# Patient Record
Sex: FEMALE | Race: WHITE | NOT HISPANIC OR LATINO | Employment: FULL TIME | URBAN - METROPOLITAN AREA
[De-identification: names, ages, dates, MRNs, and addresses within clinical notes are randomized per-mention and may not be internally consistent; named-entity substitution may affect disease eponyms.]

---

## 2016-07-11 LAB — PAP-ABSTRACT: NORMAL

## 2017-02-07 ENCOUNTER — OFFICE VISIT (OUTPATIENT)
Dept: INTERNAL MEDICINE | Facility: CLINIC | Age: 47
End: 2017-02-07
Payer: COMMERCIAL

## 2017-02-07 VITALS
TEMPERATURE: 98.3 F | HEART RATE: 72 BPM | RESPIRATION RATE: 16 BRPM | OXYGEN SATURATION: 98 % | HEIGHT: 60 IN | WEIGHT: 136 LBS | BODY MASS INDEX: 26.7 KG/M2

## 2017-02-07 DIAGNOSIS — G43.109 MIGRAINE WITH AURA AND WITHOUT STATUS MIGRAINOSUS, NOT INTRACTABLE: ICD-10-CM

## 2017-02-07 DIAGNOSIS — F41.8 DEPRESSION WITH ANXIETY: Primary | ICD-10-CM

## 2017-02-07 PROCEDURE — 99212 OFFICE O/P EST SF 10 MIN: CPT | Performed by: INTERNAL MEDICINE

## 2017-02-07 RX ORDER — RIZATRIPTAN BENZOATE 10 MG/1
10 TABLET ORAL
Qty: 8 TABLET | Refills: 0 | Status: SHIPPED | OUTPATIENT
Start: 2017-02-07 | End: 2017-03-23

## 2017-02-07 RX ORDER — FLUOXETINE 40 MG/1
40 CAPSULE ORAL DAILY
Qty: 30 CAPSULE | Refills: 0 | Status: SHIPPED | OUTPATIENT
Start: 2017-02-07 | End: 2017-03-23

## 2017-02-07 NOTE — PROGRESS NOTES
SUBJECTIVE:                                                      HPI: Nikki Hardin is a pleasant 46 year old female who presents for medication refill only:    Needs refills of Prozac and Maxalt.    Has enough medication right now, but will run out several weeks before meeting with new PCP March 23rd.    No questions or concerns today.    The medication, allergy, and problem lists have been reviewed and updated as appropriate.     OBJECTIVE:                                                      Pulse 72  Temp(Src) 98.3  F (36.8  C) (Oral)  Resp 16  Ht 5' (1.524 m)  Wt 136 lb (61.689 kg)  BMI 26.56 kg/m2  SpO2 98%  Constitutional: well-appearing  Psych: normal judgment and insight; normal mood and affect; recent and remote memory intact    ASSESSMENT/PLAN:                                                      (F41.8) Depression with anxiety  (primary encounter diagnosis)  Plan: Prozac refill provided.     (G43.109) Migraine with aura and without status migrainosus, not intractable  Plan: Maxalt refill provided.     The instructions on the AVS were discussed and explained to the patient. Patient expressed understanding of instructions.    Ileana Strange MD   [place] [address] [address] [address]  T: [phone], F: [phone]

## 2017-02-07 NOTE — NURSING NOTE
Chief Complaint   Patient presents with     Medication Request     migraine and depression medications       Initial Pulse 72  Temp(Src) 98.3  F (36.8  C) (Oral)  Resp 16  Ht 5' (1.524 m)  Wt 136 lb (61.689 kg)  BMI 26.56 kg/m2  SpO2 98% Estimated body mass index is 26.56 kg/(m^2) as calculated from the following:    Height as of this encounter: 5' (1.524 m).    Weight as of this encounter: 136 lb (61.689 kg).  Medication Reconciliation: complete   Esther Mejia MA

## 2017-03-23 ENCOUNTER — OFFICE VISIT (OUTPATIENT)
Dept: INTERNAL MEDICINE | Facility: CLINIC | Age: 47
End: 2017-03-23
Payer: COMMERCIAL

## 2017-03-23 ENCOUNTER — TRANSFERRED RECORDS (OUTPATIENT)
Dept: HEALTH INFORMATION MANAGEMENT | Facility: CLINIC | Age: 47
End: 2017-03-23

## 2017-03-23 VITALS
SYSTOLIC BLOOD PRESSURE: 100 MMHG | HEART RATE: 64 BPM | OXYGEN SATURATION: 99 % | WEIGHT: 142 LBS | BODY MASS INDEX: 27.88 KG/M2 | TEMPERATURE: 97.8 F | RESPIRATION RATE: 16 BRPM | HEIGHT: 60 IN | DIASTOLIC BLOOD PRESSURE: 60 MMHG

## 2017-03-23 DIAGNOSIS — E55.9 VITAMIN D DEFICIENCY: ICD-10-CM

## 2017-03-23 DIAGNOSIS — M35.00: ICD-10-CM

## 2017-03-23 DIAGNOSIS — R53.83 FATIGUE, UNSPECIFIED TYPE: ICD-10-CM

## 2017-03-23 DIAGNOSIS — K52.839 MICROSCOPIC COLITIS, UNSPECIFIED: Primary | ICD-10-CM

## 2017-03-23 DIAGNOSIS — G43.109 MIGRAINE WITH AURA AND WITHOUT STATUS MIGRAINOSUS, NOT INTRACTABLE: ICD-10-CM

## 2017-03-23 DIAGNOSIS — F41.8 DEPRESSION WITH ANXIETY: ICD-10-CM

## 2017-03-23 LAB
ERYTHROCYTE [DISTWIDTH] IN BLOOD BY AUTOMATED COUNT: 13.5 % (ref 10–15)
ERYTHROCYTE [SEDIMENTATION RATE] IN BLOOD BY WESTERGREN METHOD: 21 MM/H (ref 0–20)
HCT VFR BLD AUTO: 34.3 % (ref 35–47)
HGB BLD-MCNC: 11.2 G/DL (ref 11.7–15.7)
MCH RBC QN AUTO: 29.4 PG (ref 26.5–33)
MCHC RBC AUTO-ENTMCNC: 32.7 G/DL (ref 31.5–36.5)
MCV RBC AUTO: 90 FL (ref 78–100)
PLATELET # BLD AUTO: 299 10E9/L (ref 150–450)
RBC # BLD AUTO: 3.81 10E12/L (ref 3.8–5.2)
WBC # BLD AUTO: 8.2 10E9/L (ref 4–11)

## 2017-03-23 PROCEDURE — 85027 COMPLETE CBC AUTOMATED: CPT | Performed by: INTERNAL MEDICINE

## 2017-03-23 PROCEDURE — 82180 ASSAY OF ASCORBIC ACID: CPT | Mod: 90 | Performed by: INTERNAL MEDICINE

## 2017-03-23 PROCEDURE — 84425 ASSAY OF VITAMIN B-1: CPT | Mod: 90 | Performed by: INTERNAL MEDICINE

## 2017-03-23 PROCEDURE — 84439 ASSAY OF FREE THYROXINE: CPT | Performed by: INTERNAL MEDICINE

## 2017-03-23 PROCEDURE — 99215 OFFICE O/P EST HI 40 MIN: CPT | Performed by: INTERNAL MEDICINE

## 2017-03-23 PROCEDURE — 80053 COMPREHEN METABOLIC PANEL: CPT | Performed by: INTERNAL MEDICINE

## 2017-03-23 PROCEDURE — 36415 COLL VENOUS BLD VENIPUNCTURE: CPT | Performed by: INTERNAL MEDICINE

## 2017-03-23 PROCEDURE — 83516 IMMUNOASSAY NONANTIBODY: CPT | Mod: 91 | Performed by: INTERNAL MEDICINE

## 2017-03-23 PROCEDURE — 86255 FLUORESCENT ANTIBODY SCREEN: CPT | Mod: 90 | Performed by: INTERNAL MEDICINE

## 2017-03-23 PROCEDURE — 82784 ASSAY IGA/IGD/IGG/IGM EACH: CPT | Performed by: INTERNAL MEDICINE

## 2017-03-23 PROCEDURE — 82728 ASSAY OF FERRITIN: CPT | Performed by: INTERNAL MEDICINE

## 2017-03-23 PROCEDURE — 82306 VITAMIN D 25 HYDROXY: CPT | Performed by: INTERNAL MEDICINE

## 2017-03-23 PROCEDURE — 83550 IRON BINDING TEST: CPT | Performed by: INTERNAL MEDICINE

## 2017-03-23 PROCEDURE — 84443 ASSAY THYROID STIM HORMONE: CPT | Performed by: INTERNAL MEDICINE

## 2017-03-23 PROCEDURE — 84207 ASSAY OF VITAMIN B-6: CPT | Mod: 90 | Performed by: INTERNAL MEDICINE

## 2017-03-23 PROCEDURE — 82746 ASSAY OF FOLIC ACID SERUM: CPT | Performed by: INTERNAL MEDICINE

## 2017-03-23 PROCEDURE — 99000 SPECIMEN HANDLING OFFICE-LAB: CPT | Performed by: INTERNAL MEDICINE

## 2017-03-23 PROCEDURE — 85652 RBC SED RATE AUTOMATED: CPT | Performed by: INTERNAL MEDICINE

## 2017-03-23 PROCEDURE — 82607 VITAMIN B-12: CPT | Performed by: INTERNAL MEDICINE

## 2017-03-23 PROCEDURE — 83540 ASSAY OF IRON: CPT | Performed by: INTERNAL MEDICINE

## 2017-03-23 PROCEDURE — 86235 NUCLEAR ANTIGEN ANTIBODY: CPT | Performed by: INTERNAL MEDICINE

## 2017-03-23 PROCEDURE — 83516 IMMUNOASSAY NONANTIBODY: CPT | Performed by: INTERNAL MEDICINE

## 2017-03-23 RX ORDER — NAPROXEN SODIUM 220 MG
TABLET ORAL PRN
Qty: 60 TABLET | COMMUNITY
Start: 2017-03-23 | End: 2019-07-11

## 2017-03-23 RX ORDER — RIZATRIPTAN BENZOATE 10 MG/1
10 TABLET ORAL
Qty: 8 TABLET | Refills: 3 | Status: SHIPPED | OUTPATIENT
Start: 2017-03-23 | End: 2017-04-13

## 2017-03-23 RX ORDER — FLUOXETINE 40 MG/1
40 CAPSULE ORAL DAILY
Qty: 90 CAPSULE | Refills: 0 | Status: SHIPPED | OUTPATIENT
Start: 2017-03-23 | End: 2017-07-04

## 2017-03-23 NOTE — MR AVS SNAPSHOT
After Visit Summary   3/23/2017    Nikki Hardin    MRN: [id number]           Patient Information     Date Of Birth          1970        Visit Information        Provider Department      3/23/2017 4:30 PM Vandana Castellanos MD [facility]        Today's Diagnoses     Microscopic colitis, unspecified    -  1    Vitamin D deficiency        Migraine with aura and without status migrainosus, not intractable        Sicca syndrome, Sjogren's (H)        Depression with anxiety        Fatigue, unspecified type          Care Instructions    ** FOLLOW UP PLAN**:    PLEASE SCHEDULE OFFICE VISIT SOONEST AVAILABILITY FROM TODAY TO FOLLOW UP ON  FATIGUE, SLEEP, AND TO REVIEW TEST RESULTS     YOU MAY CONTACT THE CLINIC IF ANY QUESTIONS OR CONCERNS -[phone] OR [facility]IA [facility]                   Follow-ups after your visit        Additional Services     GASTROENTEROLOGY ADULT REF CONSULT ONLY       Preferred Location: [facility] [phone]      Please be aware that coverage of these services is subject to the terms and limitations of your health insurance plan.  Call member services at your health plan with any benefit or coverage questions.  Any procedures must be performed at a [place] facility OR coordinated by your clinic's referral office.    Please bring the following with you to your appointment:    (1) Any X-Rays, CTs or MRIs which have been performed.  Contact the facility where they were done to arrange for  prior to your scheduled appointment.    (2) List of current medications   (3) This referral request   (4) Any documents/labs given to you for this referral                  Follow-up notes from your care team     See patient instructions section of the AVS      Who to contact     If you have questions or need follow up information about today's clinic visit or your schedule please contact [facility] directly at [phone].  Normal or  non-critical lab and imaging results will be communicated to you by Humounohart, letter or phone within 4 business days after the clinic has received the results. If you do not hear from us within 7 days, please contact the clinic through TerraPower or phone. If you have a critical or abnormal lab result, we will notify you by phone as soon as possible.  Submit refill requests through TerraPower or call your pharmacy and they will forward the refill request to us. Please allow 3 business days for your refill to be completed.          Additional Information About Your Visit        TerraPower Information     TerraPower gives you secure access to your electronic health record. If you see a primary care provider, you can also send messages to your care team and make appointments. If you have questions, please call your primary care clinic.  If you do not have a primary care provider, please call [phone] and they will assist you.        Care EveryWhere ID     This is your Care EveryWhere ID. This could be used by other organizations to access your [place] medical records  [id number]        Your Vitals Were     Pulse Temperature Respirations Height Pulse Oximetry BMI (Body Mass Index)    64 97.8  F (36.6  C) (Oral) 16 5' (1.524 m) 99% 27.73 kg/m2       Blood Pressure from Last 3 Encounters:   03/23/17 100/60   05/02/16 98/64   06/05/13 106/61    Weight from Last 3 Encounters:   03/23/17 142 lb (64.4 kg)   02/07/17 136 lb (61.7 kg)   05/02/16 137 lb 12.8 oz (62.5 kg)              We Performed the Following     CBC with platelets     Comprehensive metabolic panel     Deamidated Giladin Peptide Vivian IgA IgG [PTE5592]     Endomysial antibody IgA [OHG7646]     Erythrocyte sedimentation rate auto     Ferritin     Folate     GASTROENTEROLOGY ADULT REF CONSULT ONLY     IgA [LAB73]     Iron and iron binding capacity     SSA Ro DELMY Antibody IgG     SSB La DELMY Antibody IgG     T4 FREE     Tissue transglutaminase vivian IgA and IgG [USP4432]      TSH     Vitamin B1 whole blood     Vitamin B12     Vitamin B6     Vitamin C     Vitamin D Deficiency          Today's Medication Changes          These changes are accurate as of: 3/23/17  5:53 PM.  If you have any questions, ask your nurse or doctor.               Start taking these medicines.        Dose/Directions    naproxen sodium 220 MG tablet   Commonly known as:  ANAPROX   Replaces:  NAPROXEN PO   Started by:  Vandana Castellanos MD        Take by mouth 2 times daily (with meals)   Quantity:  60 tablet   Refills:  0         These medicines have changed or have updated prescriptions.        Dose/Directions    cholecalciferol 5000 UNITS Caps   This may have changed:    - medication strength  - how much to take  - additional instructions   Changed by:  Vandana Castellanos MD        Dose:  1 capsule   Take 1 capsule (5,000 Units) by mouth daily FOR VITAMIN D DEFICIENCY (LOW VITAMIN D)   Quantity:  100 capsule   Refills:  3       melatonin 5 MG Caps   This may have changed:  medication strength   Changed by:  Vandana Castellanos MD        Refills:  0         Stop taking these medicines if you haven't already. Please contact your care team if you have questions.     bismuth subsalicylate 262 MG/15ML suspension   Commonly known as:  PEPTO BISMOL   Stopped by:  Vandana Castellanos MD           IMODIUM ADVANCED 2-125 MG Chew   Generic drug:  Loperamide-Simethicone   Stopped by:  Vandana Castellanos MD           NAPROXEN PO   Replaced by:  naproxen sodium 220 MG tablet   Stopped by:  Vandana Castellanos MD                Where to get your medicines      These medications were sent to [facility] [address] [place] [address] & [address] [address]    Hours:  24-hours Phone:  [phone]     rizatriptan 10 MG tablet                Primary Care Provider Office Phone # Fax #    Vandana Castellanos [phone]       [place]  [facility] [address] [facility] [address]        Thank you!     Thank you for choosing [facility]  for your care. Our goal is always to provide you with excellent care. Hearing back from our patients is one way we can continue to improve our services. Please take a few minutes to complete the written survey that you may receive in the mail after your visit with us. Thank you!             Your Updated Medication List - Protect others around you: Learn how to safely use, store and throw away your medicines at www.disposemymeds.org.          This list is accurate as of: 3/23/17  5:53 PM.  Always use your most recent med list.                   Brand Name Dispense Instructions for use    acetaminophen 325 MG tablet    TYLENOL    100 tablet    Take 2 tablets by mouth every 4 hours as needed for pain.       calcium + D 600-200 MG-UNIT Tabs   Generic drug:  calcium carbonate-vitamin D     60 tablet    Take  by mouth daily.       CALCIUM-MAGNESIUM PO      Take 2 tablets by mouth daily       cholecalciferol 5000 UNITS Caps     100 capsule    Take 1 capsule (5,000 Units) by mouth daily FOR VITAMIN D DEFICIENCY (LOW VITAMIN D)       fish oil-omega-3 fatty acids 1000 MG capsule      Take 2 g by mouth daily 05/02/16: No longer takes this medication       FLUoxetine 40 MG capsule    PROzac    30 capsule    Take 1 capsule (40 mg) by mouth daily       melatonin 5 MG Caps          naproxen sodium 220 MG tablet    ANAPROX    60 tablet    Take by mouth 2 times daily (with meals)       PROBIOTIC PO      Take 2 tablets by mouth daily       rizatriptan 10 MG tablet    MAXALT    8 tablet    Take 1 tablet (10 mg) by mouth at onset of headache May repeat in 2 hours if needed: max 2/day;       vitamin B complex with vitamin C Tabs tablet      Take 2 tablets by mouth daily       Zinc Acetate (Oral) 50 MG Caps     90 capsule        ZINC PO      Take 1 tablet by mouth daily

## 2017-03-23 NOTE — PATIENT INSTRUCTIONS
** FOLLOW UP PLAN**:    PLEASE SCHEDULE OFFICE VISIT SOONEST AVAILABILITY FROM TODAY TO FOLLOW UP ON  FATIGUE, SLEEP, AND TO REVIEW TEST RESULTS     YOU MAY CONTACT THE CLINIC IF ANY QUESTIONS OR CONCERNS -[phone] OR VIA [employer]

## 2017-03-23 NOTE — NURSING NOTE
Chief Complaint   Patient presents with     Establish Care       Initial /60 (BP Location: Right arm, Patient Position: Chair, Cuff Size: Adult Regular)  Pulse 64  Temp 97.8  F (36.6  C) (Oral)  Resp 16  Ht 5' (1.524 m)  Wt 142 lb (64.4 kg)  SpO2 99%  BMI 27.73 kg/m2 Estimated body mass index is 27.73 kg/(m^2) as calculated from the following:    Height as of this encounter: 5' (1.524 m).    Weight as of this encounter: 142 lb (64.4 kg).  Medication Reconciliation: complete   Esther Mejia MA

## 2017-03-23 NOTE — Clinical Note
Please abstract the following data from this visit with this patient into the appropriate field in Epic:  Mammogram done on this date: 12/30/16 (approximately), by this group: [facility], results were WNL.  Pap smear done on this date: 07/11/16 (approximately), by this group: [facility], results were WNL.

## 2017-03-23 NOTE — PROGRESS NOTES
SUBJECTIVE:                                                    Nikki Hardin is a 46 year old female who presents to clinic today for the following health issues:    Establish care:  -referral for gastro consult  -night sweats, difficulty sleeping- causing migraines to worse and depression       Migraine Follow-Up    Headaches symptoms:  Stable     Frequency: 2-3 every 2 weeks or so     Duration of headaches: 2 hours - 2 days    Able to do normal daily activities/work with migraines: Yes    Rescue/Relief medication:ibuprofen (Advil, Motrin), naproxyn (Aleve) and Maxalt              Effectiveness: moderate relief    Preventative medication: None    Neurologic complications: No new stroke-like symptoms, loss of vision or speech, numbness or weakness    In the past 4 weeks, how often have you gone to Urgent Care or the emergency room because of your headaches?  0       Problem list and histories reviewed & adjusted, as indicated.  Additional history: as documented    Labs reviewed in [facility] Everywhere, labs sig for positive SSA was seen by Rheumatology, but report was equivocal.    Reviewed and updated as needed this visit by clinical staff  Tobacco       Reviewed and updated as needed this visit by Provider         ROS:  14 point ROS negative except as above      OBJECTIVE:                                                    /60 (BP Location: Right arm, Patient Position: Chair, Cuff Size: Adult Regular)  Pulse 64  Temp 97.8  F (36.6  C) (Oral)  Resp 16  Ht 5' (1.524 m)  Wt 142 lb (64.4 kg)  SpO2 99%  BMI 27.73 kg/m2  Body mass index is 27.73 kg/(m^2).  GENERAL: healthy, alert and no distress  NECK: no adenopathy, no asymmetry, masses, or scars and thyroid normal to palpation  RESP: lungs clear to auscultation - no rales, rhonchi or wheezes  CV: regular rate and rhythm, normal S1 S2, no S3 or S4, no murmur, click or rub, no peripheral edema and peripheral pulses strong  ABDOMEN: soft,  nontender, no hepatosplenomegaly, no masses and bowel sounds normal  MS: no gross musculoskeletal defects noted, no edema  NEURO: Normal strength and tone, mentation intact and speech normal  PSYCH: mentation appears normal, affect normal/bright    Diagnostic Test Results:  none      ASSESSMENT/PLAN:                                                      DIAGNOSIS/PLAN:   THE PROBLEMS THAT WERE ADDRESSED TODAY ARE AS FOLLOWS:    Encounter Diagnoses   Name Primary?     Microscopic colitis, unspecified Yes     Vitamin D deficiency      Migraine with aura and without status migrainosus, not intractable      Sicca syndrome, Sjogren's (H)      Depression with anxiety      Fatigue, unspecified type        SIGNIFICANT ISSUES  AS NOTED AND ADDRESSED ABOVE     MEDS AND AND LABS AS ORDERED TO ADDRESS PREVIOUS AND CURRENT ABNORMAL INDICES      SEE PATIENT INSTRUCTION SECTION FOR FOLLOW UP PLAN    Nikki IS TO CONTINUE OTHER TREATMENT REGIMEN/PLANS EXCEPT AS INDICATED    COMPLIANCE WITH MEDICATIONS DIET AND EXERCISE PLANS ENCOURAGED    DISCONTINUED MEDS:  Medications Discontinued During This Encounter   Medication Reason     bismuth subsalicylate (PEPTO BISMOL) 262 MG/15ML suspension      IMODIUM ADVANCED 2-125 MG OR CHEW      Cholecalciferol (VITAMIN D3 PO) Reorder     MELATONIN PO Reorder     NAPROXEN PO Reorder     rizatriptan (MAXALT) 10 MG tablet Reorder       CURRENT MED LIST WITH CHANGES AS NOTED BELOW:  Current Outpatient Prescriptions   Medication Sig Dispense Refill     cholecalciferol 5000 UNITS CAPS Take 1 capsule (5,000 Units) by mouth daily FOR VITAMIN D DEFICIENCY (LOW VITAMIN D) 100 capsule 3     Zinc Acetate, Oral, 50 MG CAPS  90 capsule      melatonin 5 MG CAPS        naproxen sodium (ANAPROX) 220 MG tablet Take by mouth 2 times daily (with meals) 60 tablet      rizatriptan (MAXALT) 10 MG tablet Take 1 tablet (10 mg) by mouth at onset of headache May repeat in 2 hours if needed: max 2/day; 8 tablet 3      FLUoxetine (PROZAC) 40 MG capsule Take 1 capsule (40 mg) by mouth daily 30 capsule 0     CALCIUM-MAGNESIUM PO Take 2 tablets by mouth daily       Multiple Vitamins-Minerals (ZINC PO) Take 1 tablet by mouth daily       Probiotic Product (PROBIOTIC PO) Take 2 tablets by mouth daily       vitamin  B complex with vitamin C (VITAMIN  B COMPLEX) TABS Take 2 tablets by mouth daily       calcium carbonate-vitamin D (CALCIUM + D) 600-200 MG-UNIT TABS Take  by mouth daily. 60 tablet      acetaminophen (TYLENOL) 325 MG tablet Take 2 tablets by mouth every 4 hours as needed for pain. 100 tablet 0     fish oil-omega-3 fatty acids (FISH OIL) 1000 MG capsule Take 2 g by mouth daily 05/02/16: No longer takes this medication           Office visit time > 40 mins, greater than 50% of which was spent obtaining history, reviewing medications, counseling re compliance with treatment plan, discussion of treatment, follow up plans, and coordination of care.       Patient Instructions   ** FOLLOW UP PLAN**:    PLEASE SCHEDULE OFFICE VISIT SOONEST AVAILABILITY FROM TODAY TO FOLLOW UP ON  FATIGUE, SLEEP, AND TO REVIEW TEST RESULTS     YOU MAY CONTACT THE CLINIC IF ANY QUESTIONS OR CONCERNS -[phone] OR VIA [employer]                 Vandana Castellanos MD  [facility]

## 2017-03-24 LAB
ALBUMIN SERPL-MCNC: 4.1 G/DL (ref 3.4–5)
ALP SERPL-CCNC: 64 U/L (ref 40–150)
ALT SERPL W P-5'-P-CCNC: 26 U/L (ref 0–50)
ANION GAP SERPL CALCULATED.3IONS-SCNC: 9 MMOL/L (ref 3–14)
AST SERPL W P-5'-P-CCNC: 17 U/L (ref 0–45)
BILIRUB SERPL-MCNC: 0.2 MG/DL (ref 0.2–1.3)
BUN SERPL-MCNC: 16 MG/DL (ref 7–30)
CALCIUM SERPL-MCNC: 9.3 MG/DL (ref 8.5–10.1)
CHLORIDE SERPL-SCNC: 104 MMOL/L (ref 94–109)
CO2 SERPL-SCNC: 26 MMOL/L (ref 20–32)
CREAT SERPL-MCNC: 0.81 MG/DL (ref 0.52–1.04)
DEPRECATED CALCIDIOL+CALCIFEROL SERPL-MC: 55 UG/L (ref 20–75)
FERRITIN SERPL-MCNC: 49 NG/ML (ref 8–252)
FOLATE SERPL-MCNC: 21.6 NG/ML
GFR SERPL CREATININE-BSD FRML MDRD: 75 ML/MIN/1.7M2
GLUCOSE SERPL-MCNC: 77 MG/DL (ref 70–99)
IRON SATN MFR SERPL: 15 % (ref 15–46)
IRON SERPL-MCNC: 45 UG/DL (ref 35–180)
POTASSIUM SERPL-SCNC: 4 MMOL/L (ref 3.4–5.3)
PROT SERPL-MCNC: 7.8 G/DL (ref 6.8–8.8)
SODIUM SERPL-SCNC: 139 MMOL/L (ref 133–144)
T4 FREE SERPL-MCNC: 0.99 NG/DL (ref 0.76–1.46)
TIBC SERPL-MCNC: 294 UG/DL (ref 240–430)
TSH SERPL DL<=0.05 MIU/L-ACNC: 2.88 MU/L (ref 0.4–4)
VIT B12 SERPL-MCNC: 1368 PG/ML (ref 193–986)

## 2017-03-26 LAB
VIT B1 BLD-MCNC: 160 UG/DL
VIT B6 SERPL-MCNC: 303.2 NG/ML
VIT C SERPL-MCNC: 44 MG/DL

## 2017-03-27 LAB
ENA SS-A IGG SER IA-ACNC: 1 AI (ref 0–0.9)
ENA SS-B IGG SER IA-ACNC: NORMAL AI (ref 0–0.9)
GLIADIN IGA SER-ACNC: 1 U/ML
GLIADIN IGG SER-ACNC: NORMAL U/ML
IGA SERPL-MCNC: 145 MG/DL (ref 70–380)
TTG IGA SER-ACNC: 1 U/ML
TTG IGG SER-ACNC: NORMAL U/ML

## 2017-03-28 LAB — ENDOMYSIUM AB SER QL: NORMAL

## 2017-04-13 ENCOUNTER — HOSPITAL ENCOUNTER (OUTPATIENT)
Facility: CLINIC | Age: 47
Setting detail: SPECIMEN
Discharge: HOME | End: 2017-04-13
Attending: INTERNAL MEDICINE | Admitting: INTERNAL MEDICINE
Payer: COMMERCIAL

## 2017-04-13 ENCOUNTER — OFFICE VISIT (OUTPATIENT)
Dept: INTERNAL MEDICINE | Facility: CLINIC | Age: 47
End: 2017-04-13
Payer: COMMERCIAL

## 2017-04-13 VITALS
DIASTOLIC BLOOD PRESSURE: 60 MMHG | HEIGHT: 60 IN | SYSTOLIC BLOOD PRESSURE: 110 MMHG | TEMPERATURE: 98 F | HEART RATE: 67 BPM | WEIGHT: 142 LBS | BODY MASS INDEX: 27.88 KG/M2 | OXYGEN SATURATION: 97 %

## 2017-04-13 DIAGNOSIS — R89.4 SEROLOGIC ABNORMALITY: ICD-10-CM

## 2017-04-13 DIAGNOSIS — R79.89 ELEVATED VITAMIN B12 LEVEL: ICD-10-CM

## 2017-04-13 DIAGNOSIS — G43.109 MIGRAINE WITH AURA AND WITHOUT STATUS MIGRAINOSUS, NOT INTRACTABLE: ICD-10-CM

## 2017-04-13 DIAGNOSIS — R53.83 FATIGUE, UNSPECIFIED TYPE: Primary | ICD-10-CM

## 2017-04-13 DIAGNOSIS — D64.9 ANEMIA, UNSPECIFIED TYPE: ICD-10-CM

## 2017-04-13 DIAGNOSIS — E67.2 HYPERVITAMINOSIS B6: ICD-10-CM

## 2017-04-13 DIAGNOSIS — R70.0 SEDIMENTATION RATE ELEVATION: ICD-10-CM

## 2017-04-13 LAB
ALBUMIN UR-MCNC: NEGATIVE MG/DL
APPEARANCE UR: CLEAR
BASOPHILS # BLD AUTO: 0 10E9/L (ref 0–0.2)
BASOPHILS NFR BLD AUTO: 0.5 %
BILIRUB UR QL STRIP: NEGATIVE
COLOR UR AUTO: YELLOW
DIFFERENTIAL METHOD BLD: ABNORMAL
EOSINOPHIL # BLD AUTO: 0.3 10E9/L (ref 0–0.7)
EOSINOPHIL NFR BLD AUTO: 3.2 %
ERYTHROCYTE [DISTWIDTH] IN BLOOD BY AUTOMATED COUNT: 13.7 % (ref 10–15)
ERYTHROCYTE [SEDIMENTATION RATE] IN BLOOD BY WESTERGREN METHOD: 26 MM/H (ref 0–20)
GLUCOSE UR STRIP-MCNC: NEGATIVE MG/DL
HCT VFR BLD AUTO: 33.8 % (ref 35–47)
HGB BLD-MCNC: 11.2 G/DL (ref 11.7–15.7)
HGB UR QL STRIP: NEGATIVE
KETONES UR STRIP-MCNC: NEGATIVE MG/DL
LEUKOCYTE ESTERASE UR QL STRIP: NEGATIVE
LYMPHOCYTES # BLD AUTO: 3.1 10E9/L (ref 0.8–5.3)
LYMPHOCYTES NFR BLD AUTO: 37.9 %
MCH RBC QN AUTO: 30.2 PG (ref 26.5–33)
MCHC RBC AUTO-ENTMCNC: 33.1 G/DL (ref 31.5–36.5)
MCV RBC AUTO: 91 FL (ref 78–100)
MONOCYTES # BLD AUTO: 0.7 10E9/L (ref 0–1.3)
MONOCYTES NFR BLD AUTO: 8 %
NEUTROPHILS # BLD AUTO: 4.2 10E9/L (ref 1.6–8.3)
NEUTROPHILS NFR BLD AUTO: 50.4 %
NITRATE UR QL: NEGATIVE
PH UR STRIP: 6 PH (ref 5–7)
PLATELET # BLD AUTO: 317 10E9/L (ref 150–450)
RBC # BLD AUTO: 3.71 10E12/L (ref 3.8–5.2)
RBC #/AREA URNS AUTO: NORMAL /HPF (ref 0–2)
SP GR UR STRIP: 1.01 (ref 1–1.03)
URN SPEC COLLECT METH UR: NORMAL
UROBILINOGEN UR STRIP-ACNC: 0.2 EU/DL (ref 0.2–1)
WBC # BLD AUTO: 8.3 10E9/L (ref 4–11)
WBC #/AREA URNS AUTO: NORMAL /HPF (ref 0–2)

## 2017-04-13 PROCEDURE — 86335 IMMUNFIX E-PHORSIS/URINE/CSF: CPT | Mod: 90 | Performed by: INTERNAL MEDICINE

## 2017-04-13 PROCEDURE — 81001 URINALYSIS AUTO W/SCOPE: CPT | Performed by: INTERNAL MEDICINE

## 2017-04-13 PROCEDURE — 82785 ASSAY OF IGE: CPT | Performed by: INTERNAL MEDICINE

## 2017-04-13 PROCEDURE — 84156 ASSAY OF PROTEIN URINE: CPT | Mod: 90 | Performed by: INTERNAL MEDICINE

## 2017-04-13 PROCEDURE — 82787 IGG 1 2 3 OR 4 EACH: CPT | Performed by: INTERNAL MEDICINE

## 2017-04-13 PROCEDURE — 83883 ASSAY NEPHELOMETRY NOT SPEC: CPT | Mod: 90 | Performed by: INTERNAL MEDICINE

## 2017-04-13 PROCEDURE — 85045 AUTOMATED RETICULOCYTE COUNT: CPT | Performed by: INTERNAL MEDICINE

## 2017-04-13 PROCEDURE — 00000402 ZZHCL STATISTIC TOTAL PROTEIN: Performed by: INTERNAL MEDICINE

## 2017-04-13 PROCEDURE — 86038 ANTINUCLEAR ANTIBODIES: CPT | Performed by: INTERNAL MEDICINE

## 2017-04-13 PROCEDURE — 84165 PROTEIN E-PHORESIS SERUM: CPT | Performed by: INTERNAL MEDICINE

## 2017-04-13 PROCEDURE — 36415 COLL VENOUS BLD VENIPUNCTURE: CPT | Performed by: INTERNAL MEDICINE

## 2017-04-13 PROCEDURE — 99215 OFFICE O/P EST HI 40 MIN: CPT | Performed by: INTERNAL MEDICINE

## 2017-04-13 PROCEDURE — 83516 IMMUNOASSAY NONANTIBODY: CPT | Mod: 90 | Performed by: INTERNAL MEDICINE

## 2017-04-13 PROCEDURE — 82784 ASSAY IGA/IGD/IGG/IGM EACH: CPT | Performed by: INTERNAL MEDICINE

## 2017-04-13 PROCEDURE — 84166 PROTEIN E-PHORESIS/URINE/CSF: CPT | Performed by: INTERNAL MEDICINE

## 2017-04-13 PROCEDURE — 99000 SPECIMEN HANDLING OFFICE-LAB: CPT | Performed by: INTERNAL MEDICINE

## 2017-04-13 PROCEDURE — 85060 BLOOD SMEAR INTERPRETATION: CPT | Performed by: INTERNAL MEDICINE

## 2017-04-13 PROCEDURE — 86431 RHEUMATOID FACTOR QUANT: CPT | Performed by: INTERNAL MEDICINE

## 2017-04-13 PROCEDURE — 85025 COMPLETE CBC W/AUTO DIFF WBC: CPT | Performed by: INTERNAL MEDICINE

## 2017-04-13 PROCEDURE — 85652 RBC SED RATE AUTOMATED: CPT | Performed by: INTERNAL MEDICINE

## 2017-04-13 PROCEDURE — 83021 HEMOGLOBIN CHROMOTOGRAPHY: CPT | Mod: 90 | Performed by: INTERNAL MEDICINE

## 2017-04-13 RX ORDER — RIZATRIPTAN BENZOATE 10 MG/1
10 TABLET ORAL
Qty: 12 TABLET | Refills: 3 | Status: SHIPPED | OUTPATIENT
Start: 2017-04-13 | End: 2017-07-31

## 2017-04-13 NOTE — MR AVS SNAPSHOT
After Visit Summary   4/13/2017    Nikki Freemann    MRN: [id number]           Patient Information     Date Of Birth          1970        Visit Information        Provider Department      4/13/2017 6:30 PM Vandana Castellanos MD [facility]        Today's Diagnoses     Fatigue, unspecified type    -  1    Serologic abnormality        Sedimentation rate elevation        Elevated vitamin B12 level        Anemia, unspecified type        Hypervitaminosis B6        Migraine with aura and without status migrainosus, not intractable          Care Instructions    ** FOLLOW UP PLAN**:    PLEASE SCHEDULE OFFICE VISIT SOONEST AVAILABILITY FROM TODAY TO FOLLOW UP ON   Fatigue, unspecified type    Sedimentation rate elevation  Elevated vitamin B12 level  Anemia, unspecified type    WE WILL DISCUSS TODAY'S RESULTS VIA MY CHART SO PLEASE SEND ME AN e VISIT REQUEST ABOUT THE MIDDLE OF NEXT WEEK       YOU MAY CONTACT THE CLINIC IF ANY QUESTIONS OR CONCERNS -[phone] OR VIA [employer]                   Follow-ups after your visit        Who to contact     If you have questions or need follow up information about today's clinic visit or your schedule please contact [facility] directly at [phone].  Normal or non-critical lab and imaging results will be communicated to you by MyChart, letter or phone within 4 business days after the clinic has received the results. If you do not hear from us within 7 days, please contact the clinic through Quadriservt or phone. If you have a critical or abnormal lab result, we will notify you by phone as soon as possible.  Submit refill requests through TheLocker or call your pharmacy and they will forward the refill request to us. Please allow 3 business days for your refill to be completed.          Additional Information About Your Visit        [employer] Information     TheLocker gives you secure access to your electronic health  record. If you see a primary care provider, you can also send messages to your care team and make appointments. If you have questions, please call your primary care clinic.  If you do not have a primary care provider, please call [phone] and they will assist you.        Care EveryWhere ID     This is your Care EveryWhere ID. This could be used by other organizations to access your [place] medical records  [id number]        Your Vitals Were     Pulse Temperature Height Pulse Oximetry BMI (Body Mass Index)       67 98  F (36.7  C) (Oral) 5' (1.524 m) 97% 27.73 kg/m2        Blood Pressure from Last 3 Encounters:   04/13/17 110/60   03/23/17 100/60   05/02/16 98/64    Weight from Last 3 Encounters:   04/13/17 142 lb (64.4 kg)   03/23/17 142 lb (64.4 kg)   02/07/17 136 lb (61.7 kg)              We Performed the Following     Anti-Centromere B Antibodies ([facility])     Antinuclear antibody screen by EIA     Blood Morphology Pathologist Review     CBC with platelets differential     CENTROMERE ANTIBODY, IgG: Laboratory Miscellaneous Order     Erythrocyte sedimentation rate auto     Free kappa and lambda light chains urine     HGB Eval Reflex to ELP or RBC Solubility     IgA     IgE     IgG subclasses     IgM     Protein electrophoresis random urine     Protein electrophoresis     Reticulocyte Count     Rheumatoid factor     UA with Microscopic reflex to Culture          Today's Medication Changes          These changes are accurate as of: 4/13/17  8:16 PM.  If you have any questions, ask your nurse or doctor.               Start taking these medicines.        Dose/Directions    amitriptyline 25 MG tablet   Commonly known as:  ELAVIL   Used for:  Migraine with aura and without status migrainosus, not intractable        Dose:  25 mg   Take 1 tablet (25 mg) by mouth At Bedtime INDICATION: MIGRAINE PROPHYLAXIS   Quantity:  90 tablet   Refills:  1         These medicines have changed or have updated prescriptions.         Dose/Directions    vitamin B complex with vitamin C Tabs tablet   This may have changed:  how much to take        Dose:  1 tablet   Take 1 tablet by mouth daily   Refills:  0         Stop taking these medicines if you haven't already. Please contact your care team if you have questions.     calcium + D 600-200 MG-UNIT Tabs   Generic drug:  calcium carbonate-vitamin D                Where to get your medicines      These medications were sent to [facility] [address] [place] [address] & [address] [address]    Hours:  24-hours Phone:  [phone]     amitriptyline 25 MG tablet    rizatriptan 10 MG tablet                Primary Care Provider Office Phone # Fax #    Vandana Castellanos [phone]       [facility] [address] [facility] [address]        Thank you!     Thank you for choosing [facility]  for your care. Our goal is always to provide you with excellent care. Hearing back from our patients is one way we can continue to improve our services. Please take a few minutes to complete the written survey that you may receive in the mail after your visit with us. Thank you!             Your Updated Medication List - Protect others around you: Learn how to safely use, store and throw away your medicines at www.disposemymeds.org.          This list is accurate as of: 4/13/17  8:16 PM.  Always use your most recent med list.                   Brand Name Dispense Instructions for use    acetaminophen 325 MG tablet    TYLENOL    100 tablet    Take 2 tablets by mouth every 4 hours as needed for pain.       amitriptyline 25 MG tablet    ELAVIL    90 tablet    Take 1 tablet (25 mg) by mouth At Bedtime INDICATION: MIGRAINE PROPHYLAXIS       CALCIUM-MAGNESIUM PO      Take 2 tablets by mouth daily WITH POTASSIUM       cholecalciferol 5000 UNITS Caps     100 capsule    Take 1 capsule (5,000  Units) by mouth daily FOR VITAMIN D DEFICIENCY (LOW VITAMIN D)       [facility] BISMUTH 262 MG Tabs   Generic drug:  bismuth subsalicylate      Take 3 tablets by mouth 3 times daily       fish oil-omega-3 fatty acids 1000 MG capsule      Take 2 g by mouth daily 05/02/16: No longer takes this medication       FLUoxetine 40 MG capsule    PROzac    90 capsule    Take 1 capsule (40 mg) by mouth daily       melatonin 5 MG Caps          naproxen sodium 220 MG tablet    ANAPROX    60 tablet    Take by mouth 2 times daily (with meals)       PROBIOTIC PO      Take 2 tablets by mouth daily       rizatriptan 10 MG tablet    MAXALT    12 tablet    Take 1 tablet (10 mg) by mouth at onset of headache May repeat in 2 hours if needed: max 2/day;       vitamin B complex with vitamin C Tabs tablet      Take 1 tablet by mouth daily       Zinc Acetate (Oral) 50 MG Caps     90 capsule        ZINC PO      Take 1 tablet by mouth daily Reported on 4/13/2017

## 2017-04-13 NOTE — NURSING NOTE
Chief Complaint   Patient presents with     Recheck Medication       Initial /60 (BP Location: Right arm, Patient Position: Chair, Cuff Size: Adult Small)  Pulse 67  Temp 98  F (36.7  C) (Oral)  Ht 5' (1.524 m)  Wt 142 lb (64.4 kg)  SpO2 97%  BMI 27.73 kg/m2 Estimated body mass index is 27.73 kg/(m^2) as calculated from the following:    Height as of this encounter: 5' (1.524 m).    Weight as of this encounter: 142 lb (64.4 kg).  Medication Reconciliation: complete

## 2017-04-14 ENCOUNTER — MYC MEDICAL ADVICE (OUTPATIENT)
Dept: INTERNAL MEDICINE | Facility: CLINIC | Age: 47
End: 2017-04-14

## 2017-04-14 LAB
RETICS # AUTO: 70.3 10E9/L (ref 25–95)
RETICS/RBC NFR AUTO: 1.8 % (ref 0.5–2)

## 2017-04-14 NOTE — PATIENT INSTRUCTIONS
** FOLLOW UP PLAN**:    PLEASE SCHEDULE OFFICE VISIT SOONEST AVAILABILITY FROM TODAY TO FOLLOW UP ON   Fatigue, unspecified type    Sedimentation rate elevation  Elevated vitamin B12 level  Anemia, unspecified type    WE WILL DISCUSS TODAY'S RESULTS VIA MY CHART SO PLEASE SEND ME AN e VISIT REQUEST ABOUT THE MIDDLE OF NEXT WEEK       YOU MAY CONTACT THE CLINIC IF ANY QUESTIONS OR CONCERNS [phone] OR VIA [employer]

## 2017-04-14 NOTE — PROGRESS NOTES
SUBJECTIVE:                                                    Nikki Hardin is a 46 year old female who presents to clinic today for the following health issues:  Nikki is primarily here to review test results from previous visit.  She continues to feel fatigued.    Nikki's most recent test results are as noted and addressed in the plan section of this note, and are significant for findings consistent with:   Serologic abnormality - Isolated SSA elevation  Sedimentation rate elevation  Elevated vitamin B12 level - without being on supplements  Anemia, unspecified type  Hypervitaminosis B6 - she takes 2 B complex pills daily    Some of these could certainly account for a lot of the symptoms that she has been experiencing.    Other significant issues as outlined and addressed in the plan section of this note.          Problem list and histories reviewed & adjusted, as indicated.  Additional history: as documented    Labs reviewed in EPIC    Reviewed and updated as needed this visit by clinical staff  Allergies       Reviewed and updated as needed this visit by Provider         ROS:  14 point ROS negative except as above      OBJECTIVE:                                                    /60 (BP Location: Right arm, Patient Position: Chair, Cuff Size: Adult Small)  Pulse 67  Temp 98  F (36.7  C) (Oral)  Ht 5' (1.524 m)  Wt 142 lb (64.4 kg)  SpO2 97%  BMI 27.73 kg/m2  Body mass index is 27.73 kg/(m^2).  GENERAL: healthy, alert and no distress  EYES: Eyes grossly normal to inspection, PERRL and conjunctivae and sclerae normal  NECK: no adenopathy, no asymmetry, masses, or scars and thyroid normal to palpation  RESP: lungs clear to auscultation - no rales, rhonchi or wheezes  CV: regular rate and rhythm, normal S1 S2, no S3 or S4, no murmur, click or rub, no peripheral edema and peripheral pulses strong  ABDOMEN: soft, nontender, no hepatosplenomegaly, no masses and bowel sounds normal  MS: no  gross musculoskeletal defects noted, no edema    Diagnostic Test Results:  Results for orders placed or performed in visit on 04/13/17   CBC with platelets differential   Result Value Ref Range    WBC 8.3 4.0 - 11.0 10e9/L    RBC Count 3.71 (L) 3.8 - 5.2 10e12/L    Hemoglobin 11.2 (L) 11.7 - 15.7 g/dL    Hematocrit 33.8 (L) 35.0 - 47.0 %    MCV 91 78 - 100 fl    MCH 30.2 26.5 - 33.0 pg    MCHC 33.1 31.5 - 36.5 g/dL    RDW 13.7 10.0 - 15.0 %    Platelet Count 317 150 - 450 10e9/L    Diff Method Automated Method     % Neutrophils 50.4 %    % Lymphocytes 37.9 %    % Monocytes 8.0 %    % Eosinophils 3.2 %    % Basophils 0.5 %    Absolute Neutrophil 4.2 1.6 - 8.3 10e9/L    Absolute Lymphocytes 3.1 0.8 - 5.3 10e9/L    Absolute Monocytes 0.7 0.0 - 1.3 10e9/L    Absolute Eosinophils 0.3 0.0 - 0.7 10e9/L    Absolute Basophils 0.0 0.0 - 0.2 10e9/L   Reticulocyte Count   Result Value Ref Range    % Retic 1.8 0.5 - 2.0 %    Absolute Retic 70.3 25 - 95 10e9/L   Blood Morphology Pathologist Review   Result Value Ref Range    Copath Report       Patient Name: ALYSSA HADDAD  MR#: [id number]  Specimen #: [id number]  Collected: 4/13/2017  Received: 4/17/2017  Reported: 4/17/2017 15:21  Ordering Phy(s): MARITZA WILLINGHAM    For improved result formatting, select 'View Enhanced Report Format'  under Linked Documents section.    TEST(S):  Peripheral Smear Morphology    FINAL DIAGNOSIS:  Peripheral blood  - Slight normocytic normochromic anemia with slightly increased rouleaux    COMMENT:  The etiology of this patient's anemia is not clear from this peripheral  blood morphology. There is no morphologic evidence of hemolysis.  Increased rouleaux can be seen with any elevation of plasma proteins.  Please correlate with pending laboratory studies.    Electronically signed out by:    Rebecca Swanson M.D.    CLINICAL HISTORY:  46-year-old woman with anemia.    PERIPHERAL BLOOD DATA:    PERIPHERAL BLOOD DATA  Patient Value (Reference  Range >18 year old female)  8.25 .......WBC   (4.0-11.0 x 10*9/L)  3.71 .......RBC   (3.8-5.2 x 10*12 /L)  11.2 .......HGB   (11.7-15.7 g/dL)  33.8 .......HCT   (35.0-47.0 %)  91.1 .......MCV   (78-100fL)  30.2 .......MCH   (26.5-33.0 pg)  33.1  .......MCHC   (31.5-36.5 g/dL)  13.7 .......RDW   (10.0-15.0 %)  317 .......PLT   (150-450 x 10*9/L)  1.84 .......Retic   (0.5-2.0%)    PERIPHERAL BLOOD DIFFERENTIAL  (Reference ranges >18 year old female)  Automated differential:    Percent  4.16....Neutrophils, segmented and bands (40 - 75)  3.13....Lymphocytes (20 - 48)  0.66....Monocytes (0 - 12)  0.26....Eosinophils (0 - 6)  0.04....Basophils (0 - 2)    Absolute  50.4....Neutrophils, segmented and bands  (1.6 - 8.3 x 10*9/L)  37.9....Lymphocytes  (0.8 - 5.3 x 10*9/L)  8.0....Monocytes  (0 -1.3 x 10*9/L)  3.2....Eosinophils  (0 - 0.7 x 10*9/L)  0.5....Basophils  (0 - 0.2 x 10*9/L)    PERIPHERAL MORPHOLOGY:    ERYTHROCYTES: The red blood cells appear normochromic. Poikilocytosis is  minimal. Polychromasia is not increased. Rouleaux formation is slightly  increased.    LEUKOCYTES: Plasmacytoi d lymphocytes are noted.    PLATELETS: The morphology of the platelets is normal.    CPT Codes:  A: 57947-BKUQ    TESTING LAB LOCATION:  [address] [place]  [address] [phone]    COLLECTION SITE:  Client:  [facility]  Location:  [facility] (S)     Protein electrophoresis   Result Value Ref Range    Albumin Fraction 4.5 3.7 - 5.1 g/dL    Alpha 1 Fraction 0.3 0.2 - 0.4 g/dL    Alpha 2 Fraction 0.7 0.5 - 0.9 g/dL    Beta Fraction 0.8 0.6 - 1.0 g/dL    Gamma Fraction 1.2 0.7 - 1.6 g/dL    Monoclonal Peak 0.0 0.0 g/dL    ELP Interpretation:       Essentially normal electrophoretic pattern.  No monoclonal protein seen.   Pathologic significance requires clinical correlation.  ABIODUN Kyle M.D.,   Ph.D., Pathologist ().     IgA   Result Value Ref Range     70  - 380 mg/dL   IgE   Result Value Ref Range    IGE 3 0 - 114 KIU/L   IgM   Result Value Ref Range     60 - 265 mg/dL   IgG subclasses   Result Value Ref Range    IGG 1160 695 - 1620 mg/dL    IgG1 Test sent to [facility].  See ARMISC. 300 - 856 mg/dL    IgG2 Test sent to [facility].  See ARMISC. 158 - 761 mg/dL    IgG3 Test sent to [facility].  See ARMISC. 24 - 192 mg/dL    IgG4 Test sent to [facility].  See ARMISC. 11 - 86 mg/dL   Rheumatoid factor   Result Value Ref Range    Rheumatoid Factor <20 <20 IU/mL   Antinuclear antibody screen by [facility]   Result Value Ref Range    SHILPA Screen by [facility] 9.6 (H) <1.0   UA with Microscopic reflex to Culture   Result Value Ref Range    Color Urine Yellow     Appearance Urine Clear     Glucose Urine Negative NEG mg/dL    Bilirubin Urine Negative NEG    Ketones Urine Negative NEG mg/dL    Specific Gravity Urine 1.015 1.003 - 1.035    pH Urine 6.0 5.0 - 7.0 pH    Protein Albumin Urine Negative NEG mg/dL    Urobilinogen Urine 0.2 0.2 - 1.0 EU/dL    Nitrite Urine Negative NEG    Blood Urine Negative NEG    Leukocyte Esterase Urine Negative NEG    Source Midstream Urine     WBC Urine O - 2 0 - 2 /HPF    RBC Urine O - 2 0 - 2 /HPF   Erythrocyte sedimentation rate auto   Result Value Ref Range    Sed Rate 26 (H) 0 - 20 mm/h   HGB Eval Reflex to ELP or RBC Solubility   Result Value Ref Range    Hemoglobin A1 96.7     Hemoglobin A2 2.9     Hemoglobin F 0.4     Hemoglobin S Eval 0.0     Hemoglobin C 0.0     Hemoglobin E 0.0     Hemoglobin Other 0.0     HGB Abn Evaluation       SEE NOTE  (Note)    Impression: Normal HPLC evaluation.    This normal HPLC result does not rule out the possibility  of alpha globin gene deletions associated with silent  carrier status or alpha thalassemia trait. Individuals who  carry a rare, Greek beta thalassemia variant often have a  normal Hb A2 and may not be identified by this assay.  Please correlate with clinical and laboratory findings.      Sickle Cell Solubility Confirm Not  Performed     Hemoglobin Capillary ELP       Not Performed  (Note)  Performed by [facility],  [address] [place] [address] [phone]  [email or web address], Robbin Nunez MD, Lab. Director     Protein electrophoresis random urine   Result Value Ref Range    ELP Interpretation Urine       Only trace albumin and trace globulins.  No obvious monoclonal protein seen. We   recommend a first morning voided urine to detect clinically significant   proteinuria.  A random specimen is not optimal for detecting all proteins.  The   specific gravity of this specimen was only 1.010. Pathological significance   requires clinical correlation.  ABIODUN Kyle M.D., Ph.D., Pathologist ([phone])     Free kappa and lambda light chains urine   Result Value Ref Range    Total Urine Volume Resistant     Hours Collected Resistant     Albumin Urine       Detected  Reference range: Detected  Unit: not reported      Alpha-1 Urine       Detected  Reference range: None Detected  Unit: not reported      Alpha-2 Urine       Detected  Reference range: None Detected  Unit: not reported      Beta Globulin Urine       Detected  Reference range: None Detected  Unit: not reported      Gamma Urine       Detected  Reference range: None Detected  Unit: not reported      Total Protein Urine       SEE NOTE  Reference range: 10 to 140  Unit: mg/d  (Note)  Total Protein = 1.63 mg/dL based on random urine.  INTERPRETIVE INFORMATION: Total Protein  Total urinary protein is determined nephelometrically by  adding the albumin and Kappa and/or Lambda light chains.  This value may not agree with the total protein as  determined by chemical methods, which characteristically  underestimate urinary light chains.      CARLOS Interpretation Urine       SEE NOTE  Unit: not reported  (Note)  Urine is negative for monoclonal Free Light Chains (Bence  Clayton Protein).      Free Kappa Light Chn Urine 1.59     Free Kappa Excr/Day Urine       SEE NOTE  Unit:  mg/d  (Note)  Unable to quantitate free light chain excretion per day  on a random urine sample.      Free Lambda Lght Chn Urine 0.04     Free Lambda Excr/Day Urine       SEE NOTE  Unit: mg/d  (Note)  Unable to quantitate free light chain excretion per day  on a random urine sample.      F Kappa/Lambda Ratio Urine 39.75 (H)    [facility] Miscellaneous Test   Result Value Ref Range    Result       SEE NOTE  (Note)  Test name                    Result Flag  Units  RefIntvl  ------------------------------------------------------------  Centromere Ab, IgG                0       AU/mL 0-40  INTERPRETIVE INFORMATION: Centromere Ab, IgG   29 AU/mL or Less ............. Negative   30 - 40 AU/mL ................ Equivocal   41 AU/mL or Greater .......... Positive  When detected by this multiplex bead assay, the presence of  centromere antibodies is mainly associated with CREST  syndrome, a variant of systemic sclerosis (SSc). These  antibodies target the centromere B, a dominant antigen of  the centromeric complex associated with the centromere  pattern observed in antinuclear antibody (SHILPA) testing by  [facility]. Centromere antibodies may also be seen in a varying  percentage of patients with other autoimmune diseases,  including diffuse cutaneous SSc, Raynaud syndrome,  interstitial pulmonary fibrosis, autoimmune liver disease,  systemic lupus erythematosus (SLE) and rheumatoid arthritis  ( RA).  A negative result indicates no detectable IgG antibodies to  centromere B. If the result is negative but clinical  suspicion for SSc is strong, consider testing for SHILPA by  [facility] along with other antibodies associated with SSc,  including Scl-70, U3-RNP, PM/Scl, or Th/To.  Performed by [facility],  [address] [place] [id number] [phone]  [email or web address], Robbin Nunez MD, Lab. Director      Test Name CENTROMERE ANTIBODY IgG     Send Outs Misc Test Code       Assayed at [facility] [address]    Send Outs [facility]  Test Specimen SERUM    [facility] Miscellaneous Test   Result Value Ref Range    Result       SEE NOTE  (Note)  Test name                    Result Flag  Units  RefIntvl  ------------------------------------------------------------  Immunoglobulin G Subclass 1                                 717       mg/dL 240-1118  REFERENCE INTERVAL: Immunoglobulin G Subclass 1  Access complete set of age- and/or gender-specific  reference intervals for this test in the [facility]  Test Directory ([email or web address]).  Immunoglobulin G Subclass 2                                 312       mg/dL 124-549  REFERENCE INTERVAL: Immunoglobulin G Subclass 2  Access complete set of age- and/or gender-specific  reference intervals for this test in the [employer]  Test Directory ([email or web address]).  Immunoglobulin G Subclass 3                                  28       mg/dL   REFERENCE INTERVAL: Immunoglobulin G Subclass 3  Access complete set of age- and/or gender-specific  reference intervals for this test in the [facility]  Test Directory ([email or web address]).  Immunoglobulin G Subclass 4                                   13       mg/dL 1-123  The total IgG (mg/dL) can be derived by the sum of the  subclasses IgG1, IgG2, IgG3 and IgG4 values. However, a  confirmatory and more precise total IgG is available by the  nephelometric method of total IgG (Test # [id number]).  REFERENCE INTERVAL: Immunoglobulin G Subclass 4  Access complete set of age- and/or gender-specific  reference intervals for this test in the [facility]  Test Directory ([email or web address]).  Performed by [facility],  [address] [place] [address] [phone]  [email or web address], Robbin Nunez MD, Lab. Director      Test Name IGG SUBCLASSES     Send Outs Misc Test Code 97816     Send Outs Misc Test Specimen Serum         ASSESSMENT/PLAN:                                                      DIAGNOSIS/PLAN:     ICD-10-CM    1. Fatigue, unspecified type R53.83 Protein  electrophoresis random urine     Free kappa and lambda light chains urine     [facility] Miscellaneous Test     [facility] Miscellaneous Test   2. Serologic abnormality R89.4 CBC with platelets differential     Reticulocyte Count     Blood Morphology Pathologist Review     Protein electrophoresis     IgA     IgE     IgM     IgG subclasses     Rheumatoid factor     Antinuclear antibody screen by [facility]     UA with Microscopic reflex to Culture     Erythrocyte sedimentation rate auto     Anti-Centromere B Antibodies ([facility])     Protein electrophoresis random urine     Free kappa and lambda light chains urine     CANCELED: IgG     CANCELED: CENTROMERE ANTIBODY, IgG: Laboratory Miscellaneous Order    LOW ABNORMAL SSA, NORMAL SSB   3. Sedimentation rate elevation R70.0 CBC with platelets differential     Reticulocyte Count     Blood Morphology Pathologist Review     Protein electrophoresis     IgA     IgE     IgM     IgG subclasses     Rheumatoid factor     Antinuclear antibody screen by [facility]     UA with Microscopic reflex to Culture     Erythrocyte sedimentation rate auto     Anti-Centromere B Antibodies ([facility])     Protein electrophoresis random urine     Free kappa and lambda light chains urine     CANCELED: IgG     CANCELED: CENTROMERE ANTIBODY, IgG: Laboratory Miscellaneous Order   4. Elevated vitamin B12 level R74.8 CBC with platelets differential     Reticulocyte Count     Blood Morphology Pathologist Review     Protein electrophoresis     IgA     IgE     IgM     IgG subclasses     Rheumatoid factor     Antinuclear antibody screen by [facility]     UA with Microscopic reflex to Culture     Erythrocyte sedimentation rate auto     Anti-Centromere B Antibodies ([facility])     HGB Eval Reflex to ELP or RBC Solubility     Protein electrophoresis random urine     Free kappa and lambda light chains urine     CANCELED: IgG     CANCELED: CENTROMERE ANTIBODY, IgG: Laboratory Miscellaneous Order   5. Anemia, unspecified type D64.9 CBC with  platelets differential     Reticulocyte Count     Blood Morphology Pathologist Review     Protein electrophoresis     IgA     IgE     IgM     IgG subclasses     Rheumatoid factor     Antinuclear antibody screen by [facility]     UA with Microscopic reflex to Culture     Erythrocyte sedimentation rate auto     Anti-Centromere B Antibodies ([facility])     HGB Eval Reflex to ELP or RBC Solubility     Protein electrophoresis random urine     Free kappa and lambda light chains urine     CANCELED: IgG     CANCELED: CENTROMERE ANTIBODY, IgG: Laboratory Miscellaneous Order   6. Hypervitaminosis B6 E67.2    7. Migraine with aura and without status migrainosus, not intractable G43.109 rizatriptan (MAXALT) 10 MG tablet     DISCONTINUED: amitriptyline (ELAVIL) 25 MG tablet       SIGNIFICANT ISSUES RE The primary encounter diagnosis was Fatigue, unspecified type. Diagnoses of Serologic abnormality, Sedimentation rate elevation, Elevated vitamin B12 level, Anemia, unspecified type, Hypervitaminosis B6, and Migraine with aura and without status migrainosus, not intractable were also pertinent to this visit. AS NOTED AND ADDRESSED ABOVE   MEDS AND AND LABS AS ORDERED TO ADDRESS PREVIOUS AND CURRENT ABNORMAL INDICES    SEE PATIENT INSTRUCTION SECTION FOR FOLLOW UP PLAN    Nikki IS TO CONTINUE OTHER TREATMENT REGIMEN/PLANS EXCEPT AS INDICATED    COMPLIANCE WITH MEDICATIONS DIET AND EXERCISE PLANS ENCOURAGED    DISCONTINUED MEDS:  Medications Discontinued During This Encounter   Medication Reason     vitamin  B complex with vitamin C (VITAMIN  B COMPLEX) TABS Reorder     calcium carbonate-vitamin D (CALCIUM + D) 600-200 MG-UNIT TABS      rizatriptan (MAXALT) 10 MG tablet Reorder       CURRENT MED LIST WITH CHANGES AS NOTED BELOW:  Current Outpatient Prescriptions   Medication Sig Dispense Refill     vitamin B complex with vitamin C (VITAMIN  B COMPLEX) TABS tablet Take 1 tablet by mouth daily       rizatriptan (MAXALT) 10 MG tablet Take 1  tablet (10 mg) by mouth at onset of headache May repeat in 2 hours if needed: max 2/day; 12 tablet 3     cholecalciferol 5000 UNITS CAPS Take 1 capsule (5,000 Units) by mouth daily FOR VITAMIN D DEFICIENCY (LOW VITAMIN D) 100 capsule 3     Zinc Acetate, Oral, 50 MG CAPS  90 capsule      naproxen sodium (ANAPROX) 220 MG tablet Take by mouth as needed Takes 2 tablets PRN 60 tablet      FLUoxetine (PROZAC) 40 MG capsule Take 1 capsule (40 mg) by mouth daily 90 capsule 0     CALCIUM-MAGNESIUM PO Take 2 tablets by mouth daily WITH POTASSIUM       Probiotic Product (PROBIOTIC PO) Take 2 tablets by mouth daily       acetaminophen (TYLENOL) 325 MG tablet Take 2 tablets by mouth every 4 hours as needed for pain. 100 tablet 0     bismuth subsalicylate ([facility] BISMUTH) 262 MG TABS Take 3 tablets by mouth 2 times daily Reported on 5/8/2017 80 tablet      loperamide (LOPERAMIDE A-D) 2 MG tablet Take 2 mg by mouth 4 times daily as needed for diarrhea       zolpidem (AMBIEN) 5 MG tablet Take tablet by mouth 15 minutes prior to sleep, for Sleep Study 1 tablet 0     Omega-3 Fatty Acids (FISH OIL CONCENTRATE PO) Take 850 mg by mouth daily       IBUPROFEN PO Take 200 mg by mouth Takes 3 tablets PRN       amitriptyline (ELAVIL) 25 MG tablet Take 0.5 tablets (12.5 mg) by mouth At Bedtime INDICATION: MIGRAINE PROPHYLAXIS 90 tablet 1     ferrous fumarate 65 mg, [place]. FE,-Vitamin C 125 mg (VITRON C)  MG TABS tablet Take 1 tablet by mouth daily INDICATION: IRON SUPPLEMENT 100 tablet prn         Office visit time > 40 mins, greater than 50% of which was spent obtaining history, reviewing medications, counseling re compliance with treatment plan, discussion of treatment, follow up plans, and coordination of care.       Patient Instructions   ** FOLLOW UP PLAN**:    PLEASE SCHEDULE OFFICE VISIT SOONEST AVAILABILITY FROM TODAY TO FOLLOW UP ON   Fatigue, unspecified type    Sedimentation rate elevation  Elevated vitamin B12 level  Anemia,  unspecified type    WE WILL DISCUSS TODAY'S RESULTS VIA MY CHART SO PLEASE SEND ME AN e VISIT REQUEST ABOUT THE MIDDLE OF NEXT WEEK       YOU MAY CONTACT THE CLINIC IF ANY QUESTIONS OR CONCERNS -[phone] OR VIA JUAN Castellanos MD  [facility]

## 2017-04-16 LAB
HGB A1 MFR BLD: 96.7 %
HGB A2 MFR BLD: 2.9 %
HGB C MFR BLD: 0 %
HGB E MFR BLD: 0 %
HGB F MFR BLD: 0.4 %
HGB FRACT BLD ELPH-IMP: NORMAL
HGB OTHER MFR BLD: 0 %
HGB S BLD QL SOLY: NORMAL
HGB S MFR BLD: 0 %
PATH INTERP BLD-IMP: NORMAL
RESULT: NORMAL
SEND OUTS MISC TEST CODE: NORMAL
SEND OUTS MISC TEST SPECIMEN: NORMAL
TEST NAME: NORMAL

## 2017-04-17 LAB
ALBUMIN SERPL ELPH-MCNC: 4.5 G/DL (ref 3.7–5.1)
ALPHA1 GLOB SERPL ELPH-MCNC: 0.3 G/DL (ref 0.2–0.4)
ALPHA2 GLOB SERPL ELPH-MCNC: 0.7 G/DL (ref 0.5–0.9)
ANA SER QL IA: 9.6
B-GLOBULIN SERPL ELPH-MCNC: 0.8 G/DL (ref 0.6–1)
COPATH REPORT: NORMAL
GAMMA GLOB SERPL ELPH-MCNC: 1.2 G/DL (ref 0.7–1.6)
IGA SERPL-MCNC: 152 MG/DL (ref 70–380)
IGE SERPL-ACNC: 3 KIU/L (ref 0–114)
IGM SERPL-MCNC: 133 MG/DL (ref 60–265)
M PROTEIN SERPL ELPH-MCNC: 0 G/DL
PROT PATTERN SERPL ELPH-IMP: NORMAL
PROT PATTERN UR ELPH-IMP: NORMAL

## 2017-04-18 LAB
IGG SERPL-MCNC: 1160 MG/DL (ref 695–1620)
IGG1 SER-MCNC: NORMAL MG/DL (ref 300–856)
IGG2 SER-MCNC: NORMAL MG/DL (ref 158–761)
IGG3 SER-MCNC: NORMAL MG/DL (ref 24–192)
IGG4 SER-MCNC: NORMAL MG/DL (ref 11–86)
RHEUMATOID FACT SER NEPH-ACNC: <20 IU/ML (ref 0–20)

## 2017-04-20 LAB
RESULT: NORMAL
SEND OUTS MISC TEST CODE: NORMAL
SEND OUTS MISC TEST SPECIMEN: NORMAL
TEST NAME: NORMAL

## 2017-04-21 LAB
ALBUMIN UR QL: ABNORMAL
ALPHA1 GLOB 24H UR QL ELPH: ABNORMAL
ALPHA2 GLOB UR QL ELPH: ABNORMAL
B-GLOBULIN UR QL ELPH: ABNORMAL
COLLECT DURATION TIME SPEC: ABNORMAL H
GAMMA GLOB UR QL ELPH: ABNORMAL
INTERPRETATION UR IFE-IMP: ABNORMAL
KAPPA LC FREE 24H UR-MRATE: ABNORMAL MG/(24.H)
KAPPA LC FREE UR-MCNC: 1.59 MG/DL
KAPPA LC FREE/LAMBDA FREE UR: 39.75
LAMBDA LC FREE 24H UR-MRATE: ABNORMAL MG/(24.H)
LAMBDA LC FREE UR-MCNC: 0.04 MG/DL
PROT 24H UR-MRATE: ABNORMAL G/(24.H)
SPECIMEN VOL ?TM UR: ABNORMAL ML

## 2017-04-26 ENCOUNTER — MYC MEDICAL ADVICE (OUTPATIENT)
Dept: INTERNAL MEDICINE | Facility: CLINIC | Age: 47
End: 2017-04-26

## 2017-04-28 ENCOUNTER — OFFICE VISIT (OUTPATIENT)
Dept: INTERNAL MEDICINE | Facility: CLINIC | Age: 47
End: 2017-04-28
Payer: COMMERCIAL

## 2017-04-28 VITALS
HEART RATE: 76 BPM | DIASTOLIC BLOOD PRESSURE: 62 MMHG | OXYGEN SATURATION: 98 % | BODY MASS INDEX: 27.93 KG/M2 | TEMPERATURE: 98.1 F | SYSTOLIC BLOOD PRESSURE: 106 MMHG | WEIGHT: 143 LBS

## 2017-04-28 DIAGNOSIS — E55.9 VITAMIN D DEFICIENCY: ICD-10-CM

## 2017-04-28 DIAGNOSIS — R53.83 FATIGUE, UNSPECIFIED TYPE: ICD-10-CM

## 2017-04-28 DIAGNOSIS — R82.90 ABNORMAL URINE FINDING: ICD-10-CM

## 2017-04-28 DIAGNOSIS — I73.00 RAYNAUD'S PHENOMENON WITHOUT GANGRENE: ICD-10-CM

## 2017-04-28 DIAGNOSIS — M35.00 SICCA (H): ICD-10-CM

## 2017-04-28 DIAGNOSIS — E67.2 HYPERVITAMINOSIS B6: ICD-10-CM

## 2017-04-28 DIAGNOSIS — R79.89 ELEVATED VITAMIN B12 LEVEL: ICD-10-CM

## 2017-04-28 DIAGNOSIS — D64.9 ANEMIA, UNSPECIFIED TYPE: ICD-10-CM

## 2017-04-28 DIAGNOSIS — R76.8 RHEUMATOID FACTOR POSITIVE: ICD-10-CM

## 2017-04-28 DIAGNOSIS — R40.0 DAYTIME SOMNOLENCE: ICD-10-CM

## 2017-04-28 DIAGNOSIS — R70.0 ELEVATED SEDIMENTATION RATE: ICD-10-CM

## 2017-04-28 DIAGNOSIS — G43.109 MIGRAINE WITH AURA AND WITHOUT STATUS MIGRAINOSUS, NOT INTRACTABLE: Primary | ICD-10-CM

## 2017-04-28 PROCEDURE — 99215 OFFICE O/P EST HI 40 MIN: CPT | Performed by: INTERNAL MEDICINE

## 2017-04-28 NOTE — NURSING NOTE
Chief Complaint   Patient presents with     Fatigue     Headache     Results     labs       Initial /62 (BP Location: Right arm, Patient Position: Chair, Cuff Size: Adult Regular)  Pulse 76  Temp 98.1  F (36.7  C) (Oral)  Wt 143 lb (64.9 kg)  SpO2 98%  BMI 27.93 kg/m2 Estimated body mass index is 27.93 kg/(m^2) as calculated from the following:    Height as of 4/13/17: 5' (1.524 m).    Weight as of this encounter: 143 lb (64.9 kg).  Medication Reconciliation: complete

## 2017-04-28 NOTE — MR AVS SNAPSHOT
After Visit Summary   4/28/2017    Nikki Freemann    MRN: [id number]           Patient Information     Date Of Birth          1970        Visit Information        Provider Department      4/28/2017 1:30 PM Vandana Castellanos MD [facility]        Today's Diagnoses     Migraine with aura and without status migrainosus, not intractable    -  1    Elevated vitamin B12 level        Abnormal urine finding        Fatigue, unspecified type        Daytime somnolence        Sicca (H)        Raynaud's phenomenon without gangrene        Anemia, unspecified type        Elevated sedimentation rate        Rheumatoid factor positive        Hypervitaminosis B6        Vitamin D deficiency          Care Instructions    ** FOLLOW UP PLAN**:    PLEASE SCHEDULE LABS WITH OFFICE VISIT SOONEST AVAILABILITY FROM TODAY TO FOLLOW UP ON  FATIGUE, ABNORMAL LAB VALUES, RESULTS OF RHEUMATOLOGY EVALUATION, AND TO REVIEW YOUR TEST RESULTS     BE SURE TO SCHEDULE YOUR LAB DRAW APPOINTMENT FOR AT LEAST 1 WEEK BEFORE YOUR NEXT VISIT      YOU HAVE BEEN REFERRED FOR RHEUMATOLOGY AND FOR SLEEP STUDY TO ADDRESS ISSUES RAISED TODAY RE ,  PLEASE  MAKE SURE TO SCHEDULE RHEUMATOLOGY APPOINTMENT(S) AT THE   OR BY CALLING THE NUMBER BELOW AND  SLEEP STUDY APPOINTMENT(S) BY TELEPHONE      YOU MAY CONTACT THE CLINIC IF ANY QUESTIONS OR CONCERNS -[phone] OR [facility]                   Follow-ups after your visit        Additional Services     RHEUMATOLOGY REFERRAL       Your provider has referred you to: [facility]:  [facility]  [phone] [email or web address]      Please be aware that coverage of these services is subject to the terms and limitations of your health insurance plan.  Call member services at your health plan with any benefit or coverage questions.      Please bring the following with you to your appointment:    (1) Any X-Rays, CTs or  MRIs which have been performed.  Contact the facility where they were done to arrange for  prior to your scheduled appointment.    (2) List of current medications   (3) This referral request   (4) Any documents/labs given to you for this referral            SLEEP EVALUATION & MANAGEMENT REFERRAL - ADULT       Please be aware that coverage of these services is subject to the terms and limitations of your health insurance plan.  Call member services at your health plan with any benefit or coverage questions.      Please bring the following to your appointment:    >>   List of current medications   >>   This referral request   >>   Any documents/labs given to you for this referral    [place] Elsi Edwards)   [phone] (Age 18 and up)                  Future tests that were ordered for you today     Open Future Orders        Priority Expected Expires Ordered    Vitamin B6 Routine 4/28/2017 10/26/2017 4/28/2017    Vitamin D Deficiency Routine 4/28/2017 10/26/2017 4/28/2017    Ferritin Routine 4/28/2017 10/26/2017 4/28/2017    Vitamin B12 Routine 4/28/2017 10/26/2017 4/28/2017    Erythrocyte sedimentation rate auto Routine 4/28/2017 4/28/2018 4/28/2017    Total Light Chains Ur Routine 4/28/2017 4/28/2018 4/28/2017    SLEEP EVALUATION & MANAGEMENT REFERRAL - ADULT Routine  4/28/2018 4/28/2017            Who to contact     If you have questions or need follow up information about today's clinic visit or your schedule please contact [facility] directly at [phone].  Normal or non-critical lab and imaging results will be communicated to you by MyChart, letter or phone within 4 business days after the clinic has received the results. If you do not hear from us within 7 days, please contact the clinic through MyChart or phone. If you have a critical or abnormal lab result, we will notify you by phone as soon as possible.  Submit refill requests through [employer] or call your pharmacy  and they will forward the refill request to us. Please allow 3 business days for your refill to be completed.          Additional Information About Your Visit        [employer] Information     [employer] gives you secure access to your electronic health record. If you see a primary care provider, you can also send messages to your care team and make appointments. If you have questions, please call your primary care clinic.  If you do not have a primary care provider, please call [phone] and they will assist you.        Care EveryWhere ID     This is your Care EveryWhere ID. This could be used by other organizations to access your [place] medical records  [id number]        Your Vitals Were     Pulse Temperature Pulse Oximetry BMI (Body Mass Index)          76 98.1  F (36.7  C) (Oral) 98% 27.93 kg/m2         Blood Pressure from Last 3 Encounters:   04/28/17 106/62   04/13/17 110/60   03/23/17 100/60    Weight from Last 3 Encounters:   04/28/17 143 lb (64.9 kg)   04/13/17 142 lb (64.4 kg)   03/23/17 142 lb (64.4 kg)              We Performed the Following     RHEUMATOLOGY REFERRAL          Today's Medication Changes          These changes are accurate as of: 4/28/17  2:31 PM.  If you have any questions, ask your nurse or doctor.               Start taking these medicines.        Dose/Directions    ferrous fumarate 65 mg ([place]. FE)-Vitamin C 125 mg  MG Tabs tablet   Commonly known as:  VITRON C   Used for:  Migraine with aura and without status migrainosus, not intractable   Started by:  Vandana Castellanos MD        Dose:  1 tablet   Take 1 tablet by mouth daily INDICATION: IRON SUPPLEMENT   Quantity:  100 tablet   Refills:  prn         These medicines have changed or have updated prescriptions.        Dose/Directions    amitriptyline 25 MG tablet   Commonly known as:  ELAVIL   This may have changed:  how much to take   Used for:  Migraine with aura and without status migrainosus, not intractable   Changed by:   Vandana Castellanos MD        Dose:  12.5 mg   Take 0.5 tablets (12.5 mg) by mouth At Bedtime INDICATION: MIGRAINE PROPHYLAXIS   Quantity:  90 tablet   Refills:  1            Where to get your medicines      Some of these will need a paper prescription and others can be bought over the counter.  Ask your nurse if you have questions.     Bring a paper prescription for each of these medications     ferrous fumarate 65 mg ([place]. FE)-Vitamin C 125 mg  MG Tabs tablet       You don't need a prescription for these medications     amitriptyline 25 MG tablet                Primary Care Provider Office Phone # Fax #    Vandana Castellanos [phone]       [facility] [address] [facility] [address]        Thank you!     Thank you for choosing [facility]  for your care. Our goal is always to provide you with excellent care. Hearing back from our patients is one way we can continue to improve our services. Please take a few minutes to complete the written survey that you may receive in the mail after your visit with us. Thank you!             Your Updated Medication List - Protect others around you: Learn how to safely use, store and throw away your medicines at www.disposemymeds.org.          This list is accurate as of: 4/28/17  2:31 PM.  Always use your most recent med list.                   Brand Name Dispense Instructions for use    acetaminophen 325 MG tablet    TYLENOL    100 tablet    Take 2 tablets by mouth every 4 hours as needed for pain.       amitriptyline 25 MG tablet    ELAVIL    90 tablet    Take 0.5 tablets (12.5 mg) by mouth At Bedtime INDICATION: MIGRAINE PROPHYLAXIS       CALCIUM-MAGNESIUM PO      Take 2 tablets by mouth daily WITH POTASSIUM       cholecalciferol 5000 UNITS Caps     100 capsule    Take 1 capsule (5,000 Units) by mouth daily FOR VITAMIN D DEFICIENCY (LOW VITAMIN D)       [facility] BISMUTH 262 MG Tabs   Generic drug:  bismuth  subsalicylate      Take 3 tablets by mouth 3 times daily       ferrous fumarate 65 mg ([place]. FE)-Vitamin C 125 mg  MG Tabs tablet    VITRON C    100 tablet    Take 1 tablet by mouth daily INDICATION: IRON SUPPLEMENT       FISH OIL CONCENTRATE PO      Take 850 mg by mouth daily       FLUoxetine 40 MG capsule    PROzac    90 capsule    Take 1 capsule (40 mg) by mouth daily       IBUPROFEN PO      Take 200 mg by mouth Takes 3 tablets PRN       melatonin 5 MG Caps          naproxen sodium 220 MG tablet    ANAPROX    60 tablet    Take by mouth as needed Takes 2 tablets PRN       PROBIOTIC PO      Take 2 tablets by mouth daily       rizatriptan 10 MG tablet    MAXALT    12 tablet    Take 1 tablet (10 mg) by mouth at onset of headache May repeat in 2 hours if needed: max 2/day;       vitamin B complex with vitamin C Tabs tablet      Take 1 tablet by mouth daily       Zinc Acetate (Oral) 50 MG Caps     90 capsule        ZINC PO      Take 1 tablet by mouth daily Reported on 4/28/2017

## 2017-04-28 NOTE — PROGRESS NOTES
SUBJECTIVE:                                                    Nikki Hardin is a 47 year old female who presents to clinic today for the following health issues:      Patient here to follow-up on fatigue.  Patient states that she is still feeling as she did prior to initial visit. Patient also presents today for follow-up on migraines. States that the Amitriptyline made the fatigue worse the next day after taking the medication. She also reports feeling as if her brain was foggy after taking this medication. Patient is also here to follow-up on labs taken at last visit.      She reports that she has felt a little better with regards to fatigue and muscle aches and pains since her last office visit.    She now gives me a history of snoring and excessive daytime sleepiness which coupled with the fatigue opens up the suggestion of possible sleep apnea. She says that she has had a sleep study in the past which was inconclusive.    Nikki's most recent test results are as noted and addressed in the plan section of this note, and are significant for findings consistent with:   Elevated vitamin B12 level ? Etiology  Abnormal urine finding - elevated light chain ratio  Anemia, unspecified type  Elevated sedimentation rate  Rheumatoid factor positive  Hypervitaminosis B6    Which could certainly account for some of the symptoms that she has been experiencing.    Problem list and histories reviewed & adjusted, as indicated.  Additional history: as documented    Labs reviewed in EPIC    Reviewed and updated as needed this visit by clinical staff       Reviewed and updated as needed this visit by Provider         ROS:  14 point ROS negative except as above      OBJECTIVE:                                                    /62 (BP Location: Right arm, Patient Position: Chair, Cuff Size: Adult Regular)  Pulse 76  Temp 98.1  F (36.7  C) (Oral)  Wt 143 lb (64.9 kg)  SpO2 98%  BMI 27.93 kg/m2  Body mass index  is 27.93 kg/(m^2).  GENERAL: healthy, alert and no distress  NECK: no adenopathy, no asymmetry, masses, or scars and thyroid normal to palpation  RESP: lungs clear to auscultation - no rales, rhonchi or wheezes  CV: regular rate and rhythm, normal S1 S2, no S3 or S4, no murmur, click or rub, no peripheral edema and peripheral pulses strong  MS: no gross musculoskeletal defects noted, no edema    Diagnostic Test Results:  Results for orders placed or performed in visit on 04/13/17   CBC with platelets differential   Result Value Ref Range    WBC 8.3 4.0 - 11.0 10e9/L    RBC Count 3.71 (L) 3.8 - 5.2 10e12/L    Hemoglobin 11.2 (L) 11.7 - 15.7 g/dL    Hematocrit 33.8 (L) 35.0 - 47.0 %    MCV 91 78 - 100 fl    MCH 30.2 26.5 - 33.0 pg    MCHC 33.1 31.5 - 36.5 g/dL    RDW 13.7 10.0 - 15.0 %    Platelet Count 317 150 - 450 10e9/L    Diff Method Automated Method     % Neutrophils 50.4 %    % Lymphocytes 37.9 %    % Monocytes 8.0 %    % Eosinophils 3.2 %    % Basophils 0.5 %    Absolute Neutrophil 4.2 1.6 - 8.3 10e9/L    Absolute Lymphocytes 3.1 0.8 - 5.3 10e9/L    Absolute Monocytes 0.7 0.0 - 1.3 10e9/L    Absolute Eosinophils 0.3 0.0 - 0.7 10e9/L    Absolute Basophils 0.0 0.0 - 0.2 10e9/L   Reticulocyte Count   Result Value Ref Range    % Retic 1.8 0.5 - 2.0 %    Absolute Retic 70.3 25 - 95 10e9/L   Blood Morphology Pathologist Review   Result Value Ref Range    Copath Report       Patient Name: ALYSSA HADDAD  MR#: [id number]  Specimen #: [id number]  Collected: 4/13/2017  Received: 4/17/2017  Reported: 4/17/2017 15:21  Ordering Phy(s): MARITZA WILLINGHAM    For improved result formatting, select 'View Enhanced Report Format'  under Linked Documents section.    TEST(S):  Peripheral Smear Morphology    FINAL DIAGNOSIS:  Peripheral blood  - Slight normocytic normochromic anemia with slightly increased rouleaux    COMMENT:  The etiology of this patient's anemia is not clear from this peripheral  blood morphology. There is  no morphologic evidence of hemolysis.  Increased rouleaux can be seen with any elevation of plasma proteins.  Please correlate with pending laboratory studies.    Electronically signed out by:    Rebecca Swanson M.D.    CLINICAL HISTORY:  46-year-old woman with anemia.    PERIPHERAL BLOOD DATA:    PERIPHERAL BLOOD DATA  Patient Value (Reference Range >18 year old female)  8.25 .......WBC   (4.0-11.0 x 10*9/L)  3.71 .......RBC   (3.8-5.2 x 10*12 /L)  11.2 .......HGB   (11.7-15.7 g/dL)  33.8 .......HCT   (35.0-47.0 %)  91.1 .......MCV   (78-100fL)  30.2 .......MCH   (26.5-33.0 pg)  33.1  .......MCHC   (31.5-36.5 g/dL)  13.7 .......RDW   (10.0-15.0 %)  317 .......PLT   (150-450 x 10*9/L)  1.84 .......Retic   (0.5-2.0%)    PERIPHERAL BLOOD DIFFERENTIAL  (Reference ranges >18 year old female)  Automated differential:    Percent  4.16....Neutrophils, segmented and bands (40 - 75)  3.13....Lymphocytes (20 - 48)  0.66....Monocytes (0 - 12)  0.26....Eosinophils (0 - 6)  0.04....Basophils (0 - 2)    Absolute  50.4....Neutrophils, segmented and bands  (1.6 - 8.3 x 10*9/L)  37.9....Lymphocytes  (0.8 - 5.3 x 10*9/L)  8.0....Monocytes  (0 -1.3 x 10*9/L)  3.2....Eosinophils  (0 - 0.7 x 10*9/L)  0.5....Basophils  (0 - 0.2 x 10*9/L)    PERIPHERAL MORPHOLOGY:    ERYTHROCYTES: The red blood cells appear normochromic. Poikilocytosis is  minimal. Polychromasia is not increased. Rouleaux formation is slightly  increased.    LEUKOCYTES: Plasmacytoi d lymphocytes are noted.    PLATELETS: The morphology of the platelets is normal.    CPT Codes:  A: 93135-MLQJ    TESTING LAB LOCATION:  [address]  [address]  [phone]    COLLECTION SITE:  Client:  [facility]  Location:  [facility] (S)     Protein electrophoresis   Result Value Ref Range    Albumin Fraction 4.5 3.7 - 5.1 g/dL    Alpha 1 Fraction 0.3 0.2 - 0.4 g/dL    Alpha 2 Fraction 0.7 0.5 - 0.9 g/dL    Beta Fraction 0.8  0.6 - 1.0 g/dL    Gamma Fraction 1.2 0.7 - 1.6 g/dL    Monoclonal Peak 0.0 0.0 g/dL    ELP Interpretation:       Essentially normal electrophoretic pattern.  No monoclonal protein seen.   Pathologic significance requires clinical correlation.  ABIODUN Kyle M.D.,   Ph.D., Pathologist ().     IgA   Result Value Ref Range     70 - 380 mg/dL   IgE   Result Value Ref Range    IGE 3 0 - 114 KIU/L   IgM   Result Value Ref Range     60 - 265 mg/dL   IgG subclasses   Result Value Ref Range    IGG 1160 695 - 1620 mg/dL    IgG1 Test sent to [facility].  See ARMISC. 300 - 856 mg/dL    IgG2 Test sent to [facility].  See ARMISC. 158 - 761 mg/dL    IgG3 Test sent to [facility].  See ARMISC. 24 - 192 mg/dL    IgG4 Test sent to [facility].  See ARMISC. 11 - 86 mg/dL   Rheumatoid factor   Result Value Ref Range    Rheumatoid Factor <20 <20 IU/mL   Antinuclear antibody screen by EIA   Result Value Ref Range    SHILPA Screen by [facility] 9.6 (H) <1.0   UA with Microscopic reflex to Culture   Result Value Ref Range    Color Urine Yellow     Appearance Urine Clear     Glucose Urine Negative NEG mg/dL    Bilirubin Urine Negative NEG    Ketones Urine Negative NEG mg/dL    Specific Gravity Urine 1.015 1.003 - 1.035    pH Urine 6.0 5.0 - 7.0 pH    Protein Albumin Urine Negative NEG mg/dL    Urobilinogen Urine 0.2 0.2 - 1.0 EU/dL    Nitrite Urine Negative NEG    Blood Urine Negative NEG    Leukocyte Esterase Urine Negative NEG    Source Midstream Urine     WBC Urine O - 2 0 - 2 /HPF    RBC Urine O - 2 0 - 2 /HPF   Erythrocyte sedimentation rate auto   Result Value Ref Range    Sed Rate 26 (H) 0 - 20 mm/h   HGB Eval Reflex to ELP or RBC Solubility   Result Value Ref Range    Hemoglobin A1 96.7     Hemoglobin A2 2.9     Hemoglobin F 0.4     Hemoglobin S Eval 0.0     Hemoglobin C 0.0     Hemoglobin E 0.0     Hemoglobin Other 0.0     HGB Abn Evaluation       SEE NOTE  (Note)    Impression: Normal HPLC evaluation.    This normal HPLC result does not  rule out the possibility  of alpha globin gene deletions associated with silent  carrier status or alpha thalassemia trait. Individuals who  carry a rare, Greek beta thalassemia variant often have a  normal Hb A2 and may not be identified by this assay.  Please correlate with clinical and laboratory findings.      Sickle Cell Solubility Confirm Not Performed     Hemoglobin Capillary ELP       Not Performed  (Note)  Performed by [facility],  [address] [place] [address] [phone]  [email or web address], Robbin Nunez MD, Lab. Director     Protein electrophoresis random urine   Result Value Ref Range    ELP Interpretation Urine       Only trace albumin and trace globulins.  No obvious monoclonal protein seen. We   recommend a first morning voided urine to detect clinically significant   proteinuria.  A random specimen is not optimal for detecting all proteins.  The   specific gravity of this specimen was only 1.010. Pathological significance   requires clinical correlation.  ABIODUN Kyle M.D., Ph.D., Pathologist ([phone])     Free kappa and lambda light chains urine   Result Value Ref Range    Total Urine Volume Resistant     Hours Collected Resistant     Albumin Urine       Detected  Reference range: Detected  Unit: not reported      Alpha-1 Urine       Detected  Reference range: None Detected  Unit: not reported      Alpha-2 Urine       Detected  Reference range: None Detected  Unit: not reported      Beta Globulin Urine       Detected  Reference range: None Detected  Unit: not reported      Gamma Urine       Detected  Reference range: None Detected  Unit: not reported      Total Protein Urine       SEE NOTE  Reference range: 10 to 140  Unit: mg/d  (Note)  Total Protein = 1.63 mg/dL based on random urine.  INTERPRETIVE INFORMATION: Total Protein  Total urinary protein is determined nephelometrically by  adding the albumin and Kappa and/or Lambda light chains.  This value may not agree with the total  protein as  determined by chemical methods, which characteristically  underestimate urinary light chains.      CARLOS Interpretation Urine       SEE NOTE  Unit: not reported  (Note)  Urine is negative for monoclonal Free Light Chains (Bence  Clayton Protein).      Free Kappa Light Chn Urine 1.59     Free Kappa Excr/Day Urine       SEE NOTE  Unit: mg/d  (Note)  Unable to quantitate free light chain excretion per day  on a random urine sample.      Free Lambda Lght Chn Urine 0.04     Free Lambda Excr/Day Urine       SEE NOTE  Unit: mg/d  (Note)  Unable to quantitate free light chain excretion per day  on a random urine sample.      F Kappa/Lambda Ratio Urine 39.75 (H)    [facility] Miscellaneous Test   Result Value Ref Range    Result       SEE NOTE  (Note)  Test name                    Result Flag  Units  RefIntvl  ------------------------------------------------------------  Centromere Ab, IgG                0       AU/mL 0-40  INTERPRETIVE INFORMATION: Centromere Ab, IgG   29 AU/mL or Less ............. Negative   30 - 40 AU/mL ................ Equivocal   41 AU/mL or Greater .......... Positive  When detected by this multiplex bead assay, the presence of  centromere antibodies is mainly associated with CREST  syndrome, a variant of systemic sclerosis (SSc). These  antibodies target the centromere B, a dominant antigen of  the centromeric complex associated with the centromere  pattern observed in antinuclear antibody (SHILPA) testing by  [facility]. Centromere antibodies may also be seen in a varying  percentage of patients with other autoimmune diseases,  including diffuse cutaneous SSc, Raynaud syndrome,  interstitial pulmonary fibrosis, autoimmune liver disease,  systemic lupus erythematosus (SLE) and rheumatoid arthritis  ( RA).  A negative result indicates no detectable IgG antibodies to  centromere B. If the result is negative but clinical  suspicion for SSc is strong, consider testing for SHILPA by  [facility] along with other antibodies  associated with SSc,  including Scl-70, U3-RNP, PM/Scl, or Th/To.  Performed by [facility] [facility]  [address] [place], [place] [id number] [phone]  [email or web address], Robbin Nunez MD, Lab. Director      Test Name CENTROMERE ANTIBODY IgG     Send Outs Misc Test Code       Assayed at [facility] [address]    Send Outs Misc Test Specimen SERUM    [facility] Miscellaneous Test   Result Value Ref Range    Result       SEE NOTE  (Note)  Test name                    Result Flag  Units  RefIntvl  ------------------------------------------------------------  Immunoglobulin G Subclass 1                                 717       mg/dL 240-1118  REFERENCE INTERVAL: Immunoglobulin G Subclass 1  Access complete set of age- and/or gender-specific  reference intervals for this test in the [facility]  Test Directory ([email or web address]).  Immunoglobulin G Subclass 2                                 312       mg/dL 124-549  REFERENCE INTERVAL: Immunoglobulin G Subclass 2  Access complete set of age- and/or gender-specific  reference intervals for this test in the [facility]  Test Directory ([email or web address]).  Immunoglobulin G Subclass 3                                  28       mg/dL   REFERENCE INTERVAL: Immunoglobulin G Subclass 3  Access complete set of age- and/or gender-specific  reference intervals for this test in the [facility]  Test Directory ([email or web address]).  Immunoglobulin G Subclass 4                                   13       mg/dL 1-123  The total IgG (mg/dL) can be derived by the sum of the  subclasses IgG1, IgG2, IgG3 and IgG4 values. However, a  confirmatory and more precise total IgG is available by the  nephelometric method of total IgG (Test # [id number]).  REFERENCE INTERVAL: Immunoglobulin G Subclass 4  Access complete set of age- and/or gender-specific  reference intervals for this test in the [facility]  Test Directory ([email or web address]).  Performed by [facility]  [address] [place] [address] [phone]  [email or web address], Robbin Nunez MD, Lab. Director      Test Name IGG SUBCLASSES     Send Outs Misc Test Code 90169     Send Outs Misc Test Specimen Serum         ASSESSMENT/PLAN:                                                      DIAGNOSIS/PLAN:     ICD-10-CM    1. Migraine with aura and without status migrainosus, not intractable G43.109 amitriptyline (ELAVIL) 25 MG tablet     ferrous fumarate 65 mg, [place]. FE,-Vitamin C 125 mg (VITRON C)  MG TABS tablet   2. Elevated vitamin B12 level R74.8    3. Abnormal urine finding R82.90 Total Light Chains Ur     RHEUMATOLOGY REFERRAL    ABNORMAL URINE LIGHT CHAIN RATIO   4. Fatigue, unspecified type R53.83 SLEEP EVALUATION & MANAGEMENT REFERRAL - ADULT     RHEUMATOLOGY REFERRAL    ASSOCIATED WITH SNORING AND EXCESSIVE DAYTIME FATIGUE   5. Daytime somnolence R40.0 SLEEP EVALUATION & MANAGEMENT REFERRAL - ADULT   6. Sicca (H) M35.00 RHEUMATOLOGY REFERRAL    WITH MILD ARTHRALGIA, POSITIVE SSA BUT NEGATIVE SSB   7. Raynaud's phenomenon without gangrene I73.00    8. Anemia, unspecified type D64.9 RHEUMATOLOGY REFERRAL     Ferritin     Vitamin B12    WITH BORDERLINE IRON STORES   9. Elevated sedimentation rate R70.0 RHEUMATOLOGY REFERRAL     Erythrocyte sedimentation rate auto   10. Rheumatoid factor positive R76.8 RHEUMATOLOGY REFERRAL   11. Hypervitaminosis B6 E67.2 Vitamin B6   12. Vitamin D deficiency E55.9 Vitamin D Deficiency       SIGNIFICANT ISSUES RE The primary encounter diagnosis was Migraine with aura and without status migrainosus, not intractable. Diagnoses of Elevated vitamin B12 level, Abnormal urine finding, Fatigue, unspecified type, Daytime somnolence, Sicca (H), Raynaud's phenomenon without gangrene, Anemia, unspecified type, Elevated sedimentation rate, Rheumatoid factor positive, Hypervitaminosis B6, and Vitamin D deficiency were also pertinent to this visit. AS NOTED AND ADDRESSED ABOVE   MEDS AND AND LABS AS ORDERED TO  ADDRESS PREVIOUS AND CURRENT ABNORMAL INDICES    GIVEN THE TEST RESULTS FROM MOST RECENT LAB DRAW, IT LOOKS LIKE ALYSSA MAY BE DEALING WITH AN AUTOIMMUNE DISORDER. mY RECOMMENDATION AT THIS TIME WOULD BE FOR RHEUMATOLOGY EVALUATION - REFERRAL TO DR. ZALDIVAR IS PLACED AT THIS TIME    ALSO GIVEN HISTORY OF SNORING AND  SHE HAS BEEN REFERRED FOR SLEEP  STUDY    SHE WAS UNABLE TO TOLERATE FULL DOSE OF AMITRIPTYLINE SO I WILL GIVE HER A TRIAL OF 12.5 MG TO SEE SHE CAN TOLERATE THIS FOR MIGRAINE PROPHYLAXIS    SEE PATIENT INSTRUCTION SECTION FOR FOLLOW UP PLAN    Alyssa IS TO CONTINUE OTHER TREATMENT REGIMEN/PLANS EXCEPT AS INDICATED    COMPLIANCE WITH MEDICATIONS DIET AND EXERCISE PLANS ENCOURAGED    DISCONTINUED MEDS:  Medications Discontinued During This Encounter   Medication Reason     fish oil-omega-3 fatty acids (FISH OIL) 1000 MG capsule Therapy completed     amitriptyline (ELAVIL) 25 MG tablet Reorder       CURRENT MED LIST WITH CHANGES AS NOTED BELOW:  Current Outpatient Prescriptions   Medication Sig Dispense Refill     Omega-3 Fatty Acids (FISH OIL CONCENTRATE PO) Take 850 mg by mouth daily       IBUPROFEN PO Take 200 mg by mouth Takes 3 tablets PRN       amitriptyline (ELAVIL) 25 MG tablet Take 0.5 tablets (12.5 mg) by mouth At Bedtime INDICATION: MIGRAINE PROPHYLAXIS 90 tablet 1     ferrous fumarate 65 mg, [place]. FE,-Vitamin C 125 mg (VITRON C)  MG TABS tablet Take 1 tablet by mouth daily INDICATION: IRON SUPPLEMENT 100 tablet prn     vitamin B complex with vitamin C (VITAMIN  B COMPLEX) TABS tablet Take 1 tablet by mouth daily       rizatriptan (MAXALT) 10 MG tablet Take 1 tablet (10 mg) by mouth at onset of headache May repeat in 2 hours if needed: max 2/day; 12 tablet 3     bismuth subsalicylate ([employer] BISMUTH) 262 MG TABS Take 3 tablets by mouth 3 times daily       cholecalciferol 5000 UNITS CAPS Take 1 capsule (5,000 Units) by mouth daily FOR VITAMIN D DEFICIENCY (LOW VITAMIN D) 100 capsule 3      Zinc Acetate, Oral, 50 MG CAPS  90 capsule      melatonin 5 MG CAPS        naproxen sodium (ANAPROX) 220 MG tablet Take by mouth as needed Takes 2 tablets PRN 60 tablet      FLUoxetine (PROZAC) 40 MG capsule Take 1 capsule (40 mg) by mouth daily 90 capsule 0     CALCIUM-MAGNESIUM PO Take 2 tablets by mouth daily WITH POTASSIUM       Probiotic Product (PROBIOTIC PO) Take 2 tablets by mouth daily       acetaminophen (TYLENOL) 325 MG tablet Take 2 tablets by mouth every 4 hours as needed for pain. 100 tablet 0     [DISCONTINUED] amitriptyline (ELAVIL) 25 MG tablet Take 1 tablet (25 mg) by mouth At Bedtime INDICATION: MIGRAINE PROPHYLAXIS 90 tablet 1     Multiple Vitamins-Minerals (ZINC PO) Take 1 tablet by mouth daily Reported on 4/28/2017           Office visit time > 40 mins, greater than 50% of which was spent obtaining history, reviewing medications, counseling re compliance with treatment plan, discussion of treatment, follow up plans, and coordination of care.     Patient Instructions   ** FOLLOW UP PLAN**:    PLEASE SCHEDULE LABS WITH OFFICE VISIT SOONEST AVAILABILITY FROM TODAY TO FOLLOW UP ON  FATIGUE, ABNORMAL LAB VALUES, RESULTS OF RHEUMATOLOGY EVALUATION, AND TO REVIEW YOUR TEST RESULTS     BE SURE TO SCHEDULE YOUR LAB DRAW APPOINTMENT FOR AT LEAST 1 WEEK BEFORE YOUR NEXT VISIT      YOU HAVE BEEN REFERRED FOR RHEUMATOLOGY AND FOR SLEEP STUDY TO ADDRESS ISSUES RAISED TODAY RE ,  PLEASE  MAKE SURE TO SCHEDULE RHEUMATOLOGY APPOINTMENT(S) AT THE   OR BY CALLING THE NUMBER BELOW AND  SLEEP STUDY APPOINTMENT(S) BY TELEPHONE      YOU MAY CONTACT THE CLINIC IF ANY QUESTIONS OR CONCERNS -[phone] OR VIA [facility] Gilbert[facility]USTEN Castellanos MD  [facility]

## 2017-04-28 NOTE — PATIENT INSTRUCTIONS
** FOLLOW UP PLAN**:    PLEASE SCHEDULE LABS WITH OFFICE VISIT SOONEST AVAILABILITY FROM TODAY TO FOLLOW UP ON  FATIGUE, ABNORMAL LAB VALUES, RESULTS OF RHEUMATOLOGY EVALUATION, AND TO REVIEW YOUR TEST RESULTS     BE SURE TO SCHEDULE YOUR LAB DRAW APPOINTMENT FOR AT LEAST 1 WEEK BEFORE YOUR NEXT VISIT      YOU HAVE BEEN REFERRED FOR RHEUMATOLOGY AND FOR SLEEP STUDY TO ADDRESS ISSUES RAISED TODAY RE ,  PLEASE  MAKE SURE TO SCHEDULE RHEUMATOLOGY APPOINTMENT(S) AT THE   OR BY CALLING THE NUMBER BELOW AND  SLEEP STUDY APPOINTMENT(S) BY TELEPHONE      YOU MAY CONTACT THE CLINIC IF ANY QUESTIONS OR CONCERNS -[phone] OR VIA tuul

## 2017-04-29 ASSESSMENT — PATIENT HEALTH QUESTIONNAIRE - PHQ9: SUM OF ALL RESPONSES TO PHQ QUESTIONS 1-9: 6

## 2017-05-07 ENCOUNTER — MYC MEDICAL ADVICE (OUTPATIENT)
Dept: INTERNAL MEDICINE | Facility: CLINIC | Age: 47
End: 2017-05-07

## 2017-05-08 ENCOUNTER — OFFICE VISIT (OUTPATIENT)
Dept: SLEEP MEDICINE | Facility: CLINIC | Age: 47
End: 2017-05-08
Attending: INTERNAL MEDICINE
Payer: COMMERCIAL

## 2017-05-08 VITALS
DIASTOLIC BLOOD PRESSURE: 70 MMHG | SYSTOLIC BLOOD PRESSURE: 109 MMHG | WEIGHT: 141.8 LBS | HEART RATE: 61 BPM | BODY MASS INDEX: 27.84 KG/M2 | HEIGHT: 60 IN | TEMPERATURE: 98.1 F

## 2017-05-08 DIAGNOSIS — R06.83 SNORING: Primary | ICD-10-CM

## 2017-05-08 DIAGNOSIS — R40.0 DAYTIME SOMNOLENCE: ICD-10-CM

## 2017-05-08 DIAGNOSIS — G47.00 INSOMNIA, UNSPECIFIED TYPE: ICD-10-CM

## 2017-05-08 DIAGNOSIS — G47.30 OBSERVED SLEEP APNEA: ICD-10-CM

## 2017-05-08 DIAGNOSIS — R53.83 FATIGUE, UNSPECIFIED TYPE: ICD-10-CM

## 2017-05-08 PROCEDURE — 99204 OFFICE O/P NEW MOD 45 MIN: CPT | Performed by: PHYSICIAN ASSISTANT

## 2017-05-08 RX ORDER — ZOLPIDEM TARTRATE 5 MG/1
TABLET ORAL
Qty: 1 TABLET | Refills: 0 | Status: SHIPPED | OUTPATIENT
Start: 2017-05-08 | End: 2017-06-26

## 2017-05-08 RX ORDER — LOPERAMIDE HYDROCHLORIDE 2 MG/1
2 TABLET ORAL DAILY PRN
COMMUNITY

## 2017-05-08 NOTE — MR AVS SNAPSHOT
"              After Visit Summary   5/8/2017    Nikki Pablo Hardin    MRN: [id number]           Patient Information     Date Of Birth          1970        Visit Information        Provider Department      5/8/2017 9:00 AM Goltz, Bennett Ezra, PA-C [facility]        Today's Diagnoses     Snoring    -  1    Fatigue, unspecified type        Daytime somnolence        Observed sleep apnea        Insomnia, unspecified type          Care Instructions    MY TREATMENT INFORMATION FOR SLEEP APNEA-  Nikki Hardin    DOCTOR : Bennett Ezra Goltz, PA-C  SLEEP CENTER : [facility]     MY CONTACT NUMBER: [phone]  Am I having a sleep study at a sleep center?  Make sure you have an appointment for the study before you leave!    Am I having a home sleep study?  Watch this video:  [email or web address]/[email or web address]?[email or web address]    Frequently asked questions:  1. What is Obstructive Sleep Apnea (JOSH)? JOSH is the most common type of sleep apnea. Apnea means, \"without breath.\"  Apnea is most often caused by narrowing or collapse of the upper airway as muscles relax during sleep.   Almost everyone has occasional apneas. Most people with sleep apnea have had brief interruptions at night frequently for many years.  The severity of sleep apnea is related to how frequent and severe the events are.   2. What are the consequences of JOSH? Symptoms include: feeling sleepy during the day, snoring loudly, gasping or stopping of breathing, trouble sleeping, and occasionally morning headaches or heartburn at night.  Sleepiness can be serious and even increase the risk of falling asleep while driving. Other health consequences may include development of high blood pressure and other cardiovascular disease in persons who are susceptible. Untreated JOSH  can contribute to heart disease, stroke and diabetes.   3. What are the treatment options? In most situations, sleep apnea is a " lifelong disease that must be managed with daily therapy. Medications are not effective for sleep apnea and surgery is generally not considered until other therapies have been tried. Your treatment is your choice . Continuous Positive Airway (CPAP) works right away and is the therapy that is effective in nearly everyone. An oral device to hold your jaw forward is usually the next most reliable option. Other options include postioning devices (to keep you off your back), weight loss, and surgery including a tongue pacing device. There is more detail about some of these options below.    Important tips for using CPAP and similar devices   Know your equipment:  CPAP is continuous positive airway pressure that prevents obstructive sleep apnea by keeping the throat from collapsing while you are sleeping. In most cases, the device is  smart  and can slowly self-adjusts if your throat collapses and keeps a record every day of how well you are treated-this information is available to you and your care team.  BPAP is bilevel positive airway pressure that keeps your throat open and also assists each breath with a pressure boost to maintain adequate breathing.  Special kinds of BPAP are used in patients who have inadequate breathing from lung or heart disease. In most cases, the device is  smart  and can slowly self-adjusts to assist breathing. Like CPAP, the device keeps a record of how well you are treated.  Your mask is your connection to the device. You get to choose what feels most comfortable and the staff will help to make sure if fits. Here: are some examples of the different masks that are available:       Key points to remember on your journey with sleep apnea:  1. Sleep study.  PAP devices often need to be adjusted during a sleep study to show that they are effective and adjusted right.  2. Good tips to remember: Try wearing just the mask during a quiet time during the day so your body adapts to wearing it. A  humidifier is recommended for comfort in most cases to prevent drying of your nose and throat. Allergy medication from your provider may help you if you are having nasal congestion.  3. Getting settled-in. It takes more than one night for most of us to get used to wearing a mask. Try wearing just the mask during a quiet time during the day so your body adapts to wearing it. A humidifier is recommended for comfort in most cases. Our team will work with you carefully on the first day and will be in contact within 4 days and again at 2 and 4 weeks for advice and remote device adjustments. Your therapy is evaluated by the device each day.   4. Use it every night. The more you are able to sleep naturally for 7-8 hours, the more likely you will have good sleep and to prevent health risks or symptoms from sleep apnea. Even if you use it 4 hours it helps. Occasionally all of us are unable to use a medical therapy, in sleep apnea, it is not dangerous to miss one night.   5. Communicate. Call our skilled team on the number provided on the first day if your visit for problems that make it difficult to wear the device. Over 2 out of 3 patients can learn to wear the device long-term with help from our team. Remember to call our team or your sleep providers if you are unable to wear the device as we may have other solutions for those who cannot adapt to mask CPAP therapy. It is recommended that you sleep your sleep provider within the first 3 months and yearly after that if you are not having problems.   Take care of your equipment. Make sure you clean your mask and tubing using directions every day and that your filter and mask are replaced as recommended or if they are not working.     BESIDES CPAP, WHAT OTHER THERAPIES ARE THERE?    Positioning Device  Positioning devices are generally used when sleep apnea is mild and only occurs on your back.This example shows a pillow that straps around the waist. It may be appropriate for  those whose sleep study shows milder sleep apnea that occurs primarily when lying flat on one's back. Preliminary studies have shown benefit but effectiveness at home may need to be verified by a home sleep test. These devices are generally not covered by medical insurance.    Oral Appliance  What is oral appliance therapy?  An oral appliance is a small acrylic device that fits over the upper and lower teeth  (similar to a retainer or a mouth guard). This device slightly moves jaw forward, which moves the base of the tongue forward, opens the airway, improves breathing for effective treat snoring and obstructive sleep apnea in perhaps 7 out of 10 people .  The best working devices are custom-made by a dental device  after a mold is made of the teeth 1, 2, 3.  FOR MORE DETAILED INFORMATION SEE BELOW:  When is an oral appliance indicated?  Oral appliance therapy is recommended as a first-line treatment for patients with primary snoring, mild sleep apnea, and for patients with moderate sleep apnea who prefer appliance therapy to use of CPAP4, 5. Severity of sleep apnea is determined by sleep testing and is based on the number of respiratory events per hour of sleep.   How successful is oral appliance therapy?  The success rate of oral appliance therapy in patients with mild sleep apnea is 75-80% while in patients with moderate sleep apnea it is 50-70%. The chance of success in patients with severe sleep apnea is 40-50%. The research also shows that oral appliances have a beneficial effect on the cardiovascular health of JOSH patients at the same magnitude as CPAP therapy7.  Oral appliances should be a second-line treatment in cases of severe sleep apnea, but if not completely successful then a combination therapy utilizing CPAP plus oral appliance therapy may be effective. Oral appliances tend to be effective in a broad range of patients although studies show that the patients who have the highest success  are females, younger patients, those with milder disease, and less severe obesity. 3, 6.   The chances of success are lower in patients who have more severe JOSH, are older, and those who are morbidly obese.    Finding a dentist that practices dental sleep medicine  Specific training is available through the American Academy of Dental Sleep Medicine for dentists interested in working in the field of sleep. To find a dentist who is educated in the field of sleep and the use of oral appliances, near you, visit the Web site of the American Academy of Dental Sleep Medicine; also see   http://www.accpstorage.org/newOrganization/patients/oralAppliances.pdf  To search for a dentist certified in these practices:  Http://aadsm.org/FindADentist.aspx?1  1. Divine et al. Objectively measured vs self-reported compliance during oral appliance therapy for sleep-disordered breathing. Chest 2013; 144(5): 1704-0546.  2. Cassidy et al. Objective measurement of compliance during oral appliance therapy for sleep-disordered breathing. Thorax 2013; 68(1): 91-96.  3. Jean et al. Mandibular advancement devices in 620 men and women with JOSH and snoring: tolerability and predictors of treatment success. Chest 2004; 125: 5413-5666.  4. Juan José, et al. Oral appliances for snoring and JOSH: a review. Sleep 2006; 29: 244-262.  5. Enmanuel et al. Oral appliance treatment for JOSH: an update. J Clin Sleep Med 2014; 10(2): 215-227.  6. Su et al. Predictors of OSAH treatment outcome. J Dent Res 2007; 86: 5755-7653.  Weight Loss:    Weight management is a personal decision.  If you are interested in exploring weight loss strategies, the following discussion covers the impact on weight loss on sleep apnea and the approaches that may be successful.    Weight loss decreases severity of sleep apnea in most people with obesity. For those with mild obesity who have developed snoring with weight gain, even 15-30 pound weight loss can  improve and occasionally eliminate sleep apnea.  Structured and life-long dietary and health habits are necessary to lose weight and keep healthier weight levels.     Though there may be significant health benefits from weight loss, long-term weight loss is very difficult to achieve- studies show success with dietary management in less than 10% of people. In addition, substantial weight loss may require years of dietary control and may be difficult if patients have severe obesity. In these cases, surgical management may be considered.  Finally, older individuals who have tolerated obesity without health complications may be less likely to benefit from weight loss strategies.    Your BMI is Body mass index is 27.69 kg/(m^2).  Body mass index (BMI) is one way to tell whether you are at a healthy weight, overweight, or obese. It measures your weight in relation to your height.  A BMI of 18.5 to 24.9 is in the healthy range. A person with a BMI of 25 to 29.9 is considered overweight, and someone with a BMI of 30 or greater is considered obese. More than two-thirds of American adults are considered overweight or obese.  Being overweight or obese increases the risk for further weight gain. Excess weight may lead to heart disease and diabetes.  Creating and following plans for healthy eating and physical activity may help you improve your health.  Weight control is part of healthy lifestyle and includes exercise, emotional health, and healthy eating habits. Careful eating habits lifelong are the mainstay of weight control. Though there are significant health benefits from weight loss, long-term weight loss with diet alone may be very difficult to achieve- studies show long-term success with dietary management in less than 10% of people. Attaining a healthy weight may be especially difficult to achieve in those with severe obesity. In some cases, medications, devices and surgical management might be considered.  What can  you do?  If you are overweight or obese and are interested in methods for weight loss, you should discuss this with your provider.     Consider reducing daily calorie intake by 500 calories.     Keep a food journal.     Avoiding skipping meals, consider cutting portions instead.    Diet combined with exercise helps maintain muscle while optimizing fat loss. Strength training is particularly important for building and maintaining muscle mass. Exercise helps reduce stress, increase energy, and improves fitness. Increasing exercise without diet control, however, may not burn enough calories to loose weight.       Start walking three days a week 10-20 minutes at a time    Work towards walking thirty minutes five days a week     Eventually, increase the speed of your walking for 1-2 minutes at time    In addition, we recommend that you review healthy lifestyles and methods for weight loss available through the National Institutes of Health patient information sites:  http://win.niddk.nih.gov/publications/index.htm    And look into health and wellness programs that may be available through your health insurance provider, employer, local community center, or cole club.    Weight management plan: Patient was referred to their PCP to discuss a diet and exercise plan.  Surgery:  Upper Airway Surgery for JOSH  Surgery for JOSH is a second-line treatment option in the management of sleep apnea.  Surgery should be considered for patients who are having a difficult time tolerating CPAP.    Surgery for JOSH is directed at areas that are responsible for narrowing or complete obstruction of the airway during sleep.  There are a wide range of procedures available to enlarge and/or stabilize the airway to prevent blockage of breathing in the three major areas where it can occur: the palate, tongue, and nasal regions.  Successful surgical treatment depends on the accurate identification of the factors responsible for obstructive sleep  apnea in each person.  A personalized approach is required because there is no single treatment that works well for everyone.  Because of anatomic variation, consultation with an examination by a sleep surgeon is a critical first step in determining what surgical options are best for each patient.  In some cases, examination during sedation may be recommended in order to guide the selection of procedures.  Patients will be counseled about risks and benefits as well as the typical recovery course after surgery. Surgery is typically not a cure for a person s JOSH.  However, surgery will often significantly improve one s JOSH severity (termed  success rate ).  Even in the absence of a cure, surgery will decrease the cardiovascular risk associated with OSA7; improve overall quality of life8 (sleepiness, functionality, sleep quality, etc).  Palate Procedures:  Patients with JOSH often have narrowing of their airway in the region of their tonsils and uvula.  The goals of palate procedures are to widen the airway in this region as well as to help the tissues resist collapse.  Modern palate procedure techniques focus on tissue conservation and soft tissue rearrangement, rather than tissue removal.  Often the uvula is preserved in this procedure. Residual sleep apnea is common in patient after pharyngoplasty with an average reduction in sleep apnea events of 33%2.    Tongue Procedures:  ExamWhile patients are awake, the muscles that surround the throat are active and keep this region open for breathing. These muscles relax during sleep, allowing the tongue and other structures to collapse and block breathing.  There are several different tongue procedures available.  Selection of a tongue base procedure depends on characteristics seen on physical exam.  Generally, procedures are aimed at removing bulky tissues in this area or preventing the back of the tongue from falling back during sleep.  Success rates for tongue surgery  range from 50-62%3.  Hypoglossal Nerve Stimulation:  Hypoglossal nerve stimulation has recently received approval from the [place] Food and Drug Administration for the treatment of obstructive sleep apnea.  This is based on research showing that the system was safe and effective in treating sleep apnea6.  Results showed that the median AHI score decreased 68%, from 29.3 to 9.0. This therapy uses an implant system that senses breathing patterns and delivers mild stimulation to airway muscles, which keeps the airway open during sleep.  The system consists of three fully implanted components: a small generator (similar in size to a pacemaker), a breathing sensor, and a stimulation lead.  Using a small handheld remote, a patient turns the therapy on before bed and off upon awakening.    Candidates for this device must be greater than 22 years of age, have moderate to severe JOSH (AHI between 20-65), BMI less than 32, have tried CPAP/oral appliance without success, and have appropriate upper airway anatomy (determined by a sleep endoscopy performed by Dr. Samano).  Hypoglossal Nerve Stimulation Pathway:    The sleep surgeon s office will work with the patient through the insurance prior-authorization process (including communications and appeals).    Nasal Procedures:  Nasal obstruction can interfere with nasal breathing during the day and night.  Studies have shown that relief of nasal obstruction can improve the ability of some patients to tolerate positive airway pressure therapy for obstructive sleep apnea1.  Treatment options include medications such as nasal saline, topical corticosteroid and antihistamine sprays, and oral medications such as antihistamines or decongestants. Non-surgical treatments can include external nasal dilators for selected patients. If these are not successful by themselves, surgery can improve the nasal airway either alone or in combination with these other options.  Combination  Procedures:  Combination of surgical procedures and other treatments may be recommended, particularly if patients have more than one area of narrowing or persistent positional disease.  The success rate of combination surgery ranges from 66-80%2,3.    1. Mee LYON. The Role of the Nose in Snoring and Obstructive Sleep Apnoea: An Update.  Eur Arch Otorhinolaryngol. 2011; 268: 1365-73.  2.  Yeyo SM; Anastasia JA; Valerio JR; Pallanch JF; Lorne MB; Yeny SG; Ravi STAFFORD. Surgical modifications of the upper airway for obstructive sleep apnea in adults: a systematic review and meta-analysis. SLEEP 2010;33(10):7751-8104. Roverto BRYAN. Hypopharyngeal surgery in obstructive sleep apnea: an evidence-based medicine review.  Arch Otolaryngol Head Neck Surg. 2006 Feb;132(2):206-13.  3. Favian YH1, Ashley Y, Saqib LEONARDO. The efficacy of anatomically based multilevel surgery for obstructive sleep apnea. Otolaryngol Head Neck Surg. 2003 Oct;129(4):327-35.  4. Roverto BRYAN, Goldberg A. Hypopharyngeal Surgery in Obstructive Sleep Apnea: An Evidence-Based Medicine Review. Arch Otolaryngol Head Neck Surg. 2006 Feb;132(2):206-13.  5. Sekou AMATO et al. Upper-Airway Stimulation for Obstructive Sleep Apnea.  N Engl J Med. 2014 Jan 9;370(2):139-49.  6. Conchita Y et al. Increased Incidence of Cardiovascular Disease in Middle-aged Men with Obstructive Sleep Apnea. Am J Respir Crit Care Med; 2002 166: 159-165  7. Anneliese EM et al. Studying Life Effects and Effectiveness of Palatopharyngoplasty (SLEEP) study: Subjective Outcomes of Isolated Uvulopalatopharyngoplasty. Otolaryngol Head Neck Surg. 2011; 144: 623-631.    General recommendations for sleep problems (Insomnia)  Allow 2-4 weeks to see results     Establish a regular sleep schedule    Most people only need 7-8 hours of sleep.  Don't be in bed longer than you need     to sleep or you will end up spending more time awake in bed. This trains your    brain to think of the bed as a place to not  sleep.  Go to bed at same time each night   Get up at same time each day - Set an alarm everyday (even weekends). This is one of    the most important tips. It prevents you from relying on your insomnia to get you    up on time for your day. That actually reinforces insomnia. It also will help your    body get into a pattern where you start feeling tired at a consistent time each    night.  The body functions best when you keep a consistent routine.  Avoid sleeping-in and napping. Anytime you sleep during the day, you will be less tired at    night. You may be tired enough to fall asleep, but you will wake more in the    middle of the night because you will have met your sleep need before the night is    done.   Cut down time in bed (if not asleep, get up)- Use your bed only for sleep and sex    Anytime you spend time in bed doing activities other than sleep (reading,    watching TV, working, playing on the computer or phone, or even just laying in    bed trying to sleep), you are training  your brain to think of the bed as a place to    do activities other than sleep. If you are not falling asleep within 20-30 minutes,    get out of bed. While out of bed, avoid bright lights. Avoid work or chores. Being    productive in the middle of the night reinforces waking up at night. Find relaxing,    not particularly entertaining activities like reading, listening to music, or relaxation    exercises. Go back to bed if you start feeling groggy, or after about 30 minutes,    even if not feeling very tired. Sometimes, just getting out of bed stops the pattern    of getting frustrated about laying in bed not sleeping, and that can help you fall    asleep.   Avoid trying to force yourself to sleep- sleep is not like everything else. The harder you    work at most things, the more you can accomplish. The harder you work at    sleep, the less you will sleep.     Make the bedroom comfortable - quiet, dark and cool are better.  Consider ear plugs    (silicon). Use dark blinds or wear an eye mask if needed     Make a relaxing routine prior to bedtime  Relaxation exercises:   Progressive muscle relaxation: Relax each muscle group individually    Begin with your feet, flex, then relax. Try to imagine your feet feeling heavy and sinking into the bed. Move to your calves, do the same thing. Work through each muscle group toward your head.    Relaxing Mental Imagery: Try to imagine a trip that you took and found relaxing, or imagine a day at the beach. Try to walk yourself through the day in your mind as if you were dreaming it. Try to imagine sensing the different experiences, such as feeling sand between your toes, the heat of the sun on your skin, seeing the waves crashing the shore, the smell of the salt water, etc.     Deal with your worries before bedtime    Set aside a worry time around dinner time for 10-15 minutes. Write down the    things that are on your mind. Plan time in the coming days to address those    issues. Brainstorm ideas on how you will deal with them. Try to identify issues    that are out of your control, and try to let those issues go.  Listen to relaxation tapes   Classical Music or Nature sounds   Back Massage   Get regular exercise each day (at least 1-2 hours before bedtime)   Take medications only as directed   Eat a light bedtime snack or warm drink   Warm milk   Warm herbal tea (non-caffeinated)       Things to avoid   No overstimulating activities just before bed   No competitive games before bedtime   No exciting television programs before bedtime   Avoid caffeine after lunchtime   Avoid chocolate   Do not use alcohol to induce sleep (worsens Insomnia)   Do not take someone else's sleeping pills   Do not look at the clock when awakening   Do not turn on light when getting up to use bathroom, use a nightlight     Online Programs     www.SHUTi.me (pronounced shut eye). There is a fee for this program. Enter the  code  [place]  if you decide to enroll in this program.      www.sleepIO.com (pronounced sleep ee oh). There is a fee for this program. Enter the code  [place]  if you decide to enroll in this program.     Suggested Resources  Insomnia Treatment Books     Overcoming Insomnia by Yan Domínguez and Candi Nava (2008)    No More Sleepless Nights by Luis Fernando Cortes and Aruna Lynch (1996)    Say Micha to Insomnia by David Aquino (2009)    The Insomnia Workbook by Goldie Kay and Danny Black (2009)    The Insomnia Answer by Dick Fernandez and Aaron Ramires (2006)      Stress Management and Relaxation Books    The Relaxation and Stress Reduction Workbook by Ashleigh Fields, Nori Morales and Luis Bowman (2008)    Stress Management Workbook: Techniques and Self-Assessment Procedures by Anastasia Roper and Jeff Marin (1997)    A Mindfulness-Based Stress Reduction Workbook by Cecil Fournier and Camilla Yousif (2010)    The Complete Stress Management Workbook by Robert Boss, Johnnie Suero and William Smith (1996)    Assert Yourself by Sujey Fonseca and Mike Fonseca (1977)    Relaxation Resources for Computer Download   These websites offer resources to help you relax. This list is for information only. [facility] is not responsible for the quality of services or the actions of any person or organization.  Progressive Muscle Relaxation (PMR):     http://www.[employer].Yodio/progressive-muscle-relaxation-exercise.html     http://studentsupport.[facility]/counseling/resources/self-help/relaxation-and-stress-management/   Deep Breathing Exercises:    http://www.[employer]/breathing-awareness.html     Meditation:     www.playnik    www.theChronicle SolutionsguidedChronicle Solutionsmeditation-site.com You may have to pay for some of these resources.    Guided Imagery:    http://www.[employer]/guided-imagery-scripts.html      http://[employer]/library/teclkezmdn-vutjhd-gkcdlki/     Counseling / Behavioral Health  [facility]  Visit [email or web address] or call [phone] to find a clinic close to you.  Or call [phone] for [facility].                      Follow-ups after your visit        Follow-up notes from your care team     Return in about 2 weeks (around 5/22/2017) for Sleep Study Review.      Your next 10 appointments already scheduled     May 17, 2017  4:00 PM CDT   [facility] with Balta [facility]legas MD   [facility]    [address] [address] [phone]           Bring a current list of meds and any records pertaining to this visit.  For Physicals, please bring immunization records and any forms needing to be filled out.  Please arrive 10 minutes early to complete paperwork.            Jun 12, 2017  8:30 PM CDT   PSG Split with BED 4 SH SLEEP   [facility] ([facility])    [address] [place] [id number]   [phone]            Jun 26, 2017 10:30 AM CDT   Return Sleep Patient with Bennett Ezra Goltz, PA-C   [facility] ([facility])    [address] [place] [id number]   [phone]            Jun 29, 2017  1:30 PM CDT   Office Visit with Vandana Castellanos MD   [facility]    [address] [id number] [phone]           Bring a current list of meds and any records pertaining to this visit.  For Physicals, please bring immunization records and any forms needing to be filled out.  Please arrive 10 minutes early to complete paperwork.              Future tests that were ordered for you today     Open Future Orders        Priority Expected Expires Ordered     Comprehensive Sleep Study Routine  11/4/2017 5/8/2017            Who to contact     If you have questions or need follow up information about today's clinic visit or your schedule please contact [facility] directly at [phone].  Normal or non-critical lab and imaging results will be communicated to you by Yupi Studioshart, letter or phone within 4 business days after the clinic has received the results. If you do not hear from us within 7 days, please contact the clinic through [employer]i [employer] or phone. If you have a critical or abnormal lab result, we will notify you by phone as soon as possible.  Submit refill requests through TapFwd or call your pharmacy and they will forward the refill request to us. Please allow 3 business days for your refill to be completed.          Additional Information About Your Visit        [employer] Influence Information     TapFwd gives you secure access to your electronic health record. If you see a primary care provider, you can also send messages to your care team and make appointments. If you have questions, please call your primary care clinic.  If you do not have a primary care provider, please call [phone] and they will assist you.        Care EveryWhere ID     This is your Care EveryWhere ID. This could be used by other organizations to access your [facility] medical records  [id number]        Your Vitals Were     Pulse Temperature Height BMI (Body Mass Index)          61 98.1  F (36.7  C) (Oral) 1.524 m (5') 27.69 kg/m2         Blood Pressure from Last 3 Encounters:   05/08/17 109/70   04/28/17 106/62   04/13/17 110/60    Weight from Last 3 Encounters:   05/08/17 64.3 kg (141 lb 12.8 oz)   04/28/17 64.9 kg (143 lb)   04/13/17 64.4 kg (142 lb)              We Performed the Following     SLEEP EVALUATION & MANAGEMENT REFERRAL - ADULT          Today's Medication Changes          These changes are accurate as of: 5/8/17 10:19 AM.  If you have any questions, ask your nurse or  doctor.               Start taking these medicines.        Dose/Directions    zolpidem 5 MG tablet   Commonly known as:  AMBIEN   Started by:  Goltz, Bennett Ezra, PA-C        Take tablet by mouth 15 minutes prior to sleep, for Sleep Study   Quantity:  1 tablet   Refills:  0         Stop taking these medicines if you haven't already. Please contact your care team if you have questions.     ZINC PO   Stopped by:  Goltz, Bennett Ezra, PA-C                Where to get your medicines      Some of these will need a paper prescription and others can be bought over the counter.  Ask your nurse if you have questions.     Bring a paper prescription for each of these medications     zolpidem 5 MG tablet                Primary Care Provider Office Phone # Fax #    Vandana Castellanos [phone]       [facility] [address] [facility] [address]        Thank you!     Thank you for choosing [facility]  for your care. Our goal is always to provide you with excellent care. Hearing back from our patients is one way we can continue to improve our services. Please take a few minutes to complete the written survey that you may receive in the mail after your visit with us. Thank you!             Your Updated Medication List - Protect others around you: Learn how to safely use, store and throw away your medicines at www.disposemymeds.org.          This list is accurate as of: 5/8/17 10:19 AM.  Always use your most recent med list.                   Brand Name Dispense Instructions for use    acetaminophen 325 MG tablet    TYLENOL    100 tablet    Take 2 tablets by mouth every 4 hours as needed for pain.       amitriptyline 25 MG tablet    ELAVIL    90 tablet    Take 0.5 tablets (12.5 mg) by mouth At Bedtime INDICATION: MIGRAINE PROPHYLAXIS       CALCIUM-MAGNESIUM PO      Take 2 tablets by mouth daily WITH POTASSIUM       cholecalciferol 5000 UNITS Caps     100 capsule    Take 1 capsule  (5,000 Units) by mouth daily FOR VITAMIN D DEFICIENCY (LOW VITAMIN D)       [facility] BISMUTH 262 MG Tabs   Generic drug:  bismuth subsalicylate      Take 3 tablets by mouth 3 times daily Reported on 5/8/2017       ferrous fumarate 65 mg ([place]. FE)-Vitamin C 125 mg  MG Tabs tablet    VITRON C    100 tablet    Take 1 tablet by mouth daily INDICATION: IRON SUPPLEMENT       FISH OIL CONCENTRATE PO      Take 850 mg by mouth daily       FLUoxetine 40 MG capsule    PROzac    90 capsule    Take 1 capsule (40 mg) by mouth daily       IBUPROFEN PO      Take 200 mg by mouth Takes 3 tablets PRN       LOPERAMIDE A-D 2 MG tablet   Generic drug:  loperamide      Take 2 mg by mouth 4 times daily as needed for diarrhea       melatonin 5 MG Caps      Reported on 5/8/2017       naproxen sodium 220 MG tablet    ANAPROX    60 tablet    Take by mouth as needed Takes 2 tablets PRN       PROBIOTIC PO      Take 2 tablets by mouth daily       rizatriptan 10 MG tablet    MAXALT    12 tablet    Take 1 tablet (10 mg) by mouth at onset of headache May repeat in 2 hours if needed: max 2/day;       vitamin B complex with vitamin C Tabs tablet      Take 1 tablet by mouth daily       Zinc Acetate (Oral) 50 MG Caps     90 capsule        zolpidem 5 MG tablet    AMBIEN    1 tablet    Take tablet by mouth 15 minutes prior to sleep, for Sleep Study

## 2017-05-08 NOTE — PROGRESS NOTES
Sleep Consultation:    Date on this visit: 5/8/2017    Nikki Hardin is sent by Vandana Castellanos for a sleep consultation regarding fatigue/daytime somnolence.    Primary Physician: Vandana Castellanosdavid Hardin reports waking frequently in the night and rarely feeling rested, constant fatigue for a few years, worse in the last year. Her medical history is significant for depression with anxiety, IBS, migraines, anemia and elevated sed rate.     Nikki goes to bed at 10:00 PM during the week. She often reads until 10:30-11 PM. She wakes up at 7:00 AM with an alarm. She hits the snooze starting at 6:15 AM. She falls asleep in 5 minutes.  Nikki denies difficulty falling asleep but has some trouble staying asleep. She tried melatonin 5 mg but that made her very groggy. She also tried 25 mg amitriptyline for headaches, but that also made her very groggy the next morning. She thinks she may have still woken up in the night with those medications. She wakes up 3-5 times a night for 5-15 minutes before falling back to sleep.  Occasionally, she will have an awakening lasting 45-60 minutes. Nikki wakes up to go to the bathroom, external stimuli and hearing her own snoring. In the last year, she has noticed waking with night sweats too. On weekends, Nikki goes to sleep at 11:00 PM.  She wakes up at 8:00 AM with an alarm to take her son somewhere. She guesses she would get up around 8:30-9 AM without an alarm. She falls asleep in 5 minutes.  Patient gets an average of 6-7 hours of sleep per night.     Patient does use electronics in bed, watch TV in bed, sometimes worries in bed about work and family, read in bed, eats in bed and works (sometimes) in bed.     Nikki does not do shift work.  She works day shifts.  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 She lives with her , son, daughter and sometimes her other daughter when she is home from college.    Nikki does snore  frequently and snoring is loud. That has been most noticeable in the last year. Patient does have a regular bed partner. There is report of snorting.  She does have witnessed apneas (observed a handful of times). They sometimes sleep separately because her  falls asleep downstairs.  Patient sleeps on her side (mostly) and stomach. She may sometimes wake on her back. She wakes with a headache about once per week. She has occasional snort arousals and morning dry mouth, denies morning confusion and restless legs. Nikki has occasional bruxism( has used a bite guard for years) and denies any sleep walking, sleep talking, dream enactment, sleep paralysis, cataplexy and hypnogogic/hypnopompic hallucinations.    Nikki has difficulty breathing through her nose, claustrophobia, depression and anxiety, denies reflux at night and heartburn.      Nikki's weight fluctuates within 5-10 pounds. She recently started Weight Watchers.  Patient describes themself as neither a morning or night person. She used to be a night owl. She would prefer to go to sleep at 11:00-11:30 PM and wake up at 7:00-8:00 AM.  Patient's L[place]erty Sleepiness score 9/24 inconsistent with daytime sleepiness.      Nikki naps 2 times per week (on weekends) for 30-60 minutes, sometimes feels refreshed after naps. She usually does fall asleep quickly. She takes some inadvertant naps if it is slow at work or if reading.  She admits fighting sleep while driving, infrequently. The most recent episode was 3 week(s) ago.  Patient was counseled on the importance of driving while alert, to pull over if drowsy, or nap before getting into the vehicle if sleepy.  She uses 2-3 cups/day of coffee or soda. Last caffeine intake is usually before 6 PM.    Allergies:    Allergies   Allergen Reactions     No Known Allergies        Medications:    Current Outpatient Prescriptions   Medication Sig Dispense Refill     loperamide (LOPERAMIDE A-D) 2 MG tablet Take 2 mg  by mouth 4 times daily as needed for diarrhea       Omega-3 Fatty Acids (FISH OIL CONCENTRATE PO) Take 850 mg by mouth daily       IBUPROFEN PO Take 200 mg by mouth Takes 3 tablets PRN       vitamin B complex with vitamin C (VITAMIN  B COMPLEX) TABS tablet Take 1 tablet by mouth daily       rizatriptan (MAXALT) 10 MG tablet Take 1 tablet (10 mg) by mouth at onset of headache May repeat in 2 hours if needed: max 2/day; 12 tablet 3     cholecalciferol 5000 UNITS CAPS Take 1 capsule (5,000 Units) by mouth daily FOR VITAMIN D DEFICIENCY (LOW VITAMIN D) 100 capsule 3     Zinc Acetate, Oral, 50 MG CAPS  90 capsule      naproxen sodium (ANAPROX) 220 MG tablet Take by mouth as needed Takes 2 tablets PRN 60 tablet      FLUoxetine (PROZAC) 40 MG capsule Take 1 capsule (40 mg) by mouth daily 90 capsule 0     CALCIUM-MAGNESIUM PO Take 2 tablets by mouth daily WITH POTASSIUM       Probiotic Product (PROBIOTIC PO) Take 2 tablets by mouth daily       acetaminophen (TYLENOL) 325 MG tablet Take 2 tablets by mouth every 4 hours as needed for pain. 100 tablet 0     amitriptyline (ELAVIL) 25 MG tablet Take 0.5 tablets (12.5 mg) by mouth At Bedtime INDICATION: MIGRAINE PROPHYLAXIS (Patient not taking: Reported on 5/8/2017) 90 tablet 1     ferrous fumarate 65 mg, [place]. FE,-Vitamin C 125 mg (VITRON C)  MG TABS tablet Take 1 tablet by mouth daily INDICATION: IRON SUPPLEMENT (Patient not taking: Reported on 5/8/2017) 100 tablet prn     bismuth subsalicylate ([facility] BISMUTH) 262 MG TABS Take 3 tablets by mouth 3 times daily Reported on 5/8/2017       melatonin 5 MG CAPS Reported on 5/8/2017         Problem List:  Patient Active Problem List    Diagnosis Date Noted     Serologic abnormality 04/13/2017     Priority: Medium     LOW ABNORMAL SSA, NORMAL SSB       Sedimentation rate elevation 04/13/2017     Priority: Medium     Elevated vitamin B12 level 04/13/2017     Priority: Medium     NOT ON B-12 SUPPLEMENTS       Anemia, unspecified type  2017     Priority: Medium     Fatigue, unspecified type 2017     Priority: Medium     Hypervitaminosis B6 2017     Priority: Medium     Migraine with aura and without status migrainosus, not intractable 2017     Priority: Medium     Depression with anxiety 2017     Priority: Medium     Other acne 2006     Priority: Medium     Irritable bowel syndrome 2005     Priority: Medium     diarrhea          Past Medical/Surgical History:  Past Medical History:   Diagnosis Date     Anxiety      Colitis     lymphocytic colitis ?     Depressive disorder, not elsewhere classified     sees psych- Dr Blue     Migraine, unspecified, without mention of intractable migraine without mention of status migrainosus age 14 yrs     Other acne      PAIN IN THORACIC SPINE [724.1] 2006    chiropractic care     Raynaud disease     ? connective tissue dz.  ? sogren's     Past Surgical History:   Procedure Laterality Date     C IUD,MIRENA  2005     C NONSPECIFIC PROCEDURE       X 3     ESOPHAGOSCOPY, GASTROSCOPY, DUODENOSCOPY (EGD), COMBINED  10/10     HC ABLATION, ENDOMETRIAL, THERMAL, W/O HYSTEROSCOPIC GUIDANCE  2010     HC COLONOSCOPY THRU STOMA, DIAGNOSTIC  2004     HC COLONOSCOPY THRU STOMA, DIAGNOSTIC  5/10     HYSTEROSCOPY      D&C       Social History:  Social History     Social History     Marital status:      Spouse name: N/A     Number of children: N/A     Years of education: N/A     Occupational History     Not on file.     Social History Main Topics     Smoking status: Never Smoker     Smokeless tobacco: Never Used     Alcohol use Yes      Comment: occasional- once monthly     Drug use: No     Sexual activity: Yes     Partners: Male     Birth control/ protection: None      Comment:  vasectomy     Other Topics Concern     Not on file     Social History Narrative    Caffeine intake/servings daily - 1    Calcium intake/servings daily - 2    Exercise 1 times  weekly - describe walk    Sunscreen used - Yes    Seatbelts used - Yes    Guns stored in the home - No    Self Breast Exam - No    Pap test up to date -  No    Eye exam up to date -  Yes    Dental exam up to date -  Yes    DEXA scan up to date -  No    Flex Sig/Colonoscopy up to date -  Yes    Mammography up to date -  Yes    Immunizations reviewed and up to date - Yes    Abuse: Current or Past (Physical, Sexual or Emotional) - No    Do you feel safe in your environment - Yes    Do you cope well with stress - No    Do you suffer from insomnia - Yes    Last updated by: Issac George  1/30/2012                   Family History:  Family History   Problem Relation Age of Onset     Neurologic Disorder Mother      MIGRIANES     C.A.D. Father      in his 60's     Depression Father      HEART DISEASE Father      Lipids Father      Hypertension Father      Arthritis Father      Sleep Disorder Father      Cancer - colorectal Paternal Grandmother      CANCER Maternal Grandfather      stomach     HEART DISEASE Maternal Grandfather      Depression Brother      Depression Brother      Asthma Daughter      HEART DISEASE Paternal Grandfather      CEREBROVASCULAR DISEASE Maternal Aunt      DIABETES No family hx of        Review of Systems:  A complete review of systems reviewed by me is negative with the exeption of what has been mentioned in the history of present illness.  CONSTITUTIONAL: NEGATIVE for weight gain/loss, fever, chills, sweats or night sweats, drug allergies.  EYES: NEGATIVE for changes in vision, blind spots, double vision.  ENT: NEGATIVE for ear pain, sore throat, sinus pain, post-nasal drip, runny nose, bloody nose  CARDIAC: NEGATIVE for fast heartbeats or fluttering in chest, chest pain or pressure, breathlessness when lying flat, swollen legs or swollen feet.  NEUROLOGIC: NEGATIVE weakness or numbness in the arms or legs.  NEUROLOGIC:  POSITIVE for  headaches  DERMATOLOGIC: NEGATIVE for rashes, new moles or  change in mole(s)  PULMONARY: NEGATIVE SOB at rest, dry cough, productive cough, coughing up blood, wheezing or whistling when breathing.    PULMONARY:  POSITIVE for  SOB with activity  GASTROINTESTINAL: NEGATIVE for nausea or vomitting, fat or grease in stools, constipation, abdominal pain, bowel movements black in color or blood noted.  GASTROINTESTINAL:  POSITIVE for  loose or watery stools  GENITOURINARY: NEGATIVE for pain during urination, blood in urine, urinating more frequently than usual, irregular menstrual periods.  MUSCULOSKELETAL: NEGATIVE for muscle pain, bone or joint pain, swollen joints.  ENDOCRINE: NEGATIVE for increased thirst or urination, diabetes.  LYMPHATIC: NEGATIVE for swollen lymph nodes, lumps or bumps in the breasts or nipple discharge.  MENTAL HEALTH: POSITIVE for depression and anxiety    Physical Examination:  Vitals: /70  Pulse 61  Temp 98.1  F (36.7  C) (Oral)  Ht 1.524 m (5')  Wt 64.3 kg (141 lb 12.8 oz)  BMI 27.69 kg/m2  Neck Circumference: 33 cm.      [place] Total Score 2/8/2012   Total score - [place] 9       GENERAL APPEARANCE: healthy, alert, no distress and cooperative  EYES: Eyes grossly normal to inspection, PERRL, conjunctivae and sclerae normal and lids and lashes normal  HENT: nose and mouth without ulcers or lesions, oral mucous membranes moist and oropharynx clear  NECK: no adenopathy, no asymmetry, masses, or scars, thyroid normal to palpation and trachea midline and normal to palpation  RESP: lungs clear to auscultation - no rales, rhonchi or wheezes  CV: regular rates and rhythm, normal S1 S2, no S3 or S4, no murmur, click or rub and no irregular beats  LYMPHATICS: normal ant/post cervical and supraclavicular nodes  MS: extremities normal- no gross deformities noted  NEURO: Normal strength and tone, mentation intact, speech normal and cranial nerves 2-12 intact  Mallampati Class: I.  Tonsillar Stage: 2  visible at pillars.    Impression/Plan:    (R06.83)  Snoring  (primary encounter diagnosis), (R40.0) Daytime somnolence, (G47.30) Observed sleep apnea, (R53.83) Fatigue, unspecified type  Comment: In the last year, Nikki has started to snore noticeably. She has also been told on a handful of occasions that she seems to have stopped breathing. She has also become more sleepy. Her ESS is 9/24, which is normal. However, she can nap on weekends and sometimes will doze briefly when it is slow at work. She is perimenopausal. She has not had any significant weight changes in the last 1-2 years. Other risk factors are negative. No HTN, BMI 27, age <50 (47), neck 33 cm, female gender. Given low risk and history of insomnia, I recommend in lab rather than home study.  Plan: Comprehensive Sleep Study        Split night PSG with CPAP/BiPAP titration if indicated. She was prescribed 5 mg zolpidem for the study.    (G47.00) Insomnia, unspecified type  Comment: She denies difficulty falling asleep. She wakes up 3-5 times a night for 5-15 minutes. She has tried amitriptyline and melatonin and both made her groggy the next day. She spends about 9 hours in bed, but reads, watches TV or electronics for an hour before trying to fall asleep. She naps 2 times per week for 30-60 minutes  Plan: Only be in bed for 8 hours. Avoid naps or sleeping in. Avoid electronics within 30-60 minutes of bedtime.      Literature provided regarding sleep apnea and insomnia.      She will follow up with me in approximately two weeks after her sleep study has been competed to review the results and discuss plan of care.       Polysomnography reviewed.  Obstructive sleep apnea reviewed.  Complications of untreated sleep apnea were reviewed.  45 minutes was spent during this visit, over 50% in counseling and coordination of care.    Bennett Goltz, PA-C    CC: Vandana Castellanos

## 2017-05-08 NOTE — PATIENT INSTRUCTIONS
"MY TREATMENT INFORMATION FOR SLEEP APNEA-  Nikki Shah Wilfred    DOCTOR : Bennett Ezra Goltz, PA-C  SLEEP CENTER : [place]     MY CONTACT NUMBER: [phone]  Am I having a sleep study at a sleep center?  Make sure you have an appointment for the study before you leave!    Am I having a home sleep study?  Watch this video:  [email or web address].com/watch?v=CteI_[email or web address]    Frequently asked questions:  1. What is Obstructive Sleep Apnea (JOSH)? JOSH is the most common type of sleep apnea. Apnea means, \"without breath.\"  Apnea is most often caused by narrowing or collapse of the upper airway as muscles relax during sleep.   Almost everyone has occasional apneas. Most people with sleep apnea have had brief interruptions at night frequently for many years.  The severity of sleep apnea is related to how frequent and severe the events are.   2. What are the consequences of JOSH? Symptoms include: feeling sleepy during the day, snoring loudly, gasping or stopping of breathing, trouble sleeping, and occasionally morning headaches or heartburn at night.  Sleepiness can be serious and even increase the risk of falling asleep while driving. Other health consequences may include development of high blood pressure and other cardiovascular disease in persons who are susceptible. Untreated JOSH  can contribute to heart disease, stroke and diabetes.   3. What are the treatment options? In most situations, sleep apnea is a lifelong disease that must be managed with daily therapy. Medications are not effective for sleep apnea and surgery is generally not considered until other therapies have been tried. Your treatment is your choice . Continuous Positive Airway (CPAP) works right away and is the therapy that is effective in nearly everyone. An oral device to hold your jaw forward is usually the next most reliable option. Other options include postioning devices (to keep you off your back), weight " loss, and surgery including a tongue pacing device. There is more detail about some of these options below.    Important tips for using CPAP and similar devices   Know your equipment:  CPAP is continuous positive airway pressure that prevents obstructive sleep apnea by keeping the throat from collapsing while you are sleeping. In most cases, the device is  smart  and can slowly self-adjusts if your throat collapses and keeps a record every day of how well you are treated-this information is available to you and your care team.  BPAP is bilevel positive airway pressure that keeps your throat open and also assists each breath with a pressure boost to maintain adequate breathing.  Special kinds of BPAP are used in patients who have inadequate breathing from lung or heart disease. In most cases, the device is  smart  and can slowly self-adjusts to assist breathing. Like CPAP, the device keeps a record of how well you are treated.  Your mask is your connection to the device. You get to choose what feels most comfortable and the staff will help to make sure if fits. Here: are some examples of the different masks that are available:       Key points to remember on your journey with sleep apnea:  1. Sleep study.  PAP devices often need to be adjusted during a sleep study to show that they are effective and adjusted right.  2. Good tips to remember: Try wearing just the mask during a quiet time during the day so your body adapts to wearing it. A humidifier is recommended for comfort in most cases to prevent drying of your nose and throat. Allergy medication from your provider may help you if you are having nasal congestion.  3. Getting settled-in. It takes more than one night for most of us to get used to wearing a mask. Try wearing just the mask during a quiet time during the day so your body adapts to wearing it. A humidifier is recommended for comfort in most cases. Our team will work with you carefully on the first day  and will be in contact within 4 days and again at 2 and 4 weeks for advice and remote device adjustments. Your therapy is evaluated by the device each day.   4. Use it every night. The more you are able to sleep naturally for 7-8 hours, the more likely you will have good sleep and to prevent health risks or symptoms from sleep apnea. Even if you use it 4 hours it helps. Occasionally all of us are unable to use a medical therapy, in sleep apnea, it is not dangerous to miss one night.   5. Communicate. Call our skilled team on the number provided on the first day if your visit for problems that make it difficult to wear the device. Over 2 out of 3 patients can learn to wear the device long-term with help from our team. Remember to call our team or your sleep providers if you are unable to wear the device as we may have other solutions for those who cannot adapt to mask CPAP therapy. It is recommended that you sleep your sleep provider within the first 3 months and yearly after that if you are not having problems.   Take care of your equipment. Make sure you clean your mask and tubing using directions every day and that your filter and mask are replaced as recommended or if they are not working.     BESIDES CPAP, WHAT OTHER THERAPIES ARE THERE?    Positioning Device  Positioning devices are generally used when sleep apnea is mild and only occurs on your back.This example shows a pillow that straps around the waist. It may be appropriate for those whose sleep study shows milder sleep apnea that occurs primarily when lying flat on one's back. Preliminary studies have shown benefit but effectiveness at home may need to be verified by a home sleep test. These devices are generally not covered by medical insurance.    Oral Appliance  What is oral appliance therapy?  An oral appliance is a small acrylic device that fits over the upper and lower teeth  (similar to a retainer or a mouth guard). This device slightly moves jaw  forward, which moves the base of the tongue forward, opens the airway, improves breathing for effective treat snoring and obstructive sleep apnea in perhaps 7 out of 10 people .  The best working devices are custom-made by a dental device  after a mold is made of the teeth 1, 2, 3.  FOR MORE DETAILED INFORMATION SEE BELOW:  When is an oral appliance indicated?  Oral appliance therapy is recommended as a first-line treatment for patients with primary snoring, mild sleep apnea, and for patients with moderate sleep apnea who prefer appliance therapy to use of CPAP4, 5. Severity of sleep apnea is determined by sleep testing and is based on the number of respiratory events per hour of sleep.   How successful is oral appliance therapy?  The success rate of oral appliance therapy in patients with mild sleep apnea is 75-80% while in patients with moderate sleep apnea it is 50-70%. The chance of success in patients with severe sleep apnea is 40-50%. The research also shows that oral appliances have a beneficial effect on the cardiovascular health of JOSH patients at the same magnitude as CPAP therapy7.  Oral appliances should be a second-line treatment in cases of severe sleep apnea, but if not completely successful then a combination therapy utilizing CPAP plus oral appliance therapy may be effective. Oral appliances tend to be effective in a broad range of patients although studies show that the patients who have the highest success are females, younger patients, those with milder disease, and less severe obesity. 3, 6.   The chances of success are lower in patients who have more severe JOSH, are older, and those who are morbidly obese.    Finding a dentist that practices dental sleep medicine  Specific training is available through the American Academy of Dental Sleep Medicine for dentists interested in working in the field of sleep. To find a dentist who is educated in the field of sleep and the use of oral  appliances, near you, visit the Web site of the American Academy of Dental Sleep Medicine; also see   http://www.accpstorage.org/newOrganization/patients/oralAppliances.pdf  To search for a dentist certified in these practices:  Http://aadsm.org/FindADentist.aspx?1  1. Divine et al. Objectively measured vs self-reported compliance during oral appliance therapy for sleep-disordered breathing. Chest 2013; 144(5): 6356-1427.  2. Cassidy et al. Objective measurement of compliance during oral appliance therapy for sleep-disordered breathing. Thorax 2013; 68(1): 91-96.  3. Jean et al. Mandibular advancement devices in 620 men and women with JOSH and snoring: tolerability and predictors of treatment success. Chest 2004; 125: 3626-7893.  4. Juan José et al. Oral appliances for snoring and JOSH: a review. Sleep 2006; 29: 244-262.  5. Enmanuel et al. Oral appliance treatment for JOSH: an update. J Clin Sleep Med 2014; 10(2): 215-227.  6. Su et al. Predictors of OSAH treatment outcome. J Dent Res 2007; 86: 4314-8762.  Weight Loss:    Weight management is a personal decision.  If you are interested in exploring weight loss strategies, the following discussion covers the impact on weight loss on sleep apnea and the approaches that may be successful.    Weight loss decreases severity of sleep apnea in most people with obesity. For those with mild obesity who have developed snoring with weight gain, even 15-30 pound weight loss can improve and occasionally eliminate sleep apnea.  Structured and life-long dietary and health habits are necessary to lose weight and keep healthier weight levels.     Though there may be significant health benefits from weight loss, long-term weight loss is very difficult to achieve- studies show success with dietary management in less than 10% of people. In addition, substantial weight loss may require years of dietary control and may be difficult if patients have severe obesity.  In these cases, surgical management may be considered.  Finally, older individuals who have tolerated obesity without health complications may be less likely to benefit from weight loss strategies.    Your BMI is Body mass index is 27.69 kg/(m^2).  Body mass index (BMI) is one way to tell whether you are at a healthy weight, overweight, or obese. It measures your weight in relation to your height.  A BMI of 18.5 to 24.9 is in the healthy range. A person with a BMI of 25 to 29.9 is considered overweight, and someone with a BMI of 30 or greater is considered obese. More than two-thirds of American adults are considered overweight or obese.  Being overweight or obese increases the risk for further weight gain. Excess weight may lead to heart disease and diabetes.  Creating and following plans for healthy eating and physical activity may help you improve your health.  Weight control is part of healthy lifestyle and includes exercise, emotional health, and healthy eating habits. Careful eating habits lifelong are the mainstay of weight control. Though there are significant health benefits from weight loss, long-term weight loss with diet alone may be very difficult to achieve- studies show long-term success with dietary management in less than 10% of people. Attaining a healthy weight may be especially difficult to achieve in those with severe obesity. In some cases, medications, devices and surgical management might be considered.  What can you do?  If you are overweight or obese and are interested in methods for weight loss, you should discuss this with your provider.     Consider reducing daily calorie intake by 500 calories.     Keep a food journal.     Avoiding skipping meals, consider cutting portions instead.    Diet combined with exercise helps maintain muscle while optimizing fat loss. Strength training is particularly important for building and maintaining muscle mass. Exercise helps reduce stress, increase  energy, and improves fitness. Increasing exercise without diet control, however, may not burn enough calories to loose weight.       Start walking three days a week 10-20 minutes at a time    Work towards walking thirty minutes five days a week     Eventually, increase the speed of your walking for 1-2 minutes at time    In addition, we recommend that you review healthy lifestyles and methods for weight loss available through the National Institutes of Health patient information sites:  http://win.niddk.nih.gov/publications/index.htm    And look into health and wellness programs that may be available through your health insurance provider, employer, local community center, or cole club.    Weight management plan: Patient was referred to their PCP to discuss a diet and exercise plan.  Surgery:  Upper Airway Surgery for JOSH  Surgery for JOSH is a second-line treatment option in the management of sleep apnea.  Surgery should be considered for patients who are having a difficult time tolerating CPAP.    Surgery for JOSH is directed at areas that are responsible for narrowing or complete obstruction of the airway during sleep.  There are a wide range of procedures available to enlarge and/or stabilize the airway to prevent blockage of breathing in the three major areas where it can occur: the palate, tongue, and nasal regions.  Successful surgical treatment depends on the accurate identification of the factors responsible for obstructive sleep apnea in each person.  A personalized approach is required because there is no single treatment that works well for everyone.  Because of anatomic variation, consultation with an examination by a sleep surgeon is a critical first step in determining what surgical options are best for each patient.  In some cases, examination during sedation may be recommended in order to guide the selection of procedures.  Patients will be counseled about risks and benefits as well as the typical  recovery course after surgery. Surgery is typically not a cure for a person s JOSH.  However, surgery will often significantly improve one s JOSH severity (termed  success rate ).  Even in the absence of a cure, surgery will decrease the cardiovascular risk associated with OSA7; improve overall quality of life8 (sleepiness, functionality, sleep quality, etc).  Palate Procedures:  Patients with JOSH often have narrowing of their airway in the region of their tonsils and uvula.  The goals of palate procedures are to widen the airway in this region as well as to help the tissues resist collapse.  Modern palate procedure techniques focus on tissue conservation and soft tissue rearrangement, rather than tissue removal.  Often the uvula is preserved in this procedure. Residual sleep apnea is common in patient after pharyngoplasty with an average reduction in sleep apnea events of 33%2.    Tongue Procedures:  ExamWhile patients are awake, the muscles that surround the throat are active and keep this region open for breathing. These muscles relax during sleep, allowing the tongue and other structures to collapse and block breathing.  There are several different tongue procedures available.  Selection of a tongue base procedure depends on characteristics seen on physical exam.  Generally, procedures are aimed at removing bulky tissues in this area or preventing the back of the tongue from falling back during sleep.  Success rates for tongue surgery range from 50-62%3.  Hypoglossal Nerve Stimulation:  Hypoglossal nerve stimulation has recently received approval from the [place] Food and Drug Administration for the treatment of obstructive sleep apnea.  This is based on research showing that the system was safe and effective in treating sleep apnea6.  Results showed that the median AHI score decreased 68%, from 29.3 to 9.0. This therapy uses an implant system that senses breathing patterns and delivers mild stimulation to  airway muscles, which keeps the airway open during sleep.  The system consists of three fully implanted components: a small generator (similar in size to a pacemaker), a breathing sensor, and a stimulation lead.  Using a small handheld remote, a patient turns the therapy on before bed and off upon awakening.    Candidates for this device must be greater than 22 years of age, have moderate to severe JOSH (AHI between 20-65), BMI less than 32, have tried CPAP/oral appliance without success, and have appropriate upper airway anatomy (determined by a sleep endoscopy performed by Dr. Samano).  Hypoglossal Nerve Stimulation Pathway:    The sleep surgeon s office will work with the patient through the insurance prior-authorization process (including communications and appeals).    Nasal Procedures:  Nasal obstruction can interfere with nasal breathing during the day and night.  Studies have shown that relief of nasal obstruction can improve the ability of some patients to tolerate positive airway pressure therapy for obstructive sleep apnea1.  Treatment options include medications such as nasal saline, topical corticosteroid and antihistamine sprays, and oral medications such as antihistamines or decongestants. Non-surgical treatments can include external nasal dilators for selected patients. If these are not successful by themselves, surgery can improve the nasal airway either alone or in combination with these other options.  Combination Procedures:  Combination of surgical procedures and other treatments may be recommended, particularly if patients have more than one area of narrowing or persistent positional disease.  The success rate of combination surgery ranges from 66-80%2,3.    1. Mee LYON. The Role of the Nose in Snoring and Obstructive Sleep Apnoea: An Update.  Eur Arch Otorhinolaryngol. 2011; 268: 1365-73.  2.  Yeyo SM; Anastasia JA; Valerio KEMP; Pallanch JF; Lorne ALLISON; Yeny MOLINA; Ravi STAFFORD. Surgical  modifications of the upper airway for obstructive sleep apnea in adults: a systematic review and meta-analysis. SLEEP 2010;33(10):1029-0242. Roverto BRYAN. Hypopharyngeal surgery in obstructive sleep apnea: an evidence-based medicine review.  Arch Otolaryngol Head Neck Surg. 2006 Feb;132(2):206-13.  3. Favian YH1, Ashley Y, Saqib LEONARDO. The efficacy of anatomically based multilevel surgery for obstructive sleep apnea. Otolaryngol Head Neck Surg. 2003 Oct;129(4):327-35.  4. Kezirian E, Goldberg A. Hypopharyngeal Surgery in Obstructive Sleep Apnea: An Evidence-Based Medicine Review. Arch Otolaryngol Head Neck Surg. 2006 Feb;132(2):206-13.  5. Sekou AMATO et al. Upper-Airway Stimulation for Obstructive Sleep Apnea.  N Engl J Med. 2014 Jan 9;370(2):139-49.  6. Conchita Y et al. Increased Incidence of Cardiovascular Disease in Middle-aged Men with Obstructive Sleep Apnea. Am J Respir Crit Care Med; 2002 166: 159-165  7. Coy EM et al. Studying Life Effects and Effectiveness of Palatopharyngoplasty (SLEEP) study: Subjective Outcomes of Isolated Uvulopalatopharyngoplasty. Otolaryngol Head Neck Surg. 2011; 144: 623-631.    General recommendations for sleep problems (Insomnia)  Allow 2-4 weeks to see results     Establish a regular sleep schedule    Most people only need 7-8 hours of sleep.  Don't be in bed longer than you need     to sleep or you will end up spending more time awake in bed. This trains your    brain to think of the bed as a place to not sleep.  Go to bed at same time each night   Get up at same time each day - Set an alarm everyday (even weekends). This is one of    the most important tips. It prevents you from relying on your insomnia to get you    up on time for your day. That actually reinforces insomnia. It also will help your    body get into a pattern where you start feeling tired at a consistent time each    night.  The body functions best when you keep a consistent routine.  Avoid sleeping-in and napping.  Anytime you sleep during the day, you will be less tired at    night. You may be tired enough to fall asleep, but you will wake more in the    middle of the night because you will have met your sleep need before the night is    done.   Cut down time in bed (if not asleep, get up)- Use your bed only for sleep and sex    Anytime you spend time in bed doing activities other than sleep (reading,    watching TV, working, playing on the computer or phone, or even just laying in    bed trying to sleep), you are training  your brain to think of the bed as a place to    do activities other than sleep. If you are not falling asleep within 20-30 minutes,    get out of bed. While out of bed, avoid bright lights. Avoid work or chores. Being    productive in the middle of the night reinforces waking up at night. Find relaxing,    not particularly entertaining activities like reading, listening to music, or relaxation    exercises. Go back to bed if you start feeling groggy, or after about 30 minutes,    even if not feeling very tired. Sometimes, just getting out of bed stops the pattern    of getting frustrated about laying in bed not sleeping, and that can help you fall    asleep.   Avoid trying to force yourself to sleep- sleep is not like everything else. The harder you    work at most things, the more you can accomplish. The harder you work at    sleep, the less you will sleep.     Make the bedroom comfortable - quiet, dark and cool are better. Consider ear plugs    (silicon). Use dark blinds or wear an eye mask if needed     Make a relaxing routine prior to bedtime  Relaxation exercises:   Progressive muscle relaxation: Relax each muscle group individually    Begin with your feet, flex, then relax. Try to imagine your feet feeling heavy and sinking into the bed. Move to your calves, do the same thing. Work through each muscle group toward your head.    Relaxing Mental Imagery: Try to imagine a trip that you took and found  relaxing, or imagine a day at the beach. Try to walk yourself through the day in your mind as if you were dreaming it. Try to imagine sensing the different experiences, such as feeling sand between your toes, the heat of the sun on your skin, seeing the waves crashing the shore, the smell of the salt water, etc.     Deal with your worries before bedtime    Set aside a worry time around dinner time for 10-15 minutes. Write down the    things that are on your mind. Plan time in the coming days to address those    issues. Brainstorm ideas on how you will deal with them. Try to identify issues    that are out of your control, and try to let those issues go.  Listen to relaxation tapes   Classical Music or Nature sounds   Back Massage   Get regular exercise each day (at least 1-2 hours before bedtime)   Take medications only as directed   Eat a light bedtime snack or warm drink   Warm milk   Warm herbal tea (non-caffeinated)       Things to avoid   No overstimulating activities just before bed   No competitive games before bedtime   No exciting television programs before bedtime   Avoid caffeine after lunchtime   Avoid chocolate   Do not use alcohol to induce sleep (worsens Insomnia)   Do not take someone else's sleeping pills   Do not look at the clock when awakening   Do not turn on light when getting up to use bathroom, use a nightlight     Online Programs     www.SHUTi.me (pronounced shut eye). There is a fee for this program. Enter the code  [facility]  if you decide to enroll in this program.      www.sleepIO.com (pronounced sleep ee oh). There is a fee for this program. Enter the code  [facility]  if you decide to enroll in this program.     Suggested Resources  Insomnia Treatment Books     Overcoming Insomnia by Yan Domínguez and Candi Nava (2008)    No More Sleepless Nights by Luis Fernando Cortes and Aruna Lynch (1996)    Say Micha to Insomnia by David Aquino (2009)    The Insomnia Workbook by Goldie  Eliza and Danny Black (2009)    The Insomnia Answer by Dick Fernandez and Aaron Ramires (2006)      Stress Management and Relaxation Books    The Relaxation and Stress Reduction Workbook by Ashleigh Fields, Nori Morales and Luis Bowman (2008)    Stress Management Workbook: Techniques and Self-Assessment Procedures by Anastasia Roper and Jeff Marin (1997)    A Mindfulness-Based Stress Reduction Workbook by Cecil Fournier and Camilla Yousif (2010)    The Complete Stress Management Workbook by Robert Boss, Johnnie Suero and William Smith (1996)    Assert Yourself by Sujey Fonseca and Mike [employer] (1977)    Relaxation Resources for Computer Download   These websites offer resources to help you relax. This list is for information only. [place] is not responsible for the quality of services or the actions of any person or organization.  Progressive Muscle Relaxation (PMR):     http://www.[employer]/progressive-muscle-relaxation-exercise.html     http://studentsupport.[facility]/counseling/resources/self-help/relaxation-and-stress-management/   Deep Breathing Exercises:    http://www.[employer]/breathing-awareness.html     Meditation:     wwwSpot Coffee    www.[employer] Holdingsmeditation-site.Resolver You may have to pay for some of these resources.    Guided Imagery:    http://www.[employer]/guided-imagery-scripts.html     http://[employer]/library/kzliwzqjwt-lsklix-dvaxkip/     Counseling / Behavioral Health  [facility]  Visit [email or web address] or call [phone] to find a clinic close to you.  Or call [phone] for [facility].

## 2017-05-08 NOTE — NURSING NOTE
Chief Complaint   Patient presents with     Sleep Problem     consult       Initial /70  Pulse 61  Temp 98.1  F (36.7  C) (Oral)  Ht 1.524 m (5')  Wt 64.3 kg (141 lb 12.8 oz)  BMI 27.69 kg/m2 Estimated body mass index is 27.69 kg/(m^2) as calculated from the following:    Height as of this encounter: 1.524 m (5').    Weight as of this encounter: 64.3 kg (141 lb 12.8 oz).  Medication Reconciliation: complete     Neck circumference:33 CM  ESS:6  Cristina Hough MA  [facility] [place]

## 2017-05-16 NOTE — PROGRESS NOTES
[place] - Rheumatology Clinic Visit     Nikki Hardin MRN# [id number]   YOB: 1970    Primary care provider: Vandana Castellanos  May 17, 2017          Assessment and Plan:   # Sicca symptoms, chronic fatigue   # Positive SHILPA >9, SSA borderline elevated; sed rate > 25; past h/o malar rash and photosensitivity  # Raynaud phenomenon since about 2014  # Microscopic lymphocytic colitis (diagnosed in 20s)  # Father with h/o ?ulcerative colitis    There is suspicion for a brewing autoimmune connective tissue disease. However it is unclear whether this is Sjogren's or systemic lupus or something else. We will get the following work up and meet again to discuss further.   Chronic fatigue is her most bothering symptom. Vitamin D, TSH within normal limits. Sleep study also coming up soon.     The labs, imaging from patient records are reviewed.     I will be back in touch with the patient through mychart/letter when results are available.     Return in about 2 weeks (around 5/31/2017).    Orders Placed This Encounter   Procedures     CRP inflammation     Cyclic Citrullinated Peptide Antibody IgG     Complement C3     Complement C4     DNA double stranded antibodies     Centromere Antibody IgG     DELMY antibody panel       Medications Discontinued During This Encounter   Medication Reason     bismuth subsalicylate ([facility] BISMUTH) 262 MG TABS Reorder     melatonin 5 MG CAPS      Current Outpatient Prescriptions   Medication Sig Dispense Refill     bismuth subsalicylate ([facility] BISMUTH) 262 MG TABS Take 3 tablets by mouth 2 times daily Reported on 5/8/2017 80 tablet      loperamide (LOPERAMIDE A-D) 2 MG tablet Take 2 mg by mouth 4 times daily as needed for diarrhea       Omega-3 Fatty Acids (FISH OIL CONCENTRATE PO) Take 850 mg by mouth daily       IBUPROFEN PO Take 200 mg by mouth Takes 3 tablets PRN       vitamin B complex with vitamin C (VITAMIN  B COMPLEX) TABS tablet Take 1 tablet by mouth daily        rizatriptan (MAXALT) 10 MG tablet Take 1 tablet (10 mg) by mouth at onset of headache May repeat in 2 hours if needed: max 2/day; 12 tablet 3     cholecalciferol 5000 UNITS CAPS Take 1 capsule (5,000 Units) by mouth daily FOR VITAMIN D DEFICIENCY (LOW VITAMIN D) 100 capsule 3     Zinc Acetate, Oral, 50 MG CAPS  90 capsule      naproxen sodium (ANAPROX) 220 MG tablet Take by mouth as needed Takes 2 tablets PRN 60 tablet      FLUoxetine (PROZAC) 40 MG capsule Take 1 capsule (40 mg) by mouth daily 90 capsule 0     CALCIUM-MAGNESIUM PO Take 2 tablets by mouth daily WITH POTASSIUM       Probiotic Product (PROBIOTIC PO) Take 2 tablets by mouth daily       acetaminophen (TYLENOL) 325 MG tablet Take 2 tablets by mouth every 4 hours as needed for pain. 100 tablet 0     zolpidem (AMBIEN) 5 MG tablet Take tablet by mouth 15 minutes prior to sleep, for Sleep Study 1 tablet 0     amitriptyline (ELAVIL) 25 MG tablet Take 0.5 tablets (12.5 mg) by mouth At Bedtime INDICATION: MIGRAINE PROPHYLAXIS (Patient not taking: Reported on 5/8/2017) 90 tablet 1     ferrous fumarate 65 mg, [place]. FE,-Vitamin C 125 mg (VITRON C)  MG TABS tablet Take 1 tablet by mouth daily INDICATION: IRON SUPPLEMENT (Patient not taking: Reported on 5/8/2017) 100 tablet prn       Balta Villegas MD  [facility]          Active Problem List:     Patient Active Problem List    Diagnosis Date Noted     Serologic abnormality 04/13/2017     Priority: Medium     LOW ABNORMAL SSA, NORMAL SSB       Sedimentation rate elevation 04/13/2017     Priority: Medium     Elevated vitamin B12 level 04/13/2017     Priority: Medium     NOT ON B-12 SUPPLEMENTS       Anemia, unspecified type 04/13/2017     Priority: Medium     Fatigue, unspecified type 04/13/2017     Priority: Medium     Hypervitaminosis B6 04/13/2017     Priority: Medium     Migraine with aura and without status migrainosus, not intractable 02/07/2017     Priority: Medium     Depression  with anxiety 02/07/2017     Priority: Medium     Other acne 02/28/2006     Priority: Medium     Irritable bowel syndrome 03/23/2005     Priority: Medium     diarrhea              History of Present Illness:     Chief Complaint   Patient presents with     Establish Care     May 17, 2017   Ms. Hardin is here for positive SHILPA. She was also seen in Feb 2016 and Mar 2016 [facility] (reports not available today).     This was tested to investigate fatigue.     Fatigue - 7/10    Joint pain history  Onset: sx started in the last year  Involved joints: hands/ has a lot of fatigue  Pain scale: 1/10   Wakes the patient from sleep : No  Morning stiffness:Yes for 30 minutes somedays  Meds used:nothing     Interim history  Since last visit:  1. Infections - No  2. New symptoms/medical problem - Yes/ fatigue the last year dry mouth and dry eyes, very sensitive to cold  3. Any side effects from Rheum medications -NA  3. ER visits/Hospitalizations/surgeries - No  4. Last PCP visit: 4/28/17    SHILPA > 9; SSA 1.0  Dry eyes and dry mouth X since about 2014. Uses systane eye drops (generic). Dry mouth is not severe.     Raynaud's phenomenon:  Onset: 2014  Digits: 2nd digits.   Digital ulcers: no   Color changes: white, bluish purple  Duration of an attack:  30 mins  Pain during attack:  Not pain  Frequency of attacks: winter once a week  H/o nailfold capillary abnormalities:   Family history of Raynauds:     GERD: occassional    Has past h/o malar rash and photosensitivity    Diarrhea with colitis flare ups.     No h/o unintentional weight loss, loss of appetite, fevers, rash, swollen glands  No family or personal history of psoriasis, chron's disease.   Patient denies any persistent malar rash, photosensitivity, recurrent mouth ulcers, recurrent sinusitis/rhinitis  No h/o recurrent miscarriages or arterial/venous thrombosis in the past  No h/o persistent shortness of breath, cough, chest pain  No h/o  persistent nausea, vomiting, constipation, abdominal pain  No h/o hematochezia, hematuria, hemoptysis, hematemesis  No h/o seizures   No h/o cancer    Creat within normal limits  AST, ALT within normal limits  RF neg.   UA no blood or protein    Anemia 11.2  Sed rate > 25  Vitamin d and TSH within normal limits    BP Readings from Last 3 Encounters:   17 98/68   17 109/70   17 106/62          Review of Systems:   Complete ROS negative except for symptoms mentioned in the HPI          Past Medical History:     Past Medical History:   Diagnosis Date     Anxiety      Colitis     lymphocytic colitis ?     Depressive disorder, not elsewhere classified     sees psych- Dr Blue     Migraine, unspecified, without mention of intractable migraine without mention of status migrainosus age 14 yrs     Other acne      PAIN IN THORACIC SPINE [724.1] 2006    chiropractic care     Raynaud disease     ? connective tissue dz.  ? sogren's     Past Surgical History:   Procedure Laterality Date     C IUD,MIRENA  2005     C NONSPECIFIC PROCEDURE       X 3     ESOPHAGOSCOPY, GASTROSCOPY, DUODENOSCOPY (EGD), COMBINED  10/10     HC ABLATION, ENDOMETRIAL, THERMAL, W/O HYSTEROSCOPIC GUIDANCE  2010     HC COLONOSCOPY THRU STOMA, DIAGNOSTIC  2004     HC COLONOSCOPY THRU STOMA, DIAGNOSTIC  5/10     HYSTEROSCOPY      D&C            Social History:     Social History     Occupational History     Not on file.     Social History Main Topics     Smoking status: Never Smoker     Smokeless tobacco: Never Used     Alcohol use Yes      Comment: occasional- once monthly     Drug use: No     Sexual activity: Yes     Partners: Male     Birth control/ protection: None      Comment:  vasectomy            Family History:     Family History   Problem Relation Age of Onset     Neurologic Disorder Mother      MIGRIANES     C.A.D. Father      in his 60's     Depression Father      HEART DISEASE Father      Lipids Father       Hypertension Father      Arthritis Father      Sleep Disorder Father      Cancer - colorectal Paternal Grandmother      CANCER Maternal Grandfather      stomach     HEART DISEASE Maternal Grandfather      Depression Brother      Depression Brother      Asthma Daughter      HEART DISEASE Paternal Grandfather      CEREBROVASCULAR DISEASE Maternal Aunt      DIABETES No family hx of             Allergies:     Allergies   Allergen Reactions     No Known Allergies             Medications:     Current Outpatient Prescriptions   Medication Sig Dispense Refill     bismuth subsalicylate ([facility] BISMUTH) 262 MG TABS Take 3 tablets by mouth 2 times daily Reported on 5/8/2017 80 tablet      loperamide (LOPERAMIDE A-D) 2 MG tablet Take 2 mg by mouth 4 times daily as needed for diarrhea       Omega-3 Fatty Acids (FISH OIL CONCENTRATE PO) Take 850 mg by mouth daily       IBUPROFEN PO Take 200 mg by mouth Takes 3 tablets PRN       vitamin B complex with vitamin C (VITAMIN  B COMPLEX) TABS tablet Take 1 tablet by mouth daily       rizatriptan (MAXALT) 10 MG tablet Take 1 tablet (10 mg) by mouth at onset of headache May repeat in 2 hours if needed: max 2/day; 12 tablet 3     cholecalciferol 5000 UNITS CAPS Take 1 capsule (5,000 Units) by mouth daily FOR VITAMIN D DEFICIENCY (LOW VITAMIN D) 100 capsule 3     Zinc Acetate, Oral, 50 MG CAPS  90 capsule      naproxen sodium (ANAPROX) 220 MG tablet Take by mouth as needed Takes 2 tablets PRN 60 tablet      FLUoxetine (PROZAC) 40 MG capsule Take 1 capsule (40 mg) by mouth daily 90 capsule 0     CALCIUM-MAGNESIUM PO Take 2 tablets by mouth daily WITH POTASSIUM       Probiotic Product (PROBIOTIC PO) Take 2 tablets by mouth daily       acetaminophen (TYLENOL) 325 MG tablet Take 2 tablets by mouth every 4 hours as needed for pain. 100 tablet 0     zolpidem (AMBIEN) 5 MG tablet Take tablet by mouth 15 minutes prior to sleep, for Sleep Study 1 tablet 0     amitriptyline (ELAVIL) 25 MG tablet  Take 0.5 tablets (12.5 mg) by mouth At Bedtime INDICATION: MIGRAINE PROPHYLAXIS (Patient not taking: Reported on 5/8/2017) 90 tablet 1     ferrous fumarate 65 mg, [place]. FE,-Vitamin C 125 mg (VITRON C)  MG TABS tablet Take 1 tablet by mouth daily INDICATION: IRON SUPPLEMENT (Patient not taking: Reported on 5/8/2017) 100 tablet prn            Physical Exam:   Blood pressure 98/68, pulse 64, temperature 98.8  F (37.1  C), temperature source Oral, height 5' (1.524 m), weight 141 lb 11.2 oz (64.3 kg), SpO2 99 %, not currently breastfeeding.  Wt Readings from Last 4 Encounters:   05/17/17 141 lb 11.2 oz (64.3 kg)   05/08/17 141 lb 12.8 oz (64.3 kg)   04/28/17 143 lb (64.9 kg)   04/13/17 142 lb (64.4 kg)       Constitutional: well-developed, appearing stated age; cooperative  Eyes: PERRLA, normal conjunctiva, sclera  ENT: nl external ears, nose, lips.No mucous membrane lesions, normal saliva pool  Neck: no cervical lymphadenopathy  Resp: lungs clear to auscultation,   CV: RRR, no added sounds, no edema  GI: Abdomen soft and no tenderness  : not tested  Lymph: no cervical, supraclavicular or epitrochlear nodes  MS: All shoulder, elbow, wrist, MCP/PIP/DIP, hip, knee, ankle, and foot MTP/IP joints were examined and  found normal. No active synovitis or deformity. Full ROM.  Fist 100%.  No dactylitis,  tenosynovitis, enthesopathy.  Skin: no nail pitting; no rash in exposed areas  Psych: nl judgement, orientation, memory, affect.         Data:     Results for orders placed or performed in visit on 04/13/17   CBC with platelets differential   Result Value Ref Range    WBC 8.3 4.0 - 11.0 10e9/L    RBC Count 3.71 (L) 3.8 - 5.2 10e12/L    Hemoglobin 11.2 (L) 11.7 - 15.7 g/dL    Hematocrit 33.8 (L) 35.0 - 47.0 %    MCV 91 78 - 100 fl    MCH 30.2 26.5 - 33.0 pg    MCHC 33.1 31.5 - 36.5 g/dL    RDW 13.7 10.0 - 15.0 %    Platelet Count 317 150 - 450 10e9/L    Diff Method Automated Method     % Neutrophils 50.4 %    %  Lymphocytes 37.9 %    % Monocytes 8.0 %    % Eosinophils 3.2 %    % Basophils 0.5 %    Absolute Neutrophil 4.2 1.6 - 8.3 10e9/L    Absolute Lymphocytes 3.1 0.8 - 5.3 10e9/L    Absolute Monocytes 0.7 0.0 - 1.3 10e9/L    Absolute Eosinophils 0.3 0.0 - 0.7 10e9/L    Absolute Basophils 0.0 0.0 - 0.2 10e9/L   Reticulocyte Count   Result Value Ref Range    % Retic 1.8 0.5 - 2.0 %    Absolute Retic 70.3 25 - 95 10e9/L   Blood Morphology Pathologist Review   Result Value Ref Range    Copath Report       Patient Name: ALYSSA HADDAD  MR#: [id number]  Specimen #: [id number]  Collected: 4/13/2017  Received: 4/17/2017  Reported: 4/17/2017 15:21  Ordering Phy(s): MARITZA WILLINGHAM    For improved result formatting, select 'View Enhanced Report Format'  under Linked Documents section.    TEST(S):  Peripheral Smear Morphology    FINAL DIAGNOSIS:  Peripheral blood  - Slight normocytic normochromic anemia with slightly increased rouleaux    COMMENT:  The etiology of this patient's anemia is not clear from this peripheral  blood morphology. There is no morphologic evidence of hemolysis.  Increased rouleaux can be seen with any elevation of plasma proteins.  Please correlate with pending laboratory studies.    Electronically signed out by:    Rebecca Swanson M.D.    CLINICAL HISTORY:  46-year-old woman with anemia.    PERIPHERAL BLOOD DATA:    PERIPHERAL BLOOD DATA  Patient Value (Reference Range >18 year old female)  8.25 .......WBC   (4.0-11.0 x 10*9/L)  3.71 .......RBC   (3.8-5.2 x 10*12 /L)  11.2 .......HGB   (11.7-15.7 g/dL)  33.8 .......HCT   (35.0-47.0 %)  91.1 .......MCV   (78-100fL)  30.2 .......MCH   (26.5-33.0 pg)  33.1  .......MCHC   (31.5-36.5 g/dL)  13.7 .......RDW   (10.0-15.0 %)  317 .......PLT   (150-450 x 10*9/L)  1.84 .......Retic   (0.5-2.0%)    PERIPHERAL BLOOD DIFFERENTIAL  (Reference ranges >18 year old female)  Automated differential:    Percent  4.16....Neutrophils, segmented and bands (40 -  75)  3.13....Lymphocytes (20 - 48)  0.66....Monocytes (0 - 12)  0.26....Eosinophils (0 - 6)  0.04....Basophils (0 - 2)    Absolute  50.4....Neutrophils, segmented and bands  (1.6 - 8.3 x 10*9/L)  37.9....Lymphocytes  (0.8 - 5.3 x 10*9/L)  8.0....Monocytes  (0 -1.3 x 10*9/L)  3.2....Eosinophils  (0 - 0.7 x 10*9/L)  0.5....Basophils  (0 - 0.2 x 10*9/L)    PERIPHERAL MORPHOLOGY:    ERYTHROCYTES: The red blood cells appear normochromic. Poikilocytosis is  minimal. Polychromasia is not increased. Rouleaux formation is slightly  increased.    LEUKOCYTES: Plasmacytoi d lymphocytes are noted.    PLATELETS: The morphology of the platelets is normal.    CPT Codes:  A: 45666-WPSL    TESTING LAB LOCATION:  [address]  [address] [phone]    COLLECTION SITE:  Client:  [facility]  Location:  [facility] (S)     Protein electrophoresis   Result Value Ref Range    Albumin Fraction 4.5 3.7 - 5.1 g/dL    Alpha 1 Fraction 0.3 0.2 - 0.4 g/dL    Alpha 2 Fraction 0.7 0.5 - 0.9 g/dL    Beta Fraction 0.8 0.6 - 1.0 g/dL    Gamma Fraction 1.2 0.7 - 1.6 g/dL    Monoclonal Peak 0.0 0.0 g/dL    ELP Interpretation:       Essentially normal electrophoretic pattern.  No monoclonal protein seen.   Pathologic significance requires clinical correlation.  ABIODUN Kyle M.D.,   Ph.D., Pathologist ().     IgA   Result Value Ref Range     70 - 380 mg/dL   IgE   Result Value Ref Range    IGE 3 0 - 114 KIU/L   IgM   Result Value Ref Range     60 - 265 mg/dL   IgG subclasses   Result Value Ref Range    IGG 1160 695 - 1620 mg/dL    IgG1 Test sent to [facility].  See ARMISC. 300 - 856 mg/dL    IgG2 Test sent to [facility].  See ARMISC. 158 - 761 mg/dL    IgG3 Test sent to [facility].  See ARMISC. 24 - 192 mg/dL    IgG4 Test sent to [facility].  See ARMISC. 11 - 86 mg/dL   Rheumatoid factor   Result Value Ref Range    Rheumatoid Factor <20 <20 IU/mL   Antinuclear antibody screen by EIA    Result Value Ref Range    SHILPA Screen by EIA 9.6 (H) <1.0   UA with Microscopic reflex to Culture   Result Value Ref Range    Color Urine Yellow     Appearance Urine Clear     Glucose Urine Negative NEG mg/dL    Bilirubin Urine Negative NEG    Ketones Urine Negative NEG mg/dL    Specific Gravity Urine 1.015 1.003 - 1.035    pH Urine 6.0 5.0 - 7.0 pH    Protein Albumin Urine Negative NEG mg/dL    Urobilinogen Urine 0.2 0.2 - 1.0 EU/dL    Nitrite Urine Negative NEG    Blood Urine Negative NEG    Leukocyte Esterase Urine Negative NEG    Source Midstream Urine     WBC Urine O - 2 0 - 2 /HPF    RBC Urine O - 2 0 - 2 /HPF   Erythrocyte sedimentation rate auto   Result Value Ref Range    Sed Rate 26 (H) 0 - 20 mm/h   HGB Eval Reflex to ELP or RBC Solubility   Result Value Ref Range    Hemoglobin A1 96.7     Hemoglobin A2 2.9     Hemoglobin F 0.4     Hemoglobin S Eval 0.0     Hemoglobin C 0.0     Hemoglobin E 0.0     Hemoglobin Other 0.0     HGB Abn Evaluation       SEE NOTE  (Note)    Impression: Normal HPLC evaluation.    This normal HPLC result does not rule out the possibility  of alpha globin gene deletions associated with silent  carrier status or alpha thalassemia trait. Individuals who  carry a rare, Greek beta thalassemia variant often have a  normal Hb A2 and may not be identified by this assay.  Please correlate with clinical and laboratory findings.      Sickle Cell Solubility Confirm Not Performed     Hemoglobin Capillary ELP       Not Performed  (Note)  Performed by [facility],  [address] [place] [id number] [phone]  [email or web address], Robbin Nunez MD, Lab. Director     Protein electrophoresis random urine   Result Value Ref Range    ELP Interpretation Urine       Only trace albumin and trace globulins.  No obvious monoclonal protein seen. We   recommend a first morning voided urine to detect clinically significant   proteinuria.  A random specimen is not optimal for detecting all proteins.  The    specific gravity of this specimen was only 1.010. Pathological significance   requires clinical correlation.  ABIODUN Kyle M.D., Ph.D., Pathologist ([phone])     Free kappa and lambda light chains urine   Result Value Ref Range    Total Urine Volume Resistant     Hours Collected Resistant     Albumin Urine       Detected  Reference range: Detected  Unit: not reported      Alpha-1 Urine       Detected  Reference range: None Detected  Unit: not reported      Alpha-2 Urine       Detected  Reference range: None Detected  Unit: not reported      Beta Globulin Urine       Detected  Reference range: None Detected  Unit: not reported      Gamma Urine       Detected  Reference range: None Detected  Unit: not reported      Total Protein Urine       SEE NOTE  Reference range: 10 to 140  Unit: mg/d  (Note)  Total Protein = 1.63 mg/dL based on random urine.  INTERPRETIVE INFORMATION: Total Protein  Total urinary protein is determined nephelometrically by  adding the albumin and Kappa and/or Lambda light chains.  This value may not agree with the total protein as  determined by chemical methods, which characteristically  underestimate urinary light chains.      CARLOS Interpretation Urine       SEE NOTE  Unit: not reported  (Note)  Urine is negative for monoclonal Free Light Chains (Bence  Clayton Protein).      Free Kappa Light Chn Urine 1.59     Free Kappa Excr/Day Urine       SEE NOTE  Unit: mg/d  (Note)  Unable to quantitate free light chain excretion per day  on a random urine sample.      Free Lambda Lght Chn Urine 0.04     Free Lambda Excr/Day Urine       SEE NOTE  Unit: mg/d  (Note)  Unable to quantitate free light chain excretion per day  on a random urine sample.      F Kappa/Lambda Ratio Urine 39.75 (H)    [facility] Miscellaneous Test   Result Value Ref Range    Result       SEE NOTE  (Note)  Test name                    Result Flag  Units   RefIntvl  ------------------------------------------------------------  Centromere Ab, IgG                0       AU/mL 0-40  INTERPRETIVE INFORMATION: Centromere Ab, IgG   29 AU/mL or Less ............. Negative   30 - 40 AU/mL ................ Equivocal   41 AU/mL or Greater .......... Positive  When detected by this multiplex bead assay, the presence of  centromere antibodies is mainly associated with CREST  syndrome, a variant of systemic sclerosis (SSc). These  antibodies target the centromere B, a dominant antigen of  the centromeric complex associated with the centromere  pattern observed in antinuclear antibody (SHILPA) testing by  [facility]. Centromere antibodies may also be seen in a varying  percentage of patients with other autoimmune diseases,  including diffuse cutaneous SSc, Raynaud syndrome,  interstitial pulmonary fibrosis, autoimmune liver disease,  systemic lupus erythematosus (SLE) and rheumatoid arthritis  ( RA).  A negative result indicates no detectable IgG antibodies to  centromere B. If the result is negative but clinical  suspicion for SSc is strong, consider testing for SHILPA by  [facility] along with other antibodies associated with SSc,  including Scl-70, U3-RNP, PM/Scl, or Th/To.  Performed by [facility],  [address] [place] [address] [id number] [phone]  [email or web address], Robbin Nunez MD, Lab. Director      Test Name CENTROMERE ANTIBODY IgG     Send Outs Misc Test Code       [place] [address]    Send Outs Misc Test Specimen SERUM    [facility] Miscellaneous Test   Result Value Ref Range    Result       SEE NOTE  (Note)  Test name                    Result Flag  Units  RefIntvl  ------------------------------------------------------------  Immunoglobulin G Subclass 1                                 717       mg/dL 240-1118  REFERENCE INTERVAL: Immunoglobulin G Subclass 1  Access complete set of age- and/or gender-specific  reference intervals for this test in the [facility]  Laboratory  Test Directory ([email or web address]).  Immunoglobulin G Subclass 2                                 312       mg/dL 124-549  REFERENCE INTERVAL: Immunoglobulin G Subclass 2  Access complete set of age- and/or gender-specific  reference intervals for this test in the [facility]  Test Directory ([email or web address]).  Immunoglobulin G Subclass 3                                  28       mg/dL   REFERENCE INTERVAL: Immunoglobulin G Subclass 3  Access complete set of age- and/or gender-specific  reference intervals for this test in the [facility]  Test Directory ([email or web address]).  Immunoglobulin G Subclass 4                                   13       mg/dL 1-123  The total IgG (mg/dL) can be derived by the sum of the  subclasses IgG1, IgG2, IgG3 and IgG4 values. However, a  confirmatory and more precise total IgG is available by the  nephelometric method of total IgG (Test # [id number]).  REFERENCE INTERVAL: Immunoglobulin G Subclass 4  Access complete set of age- and/or gender-specific  reference intervals for this test in the [facility]  Test Directory ([email or web address]).  Performed by [facility],  [address] [place] [id number] [phone]  [email or web address], Robbin Nunez MD, Lab. Director      Test Name IGG SUBCLASSES     Send Outs Misc Test Code 30804     Send Outs Misc Test Specimen Serum        Balta Villegas MD    [facility]

## 2017-05-17 ENCOUNTER — OFFICE VISIT (OUTPATIENT)
Dept: INTERNAL MEDICINE | Facility: CLINIC | Age: 47
End: 2017-05-17
Payer: COMMERCIAL

## 2017-05-17 VITALS
SYSTOLIC BLOOD PRESSURE: 98 MMHG | HEIGHT: 60 IN | OXYGEN SATURATION: 99 % | HEART RATE: 64 BPM | TEMPERATURE: 98.8 F | WEIGHT: 141.7 LBS | DIASTOLIC BLOOD PRESSURE: 68 MMHG | BODY MASS INDEX: 27.82 KG/M2

## 2017-05-17 DIAGNOSIS — R53.82 CHRONIC FATIGUE: ICD-10-CM

## 2017-05-17 DIAGNOSIS — M35.00 SICCA SYNDROME (H): Primary | ICD-10-CM

## 2017-05-17 DIAGNOSIS — R76.0 ABNORMAL ANTIBODY TITER: ICD-10-CM

## 2017-05-17 PROCEDURE — 86140 C-REACTIVE PROTEIN: CPT | Performed by: INTERNAL MEDICINE

## 2017-05-17 PROCEDURE — 86235 NUCLEAR ANTIGEN ANTIBODY: CPT | Performed by: INTERNAL MEDICINE

## 2017-05-17 PROCEDURE — 86160 COMPLEMENT ANTIGEN: CPT | Performed by: INTERNAL MEDICINE

## 2017-05-17 PROCEDURE — 99204 OFFICE O/P NEW MOD 45 MIN: CPT | Performed by: INTERNAL MEDICINE

## 2017-05-17 PROCEDURE — 36415 COLL VENOUS BLD VENIPUNCTURE: CPT | Performed by: INTERNAL MEDICINE

## 2017-05-17 PROCEDURE — 86225 DNA ANTIBODY NATIVE: CPT | Performed by: INTERNAL MEDICINE

## 2017-05-17 PROCEDURE — 86200 CCP ANTIBODY: CPT | Performed by: INTERNAL MEDICINE

## 2017-05-17 ASSESSMENT — ROUTINE ASSESSMENT OF PATIENT INDEX DATA (RAPID3)
TOTAL RAPID3 SCORE: 6.3
RAPID3 INTERPRETATION: MODERATE 6.1-12.0

## 2017-05-17 NOTE — NURSING NOTE
Chief Complaint   Patient presents with     \[facility] [place] [facility]\"" Care       Initial BP 98/68 (BP Location: Left arm, Patient Position: Chair, Cuff Size: Adult Regular)  Pulse 64  Temp 98.8  F (37.1  C) (Oral)  Ht 1.524 m (5')  Wt 64.3 kg (141 lb 11.2 oz)  SpO2 99%  BMI 27.67 kg/m2 Estimated body mass index is 27.67 kg/(m^2) as calculated from the following:    Height as of this encounter: 1.524 m (5').    Weight as of this encounter: 64.3 kg (141 lb 11.2 oz).  Medication Reconciliation: complete    Joint pain history  Onset: sx started in the last year  Involved joints: hands/ has a lot of fatigue  Pain scale:  1/10     Wakes the patient from sleep : No  Morning stiffness:Yes for 30 minutes  Meds used:nothing    Interim history  Since last visit:  1. Infections - No  2. New symptoms/medical problem - Yes/ fatigue the last year dry mouth and dry eyes, very sensitive to cold  3. Any side effects from Rheum medications -NA  3. ER visits/Hospitalizations/surgeries - No  4. Last PCP visit: 4/28/17  Wt Readings from Last 4 Encounters:   05/17/17 64.3 kg (141 lb 11.2 oz)   05/08/17 64.3 kg (141 lb 12.8 oz)   04/28/17 64.9 kg (143 lb)   04/13/17 64.4 kg (142 lb)     BP Readings from Last 3 Encounters:   05/17/17 98/68   05/08/17 109/70   04/28/17 106/62

## 2017-05-17 NOTE — MR AVS SNAPSHOT
After Visit Summary   5/17/2017    Nikki Shah Wilfred    MRN: [id number]           Patient Information     Date Of Birth          1970        Visit Information        Provider Department      5/17/2017 4:00 PM Balta Villegas MD [facility]        Today's Diagnoses     Sicca syndrome (H)    -  1    Abnormal antibody titer        Chronic fatigue           Follow-ups after your visit        Follow-up notes from your care team     Return in about 2 weeks (around 5/31/2017).      Your next 10 appointments already scheduled     Jun 12, 2017  8:30 PM CDT   PSG Split with BED 4 SH SLEEP   [facility] [place] ([facility])    [address]  [place] [facility] [place] [address]   [phone]            Jun 26, 2017 10:30 AM CDT   Return Sleep Patient with Bennett Ezra Goltz, PA-C   [facility] ([facility])    [address]  [place] [facility] [place] [address]   [phone]            Jun 29, 2017  1:30 PM CDT   [facility]anos [facility]   [facility]    [address] [id number] [phone]           Bring a current list of meds and any records pertaining to this visit.  For Physicals, please bring immunization records and any forms needing to be filled out.  Please arrive 10 minutes early to complete paperwork.              Future tests that were ordered for you today     Open Future Orders        Priority Expected Expires Ordered    CRP inflammation Routine  6/16/2017 5/17/2017    Cyclic Citrullinated Peptide Antibody IgG Routine  6/16/2017 5/17/2017    Complement C3 Routine  6/16/2017 5/17/2017    Complement C4 Routine  6/16/2017 5/17/2017    DNA double stranded antibodies Routine  6/16/2017 5/17/2017    Centromere Antibody IgG Routine  6/16/2017 5/17/2017    DELMY antibody panel Routine   6/16/2017 5/17/2017            Who to contact     If you have questions or need follow up information about today's clinic visit or your schedule please contact [facility] directly at [phone].  Normal or non-critical lab and imaging results will be communicated to you by [employer], letter or phone within 4 business days after the clinic has received the results. If you do not hear from us within 7 days, please contact the clinic through [employer] or phone. If you have a critical or abnormal lab result, we will notify you by phone as soon as possible.  Submit refill requests through [employer] or call your pharmacy and they will forward the refill request to us. Please allow 3 business days for your refill to be completed.          Additional Information About Your Visit        [employer] Information     [employer] gives you secure access to your electronic health record. If you see a primary care provider, you can also send messages to your care team and make appointments. If you have questions, please call your primary care clinic.  If you do not have a primary care provider, please call [phone] and they will assist you.        Care EveryWhere ID     This is your Care EveryWhere ID. This could be used by other organizations to access your [place] medical records  [id number]        Your Vitals Were     Pulse Temperature Height Pulse Oximetry BMI (Body Mass Index)       64 98.8  F (37.1  C) (Oral) 5' (1.524 m) 99% 27.67 kg/m2        Blood Pressure from Last 3 Encounters:   05/17/17 98/68   05/08/17 109/70   04/28/17 106/62    Weight from Last 3 Encounters:   05/17/17 141 lb 11.2 oz (64.3 kg)   05/08/17 141 lb 12.8 oz (64.3 kg)   04/28/17 143 lb (64.9 kg)                 Today's Medication Changes          These changes are accurate as of: 5/17/17  4:43 PM.  If you have any questions, ask your nurse or doctor.               These medicines have changed or have updated prescriptions.         Dose/Directions    [facility] BISMUTH 262 MG Tabs   This may have changed:  when to take this   Generic drug:  bismuth subsalicylate   Changed by:  aBlta Villegas MD        Dose:  3 tablet   Take 3 tablets by mouth 2 times daily Reported on 5/8/2017   Quantity:  80 tablet   Refills:  0         Stop taking these medicines if you haven't already. Please contact your care team if you have questions.     melatonin 5 MG Caps   Stopped by:  Balta Villegas MD                    Primary Care Provider Office Phone # Fax #    Vandana LEXIS Castellanos [phone]       [facility] [address] [facility] [address]        Thank you!     Thank you for choosing [facility]  for your care. Our goal is always to provide you with excellent care. Hearing back from our patients is one way we can continue to improve our services. Please take a few minutes to complete the written survey that you may receive in the mail after your visit with us. Thank you!             Your Updated Medication List - Protect others around you: Learn how to safely use, store and throw away your medicines at www.disposemymeds.org.          This list is accurate as of: 5/17/17  4:43 PM.  Always use your most recent med list.                   Brand Name Dispense Instructions for use    acetaminophen 325 MG tablet    TYLENOL    100 tablet    Take 2 tablets by mouth every 4 hours as needed for pain.       amitriptyline 25 MG tablet    ELAVIL    90 tablet    Take 0.5 tablets (12.5 mg) by mouth At Bedtime INDICATION: MIGRAINE PROPHYLAXIS       CALCIUM-MAGNESIUM PO      Take 2 tablets by mouth daily WITH POTASSIUM       cholecalciferol 5000 UNITS Caps     100 capsule    Take 1 capsule (5,000 Units) by mouth daily FOR VITAMIN D DEFICIENCY (LOW VITAMIN D)       CVS BISMUTH 262 MG Tabs   Generic drug:  bismuth subsalicylate     80 tablet    Take 3 tablets by mouth 2 times daily Reported on 5/8/2017        ferrous fumarate 65 mg ([place]. FE)-Vitamin C 125 mg  MG Tabs tablet    VITRON C    100 tablet    Take 1 tablet by mouth daily INDICATION: IRON SUPPLEMENT       FISH OIL CONCENTRATE PO      Take 850 mg by mouth daily       FLUoxetine 40 MG capsule    PROzac    90 capsule    Take 1 capsule (40 mg) by mouth daily       IBUPROFEN PO      Take 200 mg by mouth Takes 3 tablets PRN       LOPERAMIDE A-D 2 MG tablet   Generic drug:  loperamide      Take 2 mg by mouth 4 times daily as needed for diarrhea       naproxen sodium 220 MG tablet    ANAPROX    60 tablet    Take by mouth as needed Takes 2 tablets PRN       PROBIOTIC PO      Take 2 tablets by mouth daily       rizatriptan 10 MG tablet    MAXALT    12 tablet    Take 1 tablet (10 mg) by mouth at onset of headache May repeat in 2 hours if needed: max 2/day;       vitamin B complex with vitamin C Tabs tablet      Take 1 tablet by mouth daily       Zinc Acetate (Oral) 50 MG Caps     90 capsule        zolpidem 5 MG tablet    AMBIEN    1 tablet    Take tablet by mouth 15 minutes prior to sleep, for Sleep Study

## 2017-05-18 LAB — CRP SERPL-MCNC: <2.9 MG/L (ref 0–8)

## 2017-05-19 LAB
C3 SERPL-MCNC: 82 MG/DL (ref 76–169)
C4 SERPL-MCNC: 19 MG/DL (ref 15–50)
CCP AB SER IA-ACNC: 1 U/ML
CENTROMERE IGG SER-ACNC: NORMAL AI (ref 0–0.9)
DSDNA AB SER-ACNC: 1 IU/ML
ENA RNP IGG SER IA-ACNC: NORMAL AI (ref 0–0.9)
ENA SCL70 IGG SER IA-ACNC: NORMAL AI (ref 0–0.9)
ENA SM IGG SER-ACNC: NORMAL AI (ref 0–0.9)
ENA SS-A IGG SER IA-ACNC: 0.8 AI (ref 0–0.9)
ENA SS-B IGG SER IA-ACNC: NORMAL AI (ref 0–0.9)

## 2017-05-19 NOTE — PROGRESS NOTES
Results released to [facility]:  Specific antibodies for systemic lupus, sjogren syndrome, mixed connective tissue disease, scleroderma were negative. CCP rheumatoid antibody and complement levels were also normal. We will discuss these when we meet.     Sincerely    Balta Villegas MD  [facility]

## 2017-05-30 NOTE — PROGRESS NOTES
[facility] - Rheumatology Clinic Visit     Nikki Hardin MRN# [id number]   YOB: 1970    Primary care provider: Vandana Castellanos  May 31, 2017          Assessment and Plan:   # Sicca symptoms, chronic fatigue   # Positive SHILPA >9, SSA borderline elevated (repeat negative); sed rate > 25; past h/o malar rash and photosensitivity  # Raynaud phenomenon since about 2014  # Microscopic lymphocytic colitis (diagnosed in 20s)  # Father with h/o ?ulcerative colitis    There is suspicion for a brewing autoimmune connective tissue disease. Specific antibodies for systemic lupus, sjogren syndrome, mixed connective tissue disease, scleroderma were negative. CCP rheumatoid antibody and complement levels were also normal.   Chronic fatigue is her most bothering symptom. Vitamin D, TSH within normal limits. Sleep study also coming up soon. If sleep study is normal, then a trial of hydroxychloroquine may be tried for 6 months. This was also recommended by previous rheumatologist that she has seen.   Discussed with the patient regarding benefits versus risks of plaquenil therapy.  Discussed that plaquenil can cause in some people skin hyperpigmentation, muscle weakness, eye toxicity. Needs baseline eye exam by ophthalmologist. Recommend periodic eye exams. Discussed that it would take about 3-6 months to get maximum efficacy of plaquenil.  Patient would get back to us by the end of June after sleep study, if interested in trying hydroxychloroquine.     The labs, imaging from patient records are reviewed.     I will be back in touch with the patient through mychart/letter when results are available.     Data Unavailable    No orders of the defined types were placed in this encounter.      There are no discontinued medications.  Current Outpatient Prescriptions   Medication Sig Dispense Refill     bismuth subsalicylate ([facility] BISMUTH) 262 MG TABS Take 3 tablets by mouth 2 times daily Reported on 5/8/2017 80 tablet       loperamide (LOPERAMIDE A-D) 2 MG tablet Take 2 mg by mouth 4 times daily as needed for diarrhea       zolpidem (AMBIEN) 5 MG tablet Take tablet by mouth 15 minutes prior to sleep, for Sleep Study 1 tablet 0     Omega-3 Fatty Acids (FISH OIL CONCENTRATE PO) Take 850 mg by mouth daily       IBUPROFEN PO Take 200 mg by mouth Takes 3 tablets PRN       ferrous fumarate 65 mg, [place]. FE,-Vitamin C 125 mg (VITRON C)  MG TABS tablet Take 1 tablet by mouth daily INDICATION: IRON SUPPLEMENT 100 tablet prn     vitamin B complex with vitamin C (VITAMIN  B COMPLEX) TABS tablet Take 1 tablet by mouth daily       rizatriptan (MAXALT) 10 MG tablet Take 1 tablet (10 mg) by mouth at onset of headache May repeat in 2 hours if needed: max 2/day; 12 tablet 3     cholecalciferol 5000 UNITS CAPS Take 1 capsule (5,000 Units) by mouth daily FOR VITAMIN D DEFICIENCY (LOW VITAMIN D) 100 capsule 3     Zinc Acetate, Oral, 50 MG CAPS  90 capsule      naproxen sodium (ANAPROX) 220 MG tablet Take by mouth as needed Takes 2 tablets PRN 60 tablet      FLUoxetine (PROZAC) 40 MG capsule Take 1 capsule (40 mg) by mouth daily 90 capsule 0     CALCIUM-MAGNESIUM PO Take 2 tablets by mouth daily WITH POTASSIUM       Probiotic Product (PROBIOTIC PO) Take 2 tablets by mouth daily       acetaminophen (TYLENOL) 325 MG tablet Take 2 tablets by mouth every 4 hours as needed for pain. 100 tablet 0     amitriptyline (ELAVIL) 25 MG tablet Take 0.5 tablets (12.5 mg) by mouth At Bedtime INDICATION: MIGRAINE PROPHYLAXIS 90 tablet 1       Balta Villegas MD  [facility]          Active Problem List:     Patient Active Problem List    Diagnosis Date Noted     Serologic abnormality 04/13/2017     Priority: Medium     LOW ABNORMAL SSA, NORMAL SSB       Sedimentation rate elevation 04/13/2017     Priority: Medium     Elevated vitamin B12 level 04/13/2017     Priority: Medium     NOT ON B-12 SUPPLEMENTS       Anemia, unspecified type 04/13/2017      Priority: Medium     Fatigue, unspecified type 04/13/2017     Priority: Medium     Hypervitaminosis B6 04/13/2017     Priority: Medium     Migraine with aura and without status migrainosus, not intractable 02/07/2017     Priority: Medium     Depression with anxiety 02/07/2017     Priority: Medium     Other acne 02/28/2006     Priority: Medium     Irritable bowel syndrome 03/23/2005     Priority: Medium     diarrhea              History of Present Illness:     Chief Complaint   Patient presents with     RECHECK     May 17, 2017   Ms. Hardin is here for positive SHILPA. She was also seen in Feb 2016 and Mar 2016 by [facility] (reports not available today).     This was tested to investigate fatigue.     Fatigue - 7/10; she pushes her through to get the daily activities. Feels tired while driving. Avoided optional social activities.     Joint pain history  Onset: sx started in the last year  Involved joints: hands/ has a lot of fatigue  Pain scale: 1/10   Wakes the patient from sleep : No  Morning stiffness:Yes for 30 minutes somedays  Meds used:nothing     Interim history  Since last visit:  1. Infections - No  2. New symptoms/medical problem - Yes/ fatigue the last year dry mouth and dry eyes, very sensitive to cold  3. Any side effects from Rheum medications -NA  3. ER visits/Hospitalizations/surgeries - No  4. Last PCP visit: 4/28/17    SHILPA > 9; SSA 1.0  Dry eyes and dry mouth X since about 2014. Uses systane eye drops (generic). Dry mouth is not severe.     Raynaud's phenomenon:  Onset: 2014  Digits: 2nd digits.   Digital ulcers: no   Color changes: white, bluish purple  Duration of an attack:  30 mins  Pain during attack:  Not pain  Frequency of attacks: winter once a week  H/o nailfold capillary abnormalities:   Family history of Raynauds:     GERD: occassional    Has past h/o malar rash and photosensitivity    Diarrhea with colitis flare ups.     No h/o unintentional weight loss, loss  of appetite, fevers, rash, swollen glands  No family or personal history of psoriasis, chron's disease.   Patient denies any persistent malar rash, photosensitivity, recurrent mouth ulcers, recurrent sinusitis/rhinitis  No h/o recurrent miscarriages or arterial/venous thrombosis in the past  No h/o persistent shortness of breath, cough, chest pain  No h/o persistent nausea, vomiting, constipation, abdominal pain  No h/o hematochezia, hematuria, hemoptysis, hematemesis  No h/o seizures   No h/o cancer    Creat within normal limits  AST, ALT within normal limits  RF neg.   UA no blood or protein    Anemia 11.2  Sed rate > 25  Vitamin d and TSH within normal limits    May 31, 2017   Here to discuss test results:    Specific antibodies for systemic lupus, sjogren syndrome, mixed connective tissue disease, scleroderma were negative. CCP rheumatoid antibody and complement levels were also normal.     Joint pain history  Onset: none/ has extreme fatigue  Involved joints: none  Pain scale:  0/10     Wakes the patient from sleep : No  Morning stiffness:Yes for 30 minutes  Meds used:none     Interim history  Since last visit:  1. Infections - No  2. New symptoms/medical problem - No  3. Any side effects from Rheum medications -NA  3. ER visits/Hospitalizations/surgeries - No  4. Last PCP visit: 1 month ago      BP Readings from Last 3 Encounters:   05/31/17 92/60   05/17/17 98/68   05/08/17 109/70          Review of Systems:   Complete ROS negative except for symptoms mentioned in the HPI          Past Medical History:     Past Medical History:   Diagnosis Date     Anxiety      Colitis     lymphocytic colitis ?     Depressive disorder, not elsewhere classified     sees psych- Dr Blue     Migraine, unspecified, without mention of intractable migraine without mention of status migrainosus age 14 yrs     Other acne      PAIN IN THORACIC SPINE [724.1] 4/13/2006    chiropractic care     Raynaud disease     ? connective tissue  sandra.  ? rasheeda's     Past Surgical History:   Procedure Laterality Date     C IUD,MIRENA  2005     C NONSPECIFIC PROCEDURE       X 3     ESOPHAGOSCOPY, GASTROSCOPY, DUODENOSCOPY (EGD), COMBINED  10/10     HC ABLATION, ENDOMETRIAL, THERMAL, W/O HYSTEROSCOPIC GUIDANCE  2010     HC COLONOSCOPY THRU STOMA, DIAGNOSTIC  2004     HC COLONOSCOPY THRU STOMA, DIAGNOSTIC  5/10     HYSTEROSCOPY      D&C            Social History:     Social History     Occupational History     Not on file.     Social History Main Topics     Smoking status: Never Smoker     Smokeless tobacco: Never Used     Alcohol use Yes      Comment: occasional- once monthly     Drug use: No     Sexual activity: Yes     Partners: Male     Birth control/ protection: None      Comment:  vasectomy            Family History:     Family History   Problem Relation Age of Onset     Neurologic Disorder Mother      MIGRIANES     C.A.D. Father      in his 60's     Depression Father      HEART DISEASE Father      Lipids Father      Hypertension Father      Arthritis Father      Sleep Disorder Father      Cancer - colorectal Paternal Grandmother      CANCER Maternal Grandfather      stomach     HEART DISEASE Maternal Grandfather      Depression Brother      Depression Brother      Asthma Daughter      HEART DISEASE Paternal Grandfather      CEREBROVASCULAR DISEASE Maternal Aunt      DIABETES No family hx of             Allergies:     Allergies   Allergen Reactions     No Known Allergies             Medications:     Current Outpatient Prescriptions   Medication Sig Dispense Refill     bismuth subsalicylate ([facility] BISMUTH) 262 MG TABS Take 3 tablets by mouth 2 times daily Reported on 2017 80 tablet      loperamide (LOPERAMIDE A-D) 2 MG tablet Take 2 mg by mouth 4 times daily as needed for diarrhea       zolpidem (AMBIEN) 5 MG tablet Take tablet by mouth 15 minutes prior to sleep, for Sleep Study 1 tablet 0     Omega-3 Fatty Acids (FISH OIL CONCENTRATE  PO) Take 850 mg by mouth daily       IBUPROFEN PO Take 200 mg by mouth Takes 3 tablets PRN       ferrous fumarate 65 mg, [place]. FE,-Vitamin C 125 mg (VITRON C)  MG TABS tablet Take 1 tablet by mouth daily INDICATION: IRON SUPPLEMENT 100 tablet prn     vitamin B complex with vitamin C (VITAMIN  B COMPLEX) TABS tablet Take 1 tablet by mouth daily       rizatriptan (MAXALT) 10 MG tablet Take 1 tablet (10 mg) by mouth at onset of headache May repeat in 2 hours if needed: max 2/day; 12 tablet 3     cholecalciferol 5000 UNITS CAPS Take 1 capsule (5,000 Units) by mouth daily FOR VITAMIN D DEFICIENCY (LOW VITAMIN D) 100 capsule 3     Zinc Acetate, Oral, 50 MG CAPS  90 capsule      naproxen sodium (ANAPROX) 220 MG tablet Take by mouth as needed Takes 2 tablets PRN 60 tablet      FLUoxetine (PROZAC) 40 MG capsule Take 1 capsule (40 mg) by mouth daily 90 capsule 0     CALCIUM-MAGNESIUM PO Take 2 tablets by mouth daily WITH POTASSIUM       Probiotic Product (PROBIOTIC PO) Take 2 tablets by mouth daily       acetaminophen (TYLENOL) 325 MG tablet Take 2 tablets by mouth every 4 hours as needed for pain. 100 tablet 0     amitriptyline (ELAVIL) 25 MG tablet Take 0.5 tablets (12.5 mg) by mouth At Bedtime INDICATION: MIGRAINE PROPHYLAXIS 90 tablet 1            Physical Exam:   Blood pressure 92/60, pulse 64, temperature 98.7  F (37.1  C), temperature source Oral, height 5' (1.524 m), weight 139 lb 11.2 oz (63.4 kg), SpO2 97 %, not currently breastfeeding.  Wt Readings from Last 4 Encounters:   05/31/17 139 lb 11.2 oz (63.4 kg)   05/17/17 141 lb 11.2 oz (64.3 kg)   05/08/17 141 lb 12.8 oz (64.3 kg)   04/28/17 143 lb (64.9 kg)       Constitutional: well-developed, appearing stated age; cooperative  Psych: nl judgement, orientation, memory, affect.         Data:     Results for orders placed or performed in visit on 05/17/17   CRP inflammation   Result Value Ref Range    CRP Inflammation <2.9 0.0 - 8.0 mg/L   Cyclic Citrullinated  Peptide Antibody IgG   Result Value Ref Range    Cyclic Citrullinated Peptide Antibody, IgG 1 <7 U/mL   Complement C3   Result Value Ref Range    Complement C3 82 76 - 169 mg/dL   Complement C4   Result Value Ref Range    Complement C4 19 15 - 50 mg/dL   DNA double stranded antibodies   Result Value Ref Range    DNA-ds 1 <10 IU/mL   Centromere Antibody IgG   Result Value Ref Range    Centromere Antibody IgG  0.0 - 0.9 AI     <0.2  Negative   Antibody index (AI) values reflect qualitative changes in antibody   concentration that cannot be directly associated with clinical condition or   disease state.     DELMY antibody panel   Result Value Ref Range    RNP Antibody IgG  0.0 - 0.9 AI     <0.2  Negative   Antibody index (AI) values reflect qualitative changes in antibody   concentration that cannot be directly associated with clinical condition or   disease state.      Smith DELMY Antibody IgG  0.0 - 0.9 AI     <0.2  Negative   Antibody index (AI) values reflect qualitative changes in antibody   concentration that cannot be directly associated with clinical condition or   disease state.      SSA (Ro) (DELMY) Antibody, IgG 0.8 0.0 - 0.9 AI    SSB (La) (DELMY) Antibody, IgG  0.0 - 0.9 AI     <0.2  Negative   Antibody index (AI) values reflect qualitative changes in antibody   concentration that cannot be directly associated with clinical condition or   disease state.      Scleroderma Antibody Scl-70 DELMY IgG  0.0 - 0.9 AI     <0.2  Negative   Antibody index (AI) values reflect qualitative changes in antibody   concentration that cannot be directly associated with clinical condition or   disease state.         Balta Villegas MD    [facility]

## 2017-05-31 ENCOUNTER — OFFICE VISIT (OUTPATIENT)
Dept: INTERNAL MEDICINE | Facility: CLINIC | Age: 47
End: 2017-05-31
Payer: COMMERCIAL

## 2017-05-31 VITALS
WEIGHT: 139.7 LBS | OXYGEN SATURATION: 97 % | HEIGHT: 60 IN | SYSTOLIC BLOOD PRESSURE: 92 MMHG | BODY MASS INDEX: 27.43 KG/M2 | DIASTOLIC BLOOD PRESSURE: 60 MMHG | TEMPERATURE: 98.7 F | HEART RATE: 64 BPM

## 2017-05-31 DIAGNOSIS — R53.82 CHRONIC FATIGUE: Primary | ICD-10-CM

## 2017-05-31 PROCEDURE — 99213 OFFICE O/P EST LOW 20 MIN: CPT | Performed by: INTERNAL MEDICINE

## 2017-05-31 ASSESSMENT — ROUTINE ASSESSMENT OF PATIENT INDEX DATA (RAPID3)
RAPID3 INTERPRETATION: LOW 3.1-6
TOTAL RAPID3 SCORE: 4.5

## 2017-05-31 NOTE — PATIENT INSTRUCTIONS
Hydroxychloroquine Sulfate Oral tablet  What is this medicine?  HYDROXYCHLOROQUINE (lima drox ee KLOR oh kwin) is used to treat rheumatoid arthritis and systemic lupus erythematosus. It is also used to treat malaria.  This medicine may be used for other purposes; ask your health care provider or pharmacist if you have questions.  What should I tell my health care provider before I take this medicine?  They need to know if you have any of these conditions:    alcoholism    anemia or other blood disorder    eye disease    glucose 6-phosphate dehydrogenase (G6PD) deficiency    liver disease    porphyria    psoriasis    an unusual or allergic reaction to chloroquine, hydroxychloroquine, other medicines, foods, dyes, or preservatives    pregnant or trying to get pregnant    breast-feeding  How should I use this medicine?  Take this medicine by mouth with a glass of water. Follow the directions on the prescription label. If this medicine upsets your stomach take it with food or milk. Take your doses at regular intervals. Do not take your medicine more often than directed.  Talk to your pediatrician regarding the use of this medicine in children. Special care may be needed.  Overdosage: If you think you have taken too much of this medicine contact a poison control center or emergency room at once.  NOTE: This medicine is only for you. Do not share this medicine with others.  What if I miss a dose?  If you miss a dose, take it as soon as you can. If it is almost time for your next dose, take only that dose. Do not take double or extra doses.  What may interact with this medicine?    antacids    botulinum toxins    digoxin    kaolin    penicillamine  This list may not describe all possible interactions. Give your health care provider a list of all the medicines, herbs, non-prescription drugs, or dietary supplements you use. Also tell them if you smoke, drink alcohol, or use illegal drugs. Some items may interact with your  medicine.  What should I watch for while using this medicine?  Visit your doctor or health care professional for regular check ups. Tell your doctor if your symptoms do not improve. Arthritis symptoms may take several weeks to improve. If you are taking this medicine for a long time, you will need important blood work done. You will also need to have your eyes checked as directed.  This medicine can make you more sensitive to the sun. Keep out of the sun. If you cannot avoid being in the sun, wear protective clothing and use sunscreen. Do not use sun lamps or tanning beds/booths.  Avoid antacids and kaolin containing products for 2 hours before and after taking a dose of this medicine.  What side effects may I notice from receiving this medicine?  Side effects that you should report to your doctor or health care professional as soon as possible:    allergic reactions like skin rash, itching or hives, swelling of the face, lips, or tongue    change in vision    fever, infection    hearing loss or ringing    muscle weakness, tremor, or numbness    redness, blistering, peeling or loosening of the skin, including inside the mouth    seizures    unusual bleeding or bruising    unusually weak or tired  Side effects that usually do not require medical attention (report to your doctor or health care professional if they continue or are bothersome):    change in coloration of the mouth or skin    dizziness    hair loss, lightening    headache    irritability, nervousness, nightmares    loss of appetite    stomach upset, diarrhea  This list may not describe all possible side effects. Call your doctor for medical advice about side effects. You may report side effects to FDA at 3-178-FDA-1072.  Where should I keep my medicine?  Keep out of the reach of children. In children, this medicine can cause overdose with small doses.  Store at room temperature between 15 and 30 degrees C (59 and 86 degrees F). Protect from moisture and  light. Throw away any unused medicine after the expiration date.  NOTE:This sheet is a summary. It may not cover all possible information. If you have questions about this medicine, talk to your doctor, pharmacist, or health care provider. Copyright  2016 Gold Standard

## 2017-05-31 NOTE — MR AVS SNAPSHOT
After Visit Summary   5/31/2017    Nikki Hardin    MRN: [id number]           Patient Information     Date Of Birth          1970        Visit Information        Provider Department      5/31/2017 9:15 AM Balta Villegas MD [facility]        Care Instructions      Hydroxychloroquine Sulfate Oral tablet  What is this medicine?  HYDROXYCHLOROQUINE (lima drox ee KLOR oh kwin) is used to treat rheumatoid arthritis and systemic lupus erythematosus. It is also used to treat malaria.  This medicine may be used for other purposes; ask your health care provider or pharmacist if you have questions.  What should I tell my health care provider before I take this medicine?  They need to know if you have any of these conditions:    alcoholism    anemia or other blood disorder    eye disease    glucose 6-phosphate dehydrogenase (G6PD) deficiency    liver disease    porphyria    psoriasis    an unusual or allergic reaction to chloroquine, hydroxychloroquine, other medicines, foods, dyes, or preservatives    pregnant or trying to get pregnant    breast-feeding  How should I use this medicine?  Take this medicine by mouth with a glass of water. Follow the directions on the prescription label. If this medicine upsets your stomach take it with food or milk. Take your doses at regular intervals. Do not take your medicine more often than directed.  Talk to your pediatrician regarding the use of this medicine in children. Special care may be needed.  Overdosage: If you think you have taken too much of this medicine contact a poison control center or emergency room at once.  NOTE: This medicine is only for you. Do not share this medicine with others.  What if I miss a dose?  If you miss a dose, take it as soon as you can. If it is almost time for your next dose, take only that dose. Do not take double or extra doses.  What may interact with this medicine?    antacids    botulinum  toxins    digoxin    kaolin    penicillamine  This list may not describe all possible interactions. Give your health care provider a list of all the medicines, herbs, non-prescription drugs, or dietary supplements you use. Also tell them if you smoke, drink alcohol, or use illegal drugs. Some items may interact with your medicine.  What should I watch for while using this medicine?  Visit your doctor or health care professional for regular check ups. Tell your doctor if your symptoms do not improve. Arthritis symptoms may take several weeks to improve. If you are taking this medicine for a long time, you will need important blood work done. You will also need to have your eyes checked as directed.  This medicine can make you more sensitive to the sun. Keep out of the sun. If you cannot avoid being in the sun, wear protective clothing and use sunscreen. Do not use sun lamps or tanning beds/booths.  Avoid antacids and kaolin containing products for 2 hours before and after taking a dose of this medicine.  What side effects may I notice from receiving this medicine?  Side effects that you should report to your doctor or health care professional as soon as possible:    allergic reactions like skin rash, itching or hives, swelling of the face, lips, or tongue    change in vision    fever, infection    hearing loss or ringing    muscle weakness, tremor, or numbness    redness, blistering, peeling or loosening of the skin, including inside the mouth    seizures    unusual bleeding or bruising    unusually weak or tired  Side effects that usually do not require medical attention (report to your doctor or health care professional if they continue or are bothersome):    change in coloration of the mouth or skin    dizziness    hair loss, lightening    headache    irritability, nervousness, nightmares    loss of appetite    stomach upset, diarrhea  This list may not describe all possible side effects. Call your doctor for  medical advice about side effects. You may report side effects to FDA at 0-018-JPB-1181.  Where should I keep my medicine?  Keep out of the reach of children. In children, this medicine can cause overdose with small doses.  Store at room temperature between 15 and 30 degrees C (59 and 86 degrees F). Protect from moisture and light. Throw away any unused medicine after the expiration date.  NOTE:This sheet is a summary. It may not cover all possible information. If you have questions about this medicine, talk to your doctor, pharmacist, or health care provider. Copyright  2016 Gold Standard                Follow-ups after your visit        Your next 10 appointments already scheduled     Jun 12, 2017  8:30 PM CDT   PSG Split with BED 4 SH SLEEP   [facility] [place] ([facility])    [address] [address]   [phone]            Jun 26, 2017 10:30 AM CDT   Return Sleep Patient with Bennett Ezra Goltz, PA-C   [facility] ([facility])    [address] [address]   [phone]            Jun 29, 2017  1:30 PM CDT   Office Visit with Vandana Castellanos MD   [facility] ([facility]    [address] [address] [phone]           Bring a current list of meds and any records pertaining to this visit.  For Physicals, please bring immunization records and any forms needing to be filled out.  Please arrive 10 minutes early to complete paperwork.              Who to contact     If you have questions or need follow up information about today's clinic visit or your schedule please contact [facility] directly at [phone].  Normal or non-critical lab and imaging results will be communicated to you by MyChart, letter or phone within 4 business days after the clinic has received the results. If you  do not hear from us within 7 days, please contact the clinic through [employer] or phone. If you have a critical or abnormal lab result, we will notify you by phone as soon as possible.  Submit refill requests through [employer] or call your pharmacy and they will forward the refill request to us. Please allow 3 business days for your refill to be completed.          Additional Information About Your Visit        [employer] Information     [employer] gives you secure access to your electronic health record. If you see a primary care provider, you can also send messages to your care team and make appointments. If you have questions, please call your primary care clinic.  If you do not have a primary care provider, please call [phone] and they will assist you.        Care EveryWhere ID     This is your Care EveryWhere ID. This could be used by other organizations to access your [place] medical records  [id number]        Your Vitals Were     Pulse Temperature Height Pulse Oximetry BMI (Body Mass Index)       64 98.7  F (37.1  C) (Oral) 5' (1.524 m) 97% 27.28 kg/m2        Blood Pressure from Last 3 Encounters:   05/31/17 92/60   05/17/17 98/68   05/08/17 109/70    Weight from Last 3 Encounters:   05/31/17 139 lb 11.2 oz (63.4 kg)   05/17/17 141 lb 11.2 oz (64.3 kg)   05/08/17 141 lb 12.8 oz (64.3 kg)              Today, you had the following     No orders found for display       Primary Care Provider Office Phone # Fax #    Vandana Castellanos [phone]       [facility] [address] [facility] [place] [address]        Thank you!     Thank you for choosing [facility]  for your care. Our goal is always to provide you with excellent care. Hearing back from our patients is one way we can continue to improve our services. Please take a few minutes to complete the written survey that you may receive in the mail after your visit with us. Thank you!             Your Updated  Medication List - Protect others around you: Learn how to safely use, store and throw away your medicines at www.disposemymeds.org.          This list is accurate as of: 5/31/17  9:51 AM.  Always use your most recent med list.                   Brand Name Dispense Instructions for use    acetaminophen 325 MG tablet    TYLENOL    100 tablet    Take 2 tablets by mouth every 4 hours as needed for pain.       amitriptyline 25 MG tablet    ELAVIL    90 tablet    Take 0.5 tablets (12.5 mg) by mouth At Bedtime INDICATION: MIGRAINE PROPHYLAXIS       CALCIUM-MAGNESIUM PO      Take 2 tablets by mouth daily WITH POTASSIUM       cholecalciferol 5000 UNITS Caps     100 capsule    Take 1 capsule (5,000 Units) by mouth daily FOR VITAMIN D DEFICIENCY (LOW VITAMIN D)       [employer] BISMUTH 262 MG Tabs   Generic drug:  bismuth subsalicylate     80 tablet    Take 3 tablets by mouth 2 times daily Reported on 5/8/2017       ferrous fumarate 65 mg ([place]. FE)-Vitamin C 125 mg  MG Tabs tablet    VITRON C    100 tablet    Take 1 tablet by mouth daily INDICATION: IRON SUPPLEMENT       FISH OIL CONCENTRATE PO      Take 850 mg by mouth daily       FLUoxetine 40 MG capsule    PROzac    90 capsule    Take 1 capsule (40 mg) by mouth daily       IBUPROFEN PO      Take 200 mg by mouth Takes 3 tablets PRN       LOPERAMIDE A-D 2 MG tablet   Generic drug:  loperamide      Take 2 mg by mouth 4 times daily as needed for diarrhea       naproxen sodium 220 MG tablet    ANAPROX    60 tablet    Take by mouth as needed Takes 2 tablets PRN       PROBIOTIC PO      Take 2 tablets by mouth daily       rizatriptan 10 MG tablet    MAXALT    12 tablet    Take 1 tablet (10 mg) by mouth at onset of headache May repeat in 2 hours if needed: max 2/day;       vitamin B complex with vitamin C Tabs tablet      Take 1 tablet by mouth daily       Zinc Acetate (Oral) 50 MG Caps     90 capsule        zolpidem 5 MG tablet    AMBIEN    1 tablet    Take tablet by mouth 15  minutes prior to sleep, for Sleep Study

## 2017-05-31 NOTE — NURSING NOTE
Chief Complaint   Patient presents with     RECHECK       Initial BP 92/60 (BP Location: Left arm, Patient Position: Chair, Cuff Size: Adult Regular)  Pulse 64  Temp 98.7  F (37.1  C) (Oral)  Ht 1.524 m (5')  Wt 63.4 kg (139 lb 11.2 oz)  SpO2 97%  BMI 27.28 kg/m2 Estimated body mass index is 27.28 kg/(m^2) as calculated from the following:    Height as of this encounter: 1.524 m (5').    Weight as of this encounter: 63.4 kg (139 lb 11.2 oz).  Medication Reconciliation: complete    Joint pain history  Onset: none/ has extreme fatigue  Involved joints: none  Pain scale:  0/10     Wakes the patient from sleep : No  Morning stiffness:Yes for 30 minutes  Meds used:none    Interim history  Since last visit:  1. Infections - No  2. New symptoms/medical problem - No  3. Any side effects from Rheum medications -NA  3. ER visits/Hospitalizations/surgeries - No  4. Last PCP visit: 1 month ago  Wt Readings from Last 4 Encounters:   05/31/17 63.4 kg (139 lb 11.2 oz)   05/17/17 64.3 kg (141 lb 11.2 oz)   05/08/17 64.3 kg (141 lb 12.8 oz)   04/28/17 64.9 kg (143 lb)     BP Readings from Last 3 Encounters:   05/31/17 92/60   05/17/17 98/68   05/08/17 109/70

## 2017-06-08 ENCOUNTER — TRANSFERRED RECORDS (OUTPATIENT)
Dept: HEALTH INFORMATION MANAGEMENT | Facility: CLINIC | Age: 47
End: 2017-06-08

## 2017-06-12 ENCOUNTER — THERAPY VISIT (OUTPATIENT)
Dept: SLEEP MEDICINE | Facility: CLINIC | Age: 47
End: 2017-06-12
Payer: COMMERCIAL

## 2017-06-12 DIAGNOSIS — R53.83 FATIGUE, UNSPECIFIED TYPE: ICD-10-CM

## 2017-06-12 DIAGNOSIS — R06.83 SNORING: ICD-10-CM

## 2017-06-12 DIAGNOSIS — G47.30 OBSERVED SLEEP APNEA: ICD-10-CM

## 2017-06-12 DIAGNOSIS — R40.0 DAYTIME SOMNOLENCE: ICD-10-CM

## 2017-06-12 PROCEDURE — 95810 POLYSOM 6/> YRS 4/> PARAM: CPT | Performed by: PSYCHIATRY & NEUROLOGY

## 2017-06-12 NOTE — MR AVS SNAPSHOT
After Visit Summary   6/12/2017    Nikki Shah Wilfred    MRN: [id number]           Patient Information     Date Of Birth          1970        Visit Information        Provider Department      6/12/2017 8:30 PM BED 4 SH SLEEP [facility]        Today's Diagnoses     Fatigue, unspecified type        Daytime somnolence        Snoring        Observed sleep apnea           Follow-ups after your visit        Your next 10 appointments already scheduled     Jun 26, 2017 10:30 AM CDT   Return Sleep Patient with Bennett Ezra Goltz, PA-C   [facility] ([facility])    [address] [place] [address] [phone]            Jun 29, 2017  1:30 PM CDT   Office Visit with Vandana Castellanos MD   [facility] ([facility])    [address] [address] [phone]           Bring a current list of meds and any records pertaining to this visit.  For Physicals, please bring immunization records and any forms needing to be filled out.  Please arrive 10 minutes early to complete paperwork.              Who to contact     If you have questions or need follow up information about today's clinic visit or your schedule please contact [facility] directly at [phone].  Normal or non-critical lab and imaging results will be communicated to you by MyChart, letter or phone within 4 business days after the clinic has received the results. If you do not hear from us within 7 days, please contact the clinic through MyChart or phone. If you have a critical or abnormal lab result, we will notify you by phone as soon as possible.  Submit refill requests through [email or web address] or call your pharmacy and they will forward the refill request to us. Please allow 3 business days for your refill to be completed.          Additional Information About Your Visit        [facility]  Information     Physician Referral Network (PRN) gives you secure access to your electronic health record. If you see a primary care provider, you can also send messages to your care team and make appointments. If you have questions, please call your primary care clinic.  If you do not have a primary care provider, please call [phone] and they will assist you.        Care EveryWhere ID     This is your Care EveryWhere ID. This could be used by other organizations to access your [place] medical records  [id number]         Blood Pressure from Last 3 Encounters:   05/31/17 92/60   05/17/17 98/68   05/08/17 109/70    Weight from Last 3 Encounters:   05/31/17 63.4 kg (139 lb 11.2 oz)   05/17/17 64.3 kg (141 lb 11.2 oz)   05/08/17 64.3 kg (141 lb 12.8 oz)              We Performed the Following     Comprehensive Sleep Study        Primary Care Provider Office Phone # Fax #    Vandana Castellanos [phone]       [facility] [address] [place] [address]        Thank you!     Thank you for choosing [place] [facility]  for your care. Our goal is always to provide you with excellent care. Hearing back from our patients is one way we can continue to improve our services. Please take a few minutes to complete the written survey that you may receive in the mail after your visit with us. Thank you!             Your Updated Medication List - Protect others around you: Learn how to safely use, store and throw away your medicines at www.disposemymeds.org.          This list is accurate as of: 6/12/17 11:59 PM.  Always use your most recent med list.                   Brand Name Dispense Instructions for use    acetaminophen 325 MG tablet    TYLENOL    100 tablet    Take 2 tablets by mouth every 4 hours as needed for pain.       amitriptyline 25 MG tablet    ELAVIL    90 tablet    Take 0.5 tablets (12.5 mg) by mouth At Bedtime INDICATION: MIGRAINE PROPHYLAXIS       CALCIUM-MAGNESIUM PO      Take 2 tablets  by mouth daily WITH POTASSIUM       cholecalciferol 5000 UNITS Caps     100 capsule    Take 1 capsule (5,000 Units) by mouth daily FOR VITAMIN D DEFICIENCY (LOW VITAMIN D)       [employer]S BISMUTH 262 MG Tabs   Generic drug:  bismuth subsalicylate     80 tablet    Take 3 tablets by mouth 2 times daily Reported on 5/8/2017       ferrous fumarate 65 mg ([place]. FE)-Vitamin C 125 mg  MG Tabs tablet    VITRON C    100 tablet    Take 1 tablet by mouth daily INDICATION: IRON SUPPLEMENT       FISH OIL CONCENTRATE PO      Take 850 mg by mouth daily       FLUoxetine 40 MG capsule    PROzac    90 capsule    Take 1 capsule (40 mg) by mouth daily       IBUPROFEN PO      Take 200 mg by mouth Takes 3 tablets PRN       LOPERAMIDE A-D 2 MG tablet   Generic drug:  loperamide      Take 2 mg by mouth 4 times daily as needed for diarrhea       naproxen sodium 220 MG tablet    ANAPROX    60 tablet    Take by mouth as needed Takes 2 tablets PRN       PROBIOTIC PO      Take 2 tablets by mouth daily       rizatriptan 10 MG tablet    MAXALT    12 tablet    Take 1 tablet (10 mg) by mouth at onset of headache May repeat in 2 hours if needed: max 2/day;       vitamin B complex with vitamin C Tabs tablet      Take 1 tablet by mouth daily       Zinc Acetate (Oral) 50 MG Caps     90 capsule        zolpidem 5 MG tablet    AMBIEN    1 tablet    Take tablet by mouth 15 minutes prior to sleep, for Sleep Study

## 2017-06-14 NOTE — PROCEDURES
SLEEP STUDY INTERPRETATION  POLYSOMNOGRAPHY REPORT      Patient: Nikki Hardin  YOB: 1970  Study Date: 6/12/2017  MRN: [id number]  Referring Provider: MD Castellanos Adepero  Ordering Provider: Goltz, PA-C, Bennett    Indications for Polysomnography: The patient is a 47 y old Female who is 5' and weighs 141.0 lbs.  Her BMI is 27.7, [place] sleepiness scale 9.0 and neck size is 33.0.  Relevant medical history includes snoring, witnessed apneas. A diagnostic polysomnogram was performed to evaluate for sleep apnea.    Polysomnogram Data:  A full night polysomnogram recorded the standard physiologic parameters including EEG, EOG, EMG, ECG, nasal and oral airflow.  Respiratory parameters of chest and abdominal movements were recorded with respiratory inductance plethysmography.  Oxygen saturation was recorded by pulse oximetry.      Sleep Architecture: Sleep fragmentation  The total recording time of the polysomnogram was 462.1 minutes.  The total sleep time was 416.0 minutes.  Sleep latency was 7.5 minutes.  REM latency was 128.0 minutes.  Arousal index was 15.0 arousals per hour.  Sleep efficiency was 90.0%.  Wake after sleep onset was 38.5 minutes.  The patient spent 3.7% of total sleep time in Stage N1, 61.4% in Stage N2, 17.2% in Stages N3, and 17.7% in REM.  Time in REM supine was 28.0 minutes.    Respiration: Mild JOSH predominantly while supine.      Events - The polysomnogram revealed a presence of 14 obstructive, - central, and - mixed apneas resulting in an apnea index of 2.0 events per hour.  There were 24 hypopneas resulting in a hypopnea index of 3.5 events per hour.  The combined apnea/hypopnea index was 5.5 events per hour.  The REM AHI was 15.5 events per hour.  The supine AHI was 29.8 events per hour.  The RERA index was 2.0 events per hour.   The RDI was 7.5 events per hour.    Snoring - was reported as mild.    Respiratory rate and pattern - was notable for normal respiratory rate and  pattern.    Sustained Sleep Associated Hypoventilation - Transcutaneous carbon dioxide monitoring was not used.    Sleep Associated Hypoxemia - (Greater than 5 minutes O2 sat below 89%) was not present.  Baseline oxygen saturation was 97.6%. Lowest oxygen saturation was 89.0%.  Time spent less than or equal to 88% was 0 minutes.  Time spent less than or equal to 89% was 0 minutes.  7.5 2.0 5.5     Movement Activity:    Periodic Limb Activity - There were 15 PLMs during the entire study. The PLM index was 2.2 movements per hour.  The PLM Arousal Index was 0 per hour.    REM EMG Activity - Excessive muscle activity was not present.    Nocturnal Behavior - Abnormal sleep related behaviors were not noted.     Bruxism - None apparent.    Cardiac Summary: Sinus, intermittent bradycardia  The average pulse rate was 59.8 bpm.  The minimum pulse rate was 28.0 bpm while the maximum pulse rate was 91.0 bpm.     Assessment:     Mild JOSH predominantly while supine.      Recommendations:    If deemed clinically relevant Mild JOSH can be treated with the following options:  o Dental Appliance  o Auto CPAP  o Upper Airway Surgery  o Position restriction device (such as a ZZOMA) to prevent the patient from sleeping supine    Advise regarding the risks of drowsy driving.    Suggest optimizing sleep schedule and avoiding sleep deprivation.    Weight management     Diagnostic Code(s): G47.33      Jackson Ivan MD 6-14-17

## 2017-06-26 ENCOUNTER — OFFICE VISIT (OUTPATIENT)
Dept: SLEEP MEDICINE | Facility: CLINIC | Age: 47
End: 2017-06-26
Payer: COMMERCIAL

## 2017-06-26 VITALS
DIASTOLIC BLOOD PRESSURE: 63 MMHG | SYSTOLIC BLOOD PRESSURE: 98 MMHG | TEMPERATURE: 97.5 F | HEART RATE: 66 BPM | OXYGEN SATURATION: 97 % | WEIGHT: 137.8 LBS | RESPIRATION RATE: 16 BRPM | HEIGHT: 60 IN | BODY MASS INDEX: 27.05 KG/M2

## 2017-06-26 DIAGNOSIS — G47.33 OSA (OBSTRUCTIVE SLEEP APNEA): Primary | ICD-10-CM

## 2017-06-26 DIAGNOSIS — G47.00 INSOMNIA, UNSPECIFIED TYPE: ICD-10-CM

## 2017-06-26 PROCEDURE — 99214 OFFICE O/P EST MOD 30 MIN: CPT | Performed by: PHYSICIAN ASSISTANT

## 2017-06-26 NOTE — PATIENT INSTRUCTIONS
Sleep restriction: Bedtime should be set based on wake up time and number of hours you feel you sleep per night on average.  Keep the same wake up time daily. Set alarm every day at the same time.  Do not rely on your insomnia to wake you up for the day.  Adjust bedtime by 15 minutes each week depending on percentage of night you are able to sleep (out of time you are allowed in bed). If you sleep >90% of the night, you may go to bed 15 minutes earlier. If you sleep less than 85% of your time in bed, go to bed 15 minutes later.  If between 85 and 90%, keep the same bed time.   Stimulus control: Only go to bed when sleepy. No TV in bed.  Leave room if cannot fall asleep within 20 minutes. While out of bed in the middle of the night, avoid bright lights, physical activity, work activities and chores. Do relaxing activities like reading or listening to relaxing music. Return to bed if starting to feel drowsy or after 30 minutes.        Exercise during the day, no strenuous exercise 4 hours before bed        Literature provided for Sleep Hygiene and Insomnia (American Academy of Sleep Medicine)    Instructions for treating Delayed Sleep Phase Syndrome:    Delayed Sleep Phase Syndrome (DSPS) means that your body's internal timing is set late compared to the 24 hour day. Therefore, it is often difficult to get up on time for work in the morning and sometimes difficult to fall asleep on time, in order to get enough sleep. People with DSPS often tend to like to stay up late on weekends and sleep in until between 10 AM and noon, sometimes even later.This is actually a bad habit that will perpetuate the problem. It reinforces your body's tendency to be on that later schedule.    You should go to bed when you are sleepy and ready to sleep. During this entire process, you should not engage in activities that may make it worse, such as watching TV in bed, leaving the TV on all night, drinking any caffeine 6 hours before bed or  exercising 1-2 hours before bed.     Start taking Melatonin, 0.5-1 mg tablet 3-5 hours before the time that you fall asleep on average (not your desired bedtime or time that you get in bed, but the time you normally fall asleep on your own).     Upon awakening, get exposure to sun-light for about 30-45 minutes. You do not need to look at the sun, in fact, this is dangerous. Reading the paper with the sun shining on you is adequate.  Alternatively, you may use a Seasonal Affective Disorder Lamp (intensity 10,000 Lux) instead of the sun. The lamp should be positioned 1-2 arms lengths away from you. They lamps are sold at Home [employer] Companies such as [employer], [employer] or [employer]. A prescription can be written to get insurance coverage in some cases. They are also sold on [employer].    Using the light and melatonin should help march your internal clock (known as your circadian rhythms) gradually earlier. As your bedtime advances, remember to take your melatonin earlier, keeping it 5 hours before your fall asleep time.    Avoid naps and sleeping in because sleeping during the day will delay your body's clock and you will have to start from scratch.     More information about light therapy:  If the cost of any light box is too much, you can also purchase a compact fluorescent all spectrum light bulb at a local hardware store for about $8.  The most commonly available bulb is 1400 lumen.  You would need two of these positioned within 1 meter of yourself to be equivalent to 2,500 lux.  The bulbs can be placed in a standard light fixture.  Additionally, they can be placed in a mountable fixture that is used in greenhouses.  Mountable fixtures are also available at hardware stores for about $9.  Do not look directly at the light.  If you have any concerns regarding the safety of bright light therapy for you, it is recommended that you consult an ophthalmologist before using a light box.  If you have a condition  that makes your eyes very sensitive to light, macular degeneration, a family history of such problems, or diabetic changes to your eyes, consult an ophthalmologist before using a light box. If you have anxiety disorder and have an increase in anxiety discontinue use.   Your BMI is Body mass index is 26.91 kg/(m^2).  Weight management is a personal decision.  If you are interested in exploring weight loss strategies, the following discussion covers the approaches that may be successful. Body mass index (BMI) is one way to tell whether you are at a healthy weight, overweight, or obese. It measures your weight in relation to your height.  A BMI of 18.5 to 24.9 is in the healthy range. A person with a BMI of 25 to 29.9 is considered overweight, and someone with a BMI of 30 or greater is considered obese. More than two-thirds of American adults are considered overweight or obese.  Being overweight or obese increases the risk for further weight gain. Excess weight may lead to heart disease and diabetes.  Creating and following plans for healthy eating and physical activity may help you improve your health.  Weight control is part of healthy lifestyle and includes exercise, emotional health, and healthy eating habits. Careful eating habits lifelong are the mainstay of weight control. Though there are significant health benefits from weight loss, long-term weight loss with diet alone may be very difficult to achieve- studies show long-term success with dietary management in less than 10% of people. Attaining a healthy weight may be especially difficult to achieve in those with severe obesity. In some cases, medications, devices and surgical management might be considered.  What can you do?  If you are overweight or obese and are interested in methods for weight loss, you should discuss this with your provider.     Consider reducing daily calorie intake by 500 calories.     Keep a food journal.     Avoiding skipping meals,  consider cutting portions instead.    Diet combined with exercise helps maintain muscle while optimizing fat loss. Strength training is particularly important for building and maintaining muscle mass. Exercise helps reduce stress, increase energy, and improves fitness. Increasing exercise without diet control, however, may not burn enough calories to loose weight.       Start walking three days a week 10-20 minutes at a time    Work towards walking thirty minutes five days a week     Eventually, increase the speed of your walking for 1-2 minutes at time    In addition, we recommend that you review healthy lifestyles and methods for weight loss available through the National Institutes of Health patient information sites:  http://win.niddk.nih.gov/publications/index.htm    And look into health and wellness programs that may be available through your health insurance provider, employer, local community center, or cole club.    Weight management plan: Patient was referred to their PCP to discuss a diet and exercise plan.

## 2017-06-26 NOTE — NURSING NOTE
Chief Complaint   Patient presents with     Study Results     PSG 06/12/2017 follow up       Initial BP 98/63  Pulse 66  Temp 97.5  F (36.4  C) (Oral)  Resp 16  Ht 1.524 m (5')  Wt 62.5 kg (137 lb 12.8 oz)  SpO2 97%  BMI 26.91 kg/m2 Estimated body mass index is 26.91 kg/(m^2) as calculated from the following:    Height as of this encounter: 1.524 m (5').    Weight as of this encounter: 62.5 kg (137 lb 12.8 oz).  Medication Reconciliation: complete     NATTY Peralta  Clinic Coordinator   Registered Medical Assistant   [facility]

## 2017-06-26 NOTE — PROGRESS NOTES
Sleep Study Follow-Up Visit:    Date on this visit: 6/26/2017    Nikki Hardin comes in today for follow-up of her sleep study done on 6/12/2017 at the [facility] for  waking frequently in the night and rarely feeling rested, constant fatigue for a few years, worse in the last year. Her medical history is significant for depression with anxiety, IBS, migraines, anemia and elevated sed rate.    Sleep latency 7.5 minutes with Ambien.  REM achieved.   REM latency 128 minutes.  Sleep efficiency 90%. Total sleep time 416 minutes.    Sleep architecture:  Stage 1, 3.7% (5%), stage 2, 61.4% (45-55%), stage 3, 17.2% (15-20%), stage REM, 17.7% (20-25%).  AHI was 5.5/hr, with desaturations down to 89%. She spent 0 minutes below 90% SpO2. RDI 7.5/hr.  REM RDI 17.1/hr, consistent with moderate REM JOHS.  Supine RDI 40.8/hr, consistent with severe SUPINE JOSH (76.5 minutes supine).  Her non-supine RDI was 0. Periodic Limb Movement Index 2.2/hour, none with arousals.       CPAP titration:  CPAP was not initiated. These findings were reviewed with the patient.  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 She had to be at work by 7:30 AM. That was making things worse.    Past medical/surgical history, family history, social history, medications and allergies were reviewed.      Problem List:  Patient Active Problem List    Diagnosis Date Noted     Serologic abnormality 04/13/2017     Priority: Medium     LOW ABNORMAL SSA, NORMAL SSB       Sedimentation rate elevation 04/13/2017     Priority: Medium     Elevated vitamin B12 level 04/13/2017     Priority: Medium     NOT ON B-12 SUPPLEMENTS       Anemia, unspecified type 04/13/2017     Priority: Medium     Fatigue, unspecified type 04/13/2017     Priority: Medium     Hypervitaminosis B6 04/13/2017     Priority: Medium     Migraine with aura and without status migrainosus, not intractable 02/07/2017     Priority: Medium     Depression with anxiety 02/07/2017      Priority: Medium     Other acne 02/28/2006     Priority: Medium     Irritable bowel syndrome 03/23/2005     Priority: Medium     diarrhea          Impression/Plan:    (G47.33) JOSH (obstructive sleep apnea)  (primary encounter diagnosis)  Comment: Nikki had mild JOSH overall, AHI 5.5/hr, however it was entirely related to sleeping supine. Her supine RDI was 41/hr and non-supine RDI was 0. She did not think she slept supine at home.  Plan: Sleep Positioning Device  ()        Slumberbump to help her avoid supine position    (G47.00) Insomnia, unspecified type  Comment: She wakes 3-5 times per night for 10-15 minutes. Sometimes she may wake for up to an hour. She has some delayed sleep tendency, sleeping in an extra 1-2 hours on weekends. She was also spending up to 9 hours in bed.  Plan: We discussed getting 30 minutes of bright light exposure in the morning, either with the sun or a SAD Lamp of 10,000 lux intensity. Avoid bright light, including electronics, in the hour before bedtime. Take 1 mg melatonin 3-5 hours prior to natural sleep onset. Keep a consistent sleep schedule, avoiding naps and sleeping in. She was also advised to try sleep restriction, adjusting her bedtime by 15 minutes each week, to help consolidate her sleep.      She will follow up with me in about 2 month(s). She will call to schedule a follow up if needed.    Twenty-five minutes spent with patient, all of which were spent face-to-face counseling, consulting, coordinating plan of care.      Bennett Goltz, PA-C    CC: No ref. provider found

## 2017-07-04 DIAGNOSIS — F41.8 DEPRESSION WITH ANXIETY: ICD-10-CM

## 2017-07-05 NOTE — TELEPHONE ENCOUNTER
FLUoxetine     Last Written Prescription Date: 03/23/17  Last Fill Quantity: 90, # refills: 0  Last Office Visit with [facility] primary care provider:  05/31/17        Last PHQ-9 score on record=   PHQ-9 SCORE 4/28/2017   Total Score -   Total Score 6

## 2017-07-06 ENCOUNTER — TRANSFERRED RECORDS (OUTPATIENT)
Dept: HEALTH INFORMATION MANAGEMENT | Facility: CLINIC | Age: 47
End: 2017-07-06

## 2017-07-06 RX ORDER — FLUOXETINE 40 MG/1
CAPSULE ORAL
Qty: 90 CAPSULE | Refills: 0 | Status: SHIPPED | OUTPATIENT
Start: 2017-07-06 | End: 2017-10-13

## 2017-07-31 DIAGNOSIS — G43.109 MIGRAINE WITH AURA AND WITHOUT STATUS MIGRAINOSUS, NOT INTRACTABLE: ICD-10-CM

## 2017-08-02 RX ORDER — RIZATRIPTAN BENZOATE 10 MG/1
TABLET ORAL
Qty: 12 TABLET | Refills: 8 | Status: SHIPPED | OUTPATIENT
Start: 2017-08-02 | End: 2018-07-06

## 2017-08-02 NOTE — TELEPHONE ENCOUNTER
rizatriptan (MAXALT) 10 MG tablet      Last Written Prescription Date: 4/13/2017  Last Fill Quantity: 12, # refills: 3  Last Office Visit with [facility], [facility] or [facility] prescribing provider: 5/31/2017       BP Readings from Last 3 Encounters:   06/26/17 98/63   05/31/17 92/60   05/17/17 98/68

## 2017-09-12 ENCOUNTER — TRANSFERRED RECORDS (OUTPATIENT)
Dept: HEALTH INFORMATION MANAGEMENT | Facility: CLINIC | Age: 47
End: 2017-09-12

## 2017-09-15 ENCOUNTER — TRANSFERRED RECORDS (OUTPATIENT)
Dept: HEALTH INFORMATION MANAGEMENT | Facility: CLINIC | Age: 47
End: 2017-09-15

## 2017-10-09 ENCOUNTER — TRANSFERRED RECORDS (OUTPATIENT)
Dept: HEALTH INFORMATION MANAGEMENT | Facility: CLINIC | Age: 47
End: 2017-10-09

## 2017-10-13 DIAGNOSIS — F41.8 DEPRESSION WITH ANXIETY: ICD-10-CM

## 2017-10-13 NOTE — TELEPHONE ENCOUNTER
fluoxetine     Last Written Prescription Date: 07/06/17  Last Fill Quantity: 90, # refills: 0  Last Office Visit with [facility] [place] care provider:  04/28/17        Last PHQ-9 score on record=   PHQ-9 SCORE 4/28/2017   Total Score -   Total Score 6

## 2017-10-16 ENCOUNTER — MYC MEDICAL ADVICE (OUTPATIENT)
Dept: ANTICOAGULATION | Facility: CLINIC | Age: 47
End: 2017-10-16

## 2017-10-16 ASSESSMENT — PATIENT HEALTH QUESTIONNAIRE - PHQ9
10. IF YOU CHECKED OFF ANY PROBLEMS, HOW DIFFICULT HAVE THESE PROBLEMS MADE IT FOR YOU TO DO YOUR WORK, TAKE CARE OF THINGS AT HOME, OR GET ALONG WITH OTHER PEOPLE: SOMEWHAT DIFFICULT
SUM OF ALL RESPONSES TO PHQ QUESTIONS 1-9: 9
SUM OF ALL RESPONSES TO PHQ QUESTIONS 1-9: 9

## 2017-10-17 RX ORDER — FLUOXETINE 40 MG/1
CAPSULE ORAL
Qty: 30 CAPSULE | Refills: 0 | Status: SHIPPED | OUTPATIENT
Start: 2017-10-17 | End: 2017-11-15

## 2017-10-17 ASSESSMENT — PATIENT HEALTH QUESTIONNAIRE - PHQ9: SUM OF ALL RESPONSES TO PHQ QUESTIONS 1-9: 9

## 2017-11-15 DIAGNOSIS — F41.8 DEPRESSION WITH ANXIETY: ICD-10-CM

## 2017-11-15 RX ORDER — FLUOXETINE 40 MG/1
CAPSULE ORAL
Qty: 30 CAPSULE | Refills: 0 | Status: SHIPPED | OUTPATIENT
Start: 2017-11-15 | End: 2017-12-21

## 2017-11-15 NOTE — LETTER
[facility]  [address] [address]  Tel. [phone]  Fax [phone]    11/15/2017      Nikki Shah Wilfred                                                                                                              [address] [facility] [address]    Dear Nikki,    We recently received a request from your pharmacy to refill medications you are taking.  As these medications were prescribed by a physician who has left the [facility] Internal Medicine department, we cannot continue to honor these prescription requests long-term.      We will fill the prescriptions for another 30 days.  Within that 30 days, please establish care with one of our other Internal Medicine providers, or further refill requests will be denied.    If you have any questions or want more information about providers at our clinic or other [facility], please visit [email or web address] or call [phone] (toll-free).     Thank you for choosing [facility] for your medical care.     Sincerely,    [facility]

## 2017-12-21 ENCOUNTER — TRANSFERRED RECORDS (OUTPATIENT)
Dept: HEALTH INFORMATION MANAGEMENT | Facility: CLINIC | Age: 47
End: 2017-12-21

## 2017-12-21 ENCOUNTER — OFFICE VISIT (OUTPATIENT)
Dept: FAMILY MEDICINE | Facility: CLINIC | Age: 47
End: 2017-12-21
Payer: COMMERCIAL

## 2017-12-21 VITALS
BODY MASS INDEX: 25.39 KG/M2 | WEIGHT: 130 LBS | SYSTOLIC BLOOD PRESSURE: 107 MMHG | HEART RATE: 67 BPM | OXYGEN SATURATION: 97 % | RESPIRATION RATE: 18 BRPM | TEMPERATURE: 98.3 F | DIASTOLIC BLOOD PRESSURE: 65 MMHG

## 2017-12-21 DIAGNOSIS — F41.8 DEPRESSION WITH ANXIETY: Primary | ICD-10-CM

## 2017-12-21 DIAGNOSIS — K52.832 LYMPHOCYTIC COLITIS: ICD-10-CM

## 2017-12-21 DIAGNOSIS — H91.90 HEARING PROBLEM, UNSPECIFIED LATERALITY: ICD-10-CM

## 2017-12-21 DIAGNOSIS — D64.9 ANEMIA, UNSPECIFIED TYPE: ICD-10-CM

## 2017-12-21 LAB
ERYTHROCYTE [DISTWIDTH] IN BLOOD BY AUTOMATED COUNT: 13 % (ref 10–15)
FERRITIN SERPL-MCNC: 31 NG/ML (ref 8–252)
HCT VFR BLD AUTO: 36 % (ref 35–47)
HGB BLD-MCNC: 11.7 G/DL (ref 11.7–15.7)
IRON SATN MFR SERPL: 18 % (ref 15–46)
IRON SERPL-MCNC: 48 UG/DL (ref 35–180)
MCH RBC QN AUTO: 29.5 PG (ref 26.5–33)
MCHC RBC AUTO-ENTMCNC: 32.5 G/DL (ref 31.5–36.5)
MCV RBC AUTO: 91 FL (ref 78–100)
PLATELET # BLD AUTO: 295 10E9/L (ref 150–450)
RBC # BLD AUTO: 3.97 10E12/L (ref 3.8–5.2)
TIBC SERPL-MCNC: 269 UG/DL (ref 240–430)
WBC # BLD AUTO: 6.7 10E9/L (ref 4–11)

## 2017-12-21 PROCEDURE — 82728 ASSAY OF FERRITIN: CPT | Performed by: FAMILY MEDICINE

## 2017-12-21 PROCEDURE — 99213 OFFICE O/P EST LOW 20 MIN: CPT | Performed by: FAMILY MEDICINE

## 2017-12-21 PROCEDURE — 85027 COMPLETE CBC AUTOMATED: CPT | Performed by: FAMILY MEDICINE

## 2017-12-21 PROCEDURE — 36415 COLL VENOUS BLD VENIPUNCTURE: CPT | Performed by: FAMILY MEDICINE

## 2017-12-21 PROCEDURE — 83550 IRON BINDING TEST: CPT | Performed by: FAMILY MEDICINE

## 2017-12-21 PROCEDURE — 83540 ASSAY OF IRON: CPT | Performed by: FAMILY MEDICINE

## 2017-12-21 RX ORDER — FLUOXETINE 40 MG/1
40 CAPSULE ORAL DAILY
Qty: 90 CAPSULE | Refills: 3 | Status: SHIPPED | OUTPATIENT
Start: 2017-12-21 | End: 2019-01-17

## 2017-12-21 ASSESSMENT — ANXIETY QUESTIONNAIRES
GAD7 TOTAL SCORE: 6
7. FEELING AFRAID AS IF SOMETHING AWFUL MIGHT HAPPEN: NOT AT ALL
6. BECOMING EASILY ANNOYED OR IRRITABLE: MORE THAN HALF THE DAYS
3. WORRYING TOO MUCH ABOUT DIFFERENT THINGS: SEVERAL DAYS
5. BEING SO RESTLESS THAT IT IS HARD TO SIT STILL: NOT AT ALL
1. FEELING NERVOUS, ANXIOUS, OR ON EDGE: MORE THAN HALF THE DAYS
2. NOT BEING ABLE TO STOP OR CONTROL WORRYING: SEVERAL DAYS

## 2017-12-21 ASSESSMENT — PATIENT HEALTH QUESTIONNAIRE - PHQ9
SUM OF ALL RESPONSES TO PHQ QUESTIONS 1-9: 6
5. POOR APPETITE OR OVEREATING: NOT AT ALL

## 2017-12-21 NOTE — LETTER
My Depression Action Plan  Name: Nikki Hardin   Date of Birth 1970  Date: 12/21/2017    My doctor: Vandana Castellanos   My clinic: [address] [address]  [phone]          GREEN    ZONE   Good Control    What it looks like:     Things are going generally well. You have normal up s and down s. You may even feel depressed from time to time, but bad moods usually last less than a day.   What you need to do:  1. Continue to care for yourself (see self care plan)  2. Check your depression survival kit and update it as needed  3. Follow your physician s recommendations including any medication.  4. Do not stop taking medication unless you consult with your physician first.           YELLOW         ZONE Getting Worse    What it looks like:     Depression is starting to interfere with your life.     It may be hard to get out of bed; you may be starting to isolate yourself from others.    Symptoms of depression are starting to last most all day and this has happened for several days.     You may have suicidal thoughts but they are not constant.   What you need to do:     1. Call your care team, your response to treatment will improve if you keep your care team informed of your progress. Yellow periods are signs an adjustment may need to be made.     2. Continue your self-care, even if you have to fake it!    3. Talk to someone in your support network    4. Open up your depression survival kit           RED    ZONE Medical Alert - Get Help    What it looks like:     Depression is seriously interfering with your life.     You may experience these or other symptoms: You can t get out of bed most days, can t work or engage in other necessary activities, you have trouble taking care of basic hygiene, or basic responsibilities, thoughts of suicide or death that will not go away, self-injurious behavior.     What you need to do:  1. Call your care  team and request a same-day appointment. If they are not available (weekends or after hours) call your local crisis line, emergency room or 911.      Electronically signed by: Trell Moss, December 21, 2017    Depression Self Care Plan / Survival Kit    Self-Care for Depression  Here s the deal. Your body and mind are really not as separate as most people think.  What you do and think affects how you feel and how you feel influences what you do and think. This means if you do things that people who feel good do, it will help you feel better.  Sometimes this is all it takes.  There is also a place for medication and therapy depending on how severe your depression is, so be sure to consult with your medical provider and/ or Behavioral Health Consultant if your symptoms are worsening or not improving.     In order to better manage my stress, I will:    Exercise  Get some form of exercise, every day. This will help reduce pain and release endorphins, the  feel good  chemicals in your brain. This is almost as good as taking antidepressants!  This is not the same as joining a gym and then never going! (they count on that by the way ) It can be as simple as just going for a walk or doing some gardening, anything that will get you moving.      Hygiene   Maintain good hygiene (Get out of bed in the morning, Make your bed, Brush your teeth, Take a shower, and Get dressed like you were going to work, even if you are unemployed).  If your clothes don't fit try to get ones that do.    Diet  I will strive to eat foods that are good for me, drink plenty of water, and avoid excessive sugar, caffeine, alcohol, and other mood-altering substances.  Some foods that are helpful in depression are: complex carbohydrates, B vitamins, flaxseed, fish or fish oil, fresh fruits and vegetables.    Psychotherapy  I agree to participate in Individual Therapy (if recommended).    Medication  If prescribed medications, I agree to take them.  Missing  doses can result in serious side effects.  I understand that drinking alcohol, or other illicit drug use, may cause potential side effects.  I will not stop my medication abruptly without first discussing it with my provider.    Staying Connected With Others  I will stay in touch with my friends, family members, and my primary care provider/team.    Use your imagination  Be creative.  We all have a creative side; it doesn t matter if it s oil painting, sand castles, or mud pies! This will also kick up the endorphins.    Witness Beauty  (AKA stop and smell the roses) Take a look outside, even in mid-winter. Notice colors, textures. Watch the squirrels and birds.     Service to others  Be of service to others.  There is always someone else in need.  By helping others we can  get out of ourselves  and remember the really important things.  This also provides opportunities for practicing all the other parts of the program.    Humor  Laugh and be silly!  Adjust your TV habits for less news and crime-drama and more comedy.    Control your stress  Try breathing deep, massage therapy, biofeedback, and meditation. Find time to relax each day.     My support system    Clinic Contact:  Phone number:    Contact 1:  Phone number:    Contact 2:  Phone number:    Synagogue/:  Phone number:    Therapist:  Phone number:    Local crisis center:    Phone number:    Other community support:  Phone number:

## 2017-12-21 NOTE — PROGRESS NOTES
"HPI      ROS      Physical Exam      SUBJECTIVE:   Nikki Hardin is a 47 year old female who presents to clinic today for the following health issues:    Depression and Anxiety Follow-Up    Status since last visit: Improved since the last refill     Other associated symptoms:None    Complicating factors:     Significant life event: No     Current substance abuse: None    PHQ-9 Score and MyChart F/U Questions 4/28/2017 10/16/2017   Total Score 6 9   Q9: Suicide Ideation Not at all Not at all     BYRON-7 SCORE 10/31/2012   Total Score 7       PHQ-9  English  PHQ-9   Any Language  GAD7  Suicide Assessment Five-step Evaluation and Treatment (SAFE-T)      Amount of exercise or physical activity: 2 days/week for an average of greater than 60 minutes    Problems taking medications regularly: No    Medication side effects: none    Diet: regular (no restrictions)    Additional: establish care      Nikki reports that she has been on prozac for a number of years and states it does very well for her depression and anxiety.  She has rarely ever had panic attacks.  She denies any SI/HI.  She denies ever having had any manic symptoms.  One daughter has recently gone through a manic episode.  There is otherwise a strong family history of depression.       She has also tried the following:  celexa  wellbutrin-tremors in hands  zoloft    She recalls that wellbutrin was stopped after many years due to tremors in her hands.  She cannot recall why the others were stopped, though she did stop due to pregnancy and due to migraines.      Lymphocytic colitis: she is followed by [place] [facility], now with Dr. Corrales.  She would like to talk with him about whether re-starting wellbutrin might help with her chronic diarrhea.  She had a colonoscopy 1-2 years ago.      Frequent headaches: she has frequent headaches and \"bad migraines\" occur about once every two months.  She takes maxalt and ibuprofen as needed.      Anemia: she " reports a h/o anemia, but is not sure what the cause is (states it is not due to her colitis).  She had been on a daily iron supplement but hasn't really been taking this lately.  No symptoms such as dizziness or extreme fatigue.  She has not had menses for the past three years, since her endometrial ablation.      Hearing difficulty: L>R, others are starting to comment that she should have her hearing tested.  She mostly has trouble only if there is a lot of background noise.  No h/o noise exposure.      She has two daughters (Kate is also seen here) and a son.            Problem list and histories reviewed & adjusted, as indicated.  Additional history: as documented    Patient Active Problem List   Diagnosis     Irritable bowel syndrome     Other acne     Migraine with aura and without status migrainosus, not intractable     Depression with anxiety     Serologic abnormality     Sedimentation rate elevation     Elevated vitamin B12 level     Anemia, unspecified type     Fatigue, unspecified type     Hypervitaminosis B6     Lymphocytic colitis     Past Surgical History:   Procedure Laterality Date     C IUD,MIRENA  2005     C NONSPECIFIC PROCEDURE       X 3     ESOPHAGOSCOPY, GASTROSCOPY, DUODENOSCOPY (EGD), COMBINED  10/10     HC ABLATION, ENDOMETRIAL, THERMAL, W/O HYSTEROSCOPIC GUIDANCE  2010     HC COLONOSCOPY THRU STOMA, DIAGNOSTIC  2004     HC COLONOSCOPY THRU STOMA, DIAGNOSTIC  5/10     HYSTEROSCOPY      D&C       Social History   Substance Use Topics     Smoking status: Never Smoker     Smokeless tobacco: Never Used     Alcohol use Yes      Comment: occasional- once monthly     Family History   Problem Relation Age of Onset     Neurologic Disorder Mother      MIGRIANES     C.A.D. Father      in his 60's     Depression Father      HEART DISEASE Father      Lipids Father      Hypertension Father      Arthritis Father      Sleep Disorder Father      CEREBROVASCULAR DISEASE Father 78     Cancer  - colorectal Paternal Grandmother      CANCER Maternal Grandfather      stomach     HEART DISEASE Maternal Grandfather      Depression Brother      Depression Brother      Asthma Daughter      HEART DISEASE Paternal Grandfather      Breast Cancer Maternal Aunt      DIABETES No family hx of          Current Outpatient Prescriptions   Medication Sig Dispense Refill     FLUoxetine (PROZAC) 40 MG capsule Take 1 capsule (40 mg) by mouth daily 90 capsule 3     [DISCONTINUED] FLUoxetine (PROZAC) 40 MG capsule TAKE ONE CAPSULE BY MOUTH EVERY DAY 30 capsule 0     rizatriptan (MAXALT) 10 MG tablet TAKE 1 TABLET(10 MG) BY MOUTH AT ONSET OF HEADACHE. MAY REPEAT IN 2 HOURS AS NEEDED:. MAX 2 PER DAY 12 tablet 8     loperamide (LOPERAMIDE A-D) 2 MG tablet Take 2 mg by mouth 4 times daily as needed for diarrhea       Omega-3 Fatty Acids (FISH OIL CONCENTRATE PO) Take 850 mg by mouth daily       IBUPROFEN PO Take 200 mg by mouth Takes 3 tablets PRN       ferrous fumarate 65 mg, [place]. FE,-Vitamin C 125 mg (VITRON C)  MG TABS tablet Take 1 tablet by mouth daily INDICATION: IRON SUPPLEMENT 100 tablet prn     vitamin B complex with vitamin C (VITAMIN  B COMPLEX) TABS tablet Take 1 tablet by mouth daily       cholecalciferol 5000 UNITS CAPS Take 1 capsule (5,000 Units) by mouth daily FOR VITAMIN D DEFICIENCY (LOW VITAMIN D) 100 capsule 3     Zinc Acetate, Oral, 50 MG CAPS  90 capsule      naproxen sodium (ANAPROX) 220 MG tablet Take by mouth as needed Takes 2 tablets PRN 60 tablet      CALCIUM-MAGNESIUM PO Take 2 tablets by mouth daily WITH POTASSIUM       acetaminophen (TYLENOL) 325 MG tablet Take 2 tablets by mouth every 4 hours as needed for pain. 100 tablet 0     Allergies   Allergen Reactions     No Known Allergies          Reviewed and updated as needed this visit by clinical staffTobacco  Allergies  Med Hx  Surg Hx  Fam Hx  Soc Hx      Reviewed and updated as needed this visit by Provider         ROS:  Constitutional,  HEENT, cardiovascular, pulmonary, GI, , musculoskeletal, neuro, skin, endocrine and psych systems are negative, except as otherwise noted.      OBJECTIVE:   /65 (BP Location: Right arm, Patient Position: Sitting, Cuff Size: Adult Regular)  Pulse 67  Temp 98.3  F (36.8  C) (Oral)  Resp 18  Wt 130 lb (59 kg)  SpO2 97%  BMI 25.39 kg/m2  Body mass index is 25.39 kg/(m^2).  GENERAL: healthy, alert and no distress  EYES: Eyes grossly normal to inspection, PERRL and conjunctivae and sclerae normal  HENT: ear canals and TM's normal, nose and mouth without ulcers or lesions  NECK: no adenopathy, no asymmetry, masses, or scars and thyroid normal to palpation  RESP: lungs clear to auscultation - no rales, rhonchi or wheezes  CV: regular rate and rhythm, normal S1 S2, no S3 or S4, no murmur, click or rub, no peripheral edema and peripheral pulses strong  ABDOMEN: soft, nontender, no hepatosplenomegaly, no masses and bowel sounds normal  MS: no gross musculoskeletal defects noted, no edema  SKIN: no suspicious lesions or rashes  NEURO: Normal strength and tone, mentation intact and speech normal  PSYCH: mentation appears normal, affect normal/bright    Diagnostic Test Results:  none     ASSESSMENT/PLAN:     1. Depression with anxiety  Stable, continue prozac, rx refilled.  - DEPRESSION ACTION PLAN (DAP)  - FLUoxetine (PROZAC) 40 MG capsule; Take 1 capsule (40 mg) by mouth daily  Dispense: 90 capsule; Refill: 3    2. Anemia, unspecified type    - CBC with platelets  - Iron and iron binding capacity  - Ferritin    3. Hearing problem, unspecified laterality    - AUDIOLOGY ADULT REFERRAL    4. Lymphocytic colitis  I'm not certain about wellbutrin as a treatment option, but a TCA might be helpful with diarrhea, as well as mood and frequent headache/migraine.  She will discuss with her GI.           Nohelia Rodrigez MD  [facility]

## 2017-12-21 NOTE — MR AVS SNAPSHOT
After Visit Summary   12/21/2017    Nikki Hardin    MRN: [id number]           Patient Information     Date Of Birth          1970        Visit Information        Provider Department      12/21/2017 11:20 AM Nohelia Rodrigez MD [facility]        Today's Diagnoses     Anemia, unspecified type    -  1    Depression with anxiety        Hearing problem, unspecified laterality           Follow-ups after your visit        Additional Services     AUDIOLOGY ADULT REFERRAL       Your provider has referred you to: [facility]: Audiology and Aural Rehab Services [facility] [phone]   [email or web address]    Specialty Testing:  Audiogram w/Tymps and Reflexes (Comprehensive Audiology Evaluation)                  Who to contact     If you have questions or need follow up information about today's clinic visit or your schedule please contact [facility] directly at [phone].  Normal or non-critical lab and imaging results will be communicated to you by Qwbcghart, letter or phone within 4 business days after the clinic has received the results. If you do not hear from us within 7 days, please contact the clinic through Totsyt or phone. If you have a critical or abnormal lab result, we will notify you by phone as soon as possible.  Submit refill requests through Everyclick or call your pharmacy and they will forward the refill request to us. Please allow 3 business days for your refill to be completed.          Additional Information About Your Visit        MyChart Information     Everyclick gives you secure access to your electronic health record. If you see a primary care provider, you can also send messages to your care team and make appointments. If you have questions, please call your primary care clinic.  If you do not have a primary care provider, please call [phone] and they will assist  you.        Care EveryWhere ID     This is your Care EveryWhere ID. This could be used by other organizations to access your [place] medical records  [id number]        Your Vitals Were     Pulse Temperature Respirations Pulse Oximetry BMI (Body Mass Index)       67 98.3  F (36.8  C) (Oral) 18 97% 25.39 kg/m2        Blood Pressure from Last 3 Encounters:   12/21/17 107/65   06/26/17 98/63   05/31/17 92/60    Weight from Last 3 Encounters:   12/21/17 130 lb (59 kg)   06/26/17 137 lb 12.8 oz (62.5 kg)   05/31/17 139 lb 11.2 oz (63.4 kg)              We Performed the Following     AUDIOLOGY ADULT REFERRAL     CBC with platelets     DEPRESSION ACTION PLAN (DAP)     Ferritin     Iron and iron binding capacity          Today's Medication Changes          These changes are accurate as of: 12/21/17 12:20 PM.  If you have any questions, ask your nurse or doctor.               These medicines have changed or have updated prescriptions.        Dose/Directions    FLUoxetine 40 MG capsule   Commonly known as:  PROzac   This may have changed:  See the new instructions.   Used for:  Depression with anxiety   Changed by:  Nohelia Rodrigez MD        Dose:  40 mg   Take 1 capsule (40 mg) by mouth daily   Quantity:  90 capsule   Refills:  3            Where to get your medicines      These medications were sent to [facility] [address] [place] [address] & [address] [address]    Hours:  24-hours Phone:  [phone]     FLUoxetine 40 MG capsule                Primary Care Provider Office Phone # Fax #    Vandana Castellanos [phone]       XXX RESIGNED XXX  [facility] [id number]        Equal Access to Services     NATANAEL FARRAR AH: Berlin Pollack, wabrittda luqnewton, qaybta kaalmada zahraa, samanta bender. So [facility] [phone].    ATENCIÓN: Si habla español, tiene a taveras disposición servicios  celina de asistencia lingüística. Slime sánchez [phone].    We comply with applicable federal civil rights laws and [place] laws. We do not discriminate on the basis of race, color, national origin, age, disability, sex, sexual orientation, or gender identity.            Thank you!     Thank you for choosing [facility]  for your care. Our goal is always to provide you with excellent care. Hearing back from our patients is one way we can continue to improve our services. Please take a few minutes to complete the written survey that you may receive in the mail after your visit with us. Thank you!             Your Updated Medication List - Protect others around you: Learn how to safely use, store and throw away your medicines at www.disposemymeds.org.          This list is accurate as of: 12/21/17 12:20 PM.  Always use your most recent med list.                   Brand Name Dispense Instructions for use Diagnosis    acetaminophen 325 MG tablet    TYLENOL    100 tablet    Take 2 tablets by mouth every 4 hours as needed for pain.        CALCIUM-MAGNESIUM PO      Take 2 tablets by mouth daily WITH POTASSIUM        cholecalciferol 5000 UNITS Caps     100 capsule    Take 1 capsule (5,000 Units) by mouth daily FOR VITAMIN D DEFICIENCY (LOW VITAMIN D)        ferrous fumarate 65 mg ([place]. FE)-Vitamin C 125 mg  MG Tabs tablet    VITRON C    100 tablet    Take 1 tablet by mouth daily INDICATION: IRON SUPPLEMENT    Migraine with aura and without status migrainosus, not intractable       FISH OIL CONCENTRATE PO      Take 850 mg by mouth daily        FLUoxetine 40 MG capsule    PROzac    90 capsule    Take 1 capsule (40 mg) by mouth daily    Depression with anxiety       IBUPROFEN PO      Take 200 mg by mouth Takes 3 tablets PRN        LOPERAMIDE A-D 2 MG tablet   Generic drug:  loperamide      Take 2 mg by mouth 4 times daily as needed for diarrhea        naproxen sodium 220 MG tablet    ANAPROX     60 tablet    Take by mouth as needed Takes 2 tablets PRN        rizatriptan 10 MG tablet    MAXALT    12 tablet    TAKE 1 TABLET(10 MG) BY MOUTH AT ONSET OF HEADACHE. MAY REPEAT IN 2 HOURS AS NEEDED:. MAX 2 PER DAY    Migraine with aura and without status migrainosus, not intractable       vitamin B complex with vitamin C Tabs tablet      Take 1 tablet by mouth daily        Zinc Acetate (Oral) 50 MG Caps     90 capsule

## 2017-12-22 ASSESSMENT — ANXIETY QUESTIONNAIRES: GAD7 TOTAL SCORE: 6

## 2018-01-27 ENCOUNTER — OFFICE VISIT (OUTPATIENT)
Dept: AUDIOLOGY | Facility: CLINIC | Age: 48
End: 2018-01-27
Payer: COMMERCIAL

## 2018-01-27 DIAGNOSIS — H90.3 SENSORINEURAL HEARING LOSS (SNHL) OF BOTH EARS: Primary | ICD-10-CM

## 2018-01-27 NOTE — MR AVS SNAPSHOT
After Visit Summary   1/27/2018    Nikki Freemann    MRN: [id number]           Patient Information     Date Of Birth          1970        Visit Information        Provider Department      1/27/2018 8:00 AM Julieta Anderson AuD M [facility] Audiology        Today's Diagnoses     Sensorineural hearing loss (SNHL) of both ears    -  1       Follow-ups after your visit        Who to contact     Please call your clinic at [phone] to:    Ask questions about your health    Make or cancel appointments    Discuss your medicines    Learn about your test results    Speak to your doctor   If you have compliments or concerns about an experience at your clinic, or if you wish to file a complaint, please contact [facility] Patient Relations at [phone] or email us at [email or web address]         Additional Information About Your Visit        MyChart Information     Lookbackt gives you secure access to your electronic health record. If you see a primary care provider, you can also send messages to your care team and make appointments. If you have questions, please call your primary care clinic.  If you do not have a primary care provider, please call [phone] and they will assist you.      Diet4Life is an electronic gateway that provides easy, online access to your medical records. With Diet4Life, you can request a clinic appointment, read your test results, renew a prescription or communicate with your care team.     To access your existing account, please contact your [facility] Clinic or call [phone] for assistance.        Care EveryWhere ID     This is your Care EveryWhere ID. This could be used by other organizations to access your [place] medical records  [id number]         Blood Pressure from Last 3 Encounters:   12/21/17 107/65   06/26/17 98/63   05/31/17 92/60    Weight from Last 3 Encounters:   12/21/17 59 kg (130 lb)    06/26/17 62.5 kg (137 lb 12.8 oz)   05/31/17 63.4 kg (139 lb 11.2 oz)              We Performed the Following     AUDIOGRAM/TYMPANOGRAM - INTERFACE     [facility] Audiometry Thrshld Eval & Speech Recog ([id number])     Tymps / Reflex   ([id number])        Primary Care Provider Office Phone # Fax #    Vandana Castellanos [phone]       XXX RESIGNED XXX  [facility] [address]        Equal Access to Services     NATANAEL [facility] HospitalNATHANAEL : Hadii aad ku hadasho Soomaali, waaxda luqadaha, qaybta kaalmada adeegyada, waxay idiin hayaan aderylan navarro . So [facility] [phone].    ATENCIÓN: Si habla español, tiene a taveras disposición servicios gratuitos de asistencia lingüística. [facility] [phone].    We comply with applicable federal civil rights laws and [place] laws. We do not discriminate on the basis of race, color, national origin, age, disability, sex, sexual orientation, or gender identity.            Thank you!     Thank you for choosing [facility] AUDIOLOGY  for your care. Our goal is always to provide you with excellent care. Hearing back from our patients is one way we can continue to improve our services. Please take a few minutes to complete the written survey that you may receive in the mail after your visit with us. Thank you!             Your Updated Medication List - Protect others around you: Learn how to safely use, store and throw away your medicines at www.disposemymeds.org.          This list is accurate as of 1/27/18 11:59 PM.  Always use your most recent med list.                   Brand Name Dispense Instructions for use Diagnosis    acetaminophen 325 MG tablet    TYLENOL    100 tablet    Take 2 tablets by mouth every 4 hours as needed for pain.        CALCIUM-MAGNESIUM PO      Take 2 tablets by mouth daily WITH POTASSIUM        cholecalciferol 5000 UNITS Caps     100 capsule    Take 1 capsule (5,000 Units) by mouth daily FOR VITAMIN D DEFICIENCY (LOW VITAMIN D)        ferrous fumarate 65 mg ([place] IRA.  FE)-Vitamin C 125 mg  MG Tabs tablet    VITRON C    100 tablet    Take 1 tablet by mouth daily INDICATION: IRON SUPPLEMENT    Migraine with aura and without status migrainosus, not intractable       FISH OIL CONCENTRATE PO      Take 850 mg by mouth daily        FLUoxetine 40 MG capsule    PROzac    90 capsule    Take 1 capsule (40 mg) by mouth daily    Depression with anxiety       IBUPROFEN PO      Take 200 mg by mouth Takes 3 tablets PRN        LOPERAMIDE A-D 2 MG tablet   Generic drug:  loperamide      Take 2 mg by mouth 4 times daily as needed for diarrhea        naproxen sodium 220 MG tablet    ANAPROX    60 tablet    Take by mouth as needed Takes 2 tablets PRN        rizatriptan 10 MG tablet    MAXALT    12 tablet    TAKE 1 TABLET(10 MG) BY MOUTH AT ONSET OF HEADACHE. MAY REPEAT IN 2 HOURS AS NEEDED:. MAX 2 PER DAY    Migraine with aura and without status migrainosus, not intractable       vitamin B complex with vitamin C Tabs tablet      Take 1 tablet by mouth daily        Zinc Acetate (Oral) 50 MG Caps     90 capsule

## 2018-01-29 NOTE — PROGRESS NOTES
AUDIOLOGY REPORT    SUBJECTIVE:  Nikki Hardin is a 47 year old female who was seen in Audiology at the [facility] [place]   for audiologic evaluation, referred by Nohelia Rodrigez MD.  The patient reports bilateral decreased hearing over several months. Her family has been complaining of her hearing as of recently. Nikki denies any otalgia, aural fullness, tinnitus, and dizziness. She denies any significant history of ear infections, ear-related surgeries, or noise exposure.     OBJECTIVE:  Otoscopic exam indicates ears are clear of cerumen bilaterally     Pure Tone Thresholds assessed using conventional audiometry with good  reliability from 250-8000 Hz bilaterally using circumaural headphones     RIGHT:  normal hearing, with a mild sensorineural notch at 6000 Hz.     LEFT:    normal hearing, with a mild sensorineural notch at 6000 Hz.    Tympanogram:    RIGHT: normal eardrum mobility    LEFT:   normal eardrum mobility    Reflexes (reported by stimulus ear):  RIGHT: Ipsilateral is present at normal levels  RIGHT: Contralateral is present at normal levels  LEFT:   Ipsilateral is present at normal levels  LEFT:   Contralateral is present at normal levels      Speech Reception Threshold:    RIGHT: 5 dB HL    LEFT:   5 dB HL  Word Recognition Score:     RIGHT: 100% at 50 dB HL using NU-6 recorded word list.    LEFT:   100% at 50 dB HL using NU-6 recorded word list.      ASSESSMENT: Today's results largely normal hearing in both ears, with the exception of a mild sensorineural hearing loss at 6000 Hz.  Today s results were discussed with the patient in detail.     PLAN:  Patient was counseled regarding hearing loss and impact on communication. We discussed the importance of using hearing protection in noisy environments. The patient is not a good candidate for amplification at this time. It is recommended that she continue to monitor her hearing on an annual basis, or  sooner if concerns are noted. Please call this clinic with questions regarding these results or recommendations.      Oj Ross.  Licensed Audiologist  [place] #[id number]

## 2018-02-06 ENCOUNTER — TELEPHONE (OUTPATIENT)
Dept: FAMILY MEDICINE | Facility: CLINIC | Age: 48
End: 2018-02-06

## 2018-02-06 NOTE — TELEPHONE ENCOUNTER
Talked to pt.  Sx started on 2/4/18. Sudden onset of fever=101.8, chills, cough.    Rec home measures. Rest, fluids, med fever.  Pt is low risk.  Pt was asking about Tamiflu. Offered appt or evisit.  Offered afternoon appt. Pt declined.    If sx worsen or any sob or worse resp sx- pt should be seen.  Pt's  felt pt should go to the ER. I reassured that ER was not needed at this time.  Reviewed home measures and stay home from work.  YOLANDE Garcia

## 2018-02-08 ENCOUNTER — TELEPHONE (OUTPATIENT)
Dept: FAMILY MEDICINE | Facility: CLINIC | Age: 48
End: 2018-02-08

## 2018-02-08 NOTE — TELEPHONE ENCOUNTER
Triaged call. Had onset of fever and RANDALL sx on Monday into Tuesday. Fever 101.8 then 99.7 then back to 102.1 (chills)now 99.2.    Drinking fluids and having cough , runny nose . fatigue.  Had ST but that is better today. No weakness or dizziness.   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    Plan:  Continue to monitor temp and sx. If fever spikes again she should call to discuss plan or come for eval.  Rest, fluids.   Only go to work if no fever for 24 hours and feeling better.  Understands plan.   Pippa Sun RN

## 2018-02-13 ENCOUNTER — OFFICE VISIT (OUTPATIENT)
Dept: URGENT CARE | Facility: URGENT CARE | Age: 48
End: 2018-02-13
Payer: COMMERCIAL

## 2018-02-13 VITALS
HEIGHT: 60 IN | DIASTOLIC BLOOD PRESSURE: 80 MMHG | BODY MASS INDEX: 25.17 KG/M2 | OXYGEN SATURATION: 99 % | HEART RATE: 62 BPM | WEIGHT: 128.2 LBS | RESPIRATION RATE: 16 BRPM | SYSTOLIC BLOOD PRESSURE: 120 MMHG | TEMPERATURE: 98.1 F

## 2018-02-13 DIAGNOSIS — R06.2 WHEEZING: ICD-10-CM

## 2018-02-13 DIAGNOSIS — R05.8 PRODUCTIVE COUGH: Primary | ICD-10-CM

## 2018-02-13 PROCEDURE — 99214 OFFICE O/P EST MOD 30 MIN: CPT | Performed by: PHYSICIAN ASSISTANT

## 2018-02-13 RX ORDER — CODEINE PHOSPHATE AND GUAIFENESIN 10; 100 MG/5ML; MG/5ML
1 SOLUTION ORAL EVERY 4 HOURS PRN
Qty: 120 ML | Refills: 0 | Status: SHIPPED | OUTPATIENT
Start: 2018-02-13 | End: 2018-05-25

## 2018-02-13 RX ORDER — AZITHROMYCIN 250 MG/1
TABLET, FILM COATED ORAL
Qty: 6 TABLET | Refills: 0 | Status: SHIPPED | OUTPATIENT
Start: 2018-02-13 | End: 2018-05-25

## 2018-02-13 RX ORDER — CHOLESTYRAMINE 4 G/9G
2 POWDER, FOR SUSPENSION ORAL DAILY
COMMUNITY
End: 2020-12-22

## 2018-02-13 RX ORDER — ALBUTEROL SULFATE 90 UG/1
2 AEROSOL, METERED RESPIRATORY (INHALATION) EVERY 6 HOURS PRN
Qty: 1 INHALER | Refills: 0 | Status: SHIPPED | OUTPATIENT
Start: 2018-02-13 | End: 2018-05-25

## 2018-02-13 NOTE — MR AVS SNAPSHOT
After Visit Summary   2/13/2018    Nikki Hardin    MRN: [id number]           Patient Information     Date Of Birth          1970        Visit Information        Provider Department      2/13/2018 7:10 PM Alessia Mojica PA-C [facility]        Today's Diagnoses     Productive cough    -  1    Wheezing          Care Instructions    (R05) Productive cough  (primary encounter diagnosis)  Comment:   Plan: guaiFENesin-codeine (ROBITUSSIN AC) 100-10         MG/5ML SOLN solution, azithromycin (ZITHROMAX)         250 MG tablet            (R06.2) Wheezing  Comment:   Plan: albuterol (PROAIR HFA/PROVENTIL HFA/VENTOLIN         HFA) 108 (90 BASE) MCG/ACT Inhaler            Follow up with primary clinic should symptoms persist or worsen.                  Follow-ups after your visit        Who to contact     If you have questions or need follow up information about today's clinic visit or your schedule please contact [facility] directly at [phone].  Normal or non-critical lab and imaging results will be communicated to you by [employer], letter or phone within 4 business days after the clinic has received the results. If you do not hear from us within 7 days, please contact the clinic through [employer] or phone. If you have a critical or abnormal lab result, we will notify you by phone as soon as possible.  Submit refill requests through [employer] or call your pharmacy and they will forward the refill request to us. Please allow 3 business days for your refill to be completed.          Additional Information About Your Visit        [employer] Information     [employer] gives you secure access to your electronic health record. If you see a primary care provider, you can also send messages to your care team and make appointments. If you have questions, please call your primary care clinic.  If you do not have a primary care provider, please call  [phone] and they will assist you.        Care EveryWhere ID     This is your Care EveryWhere ID. This could be used by other organizations to access your [place] medical records  [id number]        Your Vitals Were     Pulse Temperature Respirations Height Pulse Oximetry BMI (Body Mass Index)    62 98.1  F (36.7  C) (Oral) 16 5' (1.524 m) 99% 25.04 kg/m2       Blood Pressure from Last 3 Encounters:   02/13/18 120/80   12/21/17 107/65   06/26/17 98/63    Weight from Last 3 Encounters:   02/13/18 128 lb 3.2 oz (58.2 kg)   12/21/17 130 lb (59 kg)   06/26/17 137 lb 12.8 oz (62.5 kg)              Today, you had the following     No orders found for display         Today's Medication Changes          These changes are accurate as of 2/13/18  8:46 PM.  If you have any questions, ask your nurse or doctor.               Start taking these medicines.        Dose/Directions    albuterol 108 (90 BASE) MCG/ACT Inhaler   Commonly known as:  PROAIR HFA/PROVENTIL HFA/VENTOLIN HFA   Used for:  Wheezing   Started by:  Alessia Mojica PA-C        Dose:  2 puff   Inhale 2 puffs into the lungs every 6 hours as needed for shortness of breath / dyspnea or wheezing   Quantity:  1 Inhaler   Refills:  0       azithromycin 250 MG tablet   Commonly known as:  ZITHROMAX   Used for:  Productive cough   Started by:  Alessia Mojica PA-C        Two tablets first day, then one tablet daily for four days.   Quantity:  6 tablet   Refills:  0       guaiFENesin-codeine 100-10 MG/5ML Soln solution   Commonly known as:  ROBITUSSIN AC   Used for:  Productive cough   Started by:  Alessia Mojica PA-C        Dose:  1 tsp.   Take 5 mLs by mouth every 4 hours as needed for cough   Quantity:  120 mL   Refills:  0            Where to get your medicines      These medications were sent to [facility] [address] [place] [address] & [address]  [address]    Hours:  24-hours Phone:  [phone]     albuterol 108 (90 BASE) MCG/ACT Inhaler    azithromycin 250 MG tablet         Some of these will need a paper prescription and others can be bought over the counter.  Ask your nurse if you have questions.     Bring a paper prescription for each of these medications     guaiFENesin-codeine 100-10 MG/5ML Soln solution                Primary Care Provider Office Phone # Fax #    Nohelia Rodrigez [phone] [address] [place] [address]        Equal Access to Services     [facility]: Hadii aad ku hadasho Soomaali, waaxda luqadaha, qaybta kaalmada aderylanyada, samanta navarro . So [facility] [phone].    ATENCIÓN: Si habla español, tiene a taveras disposición servicios gratuitos de asistencia lingüística. [facility] [phone].    We comply with applicable federal civil rights laws and [place] laws. We do not discriminate on the basis of race, color, national origin, age, disability, sex, sexual orientation, or gender identity.            Thank you!     Thank you for choosing [facility]  for your care. Our goal is always to provide you with excellent care. Hearing back from our patients is one way we can continue to improve our services. Please take a few minutes to complete the written survey that you may receive in the mail after your visit with us. Thank you!             Your Updated Medication List - Protect others around you: Learn how to safely use, store and throw away your medicines at www.disposemymeds.org.          This list is accurate as of 2/13/18  8:46 PM.  Always use your most recent med list.                   Brand Name Dispense Instructions for use Diagnosis    acetaminophen 325 MG tablet    TYLENOL    100 tablet    Take 2 tablets by mouth every 4 hours as needed for pain.        albuterol 108 (90 BASE) MCG/ACT Inhaler    PROAIR HFA/PROVENTIL HFA/VENTOLIN HFA     1 Inhaler    Inhale 2 puffs into the lungs every 6 hours as needed for shortness of breath / dyspnea or wheezing    Wheezing       azithromycin 250 MG tablet    ZITHROMAX    6 tablet    Two tablets first day, then one tablet daily for four days.    Productive cough       CALCIUM-MAGNESIUM PO      Take 2 tablets by mouth daily WITH POTASSIUM        cholecalciferol 5000 UNITS Caps     100 capsule    Take 1 capsule (5,000 Units) by mouth daily FOR VITAMIN D DEFICIENCY (LOW VITAMIN D)        cholestyramine 4 G Packet    QUESTRAN     Take 1 packet by mouth 3 times daily (with meals)        ferrous fumarate 65 mg ([place]. FE)-Vitamin C 125 mg  MG Tabs tablet    VITRON C    100 tablet    Take 1 tablet by mouth daily INDICATION: IRON SUPPLEMENT    Migraine with aura and without status migrainosus, not intractable       FISH OIL CONCENTRATE PO      Take 850 mg by mouth daily        FLUoxetine 40 MG capsule    PROzac    90 capsule    Take 1 capsule (40 mg) by mouth daily    Depression with anxiety       guaiFENesin-codeine 100-10 MG/5ML Soln solution    ROBITUSSIN AC    120 mL    Take 5 mLs by mouth every 4 hours as needed for cough    Productive cough       IBUPROFEN PO      Take 200 mg by mouth Takes 3 tablets PRN        LOPERAMIDE A-D 2 MG tablet   Generic drug:  loperamide      Take 2 mg by mouth 4 times daily as needed for diarrhea        naproxen sodium 220 MG tablet    ANAPROX    60 tablet    Take by mouth as needed Takes 2 tablets PRN        rizatriptan 10 MG tablet    MAXALT    12 tablet    TAKE 1 TABLET(10 MG) BY MOUTH AT ONSET OF HEADACHE. MAY REPEAT IN 2 HOURS AS NEEDED:. MAX 2 PER DAY    Migraine with aura and without status migrainosus, not intractable       vitamin B complex with vitamin C Tabs tablet      Take 1 tablet by mouth daily        Zinc Acetate (Oral) 50 MG Caps     90 capsule

## 2018-02-14 NOTE — PROGRESS NOTES
"  SUBJECTIVE:   Nikki Hardin is a 47 year old female who presents to clinic today for the following health issues:    ED/UC Followup:    Facility:  [facility]  Date of visit: 2/13/18  Reason for visit: Productive cough, wheezing  Current Status: feeling better, less pressure in chest, a little more energy, no new sx     UC on 2/13/18:   \"SUBJECTIVE:   Nikki Hardin is a 47 year old female presenting with a chief complaint of   1) productive cough for the past week, worsening.  Had fevers initially, and was diagnosed with Influenza over the phone by her primary clinic.    2) some wheezing.    3) runny nose and congestion  Fevers stopped on or about 2/9/18.  Body aches initially  Onset of symptoms was as above.  Course of illness is worsening.    Severity moderate  Current and Associated symptoms: as above  Treatment measures tried include otc meds.  Predisposing factors include none.  DID have a flu vaccination this season.  SH: travelling to [place] for weekend to visit her daughter in college there.    (R05) Productive cough  (primary encounter diagnosis)  Comment:   Plan: guaiFENesin-codeine (ROBITUSSIN AC) 100-10         MG/5ML SOLN solution, azithromycin (ZITHROMAX)         250 MG tablet  (R06.2) Wheezing  Comment:   Plan: albuterol (PROAIR HFA/PROVENTIL HFA/VENTOLIN         HFA) 108 (90 BASE) MCG/ACT Inhaler\"      Problem list and histories reviewed & adjusted, as indicated.  Additional history: as documented  Patient Active Problem List   Diagnosis     Irritable bowel syndrome     Other acne     Migraine with aura and without status migrainosus, not intractable     Depression with anxiety     Serologic abnormality     Sedimentation rate elevation     Elevated vitamin B12 level     Anemia, unspecified type     Fatigue, unspecified type     Hypervitaminosis B6     Lymphocytic colitis     Past Surgical History:   Procedure Laterality Date     C IUD,MIRENA  5/2005     C NONSPECIFIC PROCEDURE   "     X 3     ESOPHAGOSCOPY, GASTROSCOPY, DUODENOSCOPY (EGD), COMBINED  10/10     HC ABLATION, ENDOMETRIAL, THERMAL, W/O HYSTEROSCOPIC GUIDANCE  2010     HC COLONOSCOPY THRU STOMA, DIAGNOSTIC  2004     HC COLONOSCOPY THRU STOMA, DIAGNOSTIC  5/10     HYSTEROSCOPY      D&C       Social History   Substance Use Topics     Smoking status: Never Smoker     Smokeless tobacco: Never Used     Alcohol use Yes      Comment: occasional- once monthly     Family History   Problem Relation Age of Onset     Neurologic Disorder Mother      MIGRIANES     C.A.D. Father      in his 60's     Depression Father      HEART DISEASE Father      Lipids Father      Hypertension Father      Arthritis Father      Sleep Disorder Father      CEREBROVASCULAR DISEASE Father 78     Cancer - colorectal Paternal Grandmother      CANCER Maternal Grandfather      stomach     HEART DISEASE Maternal Grandfather      Depression Brother      Depression Brother      Asthma Daughter      HEART DISEASE Paternal Grandfather      Breast Cancer Maternal Aunt      DIABETES No family hx of          Current Outpatient Prescriptions   Medication Sig Dispense Refill     cholestyramine (QUESTRAN) 4 G Packet Take 1 packet by mouth 3 times daily (with meals)       albuterol (PROAIR HFA/PROVENTIL HFA/VENTOLIN HFA) 108 (90 BASE) MCG/ACT Inhaler Inhale 2 puffs into the lungs every 6 hours as needed for shortness of breath / dyspnea or wheezing 1 Inhaler 0     guaiFENesin-codeine (ROBITUSSIN AC) 100-10 MG/5ML SOLN solution Take 5 mLs by mouth every 4 hours as needed for cough 120 mL 0     azithromycin (ZITHROMAX) 250 MG tablet Two tablets first day, then one tablet daily for four days. 6 tablet 0     FLUoxetine (PROZAC) 40 MG capsule Take 1 capsule (40 mg) by mouth daily 90 capsule 3     rizatriptan (MAXALT) 10 MG tablet TAKE 1 TABLET(10 MG) BY MOUTH AT ONSET OF HEADACHE. MAY REPEAT IN 2 HOURS AS NEEDED:. MAX 2 PER DAY 12 tablet 8     loperamide (LOPERAMIDE A-D) 2  MG tablet Take 2 mg by mouth 4 times daily as needed for diarrhea       IBUPROFEN PO Take 200 mg by mouth Takes 3 tablets PRN       vitamin B complex with vitamin C (VITAMIN  B COMPLEX) TABS tablet Take 1 tablet by mouth daily       cholecalciferol 5000 UNITS CAPS Take 1 capsule (5,000 Units) by mouth daily FOR VITAMIN D DEFICIENCY (LOW VITAMIN D) 100 capsule 3     naproxen sodium (ANAPROX) 220 MG tablet Take by mouth as needed Takes 2 tablets PRN 60 tablet      acetaminophen (TYLENOL) 325 MG tablet Take 2 tablets by mouth every 4 hours as needed for pain. 100 tablet 0     Allergies   Allergen Reactions     No Known Allergies      Recent Labs   Lab Test  03/23/17   1806  06/25/13   1107  12/26/11   0959  11/24/10   0828   LDL   --   109  136*   --    HDL   --   70  78   --    TRIG   --   56  62   --    ALT  26   --   10  15   CR  0.81   --   0.86  0.86   GFRESTIMATED  75   --   72  72   GFRESTBLACK  >90   GFR Calc     --   87  88   POTASSIUM  4.0   --   4.2  4.1   TSH  2.88  3.32  1.00  1.21      BP Readings from Last 3 Encounters:   02/15/18 100/67   02/13/18 120/80   12/21/17 107/65    Wt Readings from Last 3 Encounters:   02/15/18 58.1 kg (128 lb)   02/13/18 58.2 kg (128 lb 3.2 oz)   12/21/17 59 kg (130 lb)        Reviewed and updated as needed this visit by clinical staff  Tobacco  Allergies  Meds  Med Hx  Surg Hx  Fam Hx  Soc Hx      Reviewed and updated as needed this visit by Provider         ROS:  No complaint of chest pain or SOB.    This document serves as a record of the services and decisions personally performed and made by Nohelia Amezcua MD. It was created on his/her behalf by Angelita Escalante, trained medical scribe. The creation of this document is based the provider's statements to the medical scribes.    Beti Escalante, February 15, 2018  OBJECTIVE:     /67  Pulse 59  Temp 98.1  F (36.7  C) (Oral)  Resp 18  Ht 1.524 m (5')  Wt 58.1 kg (128 lb)  SpO2 98%   BMI 25 kg/m2  Body mass index is 25 kg/(m^2).  Exam:  GENERAL APPEARANCE:  alert and no acute distress  EYES:anicteric, no conjunctival redness  HENT: ear canals and TM's normal and nose and mouth without ulcers or lesions; OP mildly erythematous without swelling or exudates  RESP: lungs with slight wheezing right lower lung, otherwise clear to auscultation without crackles or rhonchi  CV: regular rates and rhythm, normal S1 S2, no S3 or S4 and no murmur, click or rub -   Diagnostic Test Results:  none     ASSESSMENT/PLAN:   1. Acute bronchitis with symptoms > 10 days  She was seen in  on 2/13/18 and treated with Azithromycin. She is on day three of the antibiotic and has seen much improvement in her symptoms. She will finish Azithromycin course and continue to use Robitussin AC and her albuterol inhaler as needed and let us know if symptoms begin to worsen or do not resolve completely.      Patient Instructions   Information from Your Family Doctor  Treating the Common Cold in Adults  [facility] Phys[facility]. 2012 Jul 15;86(2):online.related article on treatment of the common cold in children and adults.  What do I do if I have a cold?  Most colds don't cause serious illness and will get better over time. Cold symptoms in adults can be treated with some over-the-counter medicines. Talk to your doctor about what is best for you.  What over-the-counter treatments are helpful in adults?  ? Choosing an over-the-counter medicine that contains an antihistamine and a decongestant may help you cough less and breath better through your nose. Cough medicines such as dextromethorphan (one brand: Robitussin) and guaifenesin (one brand: Mucinex) may help some people.  ? If you have a headache or body aches, pain medicines such as ibuprofen (one brand: Advil) can help. The pain medicine naproxen (one brand: Aleve) also may be used for cough.  ? Herbal products, such as Echinacea purpurea, Pelargonium sidoides (geranium) extract (one  brand: Umcka Coldcare), and Andrographis paniculata (one brand: [place]old), may reduce cold symptoms.  ? Zinc taken in the first 24 hours of cold symptoms may reduce how many days you have a cold, and you may also get fewer symptoms. You can take one lozenge every two hours while awake for as long as you have cold symptoms. But, they may give you a bad taste in your mouth or upset your stomach. Zinc nose sprays should not be used.  What treatments are not helpful in adults?  ? Antibiotics  ? Antihistamines without decongestants  ? Codeine  ? Echinacea angustifolia  ? Saline nasal spray  ? Vitamin C  This handout is provided to you by your family doctor and the American Academy of Family Physicians. Other health-related information is available from the AAFP online at http://familydoctor.org. ??This information provides a general overview and may not apply to everyone. Talk to your family doctor to find out if this information applies to you and to get more information on this subject.  2012 by the American Academy of Family Physicians.?This content is owned by the AAFP. A person viewing it online may make one printout of the material and may use that printout only for his or her personal, non-commercial reference. This material may not otherwise be downloaded, copied, printed, stored, transmitted or reproduced in any medium, whether now known or later invented, except as authorized in writing by the AAFP. Contact afpserv@aafp.org for copyright questions and/or permission requests.        The information in this document, created by the medical scribe for me, accurately reflects the services I personally performed and the decisions made by me. I have reviewed and approved this document for accuracy. 02/15/18    Nohelia Amezcua MD  [facility]

## 2018-02-14 NOTE — PROGRESS NOTES
SUBJECTIVE:   Nikki Hardin is a 47 year old female presenting with a chief complaint of   1) productive cough for the past week, worsening.  Had fevers initially, and was diagnosed with Influenza over the phone by her primary clinic.    2) some wheezing.    3) runny nose and congestion  Fevers stopped on or about 2/9/18.  Body aches initially  Onset of symptoms was as above.  Course of illness is worsening.    Severity moderate  Current and Associated symptoms: as above  Treatment measures tried include otc meds.  Predisposing factors include none.  DID have a flu vaccination this season.    SH: travelling to [place] for weekend to visit her daughter in college there.      Past Medical History:   Diagnosis Date     Anxiety      Colitis     lymphocytic colitis ?     Depressive disorder, not elsewhere classified     sees psych- Dr Blue     Migraine, unspecified, without mention of intractable migraine without mention of status migrainosus age 14 yrs     Other acne      PAIN IN THORACIC SPINE [724.1] 4/13/2006    chiropractic care     Raynaud disease     ? connective tissue dz.  ? sogren's     Patient Active Problem List   Diagnosis     Irritable bowel syndrome     Other acne     Migraine with aura and without status migrainosus, not intractable     Depression with anxiety     Serologic abnormality     Sedimentation rate elevation     Elevated vitamin B12 level     Anemia, unspecified type     Fatigue, unspecified type     Hypervitaminosis B6     Lymphocytic colitis     Social History   Substance Use Topics     Smoking status: Never Smoker     Smokeless tobacco: Never Used     Alcohol use Yes      Comment: occasional- once monthly       ROS:  CONSTITUTIONAL:as per HPI  INTEGUMENTARY/SKIN: NEGATIVE for worrisome rashes, moles or lesions  EYES: NEGATIVE for vision changes or irritation  ENT/MOUTH: as per HPI  RESP:as per HPI  CV: NEGATIVE for chest pain, palpitations or peripheral edema  GI: NEGATIVE for nausea,  abdominal pain, heartburn, or change in bowel habits  MUSCULOSKELETAL: as per HPI    OBJECTIVE  :/80  Pulse 62  Temp 98.1  F (36.7  C) (Oral)  Resp 16  Ht 5' (1.524 m)  Wt 128 lb 3.2 oz (58.2 kg)  SpO2 99%  BMI 25.04 kg/m2  GENERAL APPEARANCE: healthy, alert and no distress  EYES: EOMI,  PERRL, conjunctiva clear  HENT: ear canals and TM's normal.  Nose and mouth without ulcers, erythema or lesions  NECK: supple, nontender, no lymphadenopathy  RESP: rhonchi in right lower lobe.  Wheezing in lower lung fields bilaterally  CV: regular rates and rhythm, normal S1 S2, no murmur noted  ABDOMEN:  soft, nontender, no HSM or masses and bowel sounds normal  NEURO: Normal strength and tone, sensory exam grossly normal,  normal speech and mentation  SKIN: no suspicious lesions or rashes    (R05) Productive cough  (primary encounter diagnosis)  Comment:   Plan: guaiFENesin-codeine (ROBITUSSIN AC) 100-10         MG/5ML SOLN solution, azithromycin (ZITHROMAX)         250 MG tablet            (R06.2) Wheezing  Comment:   Plan: albuterol (PROAIR HFA/PROVENTIL HFA/VENTOLIN         HFA) 108 (90 BASE) MCG/ACT Inhaler            Follow up with primary clinic should symptoms persist or worsen.    Patient expresses understanding and agreement with the assessment and plan as above.

## 2018-02-14 NOTE — PATIENT INSTRUCTIONS
(R05) Productive cough  (primary encounter diagnosis)  Comment:   Plan: guaiFENesin-codeine (ROBITUSSIN AC) 100-10         MG/5ML SOLN solution, azithromycin (ZITHROMAX)         250 MG tablet            (R06.2) Wheezing  Comment:   Plan: albuterol (PROAIR HFA/PROVENTIL HFA/VENTOLIN         HFA) 108 (90 BASE) MCG/ACT Inhaler            Follow up with primary clinic should symptoms persist or worsen.

## 2018-02-15 ENCOUNTER — OFFICE VISIT (OUTPATIENT)
Dept: FAMILY MEDICINE | Facility: CLINIC | Age: 48
End: 2018-02-15
Payer: COMMERCIAL

## 2018-02-15 VITALS
DIASTOLIC BLOOD PRESSURE: 67 MMHG | WEIGHT: 128 LBS | RESPIRATION RATE: 18 BRPM | SYSTOLIC BLOOD PRESSURE: 100 MMHG | HEART RATE: 59 BPM | BODY MASS INDEX: 25.13 KG/M2 | OXYGEN SATURATION: 98 % | HEIGHT: 60 IN | TEMPERATURE: 98.1 F

## 2018-02-15 DIAGNOSIS — J20.9 ACUTE BRONCHITIS WITH SYMPTOMS > 10 DAYS: Primary | ICD-10-CM

## 2018-02-15 PROCEDURE — 99213 OFFICE O/P EST LOW 20 MIN: CPT | Performed by: FAMILY MEDICINE

## 2018-02-15 NOTE — MR AVS SNAPSHOT
After Visit Summary   2/15/2018    Nikki Hardin    MRN: [id number]           Patient Information     Date Of Birth          1970        Visit Information        Provider Department      2/15/2018 7:40 AM Nohelia Amezcua MD [facility]        Today's Diagnoses     Acute bronchitis with symptoms > 10 days    -  1      Care Instructions    Information from Your Family Doctor  Treating the Common Cold in Adults  Am [facility] Physician. 2012 Jul 15;86(2):online.related article on treatment of the common cold in children and adults.  What do I do if I have a cold?  Most colds don't cause serious illness and will get better over time. Cold symptoms in adults can be treated with some over-the-counter medicines. Talk to your doctor about what is best for you.  What over-the-counter treatments are helpful in adults?  ? Choosing an over-the-counter medicine that contains an antihistamine and a decongestant may help you cough less and breath better through your nose. Cough medicines such as dextromethorphan (one brand: Robitussin) and guaifenesin (one brand: Mucinex) may help some people.  ? If you have a headache or body aches, pain medicines such as ibuprofen (one brand: Advil) can help. The pain medicine naproxen (one brand: Aleve) also may be used for cough.  ? Herbal products, such as Echinacea purpurea, Pelargonium sidoides (geranium) extract (one brand: Umcka Coldcare), and Andrographis paniculata (one brand: Kalmcold), may reduce cold symptoms.  ? Zinc taken in the first 24 hours of cold symptoms may reduce how many days you have a cold, and you may also get fewer symptoms. You can take one lozenge every two hours while awake for as long as you have cold symptoms. But, they may give you a bad taste in your mouth or upset your stomach. Zinc nose sprays should not be used.  What treatments are not helpful in adults?  ? Antibiotics  ? Antihistamines without  decongestants  ? Codeine  ? Echinacea angustifolia  ? Saline nasal spray  ? Vitamin C  This handout is provided to you by your family doctor and the American Academy of Family Physicians. Other health-related information is available from the AAFP online at http://familydoctor.org. ??This information provides a general overview and may not apply to everyone. Talk to your family doctor to find out if this information applies to you and to get more information on this subject.  2012 by the American Academy of Family Physicians.?This content is owned by the AAFP. A person viewing it online may make one printout of the material and may use that printout only for his or her personal, non-commercial reference. This material may not otherwise be downloaded, copied, printed, stored, transmitted or reproduced in any medium, whether now known or later invented, except as authorized in writing by the AAFP. Contact [email or web address] for copyright questions and/or permission requests.            Follow-ups after your visit        Who to contact     If you have questions or need follow up information about today's clinic visit or your schedule please contact [facility] directly at [phone].  Normal or non-critical lab and imaging results will be communicated to you by MyChart, letter or phone within 4 business days after the clinic has received the results. If you do not hear from us within 7 days, please contact the clinic through [employer] or phone. If you have a critical or abnormal lab result, we will notify you by phone as soon as possible.  Submit refill requests through [employer] or call your pharmacy and they will forward the refill request to us. Please allow 3 business days for your refill to be completed.          Additional Information About Your Visit        Char[employer] Information     [employer] gives you secure access to your electronic health record. If you see a primary care provider, you can also send  messages to your care team and make appointments. If you have questions, please call your primary care clinic.  If you do not have a primary care provider, please call [phone] and they will assist you.        Care EveryWhere ID     This is your Care EveryWhere ID. This could be used by other organizations to access your [facility] medical records  [id number]        Your Vitals Were     Pulse Temperature Respirations Height Pulse Oximetry BMI (Body Mass Index)    59 98.1  F (36.7  C) (Oral) 18 1.524 m (5') 98% 25 kg/m2       Blood Pressure from Last 3 Encounters:   02/15/18 100/67   02/13/18 120/80   12/21/17 107/65    Weight from Last 3 Encounters:   02/15/18 58.1 kg (128 lb)   02/13/18 58.2 kg (128 lb 3.2 oz)   12/21/17 59 kg (130 lb)              Today, you had the following     No orders found for display       Primary Care Provider Office Phone # Fax #    Nohelia Rodrigez [phone] [address] [place] [address]        Equal Access to Services     [facility]: Hadii aad ku hadasho Sorichardali, waaxda luqadaha, qaybta kaalmada adeegyada, samanta navarro . So [facility] [phone].    ATENCIÓN: Si habla español, tiene a taveras disposición servicios gratuitos de asistencia lingüística. Llame al [phone].    We comply with applicable federal civil rights laws and [place] laws. We do not discriminate on the basis of race, color, national origin, age, disability, sex, sexual orientation, or gender identity.            Thank you!     Thank you for choosing [facility]  for your care. Our goal is always to provide you with excellent care. Hearing back from our patients is one way we can continue to improve our services. Please take a few minutes to complete the written survey that you may receive in the mail after your visit with us. Thank you!             Your Updated Medication List - Protect others around you: Learn how to safely use,  store and throw away your medicines at www.disposemymeds.org.          This list is accurate as of 2/15/18  8:03 AM.  Always use your most recent med list.                   Brand Name Dispense Instructions for use Diagnosis    acetaminophen 325 MG tablet    TYLENOL    100 tablet    Take 2 tablets by mouth every 4 hours as needed for pain.        albuterol 108 (90 BASE) MCG/ACT Inhaler    PROAIR HFA/PROVENTIL HFA/VENTOLIN HFA    1 Inhaler    Inhale 2 puffs into the lungs every 6 hours as needed for shortness of breath / dyspnea or wheezing    Wheezing       azithromycin 250 MG tablet    ZITHROMAX    6 tablet    Two tablets first day, then one tablet daily for four days.    Productive cough       cholecalciferol 5000 UNITS Caps     100 capsule    Take 1 capsule (5,000 Units) by mouth daily FOR VITAMIN D DEFICIENCY (LOW VITAMIN D)        cholestyramine 4 G Packet    QUESTRAN     Take 1 packet by mouth 3 times daily (with meals)        FLUoxetine 40 MG capsule    PROzac    90 capsule    Take 1 capsule (40 mg) by mouth daily    Depression with anxiety       guaiFENesin-codeine 100-10 MG/5ML Soln solution    ROBITUSSIN AC    120 mL    Take 5 mLs by mouth every 4 hours as needed for cough    Productive cough       IBUPROFEN PO      Take 200 mg by mouth Takes 3 tablets PRN        LOPERAMIDE A-D 2 MG tablet   Generic drug:  loperamide      Take 2 mg by mouth 4 times daily as needed for diarrhea        naproxen sodium 220 MG tablet    ANAPROX    60 tablet    Take by mouth as needed Takes 2 tablets PRN        rizatriptan 10 MG tablet    MAXALT    12 tablet    TAKE 1 TABLET(10 MG) BY MOUTH AT ONSET OF HEADACHE. MAY REPEAT IN 2 HOURS AS NEEDED:. MAX 2 PER DAY    Migraine with aura and without status migrainosus, not intractable       vitamin B complex with vitamin C Tabs tablet      Take 1 tablet by mouth daily

## 2018-02-15 NOTE — PATIENT INSTRUCTIONS
Information from Your Family Doctor  Treating the Common Cold in Adults  Am [facility] Physician. 2012 Jul 15;86(2):online.related article on treatment of the common cold in children and adults.  What do I do if I have a cold?  Most colds don't cause serious illness and will get better over time. Cold symptoms in adults can be treated with some over-the-counter medicines. Talk to your doctor about what is best for you.  What over-the-counter treatments are helpful in adults?  ? Choosing an over-the-counter medicine that contains an antihistamine and a decongestant may help you cough less and breath better through your nose. Cough medicines such as dextromethorphan (one brand: Robitussin) and guaifenesin (one brand: Mucinex) may help some people.  ? If you have a headache or body aches, pain medicines such as ibuprofen (one brand: Advil) can help. The pain medicine naproxen (one brand: Aleve) also may be used for cough.  ? Herbal products, such as Echinacea purpurea, Pelargonium sidoides (geranium) extract (one brand: Umcka Coldcare), and Andrographis paniculata (one brand: Kalmcold), may reduce cold symptoms.  ? Zinc taken in the first 24 hours of cold symptoms may reduce how many days you have a cold, and you may also get fewer symptoms. You can take one lozenge every two hours while awake for as long as you have cold symptoms. But, they may give you a bad taste in your mouth or upset your stomach. Zinc nose sprays should not be used.  What treatments are not helpful in adults?  ? Antibiotics  ? Antihistamines without decongestants  ? Codeine  ? Echinacea angustifolia  ? Saline nasal spray  ? Vitamin C  This handout is provided to you by your family doctor and the American Academy of Family Physicians. Other health-related information is available from the AAFP online at http://familydoctor.org. ??This information provides a general overview and may not apply to everyone. Talk to your family doctor to find out if this  information applies to you and to get more information on this subject.  2012 by the American Academy of Family Physicians.?This content is owned by the AA. A person viewing it online may make one printout of the material and may use that printout only for his or her personal, non-commercial reference. This material may not otherwise be downloaded, copied, printed, stored, transmitted or reproduced in any medium, whether now known or later invented, except as authorized in writing by the AAFP. Contact [email or web address] for copyright questions and/or permission requests.

## 2018-03-03 ENCOUNTER — HEALTH MAINTENANCE LETTER (OUTPATIENT)
Age: 48
End: 2018-03-03

## 2018-03-07 ENCOUNTER — RADIANT APPOINTMENT (OUTPATIENT)
Dept: MAMMOGRAPHY | Facility: CLINIC | Age: 48
End: 2018-03-07
Attending: FAMILY MEDICINE
Payer: COMMERCIAL

## 2018-03-07 DIAGNOSIS — Z12.39 BREAST CANCER SCREENING: ICD-10-CM

## 2018-03-07 PROCEDURE — 77063 BREAST TOMOSYNTHESIS BI: CPT | Mod: TC

## 2018-03-07 PROCEDURE — 77067 SCR MAMMO BI INCL CAD: CPT | Mod: TC

## 2018-03-21 ENCOUNTER — TRANSFERRED RECORDS (OUTPATIENT)
Dept: HEALTH INFORMATION MANAGEMENT | Facility: CLINIC | Age: 48
End: 2018-03-21

## 2018-05-23 ENCOUNTER — MYC MEDICAL ADVICE (OUTPATIENT)
Dept: FAMILY MEDICINE | Facility: CLINIC | Age: 48
End: 2018-05-23

## 2018-05-25 ENCOUNTER — OFFICE VISIT (OUTPATIENT)
Dept: FAMILY MEDICINE | Facility: CLINIC | Age: 48
End: 2018-05-25
Payer: COMMERCIAL

## 2018-05-25 VITALS
DIASTOLIC BLOOD PRESSURE: 72 MMHG | WEIGHT: 128 LBS | TEMPERATURE: 98.2 F | OXYGEN SATURATION: 97 % | SYSTOLIC BLOOD PRESSURE: 108 MMHG | RESPIRATION RATE: 18 BRPM | BODY MASS INDEX: 25 KG/M2 | HEART RATE: 70 BPM

## 2018-05-25 DIAGNOSIS — B07.8 OTHER VIRAL WARTS: Primary | ICD-10-CM

## 2018-05-25 PROCEDURE — 17110 DESTRUCTION B9 LES UP TO 14: CPT | Performed by: FAMILY MEDICINE

## 2018-05-25 ASSESSMENT — ANXIETY QUESTIONNAIRES
3. WORRYING TOO MUCH ABOUT DIFFERENT THINGS: SEVERAL DAYS
2. NOT BEING ABLE TO STOP OR CONTROL WORRYING: NOT AT ALL
5. BEING SO RESTLESS THAT IT IS HARD TO SIT STILL: NOT AT ALL
1. FEELING NERVOUS, ANXIOUS, OR ON EDGE: SEVERAL DAYS
7. FEELING AFRAID AS IF SOMETHING AWFUL MIGHT HAPPEN: NOT AT ALL
6. BECOMING EASILY ANNOYED OR IRRITABLE: NOT AT ALL
GAD7 TOTAL SCORE: 3

## 2018-05-25 ASSESSMENT — PATIENT HEALTH QUESTIONNAIRE - PHQ9: 5. POOR APPETITE OR OVEREATING: SEVERAL DAYS

## 2018-05-25 NOTE — MR AVS SNAPSHOT
After Visit Summary   5/25/2018    Nikki Freemann    MRN: [id number]           Patient Information     Date Of Birth          1970        Visit Information        Provider Department      5/25/2018 11:20 AM Nohelia Rodrigez MD [facility]        Today's Diagnoses     Other viral warts    -  1       Follow-ups after your visit        Who to contact     If you have questions or need follow up information about today's clinic visit or your schedule please contact [facility] directly at [phone].  Normal or non-critical lab and imaging results will be communicated to you by Recurioushart, letter or phone within 4 business days after the clinic has received the results. If you do not hear from us within 7 days, please contact the clinic through [facility] or phone. If you have a critical or abnormal lab result, we will notify you by phone as soon as possible.  Submit refill requests through [employer] or call your pharmacy and they will forward the refill request to us. Please allow 3 business days for your refill to be completed.          Additional Information About Your Visit        MyChart Information     [employer] gives you secure access to your electronic health record. If you see a primary care provider, you can also send messages to your care team and make appointments. If you have questions, please call your primary care clinic.  If you do not have a primary care provider, please call [phone] and they will assist you.        Care EveryWhere ID     This is your Care EveryWhere ID. This could be used by other organizations to access your [facility] medical records  [id number]        Your Vitals Were     Pulse Temperature Respirations Pulse Oximetry BMI (Body Mass Index)       70 98.2  F (36.8  C) (Oral) 18 97% 25 kg/m2        Blood Pressure from Last 3 Encounters:   05/25/18 108/72   02/15/18 100/67   02/13/18 120/80    Weight from Last 3 Encounters:    05/25/18 128 lb (58.1 kg)   02/15/18 128 lb (58.1 kg)   02/13/18 128 lb 3.2 oz (58.2 kg)              We Performed the Following     DESTRUCT BENIGN LESION, UP TO 14          Today's Medication Changes          These changes are accurate as of 5/25/18 12:11 PM.  If you have any questions, ask your nurse or doctor.               Stop taking these medicines if you haven't already. Please contact your care team if you have questions.     albuterol 108 (90 Base) MCG/ACT Inhaler   Commonly known as:  PROAIR HFA/PROVENTIL HFA/VENTOLIN HFA   Stopped by:  Nohelia Rodrigez MD           azithromycin 250 MG tablet   Commonly known as:  ZITHROMAX   Stopped by:  Nohelia Rodrigez MD           guaiFENesin-codeine 100-10 MG/5ML Soln solution   Commonly known as:  ROBITUSSIN AC   Stopped by:  Nohelia Rodrigez MD                    Primary Care Provider Office Phone # Fax #    Nohelia Rodrigez [phone] [address] [place] [address]        Equal Access to Services     [facility] AH: Hadii aad ku hadasho Soomaali, waaxda luqadaha, qaybta kaalmada adeegyada, samanta kaur hayvalentinn irma navarro . So [facility] [phone].    ATENCIÓN: Si habla español, tiene a taveras disposición servicios gratuitos de asistencia lingüística. [facility] [phone].    We comply with applicable federal civil rights laws and [place] laws. We do not discriminate on the basis of race, color, national origin, age, disability, sex, sexual orientation, or gender identity.            Thank you!     Thank you for choosing [facility]  for your care. Our goal is always to provide you with excellent care. Hearing back from our patients is one way we can continue to improve our services. Please take a few minutes to complete the written survey that you may receive in the mail after your visit with us. Thank you!             Your Updated Medication List - Protect others around you: Learn how to  safely use, store and throw away your medicines at www.disposemymeds.org.          This list is accurate as of 5/25/18 12:11 PM.  Always use your most recent med list.                   Brand Name Dispense Instructions for use Diagnosis    acetaminophen 325 MG tablet    TYLENOL    100 tablet    Take 2 tablets by mouth every 4 hours as needed for pain.        cholecalciferol 5000 units Caps     100 capsule    Take 1 capsule (5,000 Units) by mouth daily FOR VITAMIN D DEFICIENCY (LOW VITAMIN D)        cholestyramine 4 g Packet    QUESTRAN     Take 1 packet by mouth 3 times daily (with meals)        FLUoxetine 40 MG capsule    PROzac    90 capsule    Take 1 capsule (40 mg) by mouth daily    Depression with anxiety       IBUPROFEN PO      Take 200 mg by mouth Takes 3 tablets PRN        LOPERAMIDE A-D 2 MG tablet   Generic drug:  loperamide      Take 2 mg by mouth 4 times daily as needed for diarrhea        naproxen sodium 220 MG tablet    ANAPROX    60 tablet    Take by mouth as needed Takes 2 tablets PRN        rizatriptan 10 MG tablet    MAXALT    12 tablet    TAKE 1 TABLET(10 MG) BY MOUTH AT ONSET OF HEADACHE. MAY REPEAT IN 2 HOURS AS NEEDED:. MAX 2 PER DAY    Migraine with aura and without status migrainosus, not intractable       vitamin B complex with vitamin C Tabs tablet      Take 1 tablet by mouth daily

## 2018-05-25 NOTE — PROGRESS NOTES
HPI      ROS      Physical Exam      SUBJECTIVE:   Nikki Hardin is a 48 year old female who presents to clinic today for the following health issues:    WART(S)      Onset: ongoing     Description (location/number): both hands. 1 on right and 3 on left     Accompanying signs and symptoms: Painful: no     History: prior warts: YES- plantar warts     Therapies tried and outcome: OTC compound W outcome not effective           Problem list and histories reviewed & adjusted, as indicated.  Additional history:     Patient Active Problem List   Diagnosis     Irritable bowel syndrome     Other acne     Migraine with aura and without status migrainosus, not intractable     Depression with anxiety     Serologic abnormality     Sedimentation rate elevation     Elevated vitamin B12 level     Anemia, unspecified type     Fatigue, unspecified type     Hypervitaminosis B6     Lymphocytic colitis     Past Surgical History:   Procedure Laterality Date     C IUD,MIRENA  2005     C NONSPECIFIC PROCEDURE       X 3     ESOPHAGOSCOPY, GASTROSCOPY, DUODENOSCOPY (EGD), COMBINED  10/10     HC ABLATION, ENDOMETRIAL, THERMAL, W/O HYSTEROSCOPIC GUIDANCE  2010     HC COLONOSCOPY THRU STOMA, DIAGNOSTIC  2004     HC COLONOSCOPY THRU STOMA, DIAGNOSTIC  5/10     HYSTEROSCOPY      D&C       Social History   Substance Use Topics     Smoking status: Never Smoker     Smokeless tobacco: Never Used     Alcohol use Yes      Comment: occasional- once monthly     Family History   Problem Relation Age of Onset     Neurologic Disorder Mother      MIGRIANES     C.A.D. Father      in his 60's     Depression Father      HEART DISEASE Father      Lipids Father      Hypertension Father      Arthritis Father      Sleep Disorder Father      CEREBROVASCULAR DISEASE Father 78     Cancer - colorectal Paternal Grandmother      CANCER Maternal Grandfather      stomach     HEART DISEASE Maternal Grandfather      Depression Brother      Depression  Brother      Asthma Daughter      HEART DISEASE Paternal Grandfather      Breast Cancer Maternal Aunt      DIABETES No family hx of          Current Outpatient Prescriptions   Medication Sig Dispense Refill     acetaminophen (TYLENOL) 325 MG tablet Take 2 tablets by mouth every 4 hours as needed for pain. 100 tablet 0     cholecalciferol 5000 UNITS CAPS Take 1 capsule (5,000 Units) by mouth daily FOR VITAMIN D DEFICIENCY (LOW VITAMIN D) 100 capsule 3     cholestyramine (QUESTRAN) 4 G Packet Take 1 packet by mouth 3 times daily (with meals)       FLUoxetine (PROZAC) 40 MG capsule Take 1 capsule (40 mg) by mouth daily 90 capsule 3     IBUPROFEN PO Take 200 mg by mouth Takes 3 tablets PRN       loperamide (LOPERAMIDE A-D) 2 MG tablet Take 2 mg by mouth 4 times daily as needed for diarrhea       naproxen sodium (ANAPROX) 220 MG tablet Take by mouth as needed Takes 2 tablets PRN 60 tablet      rizatriptan (MAXALT) 10 MG tablet TAKE 1 TABLET(10 MG) BY MOUTH AT ONSET OF HEADACHE. MAY REPEAT IN 2 HOURS AS NEEDED:. MAX 2 PER DAY 12 tablet 8     vitamin B complex with vitamin C (VITAMIN  B COMPLEX) TABS tablet Take 1 tablet by mouth daily       Allergies   Allergen Reactions     No Known Allergies        Reviewed and updated as needed this visit by clinical staff  Tobacco  Allergies  Meds  Med Hx  Surg Hx  Fam Hx  Soc Hx      Reviewed and updated as needed this visit by Provider         ROS:  Constitutional, HEENT, cardiovascular, pulmonary, gi and gu systems are negative, except as otherwise noted.    OBJECTIVE:     /72 (BP Location: Left arm, Patient Position: Sitting, Cuff Size: Adult Regular)  Pulse 70  Temp 98.2  F (36.8  C) (Oral)  Resp 18  Wt 128 lb (58.1 kg)  SpO2 97%  BMI 25 kg/m2  Body mass index is 25 kg/(m^2).  GENERAL APPEARANCE: healthy, alert and no distress  SKIN: Right hand: 3mm wart to intertriginous area between 4th and 5th fingers.  Left hand: two 4-5mm wart to 3rd and 4th MCPs.  2mm wart  to first mcp.  1mm wart to ring finger and 1mm wart to lateral 5th finger.          ASSESSMENT/PLAN:     47 yo F with warts to bilateral hands.  6 warts are treated with cryotherapy x3.  Tolerated well, no adverse effects.  Advised to allow to heal, then treat with over the counter salicylic acid if needed.  F/u in 1 month if repeat treatment is needed.      Nohelia Rodrigez MD  [facility]

## 2018-05-26 ASSESSMENT — ANXIETY QUESTIONNAIRES: GAD7 TOTAL SCORE: 3

## 2018-05-26 ASSESSMENT — PATIENT HEALTH QUESTIONNAIRE - PHQ9: SUM OF ALL RESPONSES TO PHQ QUESTIONS 1-9: 3

## 2018-06-29 ENCOUNTER — OFFICE VISIT (OUTPATIENT)
Dept: FAMILY MEDICINE | Facility: CLINIC | Age: 48
End: 2018-06-29
Payer: COMMERCIAL

## 2018-06-29 VITALS
SYSTOLIC BLOOD PRESSURE: 103 MMHG | WEIGHT: 127 LBS | HEART RATE: 60 BPM | DIASTOLIC BLOOD PRESSURE: 68 MMHG | BODY MASS INDEX: 24.8 KG/M2 | RESPIRATION RATE: 18 BRPM | OXYGEN SATURATION: 95 % | TEMPERATURE: 97.8 F

## 2018-06-29 DIAGNOSIS — B07.8 COMMON WART: Primary | ICD-10-CM

## 2018-06-29 PROCEDURE — 17110 DESTRUCTION B9 LES UP TO 14: CPT | Performed by: FAMILY MEDICINE

## 2018-06-29 NOTE — MR AVS SNAPSHOT
After Visit Summary   6/29/2018    Nikki Freemann    MRN: [id number]           Patient Information     Date Of Birth          1970        Visit Information        Provider Department      6/29/2018 8:00 AM Nohelia Rodrigez MD [facility]        Today's Diagnoses     Common wart    -  1       Follow-ups after your visit        Who to contact     If you have questions or need follow up information about today's clinic visit or your schedule please contact [facility] directly at [phone].  Normal or non-critical lab and imaging results will be communicated to you by [employer], letter or phone within 4 business days after the clinic has received the results. If you do not hear from us within 7 days, please contact the clinic through [employer] or phone. If you have a critical or abnormal lab result, we will notify you by phone as soon as possible.  Submit refill requests through [employer] or call your pharmacy and they will forward the refill request to us. Please allow 3 business days for your refill to be completed.          Additional Information About Your Visit        MyChart Information     [employer] gives you secure access to your electronic health record. If you see a primary care provider, you can also send messages to your care team and make appointments. If you have questions, please call your primary care clinic.  If you do not have a primary care provider, please call [phone] and they will assist you.        Care EveryWhere ID     This is your Care EveryWhere ID. This could be used by other organizations to access your [place] medical records  [id number]        Your Vitals Were     Pulse Temperature Respirations Pulse Oximetry BMI (Body Mass Index)       60 97.8  F (36.6  C) (Oral) 18 95% 24.8 kg/m2        Blood Pressure from Last 3 Encounters:   06/29/18 103/68   05/25/18 108/72   02/15/18 100/67    Weight from Last 3 Encounters:    06/29/18 127 lb (57.6 kg)   05/25/18 128 lb (58.1 kg)   02/15/18 128 lb (58.1 kg)              We Performed the Following     DESTRUCT BENIGN LESION, UP TO 14        Primary Care Provider Office Phone # Fax #    Nohelia Rodrigez [phone]       [address]  [place] [address]        Equal Access to Services     SIMBA FARRAR : Hadii aad ku hadasho Soomaali, waaxda luqadaha, qaybta kaalmada adeegyada, waxay idiin hayaan adeeg khyamilkash lalindsey . So [facility] [phone].    ATENCIÓN: Si oswaldo tripp, tiene a taveras disposición servicios gratuitos de asistencia lingüística. Llame al [phone].    We comply with applicable federal civil rights laws and [place] laws. We do not discriminate on the basis of race, color, national origin, age, disability, sex, sexual orientation, or gender identity.            Thank you!     Thank you for choosing [facility]  for your care. Our goal is always to provide you with excellent care. Hearing back from our patients is one way we can continue to improve our services. Please take a few minutes to complete the written survey that you may receive in the mail after your visit with us. Thank you!             Your Updated Medication List - Protect others around you: Learn how to safely use, store and throw away your medicines at www.disposemymeds.org.          This list is accurate as of 6/29/18  8:55 AM.  Always use your most recent med list.                   Brand Name Dispense Instructions for use Diagnosis    acetaminophen 325 MG tablet    TYLENOL    100 tablet    Take 2 tablets by mouth every 4 hours as needed for pain.        cholecalciferol 5000 units Caps     100 capsule    Take 1 capsule (5,000 Units) by mouth daily FOR VITAMIN D DEFICIENCY (LOW VITAMIN D)        cholestyramine 4 g Packet    QUESTRAN     Take 1 packet by mouth 3 times daily (with meals)        FLUoxetine 40 MG capsule    PROzac    90 capsule    Take 1 capsule (40  mg) by mouth daily    Depression with anxiety       IBUPROFEN PO      Take 200 mg by mouth Takes 3 tablets PRN        LOPERAMIDE A-D 2 MG tablet   Generic drug:  loperamide      Take 2 mg by mouth 4 times daily as needed for diarrhea        naproxen sodium 220 MG tablet    ANAPROX    60 tablet    Take by mouth as needed Takes 2 tablets PRN        rizatriptan 10 MG tablet    MAXALT    12 tablet    TAKE 1 TABLET(10 MG) BY MOUTH AT ONSET OF HEADACHE. MAY REPEAT IN 2 HOURS AS NEEDED:. MAX 2 PER DAY    Migraine with aura and without status migrainosus, not intractable       vitamin B complex with vitamin C Tabs tablet      Take 1 tablet by mouth daily

## 2018-06-29 NOTE — PROGRESS NOTES
SUBJECTIVE:   Nikki Hardin is a 48 year old female who presents to clinic today for the following health issues:      WART(S)    Onset: ongoing     Description (location/number): both hands. 1 on right and 3 on left     Accompanying signs and symptoms: Painful: no     History: prior warts: YES- plantar warts     Therapies tried and outcome: OTC compound W outcome not effective    We treated these with cryotherapy last month.  The wart on the side of her left hand has resolved.  There are still three larger warts, one on the right hand in the 4th-5th interdigital web, one on her left first knuckle and one on her left fourth knuckle . She thinks she sees a small wart starting on her left ring finger.  She has been applying compound W.         Problem list and histories reviewed & adjusted, as indicated.  Additional history: as documented    Patient Active Problem List   Diagnosis     Irritable bowel syndrome     Other acne     Migraine with aura and without status migrainosus, not intractable     Depression with anxiety     Serologic abnormality     Sedimentation rate elevation     Elevated vitamin B12 level     Anemia, unspecified type     Fatigue, unspecified type     Hypervitaminosis B6     Lymphocytic colitis     Past Surgical History:   Procedure Laterality Date     C IUD,MIRENA  2005     C NONSPECIFIC PROCEDURE       X 3     ESOPHAGOSCOPY, GASTROSCOPY, DUODENOSCOPY (EGD), COMBINED  10/10     HC ABLATION, ENDOMETRIAL, THERMAL, W/O HYSTEROSCOPIC GUIDANCE  2010     HC COLONOSCOPY THRU STOMA, DIAGNOSTIC  2004     HC COLONOSCOPY THRU STOMA, DIAGNOSTIC  5/10     HYSTEROSCOPY      D&C       Social History   Substance Use Topics     Smoking status: Never Smoker     Smokeless tobacco: Never Used     Alcohol use Yes      Comment: occasional- once monthly     Family History   Problem Relation Age of Onset     Neurologic Disorder Mother      MIGRIANES     C.A.D. Father      in his 60's     Depression  Father      HEART DISEASE Father      Lipids Father      Hypertension Father      Arthritis Father      Sleep Disorder Father      Cerebrovascular Disease Father 78     Cancer - colorectal Paternal Grandmother      Cancer Maternal Grandfather      stomach     HEART DISEASE Maternal Grandfather      Depression Brother      Depression Brother      Asthma Daughter      HEART DISEASE Paternal Grandfather      Breast Cancer Maternal Aunt      Diabetes No family hx of          Current Outpatient Prescriptions   Medication Sig Dispense Refill     acetaminophen (TYLENOL) 325 MG tablet Take 2 tablets by mouth every 4 hours as needed for pain. 100 tablet 0     cholecalciferol 5000 UNITS CAPS Take 1 capsule (5,000 Units) by mouth daily FOR VITAMIN D DEFICIENCY (LOW VITAMIN D) 100 capsule 3     cholestyramine (QUESTRAN) 4 G Packet Take 1 packet by mouth 3 times daily (with meals)       FLUoxetine (PROZAC) 40 MG capsule Take 1 capsule (40 mg) by mouth daily 90 capsule 3     IBUPROFEN PO Take 200 mg by mouth Takes 3 tablets PRN       loperamide (LOPERAMIDE A-D) 2 MG tablet Take 2 mg by mouth 4 times daily as needed for diarrhea       naproxen sodium (ANAPROX) 220 MG tablet Take by mouth as needed Takes 2 tablets PRN 60 tablet      rizatriptan (MAXALT) 10 MG tablet TAKE 1 TABLET(10 MG) BY MOUTH AT ONSET OF HEADACHE. MAY REPEAT IN 2 HOURS AS NEEDED:. MAX 2 PER DAY 12 tablet 8     vitamin B complex with vitamin C (VITAMIN  B COMPLEX) TABS tablet Take 1 tablet by mouth daily       Allergies   Allergen Reactions     No Known Allergies        Reviewed and updated as needed this visit by clinical staff  Tobacco  Allergies  Meds  Med Hx  Surg Hx  Fam Hx  Soc Hx      Reviewed and updated as needed this visit by Provider         ROS:  Constitutional, HEENT, cardiovascular, pulmonary, gi and gu systems are negative, except as otherwise noted.    OBJECTIVE:     /68 (BP Location: Right arm, Patient Position: Sitting, Cuff Size:  Adult Regular)  Pulse 60  Temp 97.8  F (36.6  C) (Oral)  Resp 18  Wt 127 lb (57.6 kg)  SpO2 95%  BMI 24.8 kg/m2  Body mass index is 24.8 kg/(m^2).  GENERAL APPEARANCE: healthy, alert and no distress  SKIN: Right hand: 3mm wart to intertriginous area between 4th and 5th fingers.  Left hand: two 4-5mm wart to First and Fourth MCPs. Tiny possible wart 1mm to left ring finger above her ring.         ASSESSMENT/PLAN:             1. Common wart  The three larger warts and the newer smaller wart were treated with cryotherapy x3.  The patient tolerated the procedure well.  No adverse effects.  F/u 1 month if needed.    - DESTRUCT BENIGN LESION, UP TO 14        Nohelia Rodrigez MD  [facility]

## 2018-07-05 ENCOUNTER — MYC MEDICAL ADVICE (OUTPATIENT)
Dept: FAMILY MEDICINE | Facility: CLINIC | Age: 48
End: 2018-07-05

## 2018-07-05 DIAGNOSIS — G43.109 MIGRAINE WITH AURA AND WITHOUT STATUS MIGRAINOSUS, NOT INTRACTABLE: ICD-10-CM

## 2018-07-06 RX ORDER — RIZATRIPTAN BENZOATE 10 MG/1
TABLET ORAL
Qty: 12 TABLET | Refills: 0 | Status: SHIPPED | OUTPATIENT
Start: 2018-07-06 | End: 2018-08-01

## 2018-07-06 NOTE — TELEPHONE ENCOUNTER
Medication is being filled for 1 time refill only due to:  Patient needs to be seen because it will be a year since last refill in august..  Pippa Sun RN

## 2018-07-19 ENCOUNTER — TRANSFERRED RECORDS (OUTPATIENT)
Dept: HEALTH INFORMATION MANAGEMENT | Facility: CLINIC | Age: 48
End: 2018-07-19

## 2018-07-23 ENCOUNTER — TRANSFERRED RECORDS (OUTPATIENT)
Dept: HEALTH INFORMATION MANAGEMENT | Facility: CLINIC | Age: 48
End: 2018-07-23

## 2018-07-26 ENCOUNTER — TRANSFERRED RECORDS (OUTPATIENT)
Dept: HEALTH INFORMATION MANAGEMENT | Facility: CLINIC | Age: 48
End: 2018-07-26

## 2018-08-01 ENCOUNTER — OFFICE VISIT (OUTPATIENT)
Dept: FAMILY MEDICINE | Facility: CLINIC | Age: 48
End: 2018-08-01
Payer: COMMERCIAL

## 2018-08-01 VITALS
RESPIRATION RATE: 12 BRPM | WEIGHT: 127.4 LBS | TEMPERATURE: 98.6 F | BODY MASS INDEX: 24.88 KG/M2 | HEART RATE: 75 BPM | DIASTOLIC BLOOD PRESSURE: 66 MMHG | OXYGEN SATURATION: 99 % | SYSTOLIC BLOOD PRESSURE: 104 MMHG

## 2018-08-01 DIAGNOSIS — R87.610 PAPANICOLAOU SMEAR OF CERVIX WITH ATYPICAL SQUAMOUS CELLS OF UNDETERMINED SIGNIFICANCE (ASC-US): ICD-10-CM

## 2018-08-01 DIAGNOSIS — G43.109 MIGRAINE WITH AURA AND WITHOUT STATUS MIGRAINOSUS, NOT INTRACTABLE: ICD-10-CM

## 2018-08-01 DIAGNOSIS — Z23 NEED FOR SHINGLES VACCINE: ICD-10-CM

## 2018-08-01 DIAGNOSIS — B07.8 COMMON WART: ICD-10-CM

## 2018-08-01 DIAGNOSIS — F41.8 DEPRESSION WITH ANXIETY: ICD-10-CM

## 2018-08-01 DIAGNOSIS — Z00.00 ROUTINE GENERAL MEDICAL EXAMINATION AT A HEALTH CARE FACILITY: Primary | ICD-10-CM

## 2018-08-01 PROCEDURE — 99396 PREV VISIT EST AGE 40-64: CPT | Mod: 25 | Performed by: FAMILY MEDICINE

## 2018-08-01 PROCEDURE — 90750 HZV VACC RECOMBINANT IM: CPT | Performed by: FAMILY MEDICINE

## 2018-08-01 PROCEDURE — 90471 IMMUNIZATION ADMIN: CPT | Performed by: FAMILY MEDICINE

## 2018-08-01 RX ORDER — FLUOXETINE 10 MG/1
CAPSULE ORAL
Qty: 90 CAPSULE | Refills: 1 | Status: SHIPPED | OUTPATIENT
Start: 2018-08-01 | End: 2018-11-12

## 2018-08-01 RX ORDER — RIZATRIPTAN BENZOATE 10 MG/1
TABLET ORAL
Qty: 12 TABLET | Refills: 0 | Status: SHIPPED | OUTPATIENT
Start: 2018-08-01 | End: 2018-09-05

## 2018-08-01 NOTE — PROGRESS NOTES
SUBJECTIVE:   CC: Nikki Hardin is an 48 year old woman who presents for preventive health visit.     Physical   Annual:     Getting at least 3 servings of Calcium per day:  NO    Bi-annual eye exam:  Yes    Dental care twice a year:  Yes    Sleep apnea or symptoms of sleep apnea:  Daytime drowsiness    Taking medications regularly:  Yes    Medication side effects:  None    Additional concerns today:  YES    CV: due for fasting labs  Malignancy; pap is up to date.  Colonoscopy is up to date; she is monitored by GI for lymphocytic colitis since her teen years.  Mammogram is up to date  Bone health: vit d level monitored by gi  Sexual health: she had an endometrial ablasion a few years ago and has not had a period since.  She did have hot flushes.    Depression screen: she reports increased depression symptoms and insomnia.  She usually falls to sleep immediately, but lately has been taking a little longer (15 minutes) and wakes up frequently.  Some anxiety.  No SI/HI.  She has been seeing her therapist.  She is currently taking prozac 40mg and wants to try 50mg.  She has tried zoloft and celexa in the past which did not work very well for her.     Skin: warts to hands persist despite cryotherapy.  She also would like to address brown spots on her face.                   Today's PHQ-2 Score:   PHQ-2 ( 1999 Pf[employer]) 8/1/2018   Q1: Little interest or pleasure in doing things 1   Q2: Feeling down, depressed or hopeless 1   PHQ-2 Score 2   Q1: Little interest or pleasure in doing things Several days   Q2: Feeling down, depressed or hopeless Several days   PHQ-2 Score 2       Abuse: Current or Past(Physical, Sexual or Emotional)- No  Do you feel safe in your environment - Yes    Social History   Substance Use Topics     Smoking status: Never Smoker     Smokeless tobacco: Never Used     Alcohol use Yes      Comment: occasional- once monthly     Alcohol Use 8/1/2018   If you drink alcohol do you typically have  greater than 3 drinks per day OR greater than 7 drinks per week? No   No flowsheet data found.    Reviewed orders with patient.  Reviewed health maintenance and updated orders accordingly - Yes  Patient Active Problem List   Diagnosis     Irritable bowel syndrome     Other acne     Migraine with aura and without status migrainosus, not intractable     Depression with anxiety     Serologic abnormality     Sedimentation rate elevation     Elevated vitamin B12 level     Anemia, unspecified type     Fatigue, unspecified type     Hypervitaminosis B6     Lymphocytic colitis     Past Surgical History:   Procedure Laterality Date     C IUD,MIRENA  2005     C NONSPECIFIC PROCEDURE       X 3     ESOPHAGOSCOPY, GASTROSCOPY, DUODENOSCOPY (EGD), COMBINED  10/10     HC ABLATION, ENDOMETRIAL, THERMAL, W/O HYSTEROSCOPIC GUIDANCE  2010     HC COLONOSCOPY THRU STOMA, DIAGNOSTIC  2004     HC COLONOSCOPY THRU STOMA, DIAGNOSTIC  5/10     HYSTEROSCOPY      D&C       Social History   Substance Use Topics     Smoking status: Never Smoker     Smokeless tobacco: Never Used     Alcohol use Yes      Comment: occasional- once monthly     Family History   Problem Relation Age of Onset     Neurologic Disorder Mother      MIGRIANES     C.A.D. Father      in his 60's     Depression Father      HEART DISEASE Father      Lipids Father      Hypertension Father      Arthritis Father      Sleep Disorder Father      Cerebrovascular Disease Father 78     Cancer - colorectal Paternal Grandmother      Cancer Maternal Grandfather      stomach     HEART DISEASE Maternal Grandfather      Depression Brother      Depression Brother      Asthma Daughter      HEART DISEASE Paternal Grandfather      Breast Cancer Maternal Aunt      Diabetes No family hx of          Current Outpatient Prescriptions   Medication Sig Dispense Refill     acetaminophen (TYLENOL) 325 MG tablet Take 2 tablets by mouth every 4 hours as needed for pain. 100 tablet 0      cholecalciferol 5000 UNITS CAPS Take 1 capsule (5,000 Units) by mouth daily FOR VITAMIN D DEFICIENCY (LOW VITAMIN D) 100 capsule 3     cholestyramine (QUESTRAN) 4 G Packet Take 1 packet by mouth daily        FLUoxetine (PROZAC) 10 MG capsule Take 1 cap by mouth daily with your 40mg cap for a total of 50mg daily. 90 capsule 1     FLUoxetine (PROZAC) 40 MG capsule Take 1 capsule (40 mg) by mouth daily 90 capsule 3     IBUPROFEN PO Take 200 mg by mouth Takes 3 tablets PRN       loperamide (LOPERAMIDE A-D) 2 MG tablet Take 2 mg by mouth 4 times daily as needed for diarrhea       naproxen sodium (ANAPROX) 220 MG tablet Take by mouth as needed Takes 2 tablets PRN 60 tablet      rizatriptan (MAXALT) 10 MG tablet TAKE 1 TABLET(10 MG) BY MOUTH AT ONSET OF HEADACHE. MAY REPEAT IN 2 HOURS AS NEEDED:. MAX 2 PER DAY 12 tablet 0     vitamin B complex with vitamin C (VITAMIN  B COMPLEX) TABS tablet Take 1 tablet by mouth daily        Allergies   Allergen Reactions     No Known Allergies        Patient over age 50, mutual decision to screen reflected in health maintenance.    Pertinent mammograms are reviewed under the imaging tab.  History of abnormal Pap smear:  PAP / HPV 6/5/2013 1/30/2012 6/4/2010   PAP ASC-US(A) ASC-US(A) NIL     Reviewed and updated as needed this visit by clinical staff         Reviewed and updated as needed this visit by Provider            Review of Systems  CONSTITUTIONAL: NEGATIVE for fever, chills, change in weight  INTEGUMENTARY/SKIN: see HPI  EYES: NEGATIVE for vision changes or irritation  ENT: NEGATIVE for ear, mouth and throat problems  RESP: NEGATIVE for significant cough or SOB  BREAST: NEGATIVE for masses, tenderness or discharge  CV: NEGATIVE for chest pain, palpitations or peripheral edema  GI: NEGATIVE for nausea, abdominal pain, heartburn, or change in bowel habits--always diarrhea, except recently found to be constipated on imaging (stool leakage around the area of constipation suspected).     : NEGATIVE for unusual urinary or vaginal symptoms. No vaginal bleeding.  MUSCULOSKELETAL: NEGATIVE for significant arthralgias or myalgia  NEURO: NEGATIVE for weakness, dizziness or paresthesias  PSYCHIATRIC:see HPI     OBJECTIVE:   /66 (BP Location: Right arm, Patient Position: Chair, Cuff Size: Adult Regular)  Pulse 75  Temp 98.6  F (37  C) (Oral)  Resp 12  Wt 127 lb 6.4 oz (57.8 kg)  SpO2 99%  BMI 24.88 kg/m2  Physical Exam  GENERAL APPEARANCE: healthy, alert and no distress  EYES: Eyes grossly normal to inspection, PERRL and conjunctivae and sclerae normal  HENT: ear canals and TM's normal, nose and mouth without ulcers or lesions, oropharynx clear and oral mucous membranes moist  NECK: no adenopathy, no asymmetry, masses, or scars and thyroid normal to palpation  RESP: lungs clear to auscultation - no rales, rhonchi or wheezes  BREAST: normal without dominant masses, tenderness or nipple discharge and no palpable axillary masses or adenopathy  CV: regular rate and rhythm, normal S1 S2, no S3 or S4, no murmur, click or rub, no peripheral edema and peripheral pulses strong  ABDOMEN: soft, nontender, no hepatosplenomegaly, no masses and bowel sounds normal  MS: no musculoskeletal defects are noted and gait is age appropriate without ataxia  SKIN: warts to hands as before; brown macules to cheeks; no suspicious lesions or rashes  NEURO: Normal strength and tone, sensory exam grossly normal, mentation intact and speech normal  PSYCH: mentation appears normal and affect normal/bright, mood is depressed, normal eye contact, normal speech rate and rhythm, normal thought process and content, no psychomotor agitation or slowing.          ASSESSMENT/PLAN:   1. Migraine with aura and without status migrainosus, not intractable  Med refilled.   - rizatriptan (MAXALT) 10 MG tablet; TAKE 1 TABLET(10 MG) BY MOUTH AT ONSET OF HEADACHE. MAY REPEAT IN 2 HOURS AS NEEDED:. MAX 2 PER DAY  Dispense: 12 tablet;  Refill: 0    2. Depression with anxiety  Discussed going up to 50mg then 60mg (as just increasing to 50mg is unlikely to do much).  Also discussed other options.  Could add a TCA for sleep and perhaps IBS symptoms.  Could add abilify or wellbutrin for depressed symptoms. Advised to f/u with me after a month or so to let me know if she thinks she would like to try any of these options.  Melatonin trial for sleep.  Advised to get back into yoga.    - FLUoxetine (PROZAC) 10 MG capsule; Take 1 cap by mouth daily with your 40mg cap for a total of 50mg daily.  Dispense: 90 capsule; Refill: 1    3. Common wart  And macules--lentigo vs melasma--to face.  - DERMATOLOGY REFERRAL    4. Routine general medical examination at a health care facility  CV: fasting labs, patient will return to do those  Malignancy: in review of her chart, her pap was NIL in 2010, then  ASC-US/HPV negative in 2012, ASC-US in 2013, and then ASC-US/HPV negative in 2016 with negative HR HPV testing done (see care everywhere [facility]).  She should therefore repeat co-test in 2016.  Mammogram and colonoscopy up to date.  Bone health: ?early menopause  Immunization: first Shingrix today    - Lipid Profile; Future  - Glucose; Future    5. Need for shingles vaccine    - ZOSTER VACCINE RECOMBINANT ADJUVANTED IM NJX    COUNSELING:  Reviewed preventive health counseling, as reflected in patient instructions    BP Readings from Last 1 Encounters:   06/29/18 103/68     Estimated body mass index is 24.8 kg/(m^2) as calculated from the following:    Height as of 2/15/18: 5' (1.524 m).    Weight as of 6/29/18: 127 lb (57.6 kg).           reports that she has never smoked. She has never used smokeless tobacco.      Counseling Resources:  ATP IV Guidelines  Pooled Cohorts Equation Calculator  Breast Cancer Risk Calculator  FRAX Risk Assessment  ICSI Preventive Guidelines  Dietary Guidelines for Americans, 2010  USDA's MyPlate  ASA Prophylaxis  Lung CA Screening    Nohelia  MD Rain  [facility]

## 2018-08-01 NOTE — NURSING NOTE
Screening Questionnaire for Adult Immunization     Are you sick today?   No    Do you have allergies to medications, food or any vaccine?   No    Have you ever had a serious reaction after receiving a vaccination?   No    Do you have a long-term health problem with heart disease, lung disease,  asthma, kidney disease, diabetes, anemia, metabolic or blood disease?   No    Do you have cancer, leukemia, AIDS, or any immune system problem?   No    Do you take cortisone, prednisone, other steroids, or anticancer drugs, or  have you had any x-ray (radiation) treatments?   No    Have you had a seizure, brain, or other nervous system problem?   No    During the past year, have you received a transfusion of blood or blood       products, or been given a medicine called immune (gamma) globulin?   No    For women: Are you pregnant or is there a chance you could become         pregnant during the next month?   No    Have you received any vaccinations in the past 4 weeks?   No     Immunization questionnaire answers were all negative.      MNVFC doesn't apply on this patient    Per orders of Dr. Rodrigez, injection of Shingrix given by Rebecca Rod. Patient instructed to remain in clinic for 20 minutes afterwards, and to report any adverse reaction to me immediately.    Prior to injection verified patient identity using patient's name and date of birth.    VIS date for Shingrix : 2/12/18       Screening performed by Rebecca Rod, [facility]

## 2018-08-01 NOTE — MR AVS SNAPSHOT
After Visit Summary   8/1/2018    Nikki Freemann    MRN: [id number]           Patient Information     Date Of Birth          1970        Visit Information        Provider Department      8/1/2018 9:40 AM Nohelia Rodrigez MD [facility]        Today's Diagnoses     Depression with anxiety    -  1    Migraine with aura and without status migrainosus, not intractable        Common wart        Routine general medical examination at a health care facility        Need for shingles vaccine          Care Instructions    Please schedule fasting labs--nothing but water from midnight the night before.      Shingrix shot:  Repeat in 2-6 months.       Preventive Health Recommendations  Female Ages 50 - 64    Yearly exam: See your health care provider every year in order to  o Review health changes.   o Discuss preventive care.    o Review your medicines if your doctor has prescribed any.      Get a Pap test every three years (unless you have an abnormal result and your provider advises testing more often).    If you get Pap tests with HPV test, you only need to test every 5 years, unless you have an abnormal result.     You do not need a Pap test if your uterus was removed (hysterectomy) and you have not had cancer.    You should be tested each year for STDs (sexually transmitted diseases) if you're at risk.     Have a mammogram every 1 to 2 years.    Have a colonoscopy at age 50, or have a yearly FIT test (stool test). These exams screen for colon cancer.      Have a cholesterol test every 5 years, or more often if advised.    Have a diabetes test (fasting glucose) every three years. If you are at risk for diabetes, you should have this test more often.     If you are at risk for osteoporosis (brittle bone disease), think about having a bone density scan (DEXA).    Shots: Get a flu shot each year. Get a tetanus shot every 10 years.    Nutrition:     Eat at least 5 servings of  fruits and vegetables each day.    Eat whole-grain bread, whole-wheat pasta and brown rice instead of white grains and rice.    Get adequate Calcium and Vitamin D.     Lifestyle    Exercise at least 150 minutes a week (30 minutes a day, 5 days a week). This will help you control your weight and prevent disease.    Limit alcohol to one drink per day.    No smoking.     Wear sunscreen to prevent skin cancer.     See your dentist every six months for an exam and cleaning.    See your eye doctor every 1 to 2 years.            Follow-ups after your visit        Additional Services     DERMATOLOGY REFERRAL       Your provider has referred you to: [facility]: [facility] [place] [phone]   [email or web address]  [facility]: [facility] [place] [phone]   [email or web address]  [facility] [place] [phone]   [email or web address]    Please be aware that coverage of these services is subject to the terms and limitations of your health insurance plan.  Call member services at your health plan with any benefit or coverage questions.      Please bring the following with you to your appointment:    (1) Any X-Rays, CTs or MRIs which have been performed.  Contact the facility where they were done to arrange for  prior to your scheduled appointment.    (2) List of current medications  (3) This referral request   (4) Any documents/labs given to you for this referral                  Future tests that were ordered for you today     Open Future Orders        Priority Expected Expires Ordered    Lipid Profile Routine  8/1/2019 8/1/2018    Glucose Routine  8/1/2019 8/1/2018            Who to contact     If you have questions or need follow up information about today's clinic visit or your schedule please contact [facility] directly at [phone].  Normal or non-critical lab and imaging  results will be communicated to you by Splicehart, letter or phone within 4 business days after the clinic has received the results. If you do not hear from us within 7 days, please contact the clinic through Crack or phone. If you have a critical or abnormal lab result, we will notify you by phone as soon as possible.  Submit refill requests through Crack or call your pharmacy and they will forward the refill request to us. Please allow 3 business days for your refill to be completed.          Additional Information About Your Visit        Crack Information     Crack gives you secure access to your electronic health record. If you see a primary care provider, you can also send messages to your care team and make appointments. If you have questions, please call your primary care clinic.  If you do not have a primary care provider, please call [phone] and they will assist you.        Care EveryWhere ID     This is your Care EveryWhere ID. This could be used by other organizations to access your [facility] medical records  [id number]        Your Vitals Were     Pulse Temperature Respirations Pulse Oximetry BMI (Body Mass Index)       75 98.6  F (37  C) (Oral) 12 99% 24.88 kg/m2        Blood Pressure from Last 3 Encounters:   08/01/18 104/66   06/29/18 103/68   05/25/18 108/72    Weight from Last 3 Encounters:   08/01/18 127 lb 6.4 oz (57.8 kg)   06/29/18 127 lb (57.6 kg)   05/25/18 128 lb (58.1 kg)              We Performed the Following     DERMATOLOGY REFERRAL     ZOSTER VACCINE RECOMBINANT ADJUVANTED IM NJX          Today's Medication Changes          These changes are accurate as of 8/1/18 10:19 AM.  If you have any questions, ask your nurse or doctor.               These medicines have changed or have updated prescriptions.        Dose/Directions    * FLUoxetine 40 MG capsule   Commonly known as:  PROzac   This may have changed:  Another medication with the same name was added. Make sure you understand  how and when to take each.   Used for:  Depression with anxiety   Changed by:  Nohelia Rodrigez MD        Dose:  40 mg   Take 1 capsule (40 mg) by mouth daily   Quantity:  90 capsule   Refills:  3       * FLUoxetine 10 MG capsule   Commonly known as:  PROzac   This may have changed:  You were already taking a medication with the same name, and this prescription was added. Make sure you understand how and when to take each.   Used for:  Depression with anxiety   Changed by:  Nohelia Rodrigez MD        Take 1 cap by mouth daily with your 40mg cap for a total of 50mg daily.   Quantity:  90 capsule   Refills:  1       * Notice:  This list has 2 medication(s) that are the same as other medications prescribed for you. Read the directions carefully, and ask your doctor or other care provider to review them with you.         Where to get your medicines      These medications were sent to [facility] [address] [place] [address] & [address] [address]     Phone:  [phone]     FLUoxetine 10 MG capsule    rizatriptan 10 MG tablet                Primary Care Provider Office Phone # Fax #    Nohelia Rodrigez [phone] [address] [place] [address]        Equal Access to Services     SIMBA FARRAR AH: Hadii carol daughertyo Sorichardali, waaxda luqadaha, qaybta kaalmada adeegyada, samanta bender. So [facility] [phone].    ATENCIÓN: Si habla español, tiene a taveras disposición servicios gratuitos de asistencia lingüística. [facility] [phone].    We comply with applicable federal civil rights laws and [place] laws. We do not discriminate on the basis of race, color, national origin, age, disability, sex, sexual orientation, or gender identity.            Thank you!     Thank you for choosing [facility]  for your care. Our goal is always to provide you with excellent  care. Hearing back from our patients is one way we can continue to improve our services. Please take a few minutes to complete the written survey that you may receive in the mail after your visit with us. Thank you!             Your Updated Medication List - Protect others around you: Learn how to safely use, store and throw away your medicines at www.disposemymeds.org.          This list is accurate as of 8/1/18 10:19 AM.  Always use your most recent med list.                   Brand Name Dispense Instructions for use Diagnosis    acetaminophen 325 MG tablet    TYLENOL    100 tablet    Take 2 tablets by mouth every 4 hours as needed for pain.        cholecalciferol 5000 units Caps     100 capsule    Take 1 capsule (5,000 Units) by mouth daily FOR VITAMIN D DEFICIENCY (LOW VITAMIN D)        cholestyramine 4 g Packet    QUESTRAN     Take 1 packet by mouth daily        * FLUoxetine 40 MG capsule    PROzac    90 capsule    Take 1 capsule (40 mg) by mouth daily    Depression with anxiety       * FLUoxetine 10 MG capsule    PROzac    90 capsule    Take 1 cap by mouth daily with your 40mg cap for a total of 50mg daily.    Depression with anxiety       IBUPROFEN PO      Take 200 mg by mouth Takes 3 tablets PRN        LOPERAMIDE A-D 2 MG tablet   Generic drug:  loperamide      Take 2 mg by mouth 4 times daily as needed for diarrhea        naproxen sodium 220 MG tablet    ANAPROX    60 tablet    Take by mouth as needed Takes 2 tablets PRN        rizatriptan 10 MG tablet    MAXALT    12 tablet    TAKE 1 TABLET(10 MG) BY MOUTH AT ONSET OF HEADACHE. MAY REPEAT IN 2 HOURS AS NEEDED:. MAX 2 PER DAY    Migraine with aura and without status migrainosus, not intractable       vitamin B complex with vitamin C Tabs tablet      Take 1 tablet by mouth daily        * Notice:  This list has 2 medication(s) that are the same as other medications prescribed for you. Read the directions carefully, and ask your doctor or other care provider  to review them with you.

## 2018-08-01 NOTE — PATIENT INSTRUCTIONS
Please schedule fasting labs--nothing but water from midnight the night before.      Shingrix shot:  Repeat in 2-6 months.       Preventive Health Recommendations  Female Ages 50 - 64    Yearly exam: See your health care provider every year in order to  o Review health changes.   o Discuss preventive care.    o Review your medicines if your doctor has prescribed any.      Get a Pap test every three years (unless you have an abnormal result and your provider advises testing more often).    If you get Pap tests with HPV test, you only need to test every 5 years, unless you have an abnormal result.     You do not need a Pap test if your uterus was removed (hysterectomy) and you have not had cancer.    You should be tested each year for STDs (sexually transmitted diseases) if you're at risk.     Have a mammogram every 1 to 2 years.    Have a colonoscopy at age 50, or have a yearly FIT test (stool test). These exams screen for colon cancer.      Have a cholesterol test every 5 years, or more often if advised.    Have a diabetes test (fasting glucose) every three years. If you are at risk for diabetes, you should have this test more often.     If you are at risk for osteoporosis (brittle bone disease), think about having a bone density scan (DEXA).    Shots: Get a flu shot each year. Get a tetanus shot every 10 years.    Nutrition:     Eat at least 5 servings of fruits and vegetables each day.    Eat whole-grain bread, whole-wheat pasta and brown rice instead of white grains and rice.    Get adequate Calcium and Vitamin D.     Lifestyle    Exercise at least 150 minutes a week (30 minutes a day, 5 days a week). This will help you control your weight and prevent disease.    Limit alcohol to one drink per day.    No smoking.     Wear sunscreen to prevent skin cancer.     See your dentist every six months for an exam and cleaning.    See your eye doctor every 1 to 2 years.

## 2018-08-09 DIAGNOSIS — Z00.00 ROUTINE GENERAL MEDICAL EXAMINATION AT A HEALTH CARE FACILITY: ICD-10-CM

## 2018-08-09 LAB
CHOLEST SERPL-MCNC: 228 MG/DL
GLUCOSE SERPL-MCNC: 82 MG/DL (ref 70–99)
HDLC SERPL-MCNC: 71 MG/DL
LDLC SERPL CALC-MCNC: 137 MG/DL
NONHDLC SERPL-MCNC: 157 MG/DL
TRIGL SERPL-MCNC: 98 MG/DL

## 2018-08-09 PROCEDURE — 36415 COLL VENOUS BLD VENIPUNCTURE: CPT | Performed by: FAMILY MEDICINE

## 2018-08-09 PROCEDURE — 80061 LIPID PANEL: CPT | Performed by: FAMILY MEDICINE

## 2018-08-09 PROCEDURE — 82947 ASSAY GLUCOSE BLOOD QUANT: CPT | Performed by: FAMILY MEDICINE

## 2018-08-11 ENCOUNTER — MYC MEDICAL ADVICE (OUTPATIENT)
Dept: FAMILY MEDICINE | Facility: CLINIC | Age: 48
End: 2018-08-11

## 2018-09-04 ENCOUNTER — TRANSFERRED RECORDS (OUTPATIENT)
Dept: HEALTH INFORMATION MANAGEMENT | Facility: CLINIC | Age: 48
End: 2018-09-04

## 2018-09-05 ENCOUNTER — OFFICE VISIT (OUTPATIENT)
Dept: FAMILY MEDICINE | Facility: CLINIC | Age: 48
End: 2018-09-05
Payer: COMMERCIAL

## 2018-09-05 VITALS
WEIGHT: 131.2 LBS | TEMPERATURE: 97.5 F | BODY MASS INDEX: 25.62 KG/M2 | RESPIRATION RATE: 16 BRPM | DIASTOLIC BLOOD PRESSURE: 73 MMHG | SYSTOLIC BLOOD PRESSURE: 123 MMHG | HEART RATE: 58 BPM

## 2018-09-05 DIAGNOSIS — G43.109 MIGRAINE WITH AURA AND WITHOUT STATUS MIGRAINOSUS, NOT INTRACTABLE: ICD-10-CM

## 2018-09-05 PROCEDURE — 99213 OFFICE O/P EST LOW 20 MIN: CPT | Performed by: NURSE PRACTITIONER

## 2018-09-05 RX ORDER — KETOROLAC TROMETHAMINE 30 MG/ML
60 INJECTION, SOLUTION INTRAMUSCULAR; INTRAVENOUS ONCE
Qty: 2 ML | Refills: 0 | OUTPATIENT
Start: 2018-09-05 | End: 2018-09-05

## 2018-09-05 RX ORDER — RIZATRIPTAN BENZOATE 10 MG/1
TABLET ORAL
Qty: 12 TABLET | Refills: 3 | Status: SHIPPED | OUTPATIENT
Start: 2018-09-05 | End: 2019-01-13

## 2018-09-05 NOTE — NURSING NOTE
MEDICATION: Ketorolac Tromethamine 60MG/2ML (30 mg/mL) (Toradol)  ROUTE: IM  SITE: RUQ - Gluteus  DOSE: 60mg  LOT #: [id number]  :  [employer]  EXPIRATION DATE:  09-19  NDC#: 28230-136-80     Shahida Hernandez on 9/5/2018 at 8:38 AM

## 2018-09-05 NOTE — MR AVS SNAPSHOT
After Visit Summary   9/5/2018    Nikki Shah Wilfred    MRN: [id number]           Patient Information     Date Of Birth          1970        Visit Information        Provider Department      9/5/2018 1:40 PM Elizabeth Hilton APRN CNP [facility]        Today's Diagnoses     Migraine with aura and without status migrainosus, not intractable           Follow-ups after your visit        Your next 10 appointments already scheduled     Sep 05, 2018  1:40 PM CDT   Office Visit with SAMANTA Griffin CNP   [facility]    [address] [facility] [facility] [id number] [phone]           Bring a current list of meds and any records pertaining to this visit. For Physicals, please bring immunization records and any forms needing to be filled out. Please arrive 10 minutes early to complete paperwork.              Who to contact     If you have questions or need follow up information about today's clinic visit or your schedule please contact [facility] directly at [phone].  Normal or non-critical lab and imaging results will be communicated to you by meebeehart, letter or phone within 4 business days after the clinic has received the results. If you do not hear from us within 7 days, please contact the clinic through ImageVisiont or phone. If you have a critical or abnormal lab result, we will notify you by phone as soon as possible.  Submit refill requests through Supramed or call your pharmacy and they will forward the refill request to us. Please allow 3 business days for your refill to be completed.          Additional Information About Your Visit        meebeehart Information     Supramed gives you secure access to your electronic health record. If you see a primary care provider, you can also send messages to your care team and make appointments. If you have questions, please call your primary care clinic.   If you do not have a primary care provider, please call [phone] and they will assist you.        Care EveryWhere ID     This is your Care EveryWhere ID. This could be used by other organizations to access your [facility] medical records  [id number]        Your Vitals Were     Pulse Temperature Respirations BMI (Body Mass Index)          58 97.5  F (36.4  C) (Oral) 16 25.62 kg/m2         Blood Pressure from Last 3 Encounters:   09/05/18 123/73   08/01/18 104/66   06/29/18 103/68    Weight from Last 3 Encounters:   09/05/18 131 lb 3.2 oz (59.5 kg)   08/01/18 127 lb 6.4 oz (57.8 kg)   06/29/18 127 lb (57.6 kg)              Today, you had the following     No orders found for display         Today's Medication Changes          These changes are accurate as of 9/5/18 12:44 PM.  If you have any questions, ask your nurse or doctor.               Start taking these medicines.        Dose/Directions    ketorolac 60 MG/2ML Soln injection   Commonly known as:  TORADOL   Used for:  Migraine with aura and without status migrainosus, not intractable   Started by:  Elizabeth Hilton APRN CNP        Dose:  60 mg   Inject 2 mLs (60 mg) into the muscle once for 1 dose   Quantity:  2 mL   Refills:  0            Where to get your medicines      These medications were sent to [facility] [address] [place] [address] & [address] [address]     Phone:  [phone]     rizatriptan 10 MG tablet         Some of these will need a paper prescription and others can be bought over the counter.  Ask your nurse if you have questions.     You don't need a prescription for these medications     ketorolac 60 MG/2ML Soln injection                Primary Care Provider Office Phone # Fax #    Nohelia Rodrigez [phone] [address] [place] [address]        Equal Access to Services     SIMBA FARRAR AH: [place] correa  Ranjeet, jocelinda luravinderadaha, qaraisata kasusy vital, samanta cristalin hayaan casrylan missymoris laMeghnakenneth yulia. So [facility] [phone].    ATENCIÓN: Si oswaldo tripp, tiene a taveras disposición servicios gratuitos de asistencia lingüística. [facility] al [phone].    We comply with applicable federal civil rights laws and [place] laws. We do not discriminate on the basis of race, color, national origin, age, disability, sex, sexual orientation, or gender identity.            Thank you!     Thank you for choosing [facility]  for your care. Our goal is always to provide you with excellent care. Hearing back from our patients is one way we can continue to improve our services. Please take a few minutes to complete the written survey that you may receive in the mail after your visit with us. Thank you!             Your Updated Medication List - Protect others around you: Learn how to safely use, store and throw away your medicines at www.disposemymeds.org.          This list is accurate as of 9/5/18 12:44 PM.  Always use your most recent med list.                   Brand Name Dispense Instructions for use Diagnosis    acetaminophen 325 MG tablet    TYLENOL    100 tablet    Take 2 tablets by mouth every 4 hours as needed for pain.        cholecalciferol 5000 units Caps     100 capsule    Take 1 capsule (5,000 Units) by mouth daily FOR VITAMIN D DEFICIENCY (LOW VITAMIN D)        cholestyramine 4 g Packet    QUESTRAN     Take 1 packet by mouth daily        * FLUoxetine 40 MG capsule    PROzac    90 capsule    Take 1 capsule (40 mg) by mouth daily    Depression with anxiety       * FLUoxetine 10 MG capsule    PROzac    90 capsule    Take 1 cap by mouth daily with your 40mg cap for a total of 50mg daily.    Depression with anxiety       IBUPROFEN PO      Take 200 mg by mouth Takes 3 tablets PRN        ketorolac 60 MG/2ML Soln injection    TORADOL    2 mL    Inject 2 mLs (60 mg) into the muscle once for 1 dose    Migraine  with aura and without status migrainosus, not intractable       LOPERAMIDE A-D 2 MG tablet   Generic drug:  loperamide      Take 2 mg by mouth 4 times daily as needed for diarrhea        naproxen sodium 220 MG tablet    ANAPROX    60 tablet    Take by mouth as needed Takes 2 tablets PRN        rizatriptan 10 MG tablet    MAXALT    12 tablet    TAKE 1 TABLET(10 MG) BY MOUTH AT ONSET OF HEADACHE. MAY REPEAT IN 2 HOURS AS NEEDED:. MAX 2 PER DAY    Migraine with aura and without status migrainosus, not intractable       vitamin B complex with vitamin C Tabs tablet      Take 1 tablet by mouth daily        * Notice:  This list has 2 medication(s) that are the same as other medications prescribed for you. Read the directions carefully, and ask your doctor or other care provider to review them with you.

## 2018-09-05 NOTE — PROGRESS NOTES
SUBJECTIVE:   Nikki Hardin is a 48 year old female who presents to clinic today for the following health issues:        Headaches      Duration: off and on since saturday     Description  Location: bilateral in the frontal area   Character: throbbing pain  Frequency:  Off and on for 2 days   Duration:  All day     Intensity:  severe    Accompanying signs and symptoms:    Precipitating or Alleviating factors:  Nausea/vomiting: occasionally  Dizziness: no  Weakness or numbness: no  Visual changes: none  Fever: no   Sinus or URI symptoms no     History  Head trauma: no  Family history of migraines: YES  Previous tests for headaches: no  Neurologist evaluations: no  Able to do daily activities when headache present: YES  Wake with headaches: YES  Daily pain medication use: YES  Any changes in: none     Precipitating or Alleviating factors (light/sound/sleep/caffeine): none    Therapies tried and outcome: Maxalt    Outcome - not effective  Frequent/daily pain medication use: no    Has had a toradol injection in the past that was very helpful when the headache lasted this long.    Normally maxalt works well if she takes it right away, but she didn't have any this weekend.    Needs a refill on maxlat.      Problem list and histories reviewed & adjusted, as indicated.  Additional history: as documented    Reviewed and updated as needed this visit by clinical staff  Tobacco  Allergies  Meds  Problems  Med Hx  Surg Hx  Fam Hx  Soc Hx        Reviewed and updated as needed this visit by Provider  Tobacco  Allergies  Meds  Problems  Med Hx  Surg Hx  Fam Hx  Soc Hx          ROS:  Constitutional, HEENT, cardiovascular, pulmonary, gi and gu systems are negative, except as otherwise noted.    OBJECTIVE:     /73  Pulse 58  Temp 97.5  F (36.4  C) (Oral)  Resp 16  Wt 131 lb 3.2 oz (59.5 kg)  BMI 25.62 kg/m2  Body mass index is 25.62 kg/(m^2).  GENERAL: healthy, alert and no distress  NECK: no  adenopathy, no asymmetry, masses, or scars and thyroid normal to palpation  MS: no gross musculoskeletal defects noted, no edema  SKIN: no suspicious lesions or rashes  NEURO: Normal strength and tone, mentation intact and speech normal      ASSESSMENT/PLAN:       ICD-10-CM    1. Migraine with aura and without status migrainosus, not intractable G43.109 rizatriptan (MAXALT) 10 MG tablet     ketorolac (TORADOL) 60 MG/2ML SOLN injection   toradol given with good relief after 30 minutes.      Discussed migraine headaches, triggers, and various treatments, including immediate acting, mainly the triptans, and prophylaxis and indications for each. Gone over benefits and risks, as well as side effects of medications, as well as instructions. Patient education handout given.      See Patient Instructions    SAMANTA Griffin [facility]

## 2018-09-22 ENCOUNTER — MYC MEDICAL ADVICE (OUTPATIENT)
Dept: FAMILY MEDICINE | Facility: CLINIC | Age: 48
End: 2018-09-22

## 2018-10-04 ENCOUNTER — TRANSFERRED RECORDS (OUTPATIENT)
Dept: HEALTH INFORMATION MANAGEMENT | Facility: CLINIC | Age: 48
End: 2018-10-04

## 2018-10-08 DIAGNOSIS — G43.109 MIGRAINE WITH AURA AND WITHOUT STATUS MIGRAINOSUS, NOT INTRACTABLE: ICD-10-CM

## 2018-10-09 NOTE — TELEPHONE ENCOUNTER
"Requested Prescriptions   Pending Prescriptions Disp Refills     rizatriptan (MAXALT) 10 MG tablet [Pharmacy Med Name: RIZATRIPTAN 10MG TABLETS]  Last Written Prescription Date:  9-5-18  Last Fill Quantity: 12 tab,  # refills: 3   Last office visit: 9/5/2018 with prescribing provider:  Elizabeth Hilton    Future Office Visit:    12 tablet 0     Sig: TAKE 1 TABLET(10 MG) BY MOUTH AT ONSET OF HEADACHE. MAY REPEAT IN 2 HOURS AS NEEDED:. MAX 2 PER DAY    Serotonin Agonists Failed    10/8/2018 10:09 PM       Failed - Serotonin Agonist request needs review.    Please review patient's record. If patient has had 8 or more treatments in the past month, please forward to provider.         Passed - Blood pressure under 140/90 in past 12 months    BP Readings from Last 3 Encounters:   09/05/18 123/73   08/01/18 104/66   06/29/18 103/68          Passed - Recent (12 mo) or future (30 days) visit within the authorizing provider's specialty    Patient had office visit in the last 12 months or has a visit in the next 30 days with authorizing provider or within the authorizing provider's specialty.  See \"Patient Info\" tab in inbasket, or \"Choose Columns\" in Meds & Orders section of the refill encounter.           Passed - Patient is age 18 or older       Passed - No active pregnancy on record       Passed - No positive pregnancy test in past 12 months          "

## 2018-10-11 RX ORDER — RIZATRIPTAN BENZOATE 10 MG/1
TABLET ORAL
Qty: 12 TABLET | Refills: 0 | OUTPATIENT
Start: 2018-10-11

## 2018-10-25 ENCOUNTER — MYC MEDICAL ADVICE (OUTPATIENT)
Dept: FAMILY MEDICINE | Facility: CLINIC | Age: 48
End: 2018-10-25

## 2018-11-12 ENCOUNTER — OFFICE VISIT (OUTPATIENT)
Dept: FAMILY MEDICINE | Facility: CLINIC | Age: 48
End: 2018-11-12
Payer: COMMERCIAL

## 2018-11-12 VITALS
RESPIRATION RATE: 18 BRPM | SYSTOLIC BLOOD PRESSURE: 106 MMHG | DIASTOLIC BLOOD PRESSURE: 70 MMHG | OXYGEN SATURATION: 96 % | HEART RATE: 69 BPM | TEMPERATURE: 97.9 F

## 2018-11-12 DIAGNOSIS — M54.16 LUMBAR RADICULOPATHY: ICD-10-CM

## 2018-11-12 DIAGNOSIS — L65.9 HAIR LOSS: Primary | ICD-10-CM

## 2018-11-12 DIAGNOSIS — M53.3 PAIN IN THE COCCYX: ICD-10-CM

## 2018-11-12 LAB
ALBUMIN SERPL-MCNC: 4 G/DL (ref 3.4–5)
ALP SERPL-CCNC: 71 U/L (ref 40–150)
ALT SERPL W P-5'-P-CCNC: 30 U/L (ref 0–50)
ANION GAP SERPL CALCULATED.3IONS-SCNC: 8 MMOL/L (ref 3–14)
AST SERPL W P-5'-P-CCNC: 23 U/L (ref 0–45)
BILIRUB SERPL-MCNC: 0.2 MG/DL (ref 0.2–1.3)
BUN SERPL-MCNC: 14 MG/DL (ref 7–30)
CALCIUM SERPL-MCNC: 9.8 MG/DL (ref 8.5–10.1)
CHLORIDE SERPL-SCNC: 105 MMOL/L (ref 94–109)
CO2 SERPL-SCNC: 24 MMOL/L (ref 20–32)
CREAT SERPL-MCNC: 0.69 MG/DL (ref 0.52–1.04)
ERYTHROCYTE [DISTWIDTH] IN BLOOD BY AUTOMATED COUNT: 13.5 % (ref 10–15)
GFR SERPL CREATININE-BSD FRML MDRD: >90 ML/MIN/1.7M2
GLUCOSE SERPL-MCNC: 75 MG/DL (ref 70–99)
HCT VFR BLD AUTO: 36.7 % (ref 35–47)
HGB BLD-MCNC: 12 G/DL (ref 11.7–15.7)
MCH RBC QN AUTO: 29.1 PG (ref 26.5–33)
MCHC RBC AUTO-ENTMCNC: 32.7 G/DL (ref 31.5–36.5)
MCV RBC AUTO: 89 FL (ref 78–100)
PLATELET # BLD AUTO: 341 10E9/L (ref 150–450)
POTASSIUM SERPL-SCNC: 4.4 MMOL/L (ref 3.4–5.3)
PROT SERPL-MCNC: 8.2 G/DL (ref 6.8–8.8)
RBC # BLD AUTO: 4.13 10E12/L (ref 3.8–5.2)
SODIUM SERPL-SCNC: 137 MMOL/L (ref 133–144)
T4 FREE SERPL-MCNC: 0.88 NG/DL (ref 0.76–1.46)
TSH SERPL DL<=0.005 MIU/L-ACNC: 1.34 MU/L (ref 0.4–4)
WBC # BLD AUTO: 7.1 10E9/L (ref 4–11)

## 2018-11-12 PROCEDURE — 82627 DEHYDROEPIANDROSTERONE: CPT | Performed by: FAMILY MEDICINE

## 2018-11-12 PROCEDURE — 84481 FREE ASSAY (FT-3): CPT | Performed by: FAMILY MEDICINE

## 2018-11-12 PROCEDURE — 36415 COLL VENOUS BLD VENIPUNCTURE: CPT | Performed by: FAMILY MEDICINE

## 2018-11-12 PROCEDURE — 99214 OFFICE O/P EST MOD 30 MIN: CPT | Performed by: FAMILY MEDICINE

## 2018-11-12 PROCEDURE — 84439 ASSAY OF FREE THYROXINE: CPT | Performed by: FAMILY MEDICINE

## 2018-11-12 PROCEDURE — 85027 COMPLETE CBC AUTOMATED: CPT | Performed by: FAMILY MEDICINE

## 2018-11-12 PROCEDURE — 80053 COMPREHEN METABOLIC PANEL: CPT | Performed by: FAMILY MEDICINE

## 2018-11-12 PROCEDURE — 82728 ASSAY OF FERRITIN: CPT | Performed by: FAMILY MEDICINE

## 2018-11-12 PROCEDURE — 84403 ASSAY OF TOTAL TESTOSTERONE: CPT | Performed by: FAMILY MEDICINE

## 2018-11-12 PROCEDURE — 84443 ASSAY THYROID STIM HORMONE: CPT | Performed by: FAMILY MEDICINE

## 2018-11-12 ASSESSMENT — PATIENT HEALTH QUESTIONNAIRE - PHQ9
SUM OF ALL RESPONSES TO PHQ QUESTIONS 1-9: 5
SUM OF ALL RESPONSES TO PHQ QUESTIONS 1-9: 5
10. IF YOU CHECKED OFF ANY PROBLEMS, HOW DIFFICULT HAVE THESE PROBLEMS MADE IT FOR YOU TO DO YOUR WORK, TAKE CARE OF THINGS AT HOME, OR GET ALONG WITH OTHER PEOPLE: SOMEWHAT DIFFICULT

## 2018-11-12 NOTE — PROGRESS NOTES
SUBJECTIVE:   Nikki Hardin is a 48 year old female who presents to clinic today for the following health issues:    Hair Loss      Duration: several months     Description (location/character/radiation): hair    Therapies tried and outcome: None     Additional: pain on lower back     Hair loss; ongong for years, and previously treated with women's rogain, but stopped a year ago.  However she feels that the hair loss accelerated more recently.  There are a couple of patches on top that are more affected, but the thinning is generalized.  She thought perhaps this was due to using the extra 10mg of prozac, so she has stopped that. She also thinks that she has had some weight gain related to the extra prozac.  She continues on 40mg prozac.      Tailbone pain: For several months she had what she thought was sciatica.  Bruising type pain over upper tailbone.  Painful to get up.  There are times that the pain radiated down the right leg, but this has not been happening much lately.  She has been doing yoga, though not as regularly.  She has not tried anything else for it.    No known injury or prolonged sitting, though she can't really recall.        Problem list and histories reviewed & adjusted, as indicated.  Additional history: as documented    Patient Active Problem List   Diagnosis     Irritable bowel syndrome     Other acne     Migraine with aura and without status migrainosus, not intractable     Depression with anxiety     Serologic abnormality     Sedimentation rate elevation     Elevated vitamin B12 level     Anemia, unspecified type     Fatigue, unspecified type     Hypervitaminosis B6     Lymphocytic colitis     Papanicolaou smear of cervix with atypical squamous cells of undetermined significance (ASC-US)     Past Surgical History:   Procedure Laterality Date     BREAST BIOPSY, RT/LT Left      C IUD,MIRENA  2005     C NONSPECIFIC PROCEDURE       X 3     ESOPHAGOSCOPY, GASTROSCOPY,  DUODENOSCOPY (EGD), COMBINED  10/10     HC ABLATION, ENDOMETRIAL, THERMAL, W/O HYSTEROSCOPIC GUIDANCE  7/2010     HC COLONOSCOPY THRU STOMA, DIAGNOSTIC  11/2004     HC COLONOSCOPY THRU STOMA, DIAGNOSTIC  5/10     HYSTEROSCOPY  7/09    D&C       Social History   Substance Use Topics     Smoking status: Never Smoker     Smokeless tobacco: Never Used     Alcohol use Yes      Comment: occasional- once monthly     Family History   Problem Relation Age of Onset     Neurologic Disorder Mother      MIGRIANES     C.A.D. Father      in his 60's     Depression Father      HEART DISEASE Father      Lipids Father      Hypertension Father      Arthritis Father      Sleep Disorder Father      Cerebrovascular Disease Father 78     Cancer - colorectal Paternal Grandmother      Cancer Maternal Grandfather      stomach     HEART DISEASE Maternal Grandfather      Depression Brother      Depression Brother      Asthma Daughter      HEART DISEASE Paternal Grandfather      Breast Cancer Maternal Aunt      Diabetes No family hx of          Current Outpatient Prescriptions   Medication Sig Dispense Refill     acetaminophen (TYLENOL) 325 MG tablet Take 2 tablets by mouth every 4 hours as needed for pain. 100 tablet 0     Calcium-Magnesium-Zinc 167-83-8 MG TABS        cholecalciferol 5000 UNITS CAPS Take 1 capsule (5,000 Units) by mouth daily FOR VITAMIN D DEFICIENCY (LOW VITAMIN D) 100 capsule 3     cholestyramine (QUESTRAN) 4 G Packet Take 2 packets by mouth daily        FLUoxetine (PROZAC) 40 MG capsule Take 1 capsule (40 mg) by mouth daily 90 capsule 3     IBUPROFEN PO Take 200 mg by mouth Takes 3 tablets PRN       loperamide (LOPERAMIDE A-D) 2 MG tablet Take 2 mg by mouth 4 times daily as needed for diarrhea       rizatriptan (MAXALT) 10 MG tablet TAKE 1 TABLET(10 MG) BY MOUTH AT ONSET OF HEADACHE. MAY REPEAT IN 2 HOURS AS NEEDED:. MAX 2 PER DAY 12 tablet 3     vitamin B complex with vitamin C (VITAMIN  B COMPLEX) TABS tablet Take 1  tablet by mouth daily        naproxen sodium (ANAPROX) 220 MG tablet Take by mouth as needed Takes 2 tablets PRN 60 tablet      [DISCONTINUED] FLUoxetine (PROZAC) 10 MG capsule Take 1 cap by mouth daily with your 40mg cap for a total of 50mg daily. (Patient not taking: Reported on 11/12/2018) 90 capsule 1     Allergies   Allergen Reactions     No Known Allergies        Reviewed and updated as needed this visit by clinical staff  Tobacco  Allergies  Meds  Med Hx  Surg Hx  Fam Hx  Soc Hx      Reviewed and updated as needed this visit by Provider         ROS:  Constitutional, HEENT, cardiovascular, pulmonary, gi and gu systems are negative, except as otherwise noted.    OBJECTIVE:     /70 (BP Location: Right arm, Patient Position: Sitting, Cuff Size: Adult Regular)  Pulse 69  Temp 97.9  F (36.6  C) (Oral)  Resp 18  SpO2 96%  There is no height or weight on file to calculate BMI.  GENERAL APPEARANCE: healthy, alert and no distress  EYES: Eyes grossly normal to inspection, PERRL and conjunctivae and sclerae normal  NECK: no adenopathy, no asymmetry, masses, or scars and thyroid normal to palpation  RESP: lungs clear to auscultation - no rales, rhonchi or wheezes  CV: regular rates and rhythm, normal S1 S2, no S3 or S4 and no murmur, click or rub  MS: extremities normal- no gross deformities noted.  Mild coccyx ttp.  Negative SLR.  Normal patellar DTRs and ankle jerks bilaterally.  Normal BLE strength.    SKIN: no suspicious lesions or rashes.  Diffuse hair loss to scalp; normal lashes and brows.  Hirsutism    PSYCH: mentation appears normal and affect normal/bright        ASSESSMENT/PLAN:             1. Hair loss  Most likely a resumption of the previous hair loss process after stopping rogaine, but will evaluate for underlying thyroid, renal hepatic, hormonal etiology with labs as below.  Otherwise, she may wish to f/u with her dermatologist for bx/tx.   - CBC with platelets  - Comprehensive metabolic  panel  - TSH  - T3, Free  - T4, free  - DHEA sulfate  - Testosterone, total  - ferritin    2. Pain in the coccyx  Recommended physical therapy for tailbone and low back pain.  If not improving, recommend orthopedics evaluation.  Checking plain films given the duration of symptoms.    - [facility], [facility] CH[facility]RACTIC REFERRAL; Future  - XR Lumbar Spine 2/3 Views; Future  - ORTHOPEDICS ADULT REFERRAL    3. Lumbar radiculopathy    - [facility], [facility] CHIROPRACTIC REFERRAL; Future  - XR Lumbar Spine 2/3 Views; Future  - ORTHOPEDICS ADULT REFERRAL        Nohelia Rodrigez MD  [facility]

## 2018-11-12 NOTE — MR AVS SNAPSHOT
After Visit Summary   11/12/2018    Nikki Hardin    MRN: [id number]           Patient Information     Date Of Birth          1970        Visit Information        Provider Department      11/12/2018 11:00 AM Nohelia Rodrigez MD [facility]        Today's Diagnoses     Pain in the coccyx    -  1    Lumbar radiculopathy        Hair loss           Follow-ups after your visit        Additional Services     [facility], [facility] CHIR[facility]RACTIC REFERRAL       Physical Therapy, Hand Therapy and Chiropractic Care are available through:  *[facility]  *Hand Therapy (Occupational Therapy or Physical Therapy)  *[facility]    Call one number to schedule at any of the above locations: [phone].    Physical therapy, Hand therapy and/or Chiropractic care has been recommended by your physician as an excellent treatment option to reduce pain and help people return to normal activities, including sports.  Therapy and/or chiropractic care services are a great complement or alternative to expensive and invasive surgery, injections, or long-term use of prescription medications. The primary goal is to identify the underlying problem and provide you the tools to manage your condition on your own.     Please be aware that coverage of these services is subject to the terms and limitations of your health insurance plan.  Call member services at your health plan with any benefit or coverage questions.      Please bring the following to your appointment:  *Your personal calendar for scheduling future appointments  *Comfortable clothing            ORTHOPEDICS ADULT REFERRAL       Your provider has referred you to: [facility] [phone]   [email or web address]    Please be aware that coverage of these services is subject to the terms and limitations of your health insurance plan.  Call member services at your  health plan with any benefit or coverage questions.      Please bring the following to your appointment:    >>   Any x-rays, CTs or MRIs which have been performed.  Contact the facility where they were done to arrange for  prior to your scheduled appointment.    >>   List of current medications   >>   This referral request   >>   Any documents/labs given to you for this referral                  Future tests that were ordered for you today     Open Future Orders        Priority Expected Expires Ordered    [facility], [facility] CH[facility]RACTIC REFERRAL Routine  11/12/2019 11/12/2018    XR Lumbar Spine 2/3 Views Routine 11/12/2018 11/12/2019 11/12/2018            Who to contact     If you have questions or need follow up information about today's clinic visit or your schedule please contact [facility] directly at [phone].  Normal or non-critical lab and imaging results will be communicated to you by [employer], letter or phone within 4 business days after the clinic has received the results. If you do not hear from us within 7 days, please contact the clinic through [employer] or phone. If you have a critical or abnormal lab result, we will notify you by phone as soon as possible.  Submit refill requests through An[employer] or call your pharmacy and they will forward the refill request to us. Please allow 3 business days for your refill to be completed.          Additional Information About Your Visit        Anaphore Information     An[employer]ore gives you secure access to your electronic health record. If you see a primary care provider, you can also send messages to your care team and make appointments. If you have questions, please call your primary care clinic.  If you do not have a primary care provider, please call [phone] and they will assist you.        Care EveryWhere ID     This is your Care EveryWhere ID. This could be used by other organizations to access your [facility]  records  [id number]        Your Vitals Were     Pulse Temperature Respirations Pulse Oximetry          69 97.9  F (36.6  C) (Oral) 18 96%         Blood Pressure from Last 3 Encounters:   11/12/18 106/70   09/05/18 123/73   08/01/18 104/66    Weight from Last 3 Encounters:   09/05/18 131 lb 3.2 oz (59.5 kg)   08/01/18 127 lb 6.4 oz (57.8 kg)   06/29/18 127 lb (57.6 kg)              We Performed the Following     CBC with platelets     Comprehensive metabolic panel     DHEA sulfate     ORTHOPEDICS ADULT REFERRAL     T3, Free     T4, free     Testosterone, total     TSH          Today's Medication Changes          These changes are accurate as of 11/12/18 11:22 AM.  If you have any questions, ask your nurse or doctor.               These medicines have changed or have updated prescriptions.        Dose/Directions    FLUoxetine 40 MG capsule   Commonly known as:  PROzac   This may have changed:  Another medication with the same name was removed. Continue taking this medication, and follow the directions you see here.   Used for:  Depression with anxiety   Changed by:  Nohelia Rodrigez MD        Dose:  40 mg   Take 1 capsule (40 mg) by mouth daily   Quantity:  90 capsule   Refills:  3                Primary Care Provider Office Phone # Fax #    Nohelia Rodrigez [phone] [address] [place] [address]        Equal Access to Services     NATANAEL FARRAR AH: Hadii carol howell hadbrookeo Soramón, waaxda luqadaha, qaybta kaalmada zahraa, samanta navarro . So [facility] [phone].    ATENCIÓN: Si habla español, tiene a taveras disposición servicios gratuitos de asistencia lingüística. Llzach al [phone].    We comply with applicable federal civil rights laws and [place] laws. We do not discriminate on the basis of race, color, national origin, age, disability, sex, sexual orientation, or gender identity.            Thank you!     Thank you for choosing [facility]  [place]  for your care. Our goal is always to provide you with excellent care. Hearing back from our patients is one way we can continue to improve our services. Please take a few minutes to complete the written survey that you may receive in the mail after your visit with us. Thank you!             Your Updated Medication List - Protect others around you: Learn how to safely use, store and throw away your medicines at www.disposemymeds.org.          This list is accurate as of 11/12/18 11:22 AM.  Always use your most recent med list.                   Brand Name Dispense Instructions for use Diagnosis    acetaminophen 325 MG tablet    TYLENOL    100 tablet    Take 2 tablets by mouth every 4 hours as needed for pain.        Calcium-Magnesium-Zinc 167-83-8 MG Tabs           cholecalciferol 5000 units Caps     100 capsule    Take 1 capsule (5,000 Units) by mouth daily FOR VITAMIN D DEFICIENCY (LOW VITAMIN D)        cholestyramine 4 g Packet    QUESTRAN     Take 2 packets by mouth daily        FLUoxetine 40 MG capsule    PROzac    90 capsule    Take 1 capsule (40 mg) by mouth daily    Depression with anxiety       IBUPROFEN PO      Take 200 mg by mouth Takes 3 tablets PRN        LOPERAMIDE A-D 2 MG tablet   Generic drug:  loperamide      Take 2 mg by mouth 4 times daily as needed for diarrhea        naproxen sodium 220 MG tablet    ANAPROX    60 tablet    Take by mouth as needed Takes 2 tablets PRN        rizatriptan 10 MG tablet    MAXALT    12 tablet    TAKE 1 TABLET(10 MG) BY MOUTH AT ONSET OF HEADACHE. MAY REPEAT IN 2 HOURS AS NEEDED:. MAX 2 PER DAY    Migraine with aura and without status migrainosus, not intractable       vitamin B complex with vitamin C Tabs tablet      Take 1 tablet by mouth daily

## 2018-11-13 LAB
DHEA-S SERPL-MCNC: 134 UG/DL (ref 35–430)
FERRITIN SERPL-MCNC: 35 NG/ML (ref 8–252)
T3FREE SERPL-MCNC: 2.4 PG/ML (ref 2.3–4.2)

## 2018-11-13 ASSESSMENT — PATIENT HEALTH QUESTIONNAIRE - PHQ9: SUM OF ALL RESPONSES TO PHQ QUESTIONS 1-9: 5

## 2018-11-14 LAB — TESTOST SERPL-MCNC: 23 NG/DL (ref 8–60)

## 2018-11-20 ENCOUNTER — THERAPY VISIT (OUTPATIENT)
Dept: PHYSICAL THERAPY | Facility: CLINIC | Age: 48
End: 2018-11-20
Payer: COMMERCIAL

## 2018-11-20 DIAGNOSIS — M54.16 LUMBAR RADICULOPATHY: ICD-10-CM

## 2018-11-20 DIAGNOSIS — M53.3 PAIN IN THE COCCYX: ICD-10-CM

## 2018-11-20 PROCEDURE — 97161 PT EVAL LOW COMPLEX 20 MIN: CPT | Mod: GP | Performed by: PHYSICAL THERAPIST

## 2018-11-20 PROCEDURE — 97530 THERAPEUTIC ACTIVITIES: CPT | Mod: GP | Performed by: PHYSICAL THERAPIST

## 2018-11-20 PROCEDURE — 97110 THERAPEUTIC EXERCISES: CPT | Mod: GP | Performed by: PHYSICAL THERAPIST

## 2018-11-20 NOTE — PROGRESS NOTES
[facility] Initial Evaluation  Subjective:  [facility]                  Physical Therapy Initial Evaluation  November 20, 2018     Precautions/Restrictions/MD instructions: PT eval and treat.    Subjective:   Date of Onset: months ago, MD order on 11/12/18  C/C: low back pain at S1-2, Denies N&Tor pain down th leg  Quality of pain is dull and aching. Pains are described as intermittent in nature. Pain is worse: variable. Pain is rated 5-6/10.   History of symptoms: Pains began gradually as the result of unknown origins, no specific locations. Since onset, symptoms are waxing and waning. Previous episodes: history of sciatica but no tail bone pain  Worsened by: sit<>stand, sitting greater than 30 minutes  Alleviated by: Ibuprofen, Movement and Walking.    General health as reported by patient: good  Pertinent medical/surgical history: depression, migraines/headaches for years, collitis. She denies night pain, painful cough, painful peripheral motor deficit, recent bowel/bladder change, recent vision change, ringing in the ears or pain with swallowing, unexplained weight loss, significant current illness or recent hospital admission. She denies any regional implanted devices. She denies history of surgery in the area.  Imaging: none. Current occupational status: counselor . Patient's goals are: decrease pain, improve tolerance to sitting at the computer. Return to MD:  PRN.     Objective:  Posture: sits with slouched posture, pain reduced with lumbar roll facilitating upright posture  Gait: no apparent gait antalgia  LUMBAR:    Neurological: All results within normal limits unless otherwise noted.    Motor Deficit:  Myotomes L R   L1-2 (hip flexion) 5/5 4+/5*   L3 (knee extension) 5/5 5/5   L4 (ankle DF) 5/5 5/5   L5 (g. toe ext) 5/5 5/5   S1 (ankle PF or knee flex) 5/5 5/5     Sensory Deficit, Reflexes: Intact to light touch sensation in all LE dermatomes. Achilles and patellar reflexes 1+ B.    Dural  Signs:   L R   Slump - -       AROM: (Major, Moderate, Minimal or Nil loss)  Movement Loss Hector Mod Min Nil Pain   Flexion  x      Extension   x  x   Side Gliding L    x    Side Gliding R    x      Repeated movement testing:   (During: produces, abolishes, increases, decreases, no effect, centralizing, peripheralizing; After: better, worse, no better, no worse, no effect, centralized, peripheralized)    Pre-test Symptoms Standin/10 pain at S1   Symptoms During Symptoms After ROM increased ROM decreased No Effect   FIS NE NE   x   Rep FIS NE NE   x   EIS NE NE      Rep EIS NE NE      Pre-test Symptoms Lying: pain 6/10 at S1    Symptoms During Symptoms After ROM increased ROM decreased No Effect   SLIME NE NE   x   Rep SLIME NE NE   x   EIL NE NE   x   Rep EIL decreases better x         Sacroiliac Special Tests  Distraction - better   Compression -   Sacral Thrust -   Femoral Sheer -   Leg Length -   Active SLR + for increased difficulty on left     Assessment/Plan:    The patient is a 48 year old female with chief complaint of low back and tailbone pain.    The patient has the following significant findings with corresponding treatment plan.  Diagnosis 1:  Low back and tailbone pain    Pain -  hot/cold therapy, manual therapy, splint/taping/bracing/orthotics, self management, education, directional preference exercise and home program  Decreased ROM/flexibility - manual therapy and therapeutic exercise  Decreased strength - therapeutic exercise and therapeutic activities  Impaired balance - neuro re-education and therapeutic activities  Decreased proprioception - neuro re-education and therapeutic activities  Impaired gait - gait training  Impaired muscle performance - neuro re-education  Decreased function - therapeutic activities  Impaired posture - neuro re-education    Therapy Evaluation Codes:   1) History comprised of:   Personal factors that impact the plan of care:      Time since onset of symptoms.     Comorbidity factors that impact the plan of care are:      Depression and Migraines/headaches.     Medications impacting care: Anti-depressant and Pain.  2) Examination of Body Systems comprised of:   Body structures and functions that impact the plan of care:      Lumbar spine, Pelvis and Sacral illiac joint.   Activity limitations that impact the plan of care are:      Bending, Sitting, Squatting/kneeling and Stairs.   Clinical presentation characteristics are:    Stable/Uncomplicated.  3) Presentation comprised of:   Presentation scored as Low complexity with uncomplicated characteristics..  4) Decision-Making    Low complexity using standardized patient assessment instrument and/or measureable assessment of functional outcome.  Cumulative Therapy Evaluation is: Low complexity.    Previous and current functional limitations:  (See Goal Flow Sheet for this information)    Short term and Long term goals: (See Goal Flow Sheet for this information)     Communication ability:  Patient appears to be able to clearly communicate and understand verbal and written communication and follow directions correctly.  Treatment Explanation - The following has been discussed with the patient: RX ordered/plan of care, anticipated outcomes, and possible risks and side effects.  This patient would benefit from PT intervention to resume normal activities.   Rehab potential is good.    Frequency:  1 X week, once daily  Duration:  for 8 weeks  Discharge Plan: Achieve all LTGs, be independent in home treatment program, and reach maximal therapeutic benefit.    Please refer to the daily flowsheet for treatment today, total treatment time and time spent performing 1:1 timed codes.       Objective:  System    Physical Exam    General     ROS

## 2018-11-20 NOTE — MR AVS SNAPSHOT
After Visit Summary   11/20/2018    Nikki Shah Wilfred    MRN: [id number]           Patient Information     Date Of Birth          1970        Visit Information        Provider Department      11/20/2018 8:10 AM Nina Villalta PT [facility]        Today's Diagnoses     Pain in the coccyx        Lumbar radiculopathy           Follow-ups after your visit        Your next 10 appointments already scheduled     Nov 29, 2018  8:10 AM CST   EDMUNDO Spine with Nina Villalta PT   [facility] Physical Therapy ([facility]  )    [address] [place] [address]   [phone]            Dec 04, 2018  8:10 AM CST   EDMUNDO Spine with Nina Villalta PT   [facility] Physical Therapy ([facility]  )    [address] [place] [address]   [phone]            Dec 11, 2018  8:10 AM CST   EDMUNDO Spine with Nina Villalta PT   [facility] Physical Therapy ([facility]  )    [address] [place] [address]   [phone]            Dec 18, 2018  9:30 AM CST   EDMUNDO Spine with Nina Villalta PT   [facility] Physical Therapy ([facility]  )    [address] [place] [address]   [phone]              Who to contact     If you have questions or need follow up information about today's clinic visit or your schedule please contact [facility] PHYSICAL THERAPY directly at [phone].  Normal or non-critical lab and imaging results will be communicated to you by MyChart, letter or phone within 4 business days after the clinic has received the results. If you do not hear from us within 7 days, please contact the clinic through MyChart or phone. If you have a critical or abnormal lab result, we will notify you by phone as soon as  possible.  Submit refill requests through [employer] or call your pharmacy and they will forward the refill request to us. Please allow 3 business days for your refill to be completed.          Additional Information About Your Visit        [facility] Information     [employer] gives you secure access to your electronic health record. If you see a primary care provider, you can also send messages to your care team and make appointments. If you have questions, please call your primary care clinic.  If you do not have a primary care provider, please call [phone] and they will assist you.        Care EveryWhere ID     This is your Care EveryWhere ID. This could be used by other organizations to access your [facility] medical records  [id number]         Blood Pressure from Last 3 Encounters:   11/12/18 106/70   09/05/18 123/73   08/01/18 104/66    Weight from Last 3 Encounters:   09/05/18 59.5 kg (131 lb 3.2 oz)   08/01/18 57.8 kg (127 lb 6.4 oz)   06/29/18 57.6 kg (127 lb)              We Performed the Following     HC PT EVAL, LOW COMPLEXITY     EDMUNDO INITIAL EVAL REPORT     EDMUNDO PT, HAND, AND CHIROPRACTIC REFERRAL     THERAPEUTIC ACTIVITIES     THERAPEUTIC EXERCISES        Primary Care Provider Office Phone # Fax #    Nohelia Rodrigez [phone] [address] [place] [address]        Equal Access to Services     SIMBA FARRAR : Hadii aad ku hadasho Soomaali, waaxda luqadaha, qaybta kaalmada adekendellda, samanta bender. So [facility] [phone].    ATENCIÓN: Si habla español, tiene a taveras disposición servicios gratuitos de asistencia lingüística. Slime al [phone].    We comply with applicable federal civil rights laws and [place] laws. We do not discriminate on the basis of race, color, national origin, age, disability, sex, sexual orientation, or gender identity.            Thank you!     Thank you for choosing [facility]  PHYSICAL THERAPY  for your care. Our goal is always to provide you with excellent care. Hearing back from our patients is one way we can continue to improve our services. Please take a few minutes to complete the written survey that you may receive in the mail after your visit with us. Thank you!             Your Updated Medication List - Protect others around you: Learn how to safely use, store and throw away your medicines at www.disposemymeds.org.          This list is accurate as of 11/20/18 11:59 PM.  Always use your most recent med list.                   Brand Name Dispense Instructions for use Diagnosis    acetaminophen 325 MG tablet    TYLENOL    100 tablet    Take 2 tablets by mouth every 4 hours as needed for pain.        Calcium-Magnesium-Zinc 167-83-8 MG Tabs           cholecalciferol 5000 units Caps     100 capsule    Take 1 capsule (5,000 Units) by mouth daily FOR VITAMIN D DEFICIENCY (LOW VITAMIN D)        cholestyramine 4 g Packet    QUESTRAN     Take 2 packets by mouth daily        FLUoxetine 40 MG capsule    PROzac    90 capsule    Take 1 capsule (40 mg) by mouth daily    Depression with anxiety       IBUPROFEN PO      Take 200 mg by mouth Takes 3 tablets PRN        LOPERAMIDE A-D 2 MG tablet   Generic drug:  loperamide      Take 2 mg by mouth 4 times daily as needed for diarrhea        naproxen sodium 220 MG tablet    ANAPROX    60 tablet    Take by mouth as needed Takes 2 tablets PRN        rizatriptan 10 MG tablet    MAXALT    12 tablet    TAKE 1 TABLET(10 MG) BY MOUTH AT ONSET OF HEADACHE. MAY REPEAT IN 2 HOURS AS NEEDED:. MAX 2 PER DAY    Migraine with aura and without status migrainosus, not intractable       vitamin B complex with vitamin C Tabs tablet      Take 1 tablet by mouth daily

## 2018-11-21 PROBLEM — M53.3 PAIN IN THE COCCYX: Status: ACTIVE | Noted: 2018-11-21

## 2018-11-21 PROBLEM — M54.16 LUMBAR RADICULOPATHY: Status: ACTIVE | Noted: 2018-11-21

## 2018-11-29 ENCOUNTER — THERAPY VISIT (OUTPATIENT)
Dept: PHYSICAL THERAPY | Facility: CLINIC | Age: 48
End: 2018-11-29
Payer: COMMERCIAL

## 2018-11-29 DIAGNOSIS — M54.16 LUMBAR RADICULOPATHY: ICD-10-CM

## 2018-11-29 DIAGNOSIS — M53.3 PAIN IN THE COCCYX: ICD-10-CM

## 2018-11-29 PROCEDURE — 97112 NEUROMUSCULAR REEDUCATION: CPT | Mod: GP | Performed by: PHYSICAL THERAPIST

## 2018-11-29 PROCEDURE — 97110 THERAPEUTIC EXERCISES: CPT | Mod: GP | Performed by: PHYSICAL THERAPIST

## 2018-12-04 ENCOUNTER — THERAPY VISIT (OUTPATIENT)
Dept: PHYSICAL THERAPY | Facility: CLINIC | Age: 48
End: 2018-12-04
Payer: COMMERCIAL

## 2018-12-04 DIAGNOSIS — M53.3 PAIN IN THE COCCYX: ICD-10-CM

## 2018-12-04 DIAGNOSIS — M54.16 LUMBAR RADICULOPATHY: ICD-10-CM

## 2018-12-04 PROCEDURE — 97530 THERAPEUTIC ACTIVITIES: CPT | Mod: GP | Performed by: PHYSICAL THERAPIST

## 2018-12-04 PROCEDURE — 97110 THERAPEUTIC EXERCISES: CPT | Mod: GP | Performed by: PHYSICAL THERAPIST

## 2018-12-04 PROCEDURE — 97140 MANUAL THERAPY 1/> REGIONS: CPT | Mod: GP | Performed by: PHYSICAL THERAPIST

## 2018-12-11 ENCOUNTER — THERAPY VISIT (OUTPATIENT)
Dept: PHYSICAL THERAPY | Facility: CLINIC | Age: 48
End: 2018-12-11
Payer: COMMERCIAL

## 2018-12-11 DIAGNOSIS — M54.16 LUMBAR RADICULOPATHY: ICD-10-CM

## 2018-12-11 DIAGNOSIS — M53.3 PAIN IN THE COCCYX: ICD-10-CM

## 2018-12-11 PROCEDURE — 97112 NEUROMUSCULAR REEDUCATION: CPT | Mod: GP | Performed by: PHYSICAL THERAPIST

## 2018-12-11 PROCEDURE — 97530 THERAPEUTIC ACTIVITIES: CPT | Mod: GP | Performed by: PHYSICAL THERAPIST

## 2018-12-11 PROCEDURE — 97110 THERAPEUTIC EXERCISES: CPT | Mod: GP | Performed by: PHYSICAL THERAPIST

## 2019-01-02 ENCOUNTER — TRANSFERRED RECORDS (OUTPATIENT)
Dept: HEALTH INFORMATION MANAGEMENT | Facility: CLINIC | Age: 49
End: 2019-01-02

## 2019-01-08 ENCOUNTER — THERAPY VISIT (OUTPATIENT)
Dept: PHYSICAL THERAPY | Facility: CLINIC | Age: 49
End: 2019-01-08
Payer: COMMERCIAL

## 2019-01-08 ENCOUNTER — ALLIED HEALTH/NURSE VISIT (OUTPATIENT)
Dept: NURSING | Facility: CLINIC | Age: 49
End: 2019-01-08
Payer: COMMERCIAL

## 2019-01-08 DIAGNOSIS — Z23 ENCOUNTER FOR IMMUNIZATION: Primary | ICD-10-CM

## 2019-01-08 DIAGNOSIS — M54.16 LUMBAR RADICULOPATHY: ICD-10-CM

## 2019-01-08 DIAGNOSIS — M53.3 PAIN IN THE COCCYX: ICD-10-CM

## 2019-01-08 PROCEDURE — 90471 IMMUNIZATION ADMIN: CPT

## 2019-01-08 PROCEDURE — 97112 NEUROMUSCULAR REEDUCATION: CPT | Mod: GP | Performed by: PHYSICAL THERAPIST

## 2019-01-08 PROCEDURE — 90750 HZV VACC RECOMBINANT IM: CPT

## 2019-01-08 PROCEDURE — 97530 THERAPEUTIC ACTIVITIES: CPT | Mod: GP | Performed by: PHYSICAL THERAPIST

## 2019-01-08 PROCEDURE — 97110 THERAPEUTIC EXERCISES: CPT | Mod: GP | Performed by: PHYSICAL THERAPIST

## 2019-01-08 PROCEDURE — 99207 ZZC NO CHARGE NURSE ONLY: CPT

## 2019-01-08 NOTE — NURSING NOTE
Screening Questionnaire for Adult Immunization    Are you sick today?   No   Do you have allergies to medications, food, a vaccine component or latex?   No   Have you ever had a serious reaction after receiving a vaccination?   No   Do you have a long-term health problem with heart disease, lung disease, asthma, kidney disease, metabolic disease (e.g. diabetes), anemia, or other blood disorder?   No   Do you have cancer, leukemia, HIV/AIDS, or any other immune system problem?   No   In the past 3 months, have you taken medications that affect  your immune system, such as prednisone, other steroids, or anticancer drugs; drugs for the treatment of rheumatoid arthritis, Crohn s disease, or psoriasis; or have you had radiation treatments?   No   Have you had a seizure, or a brain or other nervous system problem?   No   During the past year, have you received a transfusion of blood or blood     products, or been given immune (gamma) globulin or antiviral drug?   No   For women: Are you pregnant or is there a chance you could become        pregnant during the next month?   No   Have you received any vaccinations in the past 4 weeks?   No     Immunization questionnaire answers were all negative.        Per orders of Dr. Cerrato, injection of Shingrix given by Nestor Moss. Patient instructed to remain in clinic for 15 minutes afterwards, and to report any adverse reaction to me immediately.       Screening performed by Nestor Moss on 1/8/2019 at 9:29 AM.

## 2019-01-13 ENCOUNTER — MYC REFILL (OUTPATIENT)
Dept: FAMILY MEDICINE | Facility: CLINIC | Age: 49
End: 2019-01-13

## 2019-01-13 DIAGNOSIS — G43.109 MIGRAINE WITH AURA AND WITHOUT STATUS MIGRAINOSUS, NOT INTRACTABLE: ICD-10-CM

## 2019-01-14 RX ORDER — RIZATRIPTAN BENZOATE 10 MG/1
TABLET ORAL
Qty: 12 TABLET | Refills: 1 | Status: SHIPPED | OUTPATIENT
Start: 2019-01-14 | End: 2019-03-08

## 2019-01-17 DIAGNOSIS — F41.8 DEPRESSION WITH ANXIETY: ICD-10-CM

## 2019-01-18 ENCOUNTER — MYC REFILL (OUTPATIENT)
Dept: FAMILY MEDICINE | Facility: CLINIC | Age: 49
End: 2019-01-18

## 2019-01-18 DIAGNOSIS — F41.8 DEPRESSION WITH ANXIETY: ICD-10-CM

## 2019-01-18 NOTE — TELEPHONE ENCOUNTER
"Requested Prescriptions   Pending Prescriptions Disp Refills     FLUoxetine (PROZAC) 40 MG capsule [Pharmacy Med Name: FLUOXETINE 40MG CAPSULES]  Last Written Prescription Date:  12-21-17  Last Fill Quantity: 90 cap,  # refills: 3   Last office visit: 11/12/2018 with prescribing provider:  Nohelia Rodrigez    Future Office Visit:    90 capsule 0     Sig: TAKE 1 CAPSULE(40 MG) BY MOUTH DAILY    SSRIs Protocol Passed - 1/17/2019 11:17 PM  PHQ-9 SCORE 12/21/2017 5/25/2018 11/12/2018   PHQ-9 Total Score - - -   PHQ-9 Total Score MyChart - - 5 (Mild depression)   PHQ-9 Total Score 6 3 5     BYRON-7 SCORE 10/31/2012 12/21/2017 5/25/2018   Total Score 7 - -   Total Score - 6 3          Passed - Recent (12 mo) or future (30 days) visit within the authorizing provider's specialty    Patient had office visit in the last 12 months or has a visit in the next 30 days with authorizing provider or within the authorizing provider's specialty.  See \"Patient Info\" tab in inbasket, or \"Choose Columns\" in Meds & Orders section of the refill encounter.           Passed - Medication is active on med list       Passed - Patient is age 18 or older       Passed - No active pregnancy on record       Passed - No positive pregnancy test in last 12 months         "

## 2019-01-21 RX ORDER — FLUOXETINE 40 MG/1
CAPSULE ORAL
Qty: 90 CAPSULE | Refills: 0 | Status: SHIPPED | OUTPATIENT
Start: 2019-01-21 | End: 2019-04-21

## 2019-01-21 NOTE — TELEPHONE ENCOUNTER
Routing refill request to provider for review/approval because:  PHQ-9 score:    PHQ-9 SCORE 11/12/2018   PHQ-9 Total Score -   PHQ-9 Total Score MyChart 5 (Mild depression)   PHQ-9 Total Score 5

## 2019-01-22 RX ORDER — FLUOXETINE 40 MG/1
40 CAPSULE ORAL DAILY
Qty: 90 CAPSULE | Refills: 3 | OUTPATIENT
Start: 2019-01-22

## 2019-01-22 NOTE — TELEPHONE ENCOUNTER
Request denied as duplicate     Dr. Nohelia Rodrigez sent refill 1/21/19     Akilah Dailey, Registered Nurse   [facility]

## 2019-02-03 ENCOUNTER — MYC MEDICAL ADVICE (OUTPATIENT)
Dept: FAMILY MEDICINE | Facility: CLINIC | Age: 49
End: 2019-02-03

## 2019-02-03 DIAGNOSIS — Z71.84 COUNSELING FOR TRAVEL: Primary | ICD-10-CM

## 2019-02-04 NOTE — TELEPHONE ENCOUNTER
Travel clinic probably.   Would need hep A and typhoid .  Would not usually need rabies unless planning on handling animals, or going adventure hiking or caving.  Might also need malaria prophylaxis depending on her plans.  Does not need hep b series, which was already completed.

## 2019-03-08 DIAGNOSIS — G43.109 MIGRAINE WITH AURA AND WITHOUT STATUS MIGRAINOSUS, NOT INTRACTABLE: ICD-10-CM

## 2019-03-09 NOTE — TELEPHONE ENCOUNTER
"Requested Prescriptions   Pending Prescriptions Disp Refills     rizatriptan (MAXALT) 10 MG tablet [Pharmacy Med Name: RIZATRIPTAN 10MG TABLETS]  Last Written Prescription Date:  1/14/19  Last Fill Quantity: 12,  # refills: 1   Last office visit: 11/12/2018 with prescribing provider:     Future Office Visit:     12 tablet 0     Sig: TAKE 1 TABLET AT ONSET OF HEADACHE MAY REPEAT IN 2 HOURS AS NEEDED MAX. OF 2 TABLETS PER DAY    Serotonin Agonists Failed - 3/8/2019  1:00 PM       Failed - Serotonin Agonist request needs review.    Please review patient's record. If patient has had 8 or more treatments in the past month, please forward to provider.         Passed - Blood pressure under 140/90 in past 12 months    BP Readings from Last 3 Encounters:   11/12/18 106/70   09/05/18 123/73   08/01/18 104/66          Passed - Recent (12 mo) or future (30 days) visit within the authorizing provider's specialty    Patient had office visit in the last 12 months or has a visit in the next 30 days with authorizing provider or within the authorizing provider's specialty.  See \"Patient Info\" tab in inbasket, or \"Choose Columns\" in Meds & Orders section of the refill encounter.           Passed - Medication is active on med list       Passed - Patient is age 18 or older       Passed - No active pregnancy on record       Passed - No positive pregnancy test in past 12 months          "

## 2019-03-11 RX ORDER — RIZATRIPTAN BENZOATE 10 MG/1
TABLET ORAL
Qty: 12 TABLET | Refills: 0 | Status: SHIPPED | OUTPATIENT
Start: 2019-03-11 | End: 2019-04-13

## 2019-03-12 NOTE — TELEPHONE ENCOUNTER
Prescription approved per [facility] Refill Protocol.  Cristina Alcala RN

## 2019-03-13 ENCOUNTER — ANCILLARY PROCEDURE (OUTPATIENT)
Dept: MAMMOGRAPHY | Facility: CLINIC | Age: 49
End: 2019-03-13
Attending: FAMILY MEDICINE
Payer: COMMERCIAL

## 2019-03-13 DIAGNOSIS — Z12.31 VISIT FOR SCREENING MAMMOGRAM: ICD-10-CM

## 2019-03-13 PROCEDURE — 77063 BREAST TOMOSYNTHESIS BI: CPT | Mod: TC

## 2019-03-13 PROCEDURE — 77067 SCR MAMMO BI INCL CAD: CPT | Mod: TC

## 2019-04-02 ENCOUNTER — TRANSFERRED RECORDS (OUTPATIENT)
Dept: HEALTH INFORMATION MANAGEMENT | Facility: CLINIC | Age: 49
End: 2019-04-02

## 2019-04-21 DIAGNOSIS — F41.8 DEPRESSION WITH ANXIETY: ICD-10-CM

## 2019-04-22 RX ORDER — FLUOXETINE 40 MG/1
CAPSULE ORAL
Qty: 90 CAPSULE | Refills: 0 | Status: SHIPPED | OUTPATIENT
Start: 2019-04-22 | End: 2019-07-18

## 2019-05-28 ENCOUNTER — TRANSFERRED RECORDS (OUTPATIENT)
Dept: HEALTH INFORMATION MANAGEMENT | Facility: CLINIC | Age: 49
End: 2019-05-28

## 2019-06-18 ENCOUNTER — TRANSFERRED RECORDS (OUTPATIENT)
Dept: HEALTH INFORMATION MANAGEMENT | Facility: CLINIC | Age: 49
End: 2019-06-18

## 2019-07-02 ENCOUNTER — TRANSFERRED RECORDS (OUTPATIENT)
Dept: HEALTH INFORMATION MANAGEMENT | Facility: CLINIC | Age: 49
End: 2019-07-02

## 2019-07-11 ENCOUNTER — OFFICE VISIT (OUTPATIENT)
Dept: FAMILY MEDICINE | Facility: CLINIC | Age: 49
End: 2019-07-11
Payer: COMMERCIAL

## 2019-07-11 VITALS — SYSTOLIC BLOOD PRESSURE: 100 MMHG | DIASTOLIC BLOOD PRESSURE: 65 MMHG | TEMPERATURE: 98.4 F | HEART RATE: 63 BPM

## 2019-07-11 DIAGNOSIS — Z71.84 TRAVEL ADVICE ENCOUNTER: Primary | ICD-10-CM

## 2019-07-11 PROCEDURE — 90675 RABIES VACCINE IM: CPT | Mod: GA | Performed by: NURSE PRACTITIONER

## 2019-07-11 PROCEDURE — 90691 TYPHOID VACCINE IM: CPT | Mod: GA | Performed by: NURSE PRACTITIONER

## 2019-07-11 PROCEDURE — 90707 MMR VACCINE SC: CPT | Mod: GA | Performed by: NURSE PRACTITIONER

## 2019-07-11 PROCEDURE — 90471 IMMUNIZATION ADMIN: CPT | Mod: GA | Performed by: NURSE PRACTITIONER

## 2019-07-11 PROCEDURE — 99402 PREV MED CNSL INDIV APPRX 30: CPT | Mod: 25 | Performed by: NURSE PRACTITIONER

## 2019-07-11 PROCEDURE — 90472 IMMUNIZATION ADMIN EACH ADD: CPT | Mod: GA | Performed by: NURSE PRACTITIONER

## 2019-07-11 PROCEDURE — 90632 HEPA VACCINE ADULT IM: CPT | Mod: GA | Performed by: NURSE PRACTITIONER

## 2019-07-11 RX ORDER — FINASTERIDE 5 MG/1
TABLET, FILM COATED ORAL
Refills: 5 | COMMUNITY
Start: 2019-05-30 | End: 2023-02-28

## 2019-07-11 RX ORDER — ATOVAQUONE AND PROGUANIL HYDROCHLORIDE 250; 100 MG/1; MG/1
1 TABLET, FILM COATED ORAL DAILY
Qty: 15 TABLET | Refills: 0 | Status: SHIPPED | OUTPATIENT
Start: 2019-07-11 | End: 2019-11-22

## 2019-07-11 RX ORDER — AZITHROMYCIN 500 MG/1
500 TABLET, FILM COATED ORAL DAILY
Qty: 3 TABLET | Refills: 0 | Status: SHIPPED | OUTPATIENT
Start: 2019-07-11 | End: 2019-11-22

## 2019-07-11 NOTE — PROGRESS NOTES
Nurse Note      Itinerary:  [facility]      Departure Date: 9/1/2019       Return Date: 9/28/2019      Length of Trip 1 month      Reason for Travel: Work - Teaching           Urban or rural: Urban      Accommodations: Host family        IMMUNIZATION HISTORY  Have you received any immunizations within the past 4 weeks?  No  Have you ever fainted from having your blood drawn or from an injection?  No  Have you ever had a fever reaction to vaccination?  No  Have you ever had any bad reaction or side effect from any vaccination?  No  Have you ever had hepatitis A or B vaccine?  Yes  Do you live (or work closely) with anyone who has AIDS, an AIDS-like condition, any other immune disorder or who is on chemotherapy for cancer?  No  Do you have a family history of immunodeficiency?  No  Have you received any injection of immune globulin or any blood products during the past 12 months?  No    Patient roomed by RUBEN Hinton Wilfred is a 49 year old female seen today alone for counsultation for international travel to [facility] for [facility] .  Patient will be departing in  1.5 month(s) and staying for   1 month(s) and  traveling alone.      Patient itinerary :  will be in the urban region of [facility] which presents risk for Dengue Fever, food borne illnesses and motor vehicle accidents. exposure.      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    Patient's country of birth is [place]    Special medical concerns: migraines  Pre-travel questionnaire was completed by patient and reviewed by provider.     Vitals: /65   Pulse 63   Temp 98.4  F (36.9  C) (Oral)   BMI= There is no height or weight on file to calculate BMI.    EXAM:  General:  Well-nourished, well-developed in no acute distress.  Appears to be stated age, interacts appropriately and expresses understanding of information given to patient.    Current Outpatient Medications   Medication Sig Dispense Refill      atovaquone-proguanil (MALARONE) 250-100 MG tablet Take 1 tablet by mouth daily Start 2 days before exposure to Malaria and continue daily till  7 days after exposure. 15 tablet 0     Calcium-Magnesium-Zinc 167-83-8 MG TABS        cholecalciferol 5000 UNITS CAPS Take 1 capsule (5,000 Units) by mouth daily FOR VITAMIN D DEFICIENCY (LOW VITAMIN D) 100 capsule 3     cholestyramine (QUESTRAN) 4 G Packet Take 2 packets by mouth daily        finasteride (PROSCAR) 5 MG tablet TK ONE-HALF T PO QD  5     FLUoxetine (PROZAC) 40 MG capsule TAKE 1 CAPSULE(40 MG) BY MOUTH DAILY 90 capsule 0     loperamide (LOPERAMIDE A-D) 2 MG tablet Take 2 mg by mouth 2 times daily        rizatriptan (MAXALT) 10 MG tablet TAKE 1 TABLET AT ONSET OF HEADACHE MAY REPEAT IN 2 HOURS AS NEEDED MAX. OF 2 TABLETS PER DAY 12 tablet 3     vitamin B complex with vitamin C (VITAMIN  B COMPLEX) TABS tablet Take 1 tablet by mouth daily        acetaminophen (TYLENOL) 325 MG tablet Take 2 tablets by mouth every 4 hours as needed for pain. 100 tablet 0     IBUPROFEN PO Take 200 mg by mouth Takes 3 tablets PRN       Patient Active Problem List   Diagnosis     Irritable bowel syndrome     Other acne     Migraine with aura and without status migrainosus, not intractable     Depression with anxiety     Serologic abnormality     Sedimentation rate elevation     Elevated vitamin B12 level     Anemia, unspecified type     Fatigue, unspecified type     Hypervitaminosis B6     Lymphocytic colitis     Papanicolaou smear of cervix with atypical squamous cells of undetermined significance (ASC-US)     Pain in the coccyx     Lumbar radiculopathy     Allergies   Allergen Reactions     No Known Allergies          Immunizations discussed include:   Hepatitis A:  Ordered/given today, risks, benefits and side effects reviewed  Hepatitis B: Up to date  Influenza: vaccine is not available  Typhoid: Ordered/given today, risks, benefits and side effects reviewed  Rabies: future order  placed  Yellow Fever: Not indicated  Japanese Encephalitis: Not indicated  Meningococcus: Not indicated  Tetanus/Diphtheria: Up to date  Measles/Mumps/Rubella: Ordered/given today  Cholera: Not needed  Polio: Up to date  Pneumococcal: Under age of 65  Varicella: Immune by disease history per patient report  Zostavax:  Not indicated  Shingrix: Up to date  HPV:  Not indicated  TB:  Low risk     Altitude Exposure on this trip: no  Past tolerance to Altitude: na    ASSESSMENT/PLAN:    ICD-10-CM    1. Travel advice encounter Z71.89 atovaquone-proguanil (MALARONE) 250-100 MG tablet     I have reviewed general recommendations for safe travel   including: food/water precautions, insect precautions, safer sex   practices given high prevalence of Zika, HIV and other STDs,   roadway safety. Educational materials and [employer] report provided.    Malaraia prophylaxis recommended: Malarone  Symptomatic treatment for traveler's diarrhea: azithromycin  Altitude illness prevention and treatment: no      Evacuation insurance advised and resources were provided to patient.    Total visit time 30 minutes  with over 50% of time spent counseling patient as detailed above.    Rebecca Swan CNP

## 2019-07-11 NOTE — PATIENT INSTRUCTIONS
Today July 11, 2019 you received the    Hepatitis A Vaccine - Please return on 1/7/20 or later for your 2nd and final dose.    MMR #2 (Measles Mumps Rubella) Vaccine    Typhoid - injectable. This vaccine is valid for two years.     Rabies: Day 0,  Day 7, Day 21 or 28   .    These appointments can be made as a NURSE ONLY visit.    **It is very important for the vaccinations to be given on the scheduled day(s), this helps ensure you receive the full effectiveness of the vaccine.**    Please call [facility] with any questions [phone]    Thank you for visiting [facility]

## 2019-07-12 ENCOUNTER — TELEPHONE (OUTPATIENT)
Dept: FAMILY MEDICINE | Facility: CLINIC | Age: 49
End: 2019-07-12

## 2019-07-12 ENCOUNTER — OFFICE VISIT (OUTPATIENT)
Dept: FAMILY MEDICINE | Facility: CLINIC | Age: 49
End: 2019-07-12
Payer: COMMERCIAL

## 2019-07-12 VITALS
DIASTOLIC BLOOD PRESSURE: 69 MMHG | HEART RATE: 64 BPM | OXYGEN SATURATION: 100 % | TEMPERATURE: 98.4 F | RESPIRATION RATE: 18 BRPM | SYSTOLIC BLOOD PRESSURE: 101 MMHG

## 2019-07-12 DIAGNOSIS — M25.562 LEFT KNEE PAIN, UNSPECIFIED CHRONICITY: ICD-10-CM

## 2019-07-12 DIAGNOSIS — G43.109 MIGRAINE WITH AURA AND WITHOUT STATUS MIGRAINOSUS, NOT INTRACTABLE: Primary | ICD-10-CM

## 2019-07-12 DIAGNOSIS — M79.671 RIGHT FOOT PAIN: ICD-10-CM

## 2019-07-12 PROCEDURE — 99214 OFFICE O/P EST MOD 30 MIN: CPT | Performed by: FAMILY MEDICINE

## 2019-07-12 RX ORDER — SUMATRIPTAN 6 MG/.5ML
6 INJECTION, SOLUTION SUBCUTANEOUS ONCE
Qty: 0.5 ML | Refills: 0 | Status: SHIPPED | OUTPATIENT
Start: 2019-07-12 | End: 2019-12-20

## 2019-07-12 RX ORDER — FLURBIPROFEN 50 MG
50 TABLET ORAL 2 TIMES DAILY
Qty: 28 TABLET | Refills: 0 | Status: SHIPPED | OUTPATIENT
Start: 2019-07-12 | End: 2019-11-22

## 2019-07-12 RX ORDER — SUMATRIPTAN 100 MG/1
100 TABLET, FILM COATED ORAL
Qty: 20 TABLET | Refills: 1 | Status: SHIPPED | OUTPATIENT
Start: 2019-07-12 | End: 2019-12-30

## 2019-07-12 ASSESSMENT — PATIENT HEALTH QUESTIONNAIRE - PHQ9
SUM OF ALL RESPONSES TO PHQ QUESTIONS 1-9: 3
5. POOR APPETITE OR OVEREATING: NOT AT ALL

## 2019-07-12 ASSESSMENT — ANXIETY QUESTIONNAIRES
2. NOT BEING ABLE TO STOP OR CONTROL WORRYING: SEVERAL DAYS
5. BEING SO RESTLESS THAT IT IS HARD TO SIT STILL: NOT AT ALL
1. FEELING NERVOUS, ANXIOUS, OR ON EDGE: SEVERAL DAYS
6. BECOMING EASILY ANNOYED OR IRRITABLE: SEVERAL DAYS
IF YOU CHECKED OFF ANY PROBLEMS ON THIS QUESTIONNAIRE, HOW DIFFICULT HAVE THESE PROBLEMS MADE IT FOR YOU TO DO YOUR WORK, TAKE CARE OF THINGS AT HOME, OR GET ALONG WITH OTHER PEOPLE: NOT DIFFICULT AT ALL
3. WORRYING TOO MUCH ABOUT DIFFERENT THINGS: MORE THAN HALF THE DAYS

## 2019-07-12 NOTE — PATIENT INSTRUCTIONS
Start flurbiprofen twice daily with food for 2 weeks, then stop.   No more ibuprofen during the flurbiprofen treatment  Stop if any stomach upset .    If you get a migraine during this time, you may take your imitrex, you may double the flurbiprofen dose that day.

## 2019-07-12 NOTE — PROGRESS NOTES
Subjective     Nikki Hardin is a 49 year old female who presents to clinic today for the following health issues:    HPI   Migraines    Migraines and regular headaches have been more frequent and lasting longer than usual over the past several months, which resulted in an ER visit when she was in [place] in May.  She is getting 2-6 migraines per month.  She takes rizatriptan but feels it is less efficacious.  She asks to try imitrex.  She is also wondering about the newer migraine treatments for prevention.  Pain is usually unilateral, behind her eye and radiating along the side of her head and down her right neck.  She has tried inderal and topamax (brain fog).  She has been using ibuprofen for regular headaches which she feels sometimes can help prevent a regular headache from turning into a migraine.  She has been using ibuprofen several days per week for headaches and for knee pain.        Left knee pain: hurting for months off and on, feels like she hyperextended it.  It locked up painfully for about 45 minutes a couple of months ago.  No giving way.  No known injury.  Will go many days without any discomfort at all.  No swelling or redness.      Right foot bunion--would like to see podiatry        Patient Active Problem List   Diagnosis     Irritable bowel syndrome     Other acne     Migraine with aura and without status migrainosus, not intractable     Depression with anxiety     Serologic abnormality     Sedimentation rate elevation     Elevated vitamin B12 level     Anemia, unspecified type     Fatigue, unspecified type     Hypervitaminosis B6     Lymphocytic colitis     Papanicolaou smear of cervix with atypical squamous cells of undetermined significance (ASC-US)     Pain in the coccyx     Lumbar radiculopathy     Past Surgical History:   Procedure Laterality Date     BREAST BIOPSY, RT/LT Left      C IUD,MIRENA  2005     C NONSPECIFIC PROCEDURE       X 3     ESOPHAGOSCOPY, GASTROSCOPY,  DUODENOSCOPY (EGD), COMBINED  10/10     HC ABLATION, ENDOMETRIAL, THERMAL, W/O HYSTEROSCOPIC GUIDANCE  7/2010     HC COLONOSCOPY THRU STOMA, DIAGNOSTIC  11/2004     HC COLONOSCOPY THRU STOMA, DIAGNOSTIC  5/10     HYSTEROSCOPY  7/09    D&C       Social History     Tobacco Use     Smoking status: Never Smoker     Smokeless tobacco: Never Used   Substance Use Topics     Alcohol use: Yes     Comment: occasional- once monthly     Family History   Problem Relation Age of Onset     Neurologic Disorder Mother         MIGRIANES     C.A.D. Father         in his 60's     Depression Father      Heart Disease Father      Lipids Father      Hypertension Father      Arthritis Father      Sleep Disorder Father      Cerebrovascular Disease Father 78     Cancer - colorectal Paternal Grandmother      Cancer Maternal Grandfather         stomach     Heart Disease Maternal Grandfather      Depression Brother      Depression Brother      Asthma Daughter      Heart Disease Paternal Grandfather      Breast Cancer Maternal Aunt      Diabetes No family hx of          Current Outpatient Medications   Medication Sig Dispense Refill     acetaminophen (TYLENOL) 325 MG tablet Take 2 tablets by mouth every 4 hours as needed for pain. 100 tablet 0     atovaquone-proguanil (MALARONE) 250-100 MG tablet Take 1 tablet by mouth daily Start 2 days before exposure to Malaria and continue daily till  7 days after exposure. 15 tablet 0     azithromycin (ZITHROMAX) 500 MG tablet Take 1 tablet (500 mg) by mouth daily for 3 doses Take 1 tablet a day for up to 3 days for severe diarrhea 3 tablet 0     Calcium-Magnesium-Zinc 167-83-8 MG TABS        cholecalciferol 5000 UNITS CAPS Take 1 capsule (5,000 Units) by mouth daily FOR VITAMIN D DEFICIENCY (LOW VITAMIN D) 100 capsule 3     cholestyramine (QUESTRAN) 4 G Packet Take 2 packets by mouth daily        finasteride (PROSCAR) 5 MG tablet TK ONE-HALF T PO QD  5     FLUoxetine (PROZAC) 40 MG capsule TAKE 1  CAPSULE(40 MG) BY MOUTH DAILY 90 capsule 0     flurbiprofen (ANSAID) 50 MG tablet Take 1 tablet (50 mg) by mouth 2 times daily for 14 days 28 tablet 0     IBUPROFEN PO Take 200 mg by mouth Takes 3 tablets PRN       loperamide (LOPERAMIDE A-D) 2 MG tablet Take 2 mg by mouth 2 times daily        rizatriptan (MAXALT) 10 MG tablet TAKE 1 TABLET AT ONSET OF HEADACHE MAY REPEAT IN 2 HOURS AS NEEDED MAX. OF 2 TABLETS PER DAY 12 tablet 3     SUMAtriptan (IMITREX) 100 MG tablet Take 1 tablet (100 mg) by mouth at onset of headache for migraine May repeat in 2 hours. Max 4 tablets/24 hours. 20 tablet 1     vitamin B complex with vitamin C (VITAMIN  B COMPLEX) TABS tablet Take 1 tablet by mouth daily        Allergies   Allergen Reactions     No Known Allergies        Reviewed and updated as needed this visit by Provider         Review of Systems   ROS COMP: Constitutional, HEENT, cardiovascular, pulmonary, gi and gu systems are negative, except as otherwise noted.      Objective    There were no vitals taken for this visit.  There is no height or weight on file to calculate BMI.  Physical Exam   GENERAL APPEARANCE: healthy, alert and no distress  EYES: Eyes grossly normal to inspection, PERRL and conjunctivae and sclerae normal  RESP: lungs clear to auscultation - no rales, rhonchi or wheezes  CV: regular rates and rhythm, normal S1 S2, no S3 or S4 and no murmur, click or rub  MS: extremities normal- no gross deformities noted.  Left knee with full ROM, no swelling or deformity, minimally tender to the medial joint line, normal anterior/posterior drawer testing, no pain or laxity with varus/valgus stress, negative Thessaly.  Small bunion to right foot.  NEURO: Normal strength and tone, mentation intact and speech normal  PSYCH: mentation appears normal and affect normal/bright    Diagnostic Test Results:  Labs reviewed in Epic        Assessment & Plan     1. Migraine with aura and without status migrainosus, not  intractable  Stop ibuprofen due to likely rebound component, start flurbiprofen twice daily for two weeks, stop if any stomach upset, may use imitrex if needed for migraine, may increase flrubiprofen to 100mg to treat migraine.  She could consider a variety of preventative medications, including a TCA, botox, or the newer agents.  Referral to neurology done.  - NEUROLOGY ADULT REFERRAL  - flurbiprofen (ANSAID) 50 MG tablet; Take 1 tablet (50 mg) by mouth 2 times daily for 14 days  Dispense: 28 tablet; Refill: 0  - SUMAtriptan (IMITREX) 100 MG tablet; Take 1 tablet (100 mg) by mouth at onset of headache for migraine May repeat in 2 hours. Max 4 tablets/24 hours.  Dispense: 20 tablet; Refill: 1  - SUMAtriptan (IMITREX) 6 MG/0.5ML injection; Inject 0.5 mLs (6 mg) Subcutaneous once for 1 dose May repeat dose in 1 hour. Max 12 mg/24 hours.  Dispense: 0.5 mL; Refill: 0    2. Left knee pain, unspecified chronicity  With locking up, I would be more concerned about tear/injury to the ligaments or meniscus; meniscal testing was normal on exam; I think it is fine to start with some physical therapy, but if not improving would likely need MRI to evaluate further.    - [facility] REFERRAL; Future    3. Right foot pain    - PODIATRY/FOOT & ANKLE SURGERY REFERRAL           No follow-ups on file.    Nohelia Rodrigez MD  [facility]

## 2019-07-15 NOTE — TELEPHONE ENCOUNTER
Both the oral and the injection, or just the injection?     Yes, prior auth.  See my recent note for meds that she has tried and failed.  Thanks.

## 2019-07-16 ENCOUNTER — THERAPY VISIT (OUTPATIENT)
Dept: PHYSICAL THERAPY | Facility: CLINIC | Age: 49
End: 2019-07-16
Payer: COMMERCIAL

## 2019-07-16 DIAGNOSIS — M25.562 LEFT KNEE PAIN, UNSPECIFIED CHRONICITY: ICD-10-CM

## 2019-07-16 DIAGNOSIS — M25.562 LEFT KNEE PAIN: ICD-10-CM

## 2019-07-16 PROCEDURE — 97140 MANUAL THERAPY 1/> REGIONS: CPT | Mod: GP | Performed by: PHYSICAL THERAPIST

## 2019-07-16 PROCEDURE — 97530 THERAPEUTIC ACTIVITIES: CPT | Mod: GP | Performed by: PHYSICAL THERAPIST

## 2019-07-16 PROCEDURE — 97161 PT EVAL LOW COMPLEX 20 MIN: CPT | Mod: GP | Performed by: PHYSICAL THERAPIST

## 2019-07-16 ASSESSMENT — ACTIVITIES OF DAILY LIVING (ADL)
SIT WITH YOUR KNEE BENT: ACTIVITY IS NOT DIFFICULT
RISE FROM A CHAIR: ACTIVITY IS NOT DIFFICULT
WALK: ACTIVITY IS MINIMALLY DIFFICULT
GIVING WAY, BUCKLING OR SHIFTING OF KNEE: I DO NOT HAVE THE SYMPTOM
GO UP STAIRS: ACTIVITY IS MINIMALLY DIFFICULT
HOW_WOULD_YOU_RATE_THE_CURRENT_FUNCTION_OF_YOUR_KNEE_DURING_YOUR_USUAL_DAILY_ACTIVITIES_ON_A_SCALE_FROM_0_TO_100_WITH_100_BEING_YOUR_LEVEL_OF_KNEE_FUNCTION_PRIOR_TO_YOUR_INJURY_AND_0_BEING_THE_INABILITY_TO_PERFORM_ANY_OF_YOUR_USUAL_DAILY_ACTIVITIES?: 80
SWELLING: I HAVE THE SYMPTOM BUT IT DOES NOT AFFECT MY ACTIVITY
LIMPING: I DO NOT HAVE THE SYMPTOM
KNEEL ON THE FRONT OF YOUR KNEE: ACTIVITY IS MINIMALLY DIFFICULT
WEAKNESS: I DO NOT HAVE THE SYMPTOM
HOW_WOULD_YOU_RATE_THE_OVERALL_FUNCTION_OF_YOUR_KNEE_DURING_YOUR_USUAL_DAILY_ACTIVITIES?: NEARLY NORMAL
RAW_SCORE: 63
SQUAT: ACTIVITY IS MINIMALLY DIFFICULT
KNEE_ACTIVITY_OF_DAILY_LIVING_SCORE: 90
STAND: ACTIVITY IS NOT DIFFICULT
STIFFNESS: I HAVE THE SYMPTOM BUT IT DOES NOT AFFECT MY ACTIVITY
GO DOWN STAIRS: ACTIVITY IS NOT DIFFICULT
AS_A_RESULT_OF_YOUR_KNEE_INJURY,_HOW_WOULD_YOU_RATE_YOUR_CURRENT_LEVEL_OF_DAILY_ACTIVITY?: NORMAL
KNEE_ACTIVITY_OF_DAILY_LIVING_SUM: 63
PAIN: I HAVE THE SYMPTOM BUT IT DOES NOT AFFECT MY ACTIVITY

## 2019-07-16 NOTE — PROGRESS NOTES
Physical Therapy Initial Evaluation  July 16, 2019     Precautions/Restrictions/MD instructions: PT eval and treat.     Subjective:   Date of Onset: months ago, MD order on 7/16/19  C/C: left knee pain and single episode of locking  Quality of pain is dull, aching and tinge. Pains are described as intermittent in nature. Pain is worse: with use. Pain is rated 3/10.   History of symptoms: Pains began gradually as the result of unknown origins. Since onset, symptoms are improving. Previous episodes:none  Worsened by: walk, stairs, kneeling, yoga with half pidgeon   Alleviated by: Rest.    General health as reported by patient: good  Pertinent medical/surgical history: depression, menopausal, headaches. She denies night pain, significant current illness or recent hospital admission. She denies any regional implanted devices. She denies history of surgery in the area.  Imaging: none. Current occupational status: counselor. Patient's goals are: decrease pain, improve tolerance to walking and yoga. Return to MD:  PRN.     Objective:  KNEE:    PROM:   L  R   Hyperextension 4 7   Extension 0 0   Flexion 135 135     Strength:   L R   HIP     Flex 4+/5 4+/5   ER 5/5 5/5   Abd 4/5 4/5   KNEE     Flex 4/5 5/5   Ext 5/5 5/5     Hip PROM:  WNL      Special tests:   L R   Anterior Drawer - -   Posterior Drawer - -   Lachman's - -   Valgus 0 degrees - -   Valgus 30 degrees - -   Varus 0 degrees - -   Varus 30 degrees - -   Cassy's + -   Appley's - -     Gait: ambulates with limited left knee extension on L resulting in early heel off.      Assessment/Plan:    The patient is a 49 year old female with chief complaint of left knee pain.    The patient has the following significant findings with corresponding treatment plan.  Diagnosis 1:  Left knee pain    Pain -  hot/cold therapy, manual therapy, splint/taping/bracing/orthotics, education, directional preference exercise and home program  Decreased ROM/flexibility - manual therapy  and therapeutic exercise  Decreased strength - therapeutic exercise and therapeutic activities  Impaired balance - neuro re-education and therapeutic activities  Decreased proprioception - neuro re-education and therapeutic activities  Impaired gait - gait training  Impaired muscle performance - neuro re-education  Decreased function - therapeutic activities    Therapy Evaluation Codes:   Cumulative Therapy Evaluation is: Low complexity.    Previous and current functional limitations:  (See Goal Flow Sheet for this information)    Short term and Long term goals: (See Goal Flow Sheet for this information)     Communication ability:  Patient appears to be able to clearly communicate and understand verbal and written communication and follow directions correctly.  Treatment Explanation - The following has been discussed with the patient: RX ordered/plan of care, anticipated outcomes, and possible risks and side effects.  This patient would benefit from PT intervention to resume normal activities.   Rehab potential is good.    Frequency:  1 X week, once daily  Duration:  for 6 weeks  Discharge Plan: Achieve all LTGs, be independent in home treatment program, and reach maximal therapeutic benefit.    Please refer to the daily flowsheet for treatment today, total treatment time and time spent performing 1:1 timed codes.

## 2019-07-17 ENCOUNTER — MYC REFILL (OUTPATIENT)
Dept: FAMILY MEDICINE | Facility: CLINIC | Age: 49
End: 2019-07-17

## 2019-07-17 DIAGNOSIS — F41.8 DEPRESSION WITH ANXIETY: ICD-10-CM

## 2019-07-17 RX ORDER — FLUOXETINE 40 MG/1
CAPSULE ORAL
Qty: 90 CAPSULE | Refills: 0 | Status: CANCELLED | OUTPATIENT
Start: 2019-07-17

## 2019-07-18 ENCOUNTER — ALLIED HEALTH/NURSE VISIT (OUTPATIENT)
Dept: NURSING | Facility: CLINIC | Age: 49
End: 2019-07-18
Payer: COMMERCIAL

## 2019-07-18 ENCOUNTER — MYC REFILL (OUTPATIENT)
Dept: FAMILY MEDICINE | Facility: CLINIC | Age: 49
End: 2019-07-18

## 2019-07-18 ENCOUNTER — TELEPHONE (OUTPATIENT)
Dept: FAMILY MEDICINE | Facility: CLINIC | Age: 49
End: 2019-07-18

## 2019-07-18 DIAGNOSIS — F41.8 DEPRESSION WITH ANXIETY: ICD-10-CM

## 2019-07-18 DIAGNOSIS — Z23 NEED FOR VACCINATION: Primary | ICD-10-CM

## 2019-07-18 PROCEDURE — 99207 ZZC NO CHARGE NURSE ONLY: CPT

## 2019-07-18 PROCEDURE — 90471 IMMUNIZATION ADMIN: CPT

## 2019-07-18 PROCEDURE — 90675 RABIES VACCINE IM: CPT

## 2019-07-18 RX ORDER — FLUOXETINE 40 MG/1
CAPSULE ORAL
Qty: 90 CAPSULE | Refills: 0 | Status: CANCELLED | OUTPATIENT
Start: 2019-07-18

## 2019-07-18 NOTE — TELEPHONE ENCOUNTER
Prior Authorization Approval    Authorization Effective Date: 7/18/2019  Authorization Expiration Date: 7/18/2022  Medication: SUMAtriptan (IMITREX) 6 MG/0.5ML injection-APPROVED  Approved Dose/Quantity:   Reference #:     Insurance Company: [employer] - Phone [phone] Fax [phone]  Expected CoPay:       CoPay Card Available:      Foundation Assistance Needed:    Which Pharmacy is filling the prescription (Not needed for infusion/clinic administered): [facility] [address] [place] [address]  Pharmacy Notified: Yes  Patient Notified: No    Pharmacy will notify patient when medication is ready.

## 2019-07-18 NOTE — NURSING NOTE
Chief Complaint   Patient presents with     Imm/Inj     Rabies #2     Screening Questionnaire for Adult Immunization    Are you sick today?   No   Do you have allergies to medications, food, a vaccine component or latex?   No   Have you ever had a serious reaction after receiving a vaccination?   No   Do you have a long-term health problem with heart disease, lung disease, asthma, kidney disease, metabolic disease (e.g. diabetes), anemia, or other blood disorder?   No   Do you have cancer, leukemia, HIV/AIDS, or any other immune system problem?   No   In the past 3 months, have you taken medications that affect  your immune system, such as prednisone, other steroids, or anticancer drugs; drugs for the treatment of rheumatoid arthritis, Crohn s disease, or psoriasis; or have you had radiation treatments?   No   Have you had a seizure, or a brain or other nervous system problem?   No   During the past year, have you received a transfusion of blood or blood     products, or been given immune (gamma) globulin or antiviral drug?   No   For women: Are you pregnant or is there a chance you could become        pregnant during the next month?   No   Have you received any vaccinations in the past 4 weeks?   Yes     Immunization questionnaire was positive for at least one answer.  Notified BHARAT.        Per orders of Dr. NICHOLSON, injection of Rabies #2 given by Jacqueline Miller. Patient instructed to remain in clinic for 15 minutes afterwards, and to report any adverse reaction to me immediately.       Screening performed by Jacqueline Miller on 7/18/2019 at 9:44 AM.     NATTY Kohli

## 2019-07-18 NOTE — TELEPHONE ENCOUNTER
Central Prior Authorization Team   Phone: [phone]      PA Initiation    Medication: SUMAtriptan (IMITREX) 100 MG tablet-Initiated  Insurance Company: [employer] - Phone [phone] Fax [phone]  Pharmacy Filling the Rx: [facility] [address] [place] [address] - [address] [place] [address]  Filling Pharmacy Phone: [phone]  Filling Pharmacy Fax:    Start Date: 7/18/2019

## 2019-07-18 NOTE — TELEPHONE ENCOUNTER
Prior Authorization Approval    Authorization Effective Date: 7/18/2019  Authorization Expiration Date: 7/18/2022  Medication: SUMAtriptan (IMITREX) 100 MG tablet-APPROVED  Approved Dose/Quantity:   Reference #:     Insurance Company: [employer] [phone] Fax [phone]  Expected CoPay:       CoPay Card Available:      Foundation Assistance Needed:    Which Pharmacy is filling the prescription (Not needed for infusion/clinic administered): [facility] [address] [place] [address]  Pharmacy Notified: Yes  Patient Notified: No    Pharmacy will notify patient when medication is ready.

## 2019-07-18 NOTE — TELEPHONE ENCOUNTER
Central Prior Authorization Team   Phone: [phone]      PA Initiation    Medication: SUMAtriptan (IMITREX) 6 MG/0.5ML injection-Initiated  Insurance Company: [employer] - Phone [phone] Fax [phone]  Pharmacy Filling the Rx: [facility] [address] [place] [address]  Filling Pharmacy Phone: [phone]  Filling Pharmacy Fax:    Start Date: 7/18/2019

## 2019-07-19 RX ORDER — FLUOXETINE 40 MG/1
CAPSULE ORAL
Qty: 90 CAPSULE | Refills: 1 | Status: SHIPPED | OUTPATIENT
Start: 2019-07-19 | End: 2020-01-26

## 2019-07-19 NOTE — TELEPHONE ENCOUNTER
"Requested Prescriptions   Pending Prescriptions Disp Refills     FLUoxetine (PROZAC) 40 MG capsule [Pharmacy Med Name: FLUOXETINE 40MG  Last Written Prescription Date:  4/22/19  Last Fill Quantity: 90,  # refills: 0   Last office visit: 7/12/2019 with prescribing provider:     Future Office Visit:   Next 5 appointments (look out 90 days)    Aug 01, 2019  9:30 AM CDT  Nurse Only with UP NURSE  [place] [facility] ([facility]) [address] [facility] [phone]          CAPSULES] 90 capsule 0     Sig: TAKE 1 CAPSULE BY MOUTH DAILY.       SSRIs Protocol Passed - 7/18/2019  7:43 PM        Passed - Recent (12 mo) or future (30 days) visit within the authorizing provider's specialty     Patient had office visit in the last 12 months or has a visit in the next 30 days with authorizing provider or within the authorizing provider's specialty.  See \"Patient Info\" tab in inbasket, or \"Choose Columns\" in Meds & Orders section of the refill encounter.              Passed - Medication is active on med list        Passed - Patient is age 18 or older        Passed - No active pregnancy on record        Passed - No positive pregnancy test in last 12 months          "

## 2019-07-22 ENCOUNTER — OFFICE VISIT (OUTPATIENT)
Dept: PODIATRY | Facility: CLINIC | Age: 49
End: 2019-07-22
Payer: COMMERCIAL

## 2019-07-22 ENCOUNTER — ANCILLARY PROCEDURE (OUTPATIENT)
Dept: GENERAL RADIOLOGY | Facility: CLINIC | Age: 49
End: 2019-07-22
Attending: PODIATRIST
Payer: COMMERCIAL

## 2019-07-22 VITALS
SYSTOLIC BLOOD PRESSURE: 96 MMHG | BODY MASS INDEX: 26.86 KG/M2 | DIASTOLIC BLOOD PRESSURE: 64 MMHG | HEART RATE: 67 BPM | WEIGHT: 136.8 LBS | HEIGHT: 60 IN

## 2019-07-22 DIAGNOSIS — M20.5X1 HALLUX LIMITUS OF RIGHT FOOT: ICD-10-CM

## 2019-07-22 DIAGNOSIS — M21.619 BUNION: ICD-10-CM

## 2019-07-22 DIAGNOSIS — M21.619 BUNION: Primary | ICD-10-CM

## 2019-07-22 PROCEDURE — 99203 OFFICE O/P NEW LOW 30 MIN: CPT | Performed by: PODIATRIST

## 2019-07-22 PROCEDURE — 73630 X-RAY EXAM OF FOOT: CPT | Mod: RT

## 2019-07-22 ASSESSMENT — MIFFLIN-ST. JEOR: SCORE: 1157.02

## 2019-07-22 NOTE — PATIENT INSTRUCTIONS
Thank you for choosing [facility]!    Follow up as needed    DR. JONES'S CLINIC LOCATIONS     MONDAY  [place] TUESDAY & FRIDAY AM  [place]   [address] [place]   [address]   [place] [address] [place] [address]   [phone]  [phone]  [phone]       [facility]  [facility] [phone]   [address] [facility] APPOINTMENTS: [phone]   [place] [address] BILLING QUESTIONS: [phone]   [phone]         BUNION (HALLUX ABDUCTO VALGUS)  A bunion is caused by muscle imbalance. The great toe is pulled toward the smaller toes. The metatarsal head is pushed outward creating a lump on the side of your foot. Imbalance is the result of foot structure and instability.    Bunions do not improve with time. They usually enlarge, however this is a fairly slow process. Shoes do not necessarily cause bunions, however, they can hasten development and definitely cause bunions to hurt.    Bunions often run in families. We inherit a certain foot structure, which may be predisposed to bunion development.    Bunion pain is likely a combination of shoes rubbing on the bump, nerve irritation, compression between the toes, joint misalignment, arthritis and altered gait.   PREVENTION   1.  Do not wear high heels if there is a family history of bunions.   2.  Wear shoes that have enough width and depth in the toe box. Use a motion control arch support if you over-pronate (foot rolls in)     SIGNS & SYMPTOMS  Bunions are usually termed mild, moderate or severe. Just because you have a bunion does not mean you have to have pain. There are some people with very severe bunions and no pain and people with mild bunions and a lot of pain.    1.  Pain on the inside of your foot at the big toe joint (1st MTPJ)    2.  Swelling on the inside of your foot at the big toe joint    3.  Redness on the inside of your foot at the  "big toe joint    4.  Numbness or burning in the big toe (hallux)    5.  Decreased motion at the big toe joint    6.  Painful bursa (fluid-filled sac) on the inside of your foot at the big toe joint    7.  Pain while wearing shoes -especially shoes too narrow or with high heels    8.  Pain during activities    9.  Corn in between the big toe and second toe    10.  Callous formation on the side or bottom of the big toe or big toe joint    11.  Callous under the second toe joint (2nd MTPJ)    12.  Pain in the second toe joint   NON- SURGICAL TREATMENT  Conservative (non-surgical) treatment will not make the bunion go away, but it will hopefully decrease the signs and symptoms you have and help you get rid of the pain and get you back to your activities.    1.  Wider shoes    2.  Extra depth shoes    3.  Stretch shoes or cut an \"x\" in the shoe (where bunion is)    4.  Ice    5.  Padding, splints, toe spacers    6.  NSAIDs    7.  Arch supports    8.  Custom orthoses (orthotics)    9.  Change activities    10.  Physical therapy  Most bunion pain can be improved simply by wearing compatible shoes. People with bunions cannot choose footwear simply because they like the style. Your bunion should determine which shoes are to be worn. Wide shoes with nonirritating seams,soft leather and a square toe box are most compatible with a bunion. Shoes should fit appropriately right out of the box but may need to be professionally stretched and modified to accommodate the bump. Heels, dress shoes and shoes with pointed toes will not be comfortable.     Shoe inserts or orthotics will often times help with bunion pain, however inserts make shoe fit more challenging. Pads placed over the bunion can also help relieve the pain. Injection may help with nerve irritation.     BUNION SURGERY  Surgical treatment for bunions is sometimes needed. If you are limited by pain, cannot fit in shoes comfortably and are not able to do your daily " activities then surgery may be a good option for you. There are many different surgical procedures to repair bunions. Your foot doctor (podiatrist) will review your foot exam findings, your x-rays, your age, your health, your lifestyle, your physical activity level and discuss with you which procedure he or she would recommend. Surgical procedures for bunions range from soft tissue repair to cutting and realigning the bones. It is not recommended that you have bunion surgery for cosmetic reasons (you do not like how your foot looks) or because you want to fit in a certain pair of shoes; There is the risk that even after surgery, the bunion will reoccur 9-10% of the time.   Bunion surgery involves cutting and repositioning the bones surrounding the bunion. Pins and screws are used to hold the bones in place during the healing process. The goal of bunion surgery is to reduce the size of the bunion bump. Realignment of the toe and joint is attempted. Some first toes cannot be forced back into normal alignment even with surgery. Surgery is helpful in most cases but does not necessarily create a normal foot.     Healing after surgery requires about six weeks of protection. This allows the bone to heal. Maximum recovery takes about one year. The scar tissue and joint structures require this amount of time to finish the healing process. Expect stiffness, swelling and numbness during that time frame. Bunion surgery does involve side effects. Some side effects are predictable and others are less common but do occur. A scar will be visible and could be irritated by shoes. The shoe may rub on the screw or internal pin requiring surgical removal of these fixation devices. The screw and pin would likely be left in place for a full year. The first toe may loose motion after bunion surgery. The amount of stiffness is variable. Some people never regain normal motion of the first toe. This is due to scar tissue inherent to any  surgery. The first toe may drift toward the second toe or away from the second toe. Spreading of the first and second toes is a rare occurrence after bunion surgery. This can be quite bothersome and would need to be surgically repaired. Toe drift toward the second toe could result in a recurrent bunion and revision surgery. Joint fusion is one option to correct an unstable, drifting toe. This procedure straightens the toe, however, no motion remains. Fusion may be necessary to correct complications of bunion surgery or as the original procedure in severe cases.   All surgical procedures involve risk of infection, numbness, pain, delayed healing, osteotomy dislocation, blood clots, continued foot pain, etc. Bunion surgery is quite complex and should not be taken lightly.   Any skin incision can lead to infection. Deep infection might involve the bone and thus repeat surgery and six weeks of IV antibiotics. Scar tissue can cause nerve pain or numbness. This is generally temporary but can be permanent. We do not have treatments that cure nerve problems. Second toe pain could be related to altered mechanics and pressure transferred to the second toe. Most feet with bunions have pre-existing second toe problems. Delayed bone healing would lengthen the healing time. Some bones simply do not heal. This requires repeat surgery, electronic bone stimulation and/or extended protection. Smokers have an approximate 20% chance of poor bone healing. This is double that of a non-smoker. The bone cut may displace. This may need to be repaired with a second operation. Displacement can cause joint malalignment. Immobility after surgery can cause blood clots in the legs and lungs. This could result in death.   Foot pain is complex. Most feet hurt for more than one reason. Fixing the bunion would not necessarily create a pain free foot. Appropriate shoes, healthy body weight, avoidance of bare foot walking and moderation of activity  will always be necessary to enjoy foot comfort. Your bunion may involve arthritis, which is incurable even with surgery. Long standing bunions often involve chronic irritation to the surrounding nerves. Nerve pain may not resolve even with reducing the bunion bump since permanent nerve damage may be present   Bunion surgery is nevertheless quite successful. Most surgical patients are pleased with their foot following bunion surgery. Many of the issues described above can be controlled by taking proper care of your foot during the healing process.   Cosmetic bunion surgery is discouraged for the reasons listed above. A bunion that is comfortable when wearing appropriate shoes should simply be treated with appropriate shoes.   Your surgeon would be happy to fully describe any of the above issues. You should pursue a full understanding of the operation,recovery process and any potential problems that could develop.     OVER THE COUNTER INSERTS    Most of these can be found at your local [employer], [employer], or online:    [employer]   Sofsole Fit [employer]   Power Step   Walk-Fit  ([employer]) [employer]radles       **A good high quality over the counter insert should cost around $40-$50            DEGENERATIVE ARTHRITIS OF THE BIG TOE JOINT (hallux limitus/hallux rigidus)   Arthritis of the joint at the base of the big toe (metatarsophalangeal joint) has several causes. Usually it results from repetitive trauma to the joint, secondary to abnormal foot mechanics. Often it is hereditary. However, a one-time traumatic event can lead to arthritis. The condition can worsen with time. The cartilage wears out, joint surfaces are no longer smooth, bone rubs on bone, inflammation occurs with pain, and eventually bone spurs and loose fragments might develop.   The joint is often painful with activity, worse with flimsy shoes or walking barefoot, and it slowly progresses over time. A person might  "notice the toe \"locking up\" with walking. There often is an obvious, and irritating, bony bump on top of the foot. Shoes might be uncomfortable. In some people the pain is so bothersome that recreational activities sometimes even normal daily activities are difficult to perform.   The pain from this arthritis is likely a combination of joint jamming, cartilage loss and inflammation, and irritation from shoes rubbing on the bump. Sometimes other parts of the foot, leg, or back hurt from altering one's walk to compensate for the painful joint.     Ways to help a person live with the discomfort include wearing a good, supportive shoe with a rigid, rocker-type bottom. An example is a hiking boot. A rigid sole minimizes bending of the joint, and therefore, joint motion and pain. Shoes with a high toe box allow for less rubbing on the bump. Avoiding barefoot walking, sandals, flip-flops and slippers usually helps.     Sometimes an insert or orthotic provides symptom relief. This might make shoe fit more difficult. Pads over the bump and occasionally injections into the joint provide relief.     Surgery for this condition is aimed towards alleviating pain. It does not cure the arthritis nor does it guarantee better joint motion. Depending on the condition of the metatarsophalangeal joint, there are several surgical options:    1.  Cutting off the bony bump(s) and cleaning the joint    2.  Loosening the joint up by making cuts in the first metatarsal bone or the big toe bone and removing a small section of bone.    3.  Repositioning bone to minimize jamming of the joint.    4.  In severe cases, the joint is fused. By fusing the joint, it will never bend again. This resolves the pain, because it's the movement of a worn out joint that causes pain. Oftentimes the operation involves a combination of these procedures and requires the use of screws, pins, and/or a small surgical plate.     Healing after surgery requires about " six weeks of protection. This allows the bone to heal. Maximum recovery takes about one year. The scar tissue and joint structures require this amount of time to finish the healing process. Expect stiffness, swelling and numbness during that time frame.   Surgery for arthritis of the metatarsophalangeal joint does involve side effects. Some side effects are predictable and others are less common but do occur. A scar will be visible and could be irritated by shoes. The shoe may rub on the screw or internal pin requiring surgical removal of these fixation devices. The screw and pin would likely be left in place for a full year. The first toe may remain stiff after surgery. The amount of stiffness is variable. Most people never regain normal motion of the first toe. This is due to scar tissue inherent to any surgery, in addition to the cumulative effects of arthritis. Sometimes the big toe drifts to one side or the other. Joint fusion is one option to correct an unstable, drifting toe.   All surgical procedures involve risk of infection, numbness, pain, delayed bone healing, osteotomy (bone cut) dislocation, blood clots, continued foot pain, etc. Arthritic joint surgery is quite complex and should not be taken lightly.    Any skin incision can lead to infection. Deep infection might involve the bone and thus repeat surgery and six weeks of IV antibiotics. Scar tissue can cause nerve pain or numbness. This is generally temporary but can be permanent. We do not have treatments that cure nerve problems. Second toe pain could be related to altered mechanics and pressure transferred to the second toe. Delayed bone healing would lengthen the healing time. Some bones simply do not heal. This requires repeat surgery, electronic bone stimulation and/or extended protection. Smokers have an approximate 20% chance of poor bone healing. This is double that of a non-smoker. The bone cut may displace. This may need to be repaired  with a second operation. Displacement can cause joint malalignment. Immobility after surgery can cause a blood clot in the legs and lungs. This could result in death.   Foot pain is complex. Most feet hurt for more than one reason. Operating on the arthritic   big toe joint will not necessarily create a pain free foot. Appropriate shoes, healthy body weight, avoidance of bare foot walking and moderation of activity will always be necessary to enjoy foot comfort. Arthritis is incurable even with surgery.     Surgery for this type of arthritis is nevertheless quite successful. Most surgical patients are pleased with their foot following surgery. Many of the issues described above can be controlled by taking proper care of your foot during the healing process.   Cosmetic bump surgery is discouraged for the reasons listed above. A bump and joint that is comfortable when wearing appropriate shoes should simply be treated with appropriate shoes.   Your surgeon would be happy to fully describe any of the above issues. You should pursue a full understanding of the operation, recovery process and any potential problems that could develop.           BODY WEIGHT AND YOUR FEET  The following information is included in the after visit summary for all patients. Body weight can be a sensitive issue to discuss in clinic, but we think the following information is very important. Although we focus on the feet and ankles, we do support the overall health of our patients.     Many things can cause foot and ankle problems. Foot structure, activity level, foot mechanics and injuries are common causes of pain. One very important issue that often goes unmentioned, is body weight. Extra weight can cause increased stress on muscles, ligaments, bones and tendons. Sometimes just a few extra pounds is all it takes to put one over her/his threshold. Without reducing that stress, it can be difficult to alleviate pain. As Foot & Ankle specialists,  our job is addressing the lower extremity problem and possible causes. Regarding extra body weight, we encourage patients to discuss diet and weight management plans with their primary care doctors. It is this team approach that gives you the best opportunity for pain relief and getting you back on your feet.      [facility] has a Comprehensive Weight Management Program. This program includes counseling, education, non-surgical and surgical approaches to weight loss. If you are interested in learning more either talk to you primary care provider or call [phone].

## 2019-07-22 NOTE — LETTER
2019         RE: Nikki Freemann  [address] [facility] [id number]        Dear Colleague,    Thank you for referring your patient, Nikki Hardin, to the [facility]. Please see a copy of my visit note below.    PATIENT HISTORY:  Nikki Hardin is a 49 year old female who presents to clinic for R foot pain.  I was requested to see this patient for this issue by Dr Rodrigez.  Present for a few months.  Worse with walking, better with rest.  1-6/10 pain.  1st MPJ area.  No injury.    Review of Systems:  Patient denies fever, chills, rash, wound, stiffness, limping, numbness, weakness, heart burn, blood in stool, chest pain with activity, calf pain when walking, shortness of breath with activity, chronic cough, easy bleeding/bruising, swelling of ankles, excessive thirst, fatigue, depression, anxiety.       PAST MEDICAL HISTORY:   Past Medical History:   Diagnosis Date     Anxiety      Colitis     lymphocytic colitis ?     Depressive disorder, not elsewhere classified     sees psych- Dr Blue     Migraine, unspecified, without mention of intractable migraine without mention of status migrainosus age 14 yrs     Other acne      PAIN IN THORACIC SPINE [724.1] 2006    chiropractic care     Raynaud disease     ? connective tissue dz.  ? sogren's        PAST SURGICAL HISTORY:   Past Surgical History:   Procedure Laterality Date     BREAST BIOPSY, RT/LT Left      C IUD,MIRENA  2005     C NONSPECIFIC PROCEDURE       X 3     ESOPHAGOSCOPY, GASTROSCOPY, DUODENOSCOPY (EGD), COMBINED  10/10     HC ABLATION, ENDOMETRIAL, THERMAL, W/O HYSTEROSCOPIC GUIDANCE  2010     HC COLONOSCOPY THRU STOMA, DIAGNOSTIC  2004     HC COLONOSCOPY THRU STOMA, DIAGNOSTIC  5/10     HYSTEROSCOPY      D&C        MEDICATIONS:   Current Outpatient Medications:      acetaminophen (TYLENOL) 325 MG tablet, Take 2 tablets by mouth every 4 hours as needed for pain., Disp: 100  tablet, Rfl: 0     atovaquone-proguanil (MALARONE) 250-100 MG tablet, Take 1 tablet by mouth daily Start 2 days before exposure to Malaria and continue daily till  7 days after exposure., Disp: 15 tablet, Rfl: 0     Calcium-Magnesium-Zinc 167-83-8 MG TABS, , Disp: , Rfl:      cholecalciferol 5000 UNITS CAPS, Take 1 capsule (5,000 Units) by mouth daily FOR VITAMIN D DEFICIENCY (LOW VITAMIN D), Disp: 100 capsule, Rfl: 3     cholestyramine (QUESTRAN) 4 G Packet, Take 2 packets by mouth daily , Disp: , Rfl:      finasteride (PROSCAR) 5 MG tablet, TK ONE-HALF T PO QD, Disp: , Rfl: 5     FLUoxetine (PROZAC) 40 MG capsule, TAKE 1 CAPSULE BY MOUTH DAILY., Disp: 90 capsule, Rfl: 1     flurbiprofen (ANSAID) 50 MG tablet, Take 1 tablet (50 mg) by mouth 2 times daily for 14 days, Disp: 28 tablet, Rfl: 0     IBUPROFEN PO, Take 200 mg by mouth Takes 3 tablets PRN, Disp: , Rfl:      loperamide (LOPERAMIDE A-D) 2 MG tablet, Take 2 mg by mouth 2 times daily , Disp: , Rfl:      rizatriptan (MAXALT) 10 MG tablet, TAKE 1 TABLET AT ONSET OF HEADACHE MAY REPEAT IN 2 HOURS AS NEEDED MAX. OF 2 TABLETS PER DAY, Disp: 12 tablet, Rfl: 3     SUMAtriptan (IMITREX) 100 MG tablet, Take 1 tablet (100 mg) by mouth at onset of headache for migraine May repeat in 2 hours. Max 4 tablets/24 hours., Disp: 20 tablet, Rfl: 1     vitamin B complex with vitamin C (VITAMIN  B COMPLEX) TABS tablet, Take 1 tablet by mouth daily , Disp: , Rfl:      ALLERGIES:    Allergies   Allergen Reactions     No Known Allergies         SOCIAL HISTORY:   Social History     Socioeconomic History     Marital status:      Spouse name: Not on file     Number of children: Not on file     Years of education: Not on file     Highest education level: Not on file   Occupational History     Not on file   Social Needs     Financial resource strain: Not on file     Food insecurity:     Worry: Not on file     Inability: Not on file     Transportation needs:     Medical: Not on  file     Non-medical: Not on file   Tobacco Use     Smoking status: Never Smoker     Smokeless tobacco: Never Used   Substance and Sexual Activity     Alcohol use: Yes     Comment: occasional- once monthly     Drug use: No     Sexual activity: Yes     Partners: Male     Birth control/protection: None     Comment:  vasectomy   Lifestyle     Physical activity:     Days per week: Not on file     Minutes per session: Not on file     Stress: Not on file   Relationships     Social connections:     Talks on phone: Not on file     Gets together: Not on file     Attends Gnosticism service: Not on file     Active member of club or organization: Not on file     Attends meetings of clubs or organizations: Not on file     Relationship status: Not on file     Intimate partner violence:     Fear of current or ex partner: Not on file     Emotionally abused: Not on file     Physically abused: Not on file     Forced sexual activity: Not on file   Other Topics Concern     Parent/sibling w/ CABG, MI or angioplasty before 65F 55M? Not Asked   Social History Narrative    Caffeine intake/servings daily - 1    Calcium intake/servings daily - 2    Exercise 1 times weekly - describe walk    Sunscreen used - Yes    Seatbelts used - Yes    Guns stored in the home - No    Self Breast Exam - No    Pap test up to date -  No    Eye exam up to date -  Yes    Dental exam up to date -  Yes    DEXA scan up to date -  No    Flex Sig/Colonoscopy up to date -  Yes    Mammography up to date -  Yes    Immunizations reviewed and up to date - Yes    Abuse: Current or Past (Physical, Sexual or Emotional) - No    Do you feel safe in your environment - Yes    Do you cope well with stress - No    Do you suffer from insomnia - Yes    Last updated by: Issac George  1/30/2012                    FAMILY HISTORY:   Family History   Problem Relation Age of Onset     Neurologic Disorder Mother         ANDREA CASTELLON. Father         in his 60's      Depression Father      Heart Disease Father      Lipids Father      Hypertension Father      Arthritis Father      Sleep Disorder Father      Cerebrovascular Disease Father 78     Cancer - colorectal Paternal Grandmother      Cancer Maternal Grandfather         stomach     Heart Disease Maternal Grandfather      Depression Brother      Depression Brother      Asthma Daughter      Heart Disease Paternal Grandfather      Breast Cancer Maternal Aunt      Diabetes No family hx of         EXAM:Vitals: BP 96/64   Pulse 67   Ht 1.524 m (5')   Wt 62.1 kg (136 lb 12.8 oz)   BMI 26.72 kg/m     BMI= Body mass index is 26.72 kg/m .    General appearance: Patient is alert and fully cooperative with history & exam.  No sign of distress is noted during the visit.     Psychiatric: Affect is pleasant & appropriate.  Patient appears motivated to improve health.     Respiratory: Breathing is regular & unlabored while sitting.     HEENT: Hearing is intact to spoken word.  Speech is clear.  No gross evidence of visual impairment that would impact ambulation.     Dermatologic: Skin is intact to R foot.  No paronychia or evidence of soft tissue infection is noted.     Vascular: DP & PT pulses are intact & regular on the R.  No significant edema or varicosities noted.  CFT and skin temperature are normal.     Neurologic: Lower extremity sensation is intact to light touch.  No evidence of weakness or contracture in the lower extremities.  No evidence of neuropathy.     Musculoskeletal: R foot bunion noted.  Mild pain at medial eminence.  Mild limitation of R 1st MPJ ROM with loading of the 1st ray.  Patient is ambulatory without assistive device or brace.  No gross ankle deformity noted.  No foot or ankle joint effusion is noted.    XRs of R foot reviewed with pt.  Mild 1st ray elevation.  Mild bunion  Mild degenerative changes at 1st MPJ.     ASSESSMENT:   R foot bunion, functional hallux limitus     PLAN:  Reviewed patient's chart in  epic.  Discussed condition and treatment options including pros and cons.    Surgical and non-surgical treatment options for bunions and hallux limitus were discussed.  Non-operative treatments like stiff soles with wide toe box, orthotics, and NSAIDs were discussed.  Shoes that provide extra room for the enlarged joint should be helpful.  I would anticipate somewhat short term benefit from joint injection.  I would not expect much improvement from injection or bunion night splints.     Surgical options discussed if pain becomes limiting.  Pt does not think she is at this point.  Discussed the potentially progressive nature of these problems.    Handouts given.    Follow-up prn.    Luis Mccray DPM, FACFAS    Weight management plan: Patient was referred to their PCP to discuss a diet and exercise plan.      Again, thank you for allowing me to participate in the care of your patient.        Sincerely,        Luis Mccray DPM

## 2019-07-22 NOTE — PROGRESS NOTES
PATIENT HISTORY:  Nikki Hardin is a 49 year old female who presents to clinic for R foot pain.  I was requested to see this patient for this issue by Dr Rodrigez.  Present for a few months.  Worse with walking, better with rest.  1-6/10 pain.  1st MPJ area.  No injury.    Review of Systems:  Patient denies fever, chills, rash, wound, stiffness, limping, numbness, weakness, heart burn, blood in stool, chest pain with activity, calf pain when walking, shortness of breath with activity, chronic cough, easy bleeding/bruising, swelling of ankles, excessive thirst, fatigue, depression, anxiety.       PAST MEDICAL HISTORY:   Past Medical History:   Diagnosis Date     Anxiety      Colitis     lymphocytic colitis ?     Depressive disorder, not elsewhere classified     sees psych- Dr Blue     Migraine, unspecified, without mention of intractable migraine without mention of status migrainosus age 14 yrs     Other acne      PAIN IN THORACIC SPINE [724.1] 2006    chiropractic care     Raynaud disease     ? connective tissue dz.  ? sogren's        PAST SURGICAL HISTORY:   Past Surgical History:   Procedure Laterality Date     BREAST BIOPSY, RT/LT Left      C IUD,MIRENA  2005     C NONSPECIFIC PROCEDURE       X 3     ESOPHAGOSCOPY, GASTROSCOPY, DUODENOSCOPY (EGD), COMBINED  10/10     HC ABLATION, ENDOMETRIAL, THERMAL, W/O HYSTEROSCOPIC GUIDANCE  2010     HC COLONOSCOPY THRU STOMA, DIAGNOSTIC  2004     HC COLONOSCOPY THRU STOMA, DIAGNOSTIC  5/10     HYSTEROSCOPY      D&C        MEDICATIONS:   Current Outpatient Medications:      acetaminophen (TYLENOL) 325 MG tablet, Take 2 tablets by mouth every 4 hours as needed for pain., Disp: 100 tablet, Rfl: 0     atovaquone-proguanil (MALARONE) 250-100 MG tablet, Take 1 tablet by mouth daily Start 2 days before exposure to Malaria and continue daily till  7 days after exposure., Disp: 15 tablet, Rfl: 0     Calcium-Magnesium-Zinc 167-83-8 MG TABS, , Disp: ,  Rfl:      cholecalciferol 5000 UNITS CAPS, Take 1 capsule (5,000 Units) by mouth daily FOR VITAMIN D DEFICIENCY (LOW VITAMIN D), Disp: 100 capsule, Rfl: 3     cholestyramine (QUESTRAN) 4 G Packet, Take 2 packets by mouth daily , Disp: , Rfl:      finasteride (PROSCAR) 5 MG tablet, TK ONE-HALF T PO QD, Disp: , Rfl: 5     FLUoxetine (PROZAC) 40 MG capsule, TAKE 1 CAPSULE BY MOUTH DAILY., Disp: 90 capsule, Rfl: 1     flurbiprofen (ANSAID) 50 MG tablet, Take 1 tablet (50 mg) by mouth 2 times daily for 14 days, Disp: 28 tablet, Rfl: 0     IBUPROFEN PO, Take 200 mg by mouth Takes 3 tablets PRN, Disp: , Rfl:      loperamide (LOPERAMIDE A-D) 2 MG tablet, Take 2 mg by mouth 2 times daily , Disp: , Rfl:      rizatriptan (MAXALT) 10 MG tablet, TAKE 1 TABLET AT ONSET OF HEADACHE MAY REPEAT IN 2 HOURS AS NEEDED MAX. OF 2 TABLETS PER DAY, Disp: 12 tablet, Rfl: 3     SUMAtriptan (IMITREX) 100 MG tablet, Take 1 tablet (100 mg) by mouth at onset of headache for migraine May repeat in 2 hours. Max 4 tablets/24 hours., Disp: 20 tablet, Rfl: 1     vitamin B complex with vitamin C (VITAMIN  B COMPLEX) TABS tablet, Take 1 tablet by mouth daily , Disp: , Rfl:      ALLERGIES:    Allergies   Allergen Reactions     No Known Allergies         SOCIAL HISTORY:   Social History     Socioeconomic History     Marital status:      Spouse name: Not on file     Number of children: Not on file     Years of education: Not on file     Highest education level: Not on file   Occupational History     Not on file   Social Needs     Financial resource strain: Not on file     Food insecurity:     Worry: Not on file     Inability: Not on file     Transportation needs:     Medical: Not on file     Non-medical: Not on file   Tobacco Use     Smoking status: Never Smoker     Smokeless tobacco: Never Used   Substance and Sexual Activity     Alcohol use: Yes     Comment: occasional- once monthly     Drug use: No     Sexual activity: Yes     Partners: Male      Birth control/protection: None     Comment:  vasectomy   Lifestyle     Physical activity:     Days per week: Not on file     Minutes per session: Not on file     Stress: Not on file   Relationships     Social connections:     Talks on phone: Not on file     Gets together: Not on file     Attends Jain service: Not on file     Active member of club or organization: Not on file     Attends meetings of clubs or organizations: Not on file     Relationship status: Not on file     Intimate partner violence:     Fear of current or ex partner: Not on file     Emotionally abused: Not on file     Physically abused: Not on file     Forced sexual activity: Not on file   Other Topics Concern     Parent/sibling w/ CABG, MI or angioplasty before 65F 55M? Not Asked   Social History Narrative    Caffeine intake/servings daily - 1    Calcium intake/servings daily - 2    Exercise 1 times weekly - describe walk    Sunscreen used - Yes    Seatbelts used - Yes    Guns stored in the home - No    Self Breast Exam - No    Pap test up to date -  No    Eye exam up to date -  Yes    Dental exam up to date -  Yes    DEXA scan up to date -  No    Flex Sig/Colonoscopy up to date -  Yes    Mammography up to date -  Yes    Immunizations reviewed and up to date - Yes    Abuse: Current or Past (Physical, Sexual or Emotional) - No    Do you feel safe in your environment - Yes    Do you cope well with stress - No    Do you suffer from insomnia - Yes    Last updated by: Issac George  1/30/2012                    FAMILY HISTORY:   Family History   Problem Relation Age of Onset     Neurologic Disorder Mother         MIGRIANES     C.A.D. Father         in his 60's     Depression Father      Heart Disease Father      Lipids Father      Hypertension Father      Arthritis Father      Sleep Disorder Father      Cerebrovascular Disease Father 78     Cancer - colorectal Paternal Grandmother      Cancer Maternal Grandfather         stomach      Heart Disease Maternal Grandfather      Depression Brother      Depression Brother      Asthma Daughter      Heart Disease Paternal Grandfather      Breast Cancer Maternal Aunt      Diabetes No family hx of         EXAM:Vitals: BP 96/64   Pulse 67   Ht 1.524 m (5')   Wt 62.1 kg (136 lb 12.8 oz)   BMI 26.72 kg/m    BMI= Body mass index is 26.72 kg/m .    General appearance: Patient is alert and fully cooperative with history & exam.  No sign of distress is noted during the visit.     Psychiatric: Affect is pleasant & appropriate.  Patient appears motivated to improve health.     Respiratory: Breathing is regular & unlabored while sitting.     HEENT: Hearing is intact to spoken word.  Speech is clear.  No gross evidence of visual impairment that would impact ambulation.     Dermatologic: Skin is intact to R foot.  No paronychia or evidence of soft tissue infection is noted.     Vascular: DP & PT pulses are intact & regular on the R.  No significant edema or varicosities noted.  CFT and skin temperature are normal.     Neurologic: Lower extremity sensation is intact to light touch.  No evidence of weakness or contracture in the lower extremities.  No evidence of neuropathy.     Musculoskeletal: R foot bunion noted.  Mild pain at medial eminence.  Mild limitation of R 1st MPJ ROM with loading of the 1st ray.  Patient is ambulatory without assistive device or brace.  No gross ankle deformity noted.  No foot or ankle joint effusion is noted.    XRs of R foot reviewed with pt.  Mild 1st ray elevation.  Mild bunion  Mild degenerative changes at 1st MPJ.     ASSESSMENT:   R foot bunion, functional hallux limitus     PLAN:  Reviewed patient's chart in epic.  Discussed condition and treatment options including pros and cons.    Surgical and non-surgical treatment options for bunions and hallux limitus were discussed.  Non-operative treatments like stiff soles with wide toe box, orthotics, and NSAIDs were discussed.  Shoes  that provide extra room for the enlarged joint should be helpful.  I would anticipate somewhat short term benefit from joint injection.  I would not expect much improvement from injection or bunion night splints.     Surgical options discussed if pain becomes limiting.  Pt does not think she is at this point.  Discussed the potentially progressive nature of these problems.    Handouts given.    Follow-up prn.    Luis Mccray DPM, FACFAS    Weight management plan: Patient was referred to their PCP to discuss a diet and exercise plan.

## 2019-07-23 ENCOUNTER — THERAPY VISIT (OUTPATIENT)
Dept: PHYSICAL THERAPY | Facility: CLINIC | Age: 49
End: 2019-07-23
Payer: COMMERCIAL

## 2019-07-23 DIAGNOSIS — M25.562 LEFT KNEE PAIN: ICD-10-CM

## 2019-07-23 PROCEDURE — 97112 NEUROMUSCULAR REEDUCATION: CPT | Mod: GP | Performed by: PHYSICAL THERAPIST

## 2019-07-23 PROCEDURE — 97140 MANUAL THERAPY 1/> REGIONS: CPT | Mod: GP | Performed by: PHYSICAL THERAPIST

## 2019-07-23 PROCEDURE — 97110 THERAPEUTIC EXERCISES: CPT | Mod: GP | Performed by: PHYSICAL THERAPIST

## 2019-07-30 ENCOUNTER — THERAPY VISIT (OUTPATIENT)
Dept: PHYSICAL THERAPY | Facility: CLINIC | Age: 49
End: 2019-07-30
Payer: COMMERCIAL

## 2019-07-30 ENCOUNTER — TRANSFERRED RECORDS (OUTPATIENT)
Dept: HEALTH INFORMATION MANAGEMENT | Facility: CLINIC | Age: 49
End: 2019-07-30

## 2019-07-30 DIAGNOSIS — M25.562 LEFT KNEE PAIN: ICD-10-CM

## 2019-07-30 PROCEDURE — 97140 MANUAL THERAPY 1/> REGIONS: CPT | Mod: GP | Performed by: PHYSICAL THERAPIST

## 2019-07-30 PROCEDURE — 97110 THERAPEUTIC EXERCISES: CPT | Mod: GP | Performed by: PHYSICAL THERAPIST

## 2019-07-31 PROBLEM — M54.16 LUMBAR RADICULOPATHY: Status: RESOLVED | Noted: 2018-11-21 | Resolved: 2019-07-31

## 2019-07-31 PROBLEM — M53.3 PAIN IN THE COCCYX: Status: RESOLVED | Noted: 2018-11-21 | Resolved: 2019-07-31

## 2019-07-31 NOTE — PROGRESS NOTES
"Discharge Note    Progress reporting period is from initial evaluation date (please see noted date below) to Jan 8, 2019.  No linked episodes      Nikki failed to follow up and current status is unknown.  Please see information below for last relevant information on current status.  Patient seen for 5 visits.    SUBJECTIVE  Subjective changes noted by patient: Nikki returns after 4 week hiatus from PT after traveling for the holidays. She has had a handful of twinges since last visit since she has been back. She did a lot of walking on her trip and is getting back into the exercise.  Reports slight achiness today. 80% progress overall.  .  Current pain level is 0/10.     Previous pain level was  6/10.   Changes in function:  Yes (See Goal flowsheet attached for changes in current functional level)  Adverse reaction to treatment or activity: None    OBJECTIVE  Changes noted in objective findings: FIS to toes, %, B %. Able to balance x 30\" on single leg without errors or discomfort. Demos good TrA activation in sitting, standing, and walking. Improved KENN and Manav STarT today.     ASSESSMENT/PLAN  Diagnosis: low back pain   Updated problem list and treatment plan:   Pain - HEP  Decreased ROM/flexibility - HEP  Decreased function - HEP  Decreased strength - HEP  STG/LTGs have been met or progress has been made towards goals:  Yes, please see goal flowsheet for most current information  Assessment of Progress: current status is unknown.    Last current status: Pt is progressing as expected   Self Management Plans:  HEP  I have re-evaluated this patient and find that the nature, scope, duration and intensity of the therapy is appropriate for the medical condition of the patient.  Nikki continues to require the following intervention to meet STG and LTG's:  HEP.    Recommendations:  Discharge with current home program.  Patient to follow up with MD as needed.    Please refer to the daily flowsheet for " treatment today, total treatment time and time spent performing 1:1 timed codes.

## 2019-08-01 ENCOUNTER — TELEPHONE (OUTPATIENT)
Dept: NEUROLOGY | Facility: CLINIC | Age: 49
End: 2019-08-01

## 2019-08-01 ENCOUNTER — ALLIED HEALTH/NURSE VISIT (OUTPATIENT)
Dept: NURSING | Facility: CLINIC | Age: 49
End: 2019-08-01
Payer: COMMERCIAL

## 2019-08-01 DIAGNOSIS — Z23 NEED FOR VACCINATION: Primary | ICD-10-CM

## 2019-08-01 PROCEDURE — 90675 RABIES VACCINE IM: CPT | Mod: GA

## 2019-08-01 PROCEDURE — 90471 IMMUNIZATION ADMIN: CPT | Mod: GA

## 2019-08-01 PROCEDURE — 99207 ZZC NO CHARGE NURSE ONLY: CPT

## 2019-08-01 NOTE — PROGRESS NOTES
INITIAL NEUROLOGY CONSULTATION    DATE OF VISIT: 8/2/2019  CLINIC LOCATION: [facility]  MRN: [id number]  PATIENT NAME: Nikki Hardin  YOB: 1970    PRIMARY CARE PROVIDER: Nohelia Rodrigez MD     REASON FOR VISIT:   Chief Complaint   Patient presents with     Headache     started the age of 14. Headache lasting longer than usual. Medication not effective. Was prescribed injection of imitrex and oral. Have not tried those medication yet      HISTORY OF PRESENT ILLNESS:                                                    Ms. Nikki Hardin is 49 year old left handed female patient with past medical history of depression, anxiety, irritable bowel syndrome, anemia, and lymphocytic colitis, who was seen in consultation today requested by Nohelia Rodrigez MD, for headache management.    Per patient's report, she has long-standing history of chronic headaches started at age 14 that fluctuated in frequency and severity throughout the years.  Over the last several months, headaches became more frequent and lasting longer than usual.  It resulted in emergency room visit when she was in [place] in May.    At the present time, intermittent stabbing/throbbing 5-6/10 headache occurs 2-3 times per week lasting several hours without additional associated symptoms.  It is located in the periorbital region radiating to the temple and the occipital area/neck on either side, right more frequently than left.  Approximately once per month it increases to 8/10.  Associated symptoms at that time include light sensitivity, nausea, emesis, and intolerance of physical activity.  It might last up to 2 days without treatment.    The patient also has moderate pressure headaches several times per week that respond well to ibuprofen.  If not treated, they might evolve into severe headaches, as described above.    For headache prevention in the past she tried propranolol (not effective) and  Topamax (intolerable cognitive side effects).  She is interested to try a different preventive medications, including newer injectable medications, for migraine prevention.    For acute therapy she was on Maxalt, but felt that it lost effectiveness and asked for Imitrex during her last visit with primary care in July 2019.  She did not try it yet because recently she got enrolled in the study for acute migraine treatment with [id number] nasal spray (CGRP antagonist) and was told not to start any new medications.  In the distant past tried ergotamine derivatives, but does not remember the effect.  Zomig worked well, but was not covered by her insurance.    She was also taking ibuprofen several times per week (at least 4) and 650 mg of Tylenol once per week, but was switched to flurbiprofen by her primary care provider.  Completed her 14-day course yesterday.  Today she already took ibuprofen for headache.     The patient reports that her sleep is 7 to 8 hours per night, though still feels tired in the morning.  She eats 2 meals per day regularly, but frequently skips breakfast.  She drinks 24 ounces of fluids.  She rates her stress level as high.    Most recent labs from November 2018 include unremarkable CMP, normal TSH, free T3/free T4, testosterone level, and CBC.    Thoracic spine MRI from 2006 demonstrated minimal degenerative disc dehydration at multiple levels without evidence of disc herniation, fracture, or mass.    No prior brain imaging.  No additional useful information is available in Care Everywhere, which was reviewed.    Review of Systems - the patient endorses insomnia, fatigue, weight gain, arthritis, anxiety, depression, chronic diarrhea, nausea, vomiting, and irritable bowel syndrome.  All of them have been previously discussed with other medical providers. Otherwise, she denies any other complaints on 14-point comprehensive review of systems.  PAST MEDICAL/SURGICAL HISTORY:                                                     I personally reviewed patient's past medical and surgical history with the patient at today's visit.  Patient Active Problem List   Diagnosis     Irritable bowel syndrome     Other acne     Migraine with aura and without status migrainosus, not intractable     Depression with anxiety     Serologic abnormality     Sedimentation rate elevation     Elevated vitamin B12 level     Anemia, unspecified type     Fatigue, unspecified type     Hypervitaminosis B6     Lymphocytic colitis     Papanicolaou smear of cervix with atypical squamous cells of undetermined significance (ASC-US)     Left knee pain     Past Medical History:   Diagnosis Date     Anxiety      Colitis     lymphocytic colitis ?     Depressive disorder, not elsewhere classified     sees psych- Dr Blue     Migraine, unspecified, without mention of intractable migraine without mention of status migrainosus age 14 yrs     Other acne      PAIN IN THORACIC SPINE [724.1] 2006    chiropractic care     Raynaud disease     ? connective tissue dz.  ? sogren's     Past Surgical History:   Procedure Laterality Date     BREAST BIOPSY, RT/LT Left      C IUD,MIRENA  2005     C NONSPECIFIC PROCEDURE       X 3     ESOPHAGOSCOPY, GASTROSCOPY, DUODENOSCOPY (EGD), COMBINED  10/10     HC ABLATION, ENDOMETRIAL, THERMAL, W/O HYSTEROSCOPIC GUIDANCE  2010     HC COLONOSCOPY THRU STOMA, DIAGNOSTIC  2004     HC COLONOSCOPY THRU STOMA, DIAGNOSTIC  5/10     HYSTEROSCOPY      D&C     MEDICATIONS:                                                    I personally reviewed patient's medications and allergies with the patient at today's visit.  Current Outpatient Medications on File Prior to Visit:  acetaminophen (TYLENOL) 325 MG tablet Take 2 tablets by mouth every 4 hours as needed for pain.   atovaquone-proguanil (MALARONE) 250-100 MG tablet Take 1 tablet by mouth daily Start 2 days before exposure to Malaria and continue daily till  7 days  after exposure.   Calcium-Magnesium-Zinc 167-83-8 MG TABS    cholecalciferol 5000 UNITS CAPS Take 1 capsule (5,000 Units) by mouth daily FOR VITAMIN D DEFICIENCY (LOW VITAMIN D)   cholestyramine (QUESTRAN) 4 G Packet Take 2 packets by mouth daily    finasteride (PROSCAR) 5 MG tablet TK ONE-HALF T PO QD   FLUoxetine (PROZAC) 40 MG capsule TAKE 1 CAPSULE BY MOUTH DAILY.   IBUPROFEN PO Take 200 mg by mouth Takes 3 tablets PRN   loperamide (LOPERAMIDE A-D) 2 MG tablet Take 2 mg by mouth 2 times daily    rizatriptan (MAXALT) 10 MG tablet TAKE 1 TABLET AT ONSET OF HEADACHE MAY REPEAT IN 2 HOURS AS NEEDED MAX. OF 2 TABLETS PER DAY   vitamin B complex with vitamin C (VITAMIN  B COMPLEX) TABS tablet Take 1 tablet by mouth daily    [] azithromycin (ZITHROMAX) 500 MG tablet Take 1 tablet (500 mg) by mouth daily for 3 doses Take 1 tablet a day for up to 3 days for severe diarrhea   flurbiprofen (ANSAID) 50 MG tablet Take 1 tablet (50 mg) by mouth 2 times daily for 14 days   SUMAtriptan (IMITREX) 100 MG tablet Take 1 tablet (100 mg) by mouth at onset of headache for migraine May repeat in 2 hours. Max 4 tablets/24 hours. (Patient not taking: Reported on 2019)   [] SUMAtriptan (IMITREX) 6 MG/0.5ML injection Inject 0.5 mLs (6 mg) Subcutaneous once for 1 dose May repeat dose in 1 hour. Max 12 mg/24 hours.     ALLERGIES:                                                      Allergies   Allergen Reactions     No Known Allergies      FAMILY/SOCIAL HISTORY:                                                    Family and social history was reviewed with the patient at today's visit.  Family history is positive for dementia, migraine and stroke.  Never smoker.  One alcohol drink per month.  Denies recreational drug use.  , lives with family.  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  Plans to go to [facility] for 1 month since September (work-related).  REVIEW OF SYSTEMS:                                                     Patient has completed a [facility] Patient Health History, including a 14-system review, which was personally reviewed, and pertinent positives are listed in HPI. She denies any additional problems on the further questioning.  EXAM:                                                    VITAL SIGNS:   /70 (BP Location: Left arm, Patient Position: Sitting, Cuff Size: Adult Regular)   Pulse 73   Temp 98  F (36.7  C) (Oral)   Resp 18   Wt 60.8 kg (134 lb)   SpO2 97%   BMI 26.17 kg/m    Mini-Cog Assessment:  Mini Cog Assessment  Clock Draw Score: 2 Normal  3 Item Recall: 3 objects recalled  Mini Cog Total Score: 5  Administered by: : Trell Moss MA    General: pt is in NAD, cooperative.  Skin: normal turgor, moist mucous membranes, no lesions/rashes noticed.  HEENT: ATNC, EOMI, PERRL, white sclera, normal conjunctiva, no nystagmus or ptosis. No carotid bruits bilaterally.  Respiratory: lung sounds clear to auscultation bilaterally, no crackles, wheezes, rhonchi. Symmetric lung excursion, no accessory respiratory muscle use.  Cardiovascular: normal S1/S2, no murmurs/rubs/gallops.   Abdomen: Not distended.  : deferred.    Neurological:  Mental: alert, follows commands, Mini Cog Total Score: 5/5 with 3/3 on memory recall, no aphasia or dysarthria. Fund of knowledge is appropriate for age.  Cranial Nerves:  CN II: visual acuity - able to accurately count fingers with each eye. Visual fields intact, fundi: discs sharp, no papilledema and normal vessels bilaterally.  CN III, IV, VI: EOM intact, pupils equal and reactive  CN V: facial sensation nl  CN VII: face symmetric, no facial droop  CN VIII: hearing normal  CN IX: palate elevation symmetric, uvula at midline  CN XI SCM normal, shoulder shrug nl  CN XII: tongue midline  Motor: Strength: 5/5 in all major groups of all extremities. Normal tone. No abnormal movements. No pronator drift b/l.  Reflexes: Triceps,  biceps, brachioradialis, patellar, and achilles reflexes normal and symmetric. No clonus noted. Toes are down-going b/l.   Sensory: temperature, light touch, pinprick, and vibration intact. Romberg: negative.  Coordination: FNF and heel-shin tests intact b/l.   Gait:  Normal, able to tandem, toe, and heel walk.  DATA:   LABS/IMAGING/OTHER STUDIES: I reviewed pertinent medical records, including Care Everywhere, as detailed in the history of present illness.  ASSESSMENT AND PLAN:      ASSESSMENT: Nikki Hardin is a 49 year old female patient with past medical history of depression, anxiety, irritable bowel syndrome, anemia, and lymphocytic colitis, who presents with headache worsening over the last several months.    We had a prolonged discussion with the patient regarding her symptoms.  Her neurologic exam today is non-focal.  She did not have previous brain imaging, and currently does not have indications to do it.  Brain MRI without contrast might be considered in the future if her headaches continue to worsen, become resistant to treatment, or the patient develops focal neurological symptoms.    The clinical presentation is most likely consistent with multifactorial headache disorder, including migrainous and tension type components.  In addition, she may also have medication overuse component.  We reviewed in detail these diagnoses, available treatment options, and the plan, as summarized below.    DIAGNOSES:    ICD-10-CM    1. Migraine without aura and without status migrainosus, not intractable G43.009    2. Tension headache G44.209      PLAN: At today's visit we thoroughly discussed various diagnostic possibilities for patient's symptoms that are most likely consistent with multifactorial headache disorder (migraine and tension headaches).  We also reviewed available treatment options, possible future evaluation (brain MRI), and the plan.    For headache prevention:  1.  Riboflavin 400 mg every morning  for the next 3 months.  I advised the patient to contact my clinic with any intolerable side effects and give me an update in 4-6 weeks after starting this medication.  2. Other future options include Lucho-relief, Effexor (in place of currently taken fluoxetine), Nortriptyline, Amitriptyline, doxepin, Depakote, verapamil, CGRP antagonists (Ajovy or others), and neurostimulator devices (Cefaly).  She inquired with her insurance company and was told that Ajovy is covered, but Aimovig is not.  For acute headache therapy:  1.  I recommended to try Imitrex at the onset of intolerable headache.  I counseled her to limit use to less than 9 days/month.  2.  We discussed that she may also use ibuprofen or Naproxen 500 mg, but should take it with food and limit use of all analgesics to less that 15 days/month.    Reviewed non-pharmacological headache prevention measures include proper sleep hygiene, regular meals, adequate hydration, regular aerobic exercise, and stress reduction techniques.    I instructed the patient to keep the headache diary and bring it to the next follow up visit or upload it via My Chart.    Next follow-up appointment is in the next 3 months or earlier if needed.    Total Time:  83 minutes with > 50% spent counseling the patient on stated above assessment and recommendations, including nature of the diagnosis, possible future w/u, available treatment options, and proposed plan.  Additional time was used to  answer questions regarding patient's symptoms, my recommendations, and the plan.    Kamron Castro MD  / Neurology  [facility]  (Chart documentation was completed in part with Dragon voice-recognition software. Even though reviewed, some grammatical, spelling, and word errors may remain.)

## 2019-08-01 NOTE — PROGRESS NOTES
Screening Questionnaire for Adult Immunization    Are you sick today?   No   Do you have allergies to medications, food, a vaccine component or latex?   No   Have you ever had a serious reaction after receiving a vaccination?   No   Do you have a long-term health problem with heart disease, lung disease, asthma, kidney disease, metabolic disease (e.g. diabetes), anemia, or other blood disorder?   No   Do you have cancer, leukemia, HIV/AIDS, or any other immune system problem?   No   In the past 3 months, have you taken medications that affect  your immune system, such as prednisone, other steroids, or anticancer drugs; drugs for the treatment of rheumatoid arthritis, Crohn s disease, or psoriasis; or have you had radiation treatments?   No   Have you had a seizure, or a brain or other nervous system problem?   No   During the past year, have you received a transfusion of blood or blood     products, or been given immune (gamma) globulin or antiviral drug?   No   For women: Are you pregnant or is there a chance you could become        pregnant during the next month?   No   Have you received any vaccinations in the past 4 weeks?   Yes     Immunization questionnaire travel vaccines.        Per orders of  , injection of rabies given by Princess Crouch. Patient instructed to remain in clinic for 15 minutes afterwards, and to report any adverse reaction to me immediately.       Screening performed by Princess Crouch on 8/1/2019 at 10:48 AM.

## 2019-08-02 ENCOUNTER — OFFICE VISIT (OUTPATIENT)
Dept: NEUROLOGY | Facility: CLINIC | Age: 49
End: 2019-08-02
Payer: COMMERCIAL

## 2019-08-02 VITALS
TEMPERATURE: 98 F | SYSTOLIC BLOOD PRESSURE: 105 MMHG | WEIGHT: 134 LBS | RESPIRATION RATE: 18 BRPM | DIASTOLIC BLOOD PRESSURE: 70 MMHG | BODY MASS INDEX: 26.17 KG/M2 | OXYGEN SATURATION: 97 % | HEART RATE: 73 BPM

## 2019-08-02 DIAGNOSIS — G43.009 MIGRAINE WITHOUT AURA AND WITHOUT STATUS MIGRAINOSUS, NOT INTRACTABLE: Primary | ICD-10-CM

## 2019-08-02 DIAGNOSIS — G44.209 TENSION HEADACHE: ICD-10-CM

## 2019-08-02 PROCEDURE — 99205 OFFICE O/P NEW HI 60 MIN: CPT | Performed by: PSYCHIATRY & NEUROLOGY

## 2019-08-02 NOTE — LETTER
8/2/2019         RE: Nikki Hardin  [address] [facility] [id number]        Dear Colleague,    Thank you for referring your patient, Nikki Hardin, to the [facility]. Please see a copy of my visit note below.    INITIAL NEUROLOGY CONSULTATION    DATE OF VISIT: 8/2/2019  CLINIC LOCATION: [facility]  MRN: [id number]  PATIENT NAME: Nikki Hardin  YOB: 1970    PRIMARY CARE PROVIDER: Nohelia Rodrigez MD     REASON FOR VISIT:   Chief Complaint   Patient presents with     Headache     started the age of 14. Headache lasting longer than usual. Medication not effective. Was prescribed injection of imitrex and oral. Have not tried those medication yet      HISTORY OF PRESENT ILLNESS:                                                    Ms. Nikki Hardin is 49 year old left handed female patient with past medical history of depression, anxiety, irritable bowel syndrome, anemia, and lymphocytic colitis, who was seen in consultation today requested by Nohelia Rodrigez MD, for headache management.    Per patient's report, she has long-standing history of chronic headaches started at age 14 that fluctuated in frequency and severity throughout the years.  Over the last several months, headaches became more frequent and lasting longer than usual.  It resulted in emergency room visit when she was in [place] in May.    At the present time, intermittent stabbing/throbbing 5-6/10 headache occurs 2-3 times per week lasting several hours without additional associated symptoms.  It is located in the periorbital region radiating to the temple and the occipital area/neck on either side, right more frequently than left.  Approximately once per month it increases to 8/10.  Associated symptoms at that time include light sensitivity, nausea, emesis, and intolerance of physical activity.  It might last up to 2 days without treatment.    The  patient also has moderate pressure headaches several times per week that respond well to ibuprofen.  If not treated, they might evolve into severe headaches, as described above.    For headache prevention in the past she tried propranolol (not effective) and Topamax (intolerable cognitive side effects).  She is interested to try a different preventive medications, including newer injectable medications, for migraine prevention.    For acute therapy she was on Maxalt, but felt that it lost effectiveness and asked for Imitrex during her last visit with primary care in July 2019.  She did not try it yet because recently she got enrolled in the study for acute migraine treatment with [id number] nasal spray (CGRP antagonist) and was told not to start any new medications.  In the distant past tried ergotamine derivatives, but does not remember the effect.  Zomig worked well, but was not covered by her insurance.    She was also taking ibuprofen several times per week (at least 4) and 650 mg of Tylenol once per week, but was switched to flurbiprofen by her primary care provider.  Completed her 14-day course yesterday.  Today she already took ibuprofen for headache.     The patient reports that her sleep is 7 to 8 hours per night, though still feels tired in the morning.  She eats 2 meals per day regularly, but frequently skips breakfast.  She drinks 24 ounces of fluids.  She rates her stress level as high.    Most recent labs from November 2018 include unremarkable CMP, normal TSH, free T3/free T4, testosterone level, and CBC.    Thoracic spine MRI from 2006 demonstrated minimal degenerative disc dehydration at multiple levels without evidence of disc herniation, fracture, or mass.    No prior brain imaging.  No additional useful information is available in Care Everywhere, which was reviewed.    Review of Systems - the patient endorses insomnia, fatigue, weight gain, arthritis, anxiety, depression, chronic diarrhea, nausea,  vomiting, and irritable bowel syndrome.  All of them have been previously discussed with other medical providers. Otherwise, she denies any other complaints on 14-point comprehensive review of systems.  PAST MEDICAL/SURGICAL HISTORY:                                                    I personally reviewed patient's past medical and surgical history with the patient at today's visit.  Patient Active Problem List   Diagnosis     Irritable bowel syndrome     Other acne     Migraine with aura and without status migrainosus, not intractable     Depression with anxiety     Serologic abnormality     Sedimentation rate elevation     Elevated vitamin B12 level     Anemia, unspecified type     Fatigue, unspecified type     Hypervitaminosis B6     Lymphocytic colitis     Papanicolaou smear of cervix with atypical squamous cells of undetermined significance (ASC-US)     Left knee pain     Past Medical History:   Diagnosis Date     Anxiety      Colitis     lymphocytic colitis ?     Depressive disorder, not elsewhere classified     sees psych- Dr Blue     Migraine, unspecified, without mention of intractable migraine without mention of status migrainosus age 14 yrs     Other acne      PAIN IN THORACIC SPINE [724.1] 2006    chiropractic care     Raynaud disease     ? connective tissue dz.  ? sogren's     Past Surgical History:   Procedure Laterality Date     BREAST BIOPSY, RT/LT Left      C IUD,MIRENA  2005     C NONSPECIFIC PROCEDURE       X 3     ESOPHAGOSCOPY, GASTROSCOPY, DUODENOSCOPY (EGD), COMBINED  10/10     HC ABLATION, ENDOMETRIAL, THERMAL, W/O HYSTEROSCOPIC GUIDANCE  2010     HC COLONOSCOPY THRU STOMA, DIAGNOSTIC  2004     HC COLONOSCOPY THRU STOMA, DIAGNOSTIC  5/10     HYSTEROSCOPY      D&C     MEDICATIONS:                                                    I personally reviewed patient's medications and allergies with the patient at today's visit.  Current Outpatient Medications on File Prior  to Visit:  acetaminophen (TYLENOL) 325 MG tablet Take 2 tablets by mouth every 4 hours as needed for pain.   atovaquone-proguanil (MALARONE) 250-100 MG tablet Take 1 tablet by mouth daily Start 2 days before exposure to Malaria and continue daily till  7 days after exposure.   Calcium-Magnesium-Zinc 167-83-8 MG TABS    cholecalciferol 5000 UNITS CAPS Take 1 capsule (5,000 Units) by mouth daily FOR VITAMIN D DEFICIENCY (LOW VITAMIN D)   cholestyramine (QUESTRAN) 4 G Packet Take 2 packets by mouth daily    finasteride (PROSCAR) 5 MG tablet TK ONE-HALF T PO QD   FLUoxetine (PROZAC) 40 MG capsule TAKE 1 CAPSULE BY MOUTH DAILY.   IBUPROFEN PO Take 200 mg by mouth Takes 3 tablets PRN   loperamide (LOPERAMIDE A-D) 2 MG tablet Take 2 mg by mouth 2 times daily    rizatriptan (MAXALT) 10 MG tablet TAKE 1 TABLET AT ONSET OF HEADACHE MAY REPEAT IN 2 HOURS AS NEEDED MAX. OF 2 TABLETS PER DAY   vitamin B complex with vitamin C (VITAMIN  B COMPLEX) TABS tablet Take 1 tablet by mouth daily    [] azithromycin (ZITHROMAX) 500 MG tablet Take 1 tablet (500 mg) by mouth daily for 3 doses Take 1 tablet a day for up to 3 days for severe diarrhea   flurbiprofen (ANSAID) 50 MG tablet Take 1 tablet (50 mg) by mouth 2 times daily for 14 days   SUMAtriptan (IMITREX) 100 MG tablet Take 1 tablet (100 mg) by mouth at onset of headache for migraine May repeat in 2 hours. Max 4 tablets/24 hours. (Patient not taking: Reported on 2019)   [] SUMAtriptan (IMITREX) 6 MG/0.5ML injection Inject 0.5 mLs (6 mg) Subcutaneous once for 1 dose May repeat dose in 1 hour. Max 12 mg/24 hours.     ALLERGIES:                                                      Allergies   Allergen Reactions     No Known Allergies      FAMILY/SOCIAL HISTORY:                                                    Family and social history was reviewed with the patient at today's visit.  Family history is positive for dementia, migraine and stroke.  Never smoker.  One  alcohol drink per month.  Denies recreational drug use.  , lives with family.  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  Plans to go to [facility] for 1 month since September (work-related).  REVIEW OF SYSTEMS:                                                    Patient has completed a [facility] Patient Health History, including a 14-system review, which was personally reviewed, and pertinent positives are listed in HPI. She denies any additional problems on the further questioning.  EXAM:                                                    VITAL SIGNS:   /70 (BP Location: Left arm, Patient Position: Sitting, Cuff Size: Adult Regular)   Pulse 73   Temp 98  F (36.7  C) (Oral)   Resp 18   Wt 60.8 kg (134 lb)   SpO2 97%   BMI 26.17 kg/m    Mini-Cog Assessment:  Mini Cog Assessment  Clock Draw Score: 2 Normal  3 Item Recall: 3 objects recalled  Mini Cog Total Score: 5  Administered by: : Trell Moss MA    General: pt is in NAD, cooperative.  Skin: normal turgor, moist mucous membranes, no lesions/rashes noticed.  HEENT: ATNC, EOMI, PERRL, white sclera, normal conjunctiva, no nystagmus or ptosis. No carotid bruits bilaterally.  Respiratory: lung sounds clear to auscultation bilaterally, no crackles, wheezes, rhonchi. Symmetric lung excursion, no accessory respiratory muscle use.  Cardiovascular: normal S1/S2, no murmurs/rubs/gallops.   Abdomen: Not distended.  : deferred.    Neurological:  Mental: alert, follows commands, Mini Cog Total Score: 5/5 with 3/3 on memory recall, no aphasia or dysarthria. Fund of knowledge is appropriate for age.  Cranial Nerves:  CN II: visual acuity - able to accurately count fingers with each eye. Visual fields intact, fundi: discs sharp, no papilledema and normal vessels bilaterally.  CN III, IV, VI: EOM intact, pupils equal and reactive  CN V: facial sensation nl  CN VII: face symmetric, no facial droop  CN VIII: hearing  normal  CN IX: palate elevation symmetric, uvula at midline  CN XI SCM normal, shoulder shrug nl  CN XII: tongue midline  Motor: Strength: 5/5 in all major groups of all extremities. Normal tone. No abnormal movements. No pronator drift b/l.  Reflexes: Triceps, biceps, brachioradialis, patellar, and achilles reflexes normal and symmetric. No clonus noted. Toes are down-going b/l.   Sensory: temperature, light touch, pinprick, and vibration intact. Romberg: negative.  Coordination: FNF and heel-shin tests intact b/l.   Gait:  Normal, able to tandem, toe, and heel walk.  DATA:   LABS/IMAGING/OTHER STUDIES: I reviewed pertinent medical records, including Care Everywhere, as detailed in the history of present illness.  ASSESSMENT AND PLAN:      ASSESSMENT: Nikki Hardin is a 49 year old female patient with past medical history of depression, anxiety, irritable bowel syndrome, anemia, and lymphocytic colitis, who presents with headache worsening over the last several months.    We had a prolonged discussion with the patient regarding her symptoms.  Her neurologic exam today is non-focal.  She did not have previous brain imaging, and currently does not have indications to do it.  Brain MRI without contrast might be considered in the future if her headaches continue to worsen, become resistant to treatment, or the patient develops focal neurological symptoms.    The clinical presentation is most likely consistent with multifactorial headache disorder, including migrainous and tension type components.  In addition, she may also have medication overuse component.  We reviewed in detail these diagnoses, available treatment options, and the plan, as summarized below.    DIAGNOSES:    ICD-10-CM    1. Migraine without aura and without status migrainosus, not intractable G43.009    2. Tension headache G44.209      PLAN: At today's visit we thoroughly discussed various diagnostic possibilities for patient's symptoms that  are most likely consistent with multifactorial headache disorder (migraine and tension headaches).  We also reviewed available treatment options, possible future evaluation (brain MRI), and the plan.    For headache prevention:  1.  Riboflavin 400 mg every morning for the next 3 months.  I advised the patient to contact my clinic with any intolerable side effects and give me an update in 4-6 weeks after starting this medication.  2. Other future options include Lucho-relief, Effexor (in place of currently taken fluoxetine), Nortriptyline, Amitriptyline, doxepin, Depakote, verapamil, CGRP antagonists (Ajovy or others), and neurostimulator devices (Cefaly).  She inquired with her insurance company and was told that Ajovy is covered, but Aimovig is not.  For acute headache therapy:  1.  I recommended to try Imitrex at the onset of intolerable headache.  I counseled her to limit use to less than 9 days/month.  2.  We discussed that she may also use ibuprofen or Naproxen 500 mg, but should take it with food and limit use of all analgesics to less that 15 days/month.    Reviewed non-pharmacological headache prevention measures include proper sleep hygiene, regular meals, adequate hydration, regular aerobic exercise, and stress reduction techniques.    I instructed the patient to keep the headache diary and bring it to the next follow up visit or upload it via My Chart.    Next follow-up appointment is in the next 3 months or earlier if needed.    Total Time:  83 minutes with > 50% spent counseling the patient on stated above assessment and recommendations, including nature of the diagnosis, possible future w/u, available treatment options, and proposed plan.  Additional time was used to  answer questions regarding patient's symptoms, my recommendations, and the plan.    Kamron Castro MD  / Neurology  [place]  (Chart documentation was completed in part with Dragon voice-recognition software. Even though  reviewed, some grammatical, spelling, and word errors may remain.)            Again, thank you for allowing me to participate in the care of your patient.        Sincerely,        Kamron Castro MD

## 2019-08-02 NOTE — PATIENT INSTRUCTIONS
AFTER VISIT SUMMARY (AVS):    At today's visit we thoroughly discussed various diagnostic possibilities for your symptoms that are most likely consistent with multifactorial headache disorder (migraine and tension headaches).  We also reviewed available treatment options, possible future evaluation (brain MRI), and the plan.    For headache prevention:  1.  Riboflavin 400 mg every morning for the next 3 months.  Please contact my clinic with any intolerable side effects and give me an update in 4-6 weeks after starting this medication.  2. Other future options include Lucho-relief, Effexor, Nortriptyline, Amitriptyline, doxepin, Depakote, verapamil, CGRP antagonists (Ajovy), and neurostimulator devices (Cefaly).  For acute headache therapy:  1.  Try Imitrex at the onset of intolerable headache. Limit use to less than 9 days/month.  2. May also use ibuprofen or Naproxen 500 mg, but take it with food and limit use of all analgesics to less that 15 days/month.    Reviewed non-pharmacological headache prevention measures include proper sleep hygiene, regular meals, adequate hydration, regular aerobic exercise, and stress reduction techniques.    Please keep the headache diary and bring it to the next follow up visit or upload it via My Chart.    Next follow-up appointment is in the next 3 months or earlier if needed.    Please do not hesitate to call me with any questions or concerns.    Thanks.

## 2019-08-15 ENCOUNTER — TELEPHONE (OUTPATIENT)
Dept: FAMILY MEDICINE | Facility: CLINIC | Age: 49
End: 2019-08-15

## 2019-08-15 NOTE — TELEPHONE ENCOUNTER
Reviewed information on dengue fever information and preventative measures. Advised to review information on insect precautions in [facility] book. Daytime feeder generally.    Patient acknowledged understanding.    Jacy Chavez RN

## 2019-08-15 NOTE — TELEPHONE ENCOUNTER
Reason for Call:  Other call back    Detailed comments: Patient going ot [facility] and got notification about Gennge Fever. Please call patient is concerned. Can leave a message     Phone Number Patient can be reached at: Cell number on file:    Telephone Information:   Mobile [phone]       Best Time: any    Can we leave a detailed message on this number? YES    Call taken on 8/15/2019 at 9:58 AM by Nori Dlearosa

## 2019-10-01 ENCOUNTER — HEALTH MAINTENANCE LETTER (OUTPATIENT)
Age: 49
End: 2019-10-01

## 2019-11-22 ENCOUNTER — OFFICE VISIT (OUTPATIENT)
Dept: FAMILY MEDICINE | Facility: CLINIC | Age: 49
End: 2019-11-22
Payer: COMMERCIAL

## 2019-11-22 VITALS
HEIGHT: 60 IN | OXYGEN SATURATION: 98 % | SYSTOLIC BLOOD PRESSURE: 104 MMHG | WEIGHT: 135 LBS | RESPIRATION RATE: 14 BRPM | HEART RATE: 66 BPM | DIASTOLIC BLOOD PRESSURE: 68 MMHG | BODY MASS INDEX: 26.5 KG/M2 | TEMPERATURE: 97.9 F

## 2019-11-22 DIAGNOSIS — Z00.00 ROUTINE GENERAL MEDICAL EXAMINATION AT A HEALTH CARE FACILITY: Primary | ICD-10-CM

## 2019-11-22 DIAGNOSIS — F32.0 MILD MAJOR DEPRESSION (H): ICD-10-CM

## 2019-11-22 DIAGNOSIS — G43.109 MIGRAINE WITH AURA AND WITHOUT STATUS MIGRAINOSUS, NOT INTRACTABLE: ICD-10-CM

## 2019-11-22 DIAGNOSIS — K52.832 LYMPHOCYTIC COLITIS: ICD-10-CM

## 2019-11-22 PROBLEM — R89.4 SEROLOGIC ABNORMALITY: Status: RESOLVED | Noted: 2017-04-13 | Resolved: 2019-11-22

## 2019-11-22 PROBLEM — R53.83 FATIGUE, UNSPECIFIED TYPE: Status: RESOLVED | Noted: 2017-04-13 | Resolved: 2019-11-22

## 2019-11-22 PROBLEM — M25.562 LEFT KNEE PAIN: Status: RESOLVED | Noted: 2019-07-16 | Resolved: 2019-11-22

## 2019-11-22 LAB
ALBUMIN SERPL-MCNC: 3.8 G/DL (ref 3.4–5)
ALP SERPL-CCNC: 65 U/L (ref 40–150)
ALT SERPL W P-5'-P-CCNC: 21 U/L (ref 0–50)
ANION GAP SERPL CALCULATED.3IONS-SCNC: 4 MMOL/L (ref 3–14)
AST SERPL W P-5'-P-CCNC: 16 U/L (ref 0–45)
BASOPHILS # BLD AUTO: 0 10E9/L (ref 0–0.2)
BASOPHILS NFR BLD AUTO: 0.5 %
BILIRUB SERPL-MCNC: 0.3 MG/DL (ref 0.2–1.3)
BUN SERPL-MCNC: 13 MG/DL (ref 7–30)
CALCIUM SERPL-MCNC: 9.6 MG/DL (ref 8.5–10.1)
CHLORIDE SERPL-SCNC: 107 MMOL/L (ref 94–109)
CHOLEST SERPL-MCNC: 225 MG/DL
CO2 SERPL-SCNC: 26 MMOL/L (ref 20–32)
CREAT SERPL-MCNC: 0.77 MG/DL (ref 0.52–1.04)
DIFFERENTIAL METHOD BLD: NORMAL
EOSINOPHIL # BLD AUTO: 0.2 10E9/L (ref 0–0.7)
EOSINOPHIL NFR BLD AUTO: 2.6 %
ERYTHROCYTE [DISTWIDTH] IN BLOOD BY AUTOMATED COUNT: 13.2 % (ref 10–15)
GFR SERPL CREATININE-BSD FRML MDRD: 90 ML/MIN/{1.73_M2}
GLUCOSE SERPL-MCNC: 86 MG/DL (ref 70–99)
HCT VFR BLD AUTO: 37.1 % (ref 35–47)
HDLC SERPL-MCNC: 80 MG/DL
HGB BLD-MCNC: 12.2 G/DL (ref 11.7–15.7)
HIV 1+2 AB+HIV1 P24 AG SERPL QL IA: NONREACTIVE
LDLC SERPL CALC-MCNC: 132 MG/DL
LYMPHOCYTES # BLD AUTO: 1.8 10E9/L (ref 0.8–5.3)
LYMPHOCYTES NFR BLD AUTO: 27.7 %
MCH RBC QN AUTO: 28.7 PG (ref 26.5–33)
MCHC RBC AUTO-ENTMCNC: 32.9 G/DL (ref 31.5–36.5)
MCV RBC AUTO: 87 FL (ref 78–100)
MONOCYTES # BLD AUTO: 0.4 10E9/L (ref 0–1.3)
MONOCYTES NFR BLD AUTO: 6.5 %
NEUTROPHILS # BLD AUTO: 4 10E9/L (ref 1.6–8.3)
NEUTROPHILS NFR BLD AUTO: 62.7 %
NONHDLC SERPL-MCNC: 145 MG/DL
PLATELET # BLD AUTO: 315 10E9/L (ref 150–450)
POTASSIUM SERPL-SCNC: 4.4 MMOL/L (ref 3.4–5.3)
PROT SERPL-MCNC: 7.5 G/DL (ref 6.8–8.8)
RBC # BLD AUTO: 4.25 10E12/L (ref 3.8–5.2)
SODIUM SERPL-SCNC: 137 MMOL/L (ref 133–144)
TRIGL SERPL-MCNC: 64 MG/DL
WBC # BLD AUTO: 6.4 10E9/L (ref 4–11)

## 2019-11-22 PROCEDURE — 87624 HPV HI-RISK TYP POOLED RSLT: CPT | Performed by: FAMILY MEDICINE

## 2019-11-22 PROCEDURE — G0145 SCR C/V CYTO,THINLAYER,RESCR: HCPCS | Performed by: FAMILY MEDICINE

## 2019-11-22 PROCEDURE — G0124 SCREEN C/V THIN LAYER BY MD: HCPCS | Performed by: FAMILY MEDICINE

## 2019-11-22 PROCEDURE — 36415 COLL VENOUS BLD VENIPUNCTURE: CPT | Performed by: FAMILY MEDICINE

## 2019-11-22 PROCEDURE — 99396 PREV VISIT EST AGE 40-64: CPT | Performed by: FAMILY MEDICINE

## 2019-11-22 PROCEDURE — 80061 LIPID PANEL: CPT | Performed by: FAMILY MEDICINE

## 2019-11-22 PROCEDURE — 87389 HIV-1 AG W/HIV-1&-2 AB AG IA: CPT | Performed by: FAMILY MEDICINE

## 2019-11-22 PROCEDURE — 80053 COMPREHEN METABOLIC PANEL: CPT | Performed by: FAMILY MEDICINE

## 2019-11-22 PROCEDURE — 85025 COMPLETE CBC W/AUTO DIFF WBC: CPT | Performed by: FAMILY MEDICINE

## 2019-11-22 RX ORDER — ANTIARTHRITIC COMBINATION NO.2 900 MG
1000 TABLET ORAL DAILY
COMMUNITY

## 2019-11-22 ASSESSMENT — ANXIETY QUESTIONNAIRES
7. FEELING AFRAID AS IF SOMETHING AWFUL MIGHT HAPPEN: SEVERAL DAYS
3. WORRYING TOO MUCH ABOUT DIFFERENT THINGS: SEVERAL DAYS
4. TROUBLE RELAXING: NOT AT ALL
1. FEELING NERVOUS, ANXIOUS, OR ON EDGE: SEVERAL DAYS
5. BEING SO RESTLESS THAT IT IS HARD TO SIT STILL: NOT AT ALL
7. FEELING AFRAID AS IF SOMETHING AWFUL MIGHT HAPPEN: SEVERAL DAYS
6. BECOMING EASILY ANNOYED OR IRRITABLE: NOT AT ALL
GAD7 TOTAL SCORE: 3
GAD7 TOTAL SCORE: 3
2. NOT BEING ABLE TO STOP OR CONTROL WORRYING: NOT AT ALL
GAD7 TOTAL SCORE: 3

## 2019-11-22 ASSESSMENT — ENCOUNTER SYMPTOMS
BREAST MASS: 0
COUGH: 0
NERVOUS/ANXIOUS: 1
FREQUENCY: 0
SHORTNESS OF BREATH: 0
DYSURIA: 0
SORE THROAT: 0
JOINT SWELLING: 0
FEVER: 0
NAUSEA: 0
EYE PAIN: 0
HEADACHES: 1
PARESTHESIAS: 0
CONSTIPATION: 0
CHILLS: 0
DIZZINESS: 0
DIARRHEA: 1
ABDOMINAL PAIN: 0
HEMATURIA: 0
WEAKNESS: 0
ARTHRALGIAS: 1
HEARTBURN: 0
PALPITATIONS: 0
MYALGIAS: 1
HEMATOCHEZIA: 0

## 2019-11-22 ASSESSMENT — MIFFLIN-ST. JEOR: SCORE: 1148.86

## 2019-11-22 ASSESSMENT — PATIENT HEALTH QUESTIONNAIRE - PHQ9
SUM OF ALL RESPONSES TO PHQ QUESTIONS 1-9: 8
SUM OF ALL RESPONSES TO PHQ QUESTIONS 1-9: 8
10. IF YOU CHECKED OFF ANY PROBLEMS, HOW DIFFICULT HAVE THESE PROBLEMS MADE IT FOR YOU TO DO YOUR WORK, TAKE CARE OF THINGS AT HOME, OR GET ALONG WITH OTHER PEOPLE: SOMEWHAT DIFFICULT

## 2019-11-22 NOTE — PROGRESS NOTES
SUBJECTIVE:   CC: Nikki Hardin is an 49 year old woman who presents for preventive health visit.     Healthy Habits:     Getting at least 3 servings of Calcium per day:  NO    Bi-annual eye exam:  Yes    Dental care twice a year:  Yes    Sleep apnea or symptoms of sleep apnea:  Daytime drowsiness    Diet:  Regular (no restrictions)    Frequency of exercise:  1 day/week    Duration of exercise:  45-60 minutes    Taking medications regularly:  Yes    Medication side effects:  None    PHQ-2 Total Score: 2    Additional concerns today:  Yes    Current Chronic Medical Conditions  Depression  Migraines HAs  Lymphocytic colitis- followed by GI     Social History  .  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  - 3 kids 25 daughter- struggling with EtOH issues, 23 daughter-struggling with mental health issues, 18 son- applying to [employer] in [place].  Nonsmoker.    HCM  PAP 2016 ASCUS- never had Follow-up.  Annual mammograms.  Colonoscopy 5-2010.    Today's PHQ-2 Score:   PHQ-2 ( 1999 Pf[employer]) 11/22/2019   Q1: Little interest or pleasure in doing things 1   Q2: Feeling down, depressed or hopeless 1   PHQ-2 Score 2   Q1: Little interest or pleasure in doing things Several days   Q2: Feeling down, depressed or hopeless Several days   PHQ-2 Score 2       Abuse: Current or Past(Physical, Sexual or Emotional)- No  Do you feel safe in your environment? Yes        Social History     Tobacco Use     Smoking status: Never Smoker     Smokeless tobacco: Never Used   Substance Use Topics     Alcohol use: Yes     Comment: occasional- once monthly         Alcohol Use 11/22/2019   Prescreen: >3 drinks/day or >7 drinks/week? No   Prescreen: >3 drinks/day or >7 drinks/week? -       Reviewed orders with patient.  Reviewed health maintenance and updated orders accordingly - Yes  BP Readings from Last 3 Encounters:   11/22/19 104/68   08/02/19 105/70   07/22/19 96/64     Wt Readings from Last 3 Encounters:   19 61.2 kg (135 lb)   19 60.8 kg (134 lb)   19 62.1 kg (136 lb 12.8 oz)                  Patient Active Problem List   Diagnosis     Irritable bowel syndrome     Other acne     Migraine with aura and without status migrainosus, not intractable     Mild major depression (H)     Sedimentation rate elevation     Elevated vitamin B12 level     Anemia, unspecified type     Hypervitaminosis B6     Lymphocytic colitis     Papanicolaou smear of cervix with atypical squamous cells of undetermined significance (ASC-US)     Past Surgical History:   Procedure Laterality Date     BREAST BIOPSY, RT/LT Left      C IUD,MIRENA  2005     C NONSPECIFIC PROCEDURE       X 3     ESOPHAGOSCOPY, GASTROSCOPY, DUODENOSCOPY (EGD), COMBINED  10/10     HC ABLATION, ENDOMETRIAL, THERMAL, W/O HYSTEROSCOPIC GUIDANCE  2010     HC COLONOSCOPY THRU STOMA, DIAGNOSTIC  2004     HC COLONOSCOPY THRU STOMA, DIAGNOSTIC  5/10     HYSTEROSCOPY      D&C       Social History     Tobacco Use     Smoking status: Never Smoker     Smokeless tobacco: Never Used   Substance Use Topics     Alcohol use: Yes     Comment: occasional- once monthly     Family History   Problem Relation Age of Onset     Neurologic Disorder Mother         MIGRIANES     C.A.D. Father         in his 60's     Depression Father      Heart Disease Father      Lipids Father      Hypertension Father      Arthritis Father      Sleep Disorder Father      Cerebrovascular Disease Father 78     Cancer - colorectal Paternal Grandmother      Cancer Maternal Grandfather         stomach     Heart Disease Maternal Grandfather      Depression Brother      Depression Brother      Asthma Daughter      Heart Disease Paternal Grandfather      Breast Cancer Maternal Aunt      Diabetes No family hx of          Current Outpatient Medications   Medication Sig Dispense Refill     acetaminophen (TYLENOL) 325 MG tablet Take 2 tablets by mouth  every 4 hours as needed for pain. 100 tablet 0     Biotin 5000 MCG TABS Take 1,000 mcg by mouth daily       bismuth subsalicylate (PEPTO-BISMOL) 262 MG TABS Take by mouth as needed (Take 6-9 tabs PO QD PRN)       Calcium-Magnesium-Zinc 167-83-8 MG TABS        cholecalciferol 5000 UNITS CAPS Take 1 capsule (5,000 Units) by mouth daily FOR VITAMIN D DEFICIENCY (LOW VITAMIN D) 100 capsule 3     cholestyramine (QUESTRAN) 4 G Packet Take 2 packets by mouth daily        finasteride (PROSCAR) 5 MG tablet TK ONE-HALF T PO QD  5     FLUoxetine (PROZAC) 40 MG capsule TAKE 1 CAPSULE BY MOUTH DAILY. 90 capsule 1     IBUPROFEN PO Take 200 mg by mouth Takes 3 tablets PRN       loperamide (LOPERAMIDE A-D) 2 MG tablet Take 2 mg by mouth daily as needed for diarrhea        Riboflavin 100 MG TABS Take 400 mg by mouth       SUMAtriptan (IMITREX) 100 MG tablet Take 1 tablet (100 mg) by mouth at onset of headache for migraine May repeat in 2 hours. Max 4 tablets/24 hours. 20 tablet 1     SUMAtriptan (IMITREX) 6 MG/0.5ML injection Inject 0.5 mLs (6 mg) Subcutaneous once for 1 dose May repeat dose in 1 hour. Max 12 mg/24 hours. 0.5 mL 0     Allergies   Allergen Reactions     Hydrocodone Nausea and Vomiting     Recent Labs   Lab Test 11/12/18  1126 08/09/18  0800 03/23/17  1806 06/25/13  1107 12/26/11  0959   LDL  --  137*  --  109 136*   HDL  --  71  --  70 78   TRIG  --  98  --  56 62   ALT 30  --  26  --  10   CR 0.69  --  0.81  --  0.86   GFRESTIMATED >90  --  75  --  72   GFRESTBLACK >90  --  >90  African American GFR Calc    --  87   POTASSIUM 4.4  --  4.0  --  4.2   TSH 1.34  --  2.88 3.32 1.00        Mammogram Screening: Patient over age 50, mutual decision to screen reflected in health maintenance.    Pertinent mammograms are reviewed under the imaging tab.  History of abnormal Pap smear: NO - age 30-65 PAP every 5 years with negative HPV co-testing recommended  PAP / HPV 6/5/2013 1/30/2012 6/4/2010   PAP ASC-US(A) ASC-US(A) NIL      Reviewed and updated as needed this visit by clinical staff  Allergies  Meds         Reviewed and updated as needed this visit by Provider            Review of Systems   Constitutional: Negative for chills and fever.   HENT: Negative for congestion, ear pain, hearing loss and sore throat.    Eyes: Negative for pain and visual disturbance.   Respiratory: Negative for cough and shortness of breath.    Cardiovascular: Negative for chest pain, palpitations and peripheral edema.   Gastrointestinal: Positive for diarrhea. Negative for abdominal pain, constipation, heartburn, hematochezia and nausea.   Breasts:  Negative for tenderness, breast mass and discharge.   Genitourinary: Negative for dysuria, frequency, genital sores, hematuria, pelvic pain, urgency, vaginal bleeding and vaginal discharge.   Musculoskeletal: Positive for arthralgias and myalgias. Negative for joint swelling.   Skin: Negative for rash.   Neurological: Positive for headaches. Negative for dizziness, weakness and paresthesias.   Psychiatric/Behavioral: Positive for mood changes. The patient is nervous/anxious.           OBJECTIVE:   /68   Pulse 66   Temp 97.9  F (36.6  C)   Resp 14   Ht 1.524 m (5')   Wt 61.2 kg (135 lb)   SpO2 98%   BMI 26.37 kg/m    Physical Exam  GENERAL: healthy, alert and no distress  EYES: Eyes grossly normal to inspection, PERRL and conjunctivae and sclerae normal  HENT: ear canals and TM's normal, nose and mouth without ulcers or lesions  NECK: no adenopathy, no asymmetry, masses, or scars and thyroid normal to palpation  RESP: lungs clear to auscultation - no rales, rhonchi or wheezes  CV: regular rate and rhythm, normal S1 S2, no S3 or S4, no murmur, click or rub, no peripheral edema and peripheral pulses strong  ABDOMEN: soft, nontender, no hepatosplenomegaly, no masses and bowel sounds normal  MS: no gross musculoskeletal defects noted, no edema  SKIN: no suspicious lesions or rashes  NEURO: Normal  strength and tone, mentation intact and speech normal  PSYCH: mentation appears normal, affect normal/bright    ASSESSMENT/PLAN:   1. Routine general medical examination at a health care facility    - CBC with platelets differential  - Comprehensive metabolic panel  - Lipid panel reflex to direct LDL Fasting  - HIV Antigen Antibody Combo  - Pap imaged thin layer screen with HPV - recommended age 30 - 65 years (select HPV order below)  - HPV High Risk Types DNA Cervical    Fasting labs today.  PAP and pelvic updated with past hx of ASCUS PAP 2016.  Continue good diet and exercise.    2. Mild major depression (H)    Stable on fluoxetine.    3. Migraine with aura and without status migrainosus, not intractable    Stable on Imitrex.    4. Lymphocytic colitis    Followed by GI.    COUNSELING:  Reviewed preventive health counseling, as reflected in patient instructions       Regular exercise       Healthy diet/nutrition    Estimated body mass index is 26.37 kg/m  as calculated from the following:    Height as of this encounter: 1.524 m (5').    Weight as of this encounter: 61.2 kg (135 lb).    Weight management plan: Discussed healthy diet and exercise guidelines     reports that she has never smoked. She has never used smokeless tobacco.      Counseling Resources:  ATP IV Guidelines  Pooled Cohorts Equation Calculator  Breast Cancer Risk Calculator  FRAX Risk Assessment  ICSI Preventive Guidelines  Dietary Guidelines for Americans, 2010  wise.io's MyPlate  ASA Prophylaxis  Lung CA Screening    Angelique Lott MD  [facility]  Answers for HPI/ROS submitted by the patient on 11/22/2019   Annual Exam:  If you checked off any problems, how difficult have these problems made it for you to do your work, take care of things at home, or get along with other people?: Somewhat difficult  PHQ9 TOTAL SCORE: 8  BYRON 7 TOTAL SCORE: 3

## 2019-11-23 ASSESSMENT — ANXIETY QUESTIONNAIRES: GAD7 TOTAL SCORE: 3

## 2019-11-23 ASSESSMENT — PATIENT HEALTH QUESTIONNAIRE - PHQ9: SUM OF ALL RESPONSES TO PHQ QUESTIONS 1-9: 8

## 2019-11-30 LAB
COPATH REPORT: ABNORMAL
PAP: ABNORMAL

## 2019-12-02 LAB
FINAL DIAGNOSIS: NORMAL
HPV HR 12 DNA CVX QL NAA+PROBE: NEGATIVE
HPV16 DNA SPEC QL NAA+PROBE: NEGATIVE
HPV18 DNA SPEC QL NAA+PROBE: NEGATIVE
SPECIMEN DESCRIPTION: NORMAL
SPECIMEN SOURCE CVX/VAG CYTO: NORMAL

## 2019-12-03 ENCOUNTER — MYC MEDICAL ADVICE (OUTPATIENT)
Dept: FAMILY MEDICINE | Facility: CLINIC | Age: 49
End: 2019-12-03

## 2019-12-04 NOTE — TELEPHONE ENCOUNTER
Routing to provider - Oren - Patient has additional questions regarding results - please see mychart    PAP 11/22/19    Written by Bren Miller, RN on 12/4/2019  6:12 AM   Costa Jordan,     Your Pap test shows an ASCUS (Atypical Squamous Cells of Undetermined Significance) result. This indicates a mild change only. The vast majority of patients with this result do not have any significant cervical abnormalities.     The future development of cervical cancer is closely linked with certain high risk types of HPV. Your result was negative for HPV. We recommend that you have your next PAP smear with an HPV test done in 3 years however, you will still need annual wellness exams.     Bren Miller, RN-Pap Tracking     Last office visit 11/22/19    No additional genitourinary hx noted     Urinary   ASCUS of cervix with negative high risk HPV

## 2019-12-18 DIAGNOSIS — G43.109 MIGRAINE WITH AURA AND WITHOUT STATUS MIGRAINOSUS, NOT INTRACTABLE: ICD-10-CM

## 2019-12-18 RX ORDER — SUMATRIPTAN 6 MG/.5ML
6 INJECTION, SOLUTION SUBCUTANEOUS ONCE
Qty: 0.5 ML | Refills: 0 | Status: CANCELLED | OUTPATIENT
Start: 2019-12-18 | End: 2019-12-18

## 2019-12-20 ENCOUNTER — MYC MEDICAL ADVICE (OUTPATIENT)
Dept: FAMILY MEDICINE | Facility: CLINIC | Age: 49
End: 2019-12-20

## 2019-12-20 NOTE — TELEPHONE ENCOUNTER
DR Lott wrote rx for injectable imitrex today. We have not gotten notice from pharmacy that prior auth needed but pt states that this will be coming.   We will await notice from pharmacy if this is not able to be filled.   See note to pt    Lisa Mccray, RN, BSN

## 2019-12-30 ENCOUNTER — MYC REFILL (OUTPATIENT)
Dept: FAMILY MEDICINE | Facility: CLINIC | Age: 49
End: 2019-12-30

## 2019-12-30 DIAGNOSIS — G43.109 MIGRAINE WITH AURA AND WITHOUT STATUS MIGRAINOSUS, NOT INTRACTABLE: ICD-10-CM

## 2020-01-02 RX ORDER — SUMATRIPTAN 100 MG/1
100 TABLET, FILM COATED ORAL
Qty: 20 TABLET | Refills: 1 | Status: SHIPPED | OUTPATIENT
Start: 2020-01-02 | End: 2020-04-05

## 2020-01-07 ENCOUNTER — ALLIED HEALTH/NURSE VISIT (OUTPATIENT)
Dept: NURSING | Facility: CLINIC | Age: 50
End: 2020-01-07
Payer: COMMERCIAL

## 2020-01-07 DIAGNOSIS — Z23 NEED FOR HEPATITIS A VACCINATION: Primary | ICD-10-CM

## 2020-01-07 PROCEDURE — 90471 IMMUNIZATION ADMIN: CPT

## 2020-01-07 PROCEDURE — 90632 HEPA VACCINE ADULT IM: CPT

## 2020-01-07 NOTE — NURSING NOTE
Clinic Administered Medication Documentation    MEDICATION LIST:   Injectable Medication Documentation    Patient was given Hep A. Prior to medication administration, verified patients identity using patient s name and date of birth. Patient instructed to remain in clinic for 15 minutes.      Was entire vial of medication used? Yes  Vial/Syringe: Syringe  Expiration Date: 02/26/2022  Was this medication supplied by the patient? No     Prior to immunization administration, verified patients identity using patient s name and date of birth. Please see Immunization Activity for additional information.     Screening Questionnaire for Adult Immunization    Are you sick today?   No   Do you have allergies to medications, food, a vaccine component or latex?   No   Have you ever had a serious reaction after receiving a vaccination?   No   Do you have a long-term health problem with heart, lung, kidney, or metabolic disease (e.g., diabetes), asthma, a blood disorder, no spleen, complement component deficiency, a cochlear implant, or a spinal fluid leak?  Are you on long-term aspirin therapy?   No   Do you have cancer, leukemia, HIV/AIDS, or any other immune system problem?   No   Do you have a parent, brother, or sister with an immune system problem?   No   In the past 3 months, have you taken medications that affect  your immune system, such as prednisone, other steroids, or anticancer drugs; drugs for the treatment of rheumatoid arthritis, Crohn s disease, or psoriasis; or have you had radiation treatments?   No   Have you had a seizure, or a brain or other nervous system problem?   No   During the past year, have you received a transfusion of blood or blood    products, or been given immune (gamma) globulin or antiviral drug?   No   For women: Are you pregnant or is there a chance you could become       pregnant during the next month?   No   Have you received any vaccinations in the past 4 weeks?   No     Immunization  questionnaire answers were all negative.        Per orders of Dr. Cerrato ([facility]), injection of Hep A given by Cely Carrasquillo. Patient instructed to remain in clinic for 15 minutes afterwards, and to report any adverse reaction to me immediately.       Screening performed by Cely Carrasquillo on 1/7/2020 at 8:33 AM.

## 2020-01-09 ENCOUNTER — TRANSFERRED RECORDS (OUTPATIENT)
Dept: HEALTH INFORMATION MANAGEMENT | Facility: CLINIC | Age: 50
End: 2020-01-09

## 2020-01-09 LAB
CREAT SERPL-MCNC: 0.81 MG/DL (ref 0.6–1.02)
GLUCOSE SERPL-MCNC: 83 MG/DL (ref 0.6–1.02)
POTASSIUM SERPL-SCNC: 4.7 MMOL/L (ref 3.5–5.1)

## 2020-01-13 ENCOUNTER — TELEPHONE (OUTPATIENT)
Dept: FAMILY MEDICINE | Facility: CLINIC | Age: 50
End: 2020-01-13

## 2020-01-13 NOTE — TELEPHONE ENCOUNTER
Called [facility] -- they will be resending PA paperwork    MyChart message sent to patient    PA request started on separate TE    Thank You!  Hawa Clark, RN  Triage Nurse

## 2020-01-13 NOTE — TELEPHONE ENCOUNTER
PA Team:   Please initiate PA for: SUMAtriptan (IMITREX) 6 MG/0.5ML injection    Patient reports medication needs PA-- clinic never received any paperwork from pharmacy    Routing high priority as patient requested this on 12/20/2019    Thank You!  Hawa Clark, RN  Triage Nurse

## 2020-01-13 NOTE — TELEPHONE ENCOUNTER
Central Prior Authorization Team   Phone: [phone]    Prior Authorization Not Needed per Insurance    Medication: SUMAtriptan Succinate 6MG/0.5ML auto-injectors  Insurance Company: [employer] - Phone [phone] Fax [phone]  Expected CoPay:      Pharmacy Filling the Rx: [id number] [address]  Pharmacy Notified: Yes  Patient Notified: No    Your PA has been resolved, no additional PA is required.   Called pharmacy and medication will go through insurance on 1/24/20 (patient received tablets on 1/2/20). Since it is the same medication just a different formulation the patient can only fill one at a time. Pharmacy will fill medication on  1/24.

## 2020-01-14 NOTE — TELEPHONE ENCOUNTER
See 01/13/2020 TE for additional information.    [employer] message sent to patient    Thank You!  Hawa Clark, RN  Triage Nurse

## 2020-01-25 ENCOUNTER — MYC REFILL (OUTPATIENT)
Dept: FAMILY MEDICINE | Facility: CLINIC | Age: 50
End: 2020-01-25

## 2020-01-25 DIAGNOSIS — F41.8 DEPRESSION WITH ANXIETY: ICD-10-CM

## 2020-01-25 RX ORDER — FLUOXETINE 40 MG/1
40 CAPSULE ORAL DAILY
Qty: 90 CAPSULE | Refills: 1 | Status: CANCELLED | OUTPATIENT
Start: 2020-01-25

## 2020-01-26 NOTE — TELEPHONE ENCOUNTER
PHQ-9 score:    PHQ-9 SCORE 11/22/2019   PHQ-9 Total Score -   PHQ-9 Total Score MyChart 8 (Mild depression)   PHQ-9 Total Score 8     Bette Lott MD  Pt saw you 11/19  prozac refill  Contraindication pop up with ibuprofen when I go to sign  Also, ok to fill til nov- your office visit notes said to follow up in one year  I will [place] up 9 months  Fix prn  Thanks!     Leah Hernandez RN

## 2020-01-27 RX ORDER — FLUOXETINE 40 MG/1
CAPSULE ORAL
Qty: 90 CAPSULE | Refills: 2 | Status: SHIPPED | OUTPATIENT
Start: 2020-01-27 | End: 2020-10-20

## 2020-01-28 ENCOUNTER — TRANSFERRED RECORDS (OUTPATIENT)
Dept: HEALTH INFORMATION MANAGEMENT | Facility: CLINIC | Age: 50
End: 2020-01-28

## 2020-04-06 ENCOUNTER — TRANSFERRED RECORDS (OUTPATIENT)
Dept: HEALTH INFORMATION MANAGEMENT | Facility: CLINIC | Age: 50
End: 2020-04-06

## 2020-04-06 DIAGNOSIS — G43.109 MIGRAINE WITH AURA AND WITHOUT STATUS MIGRAINOSUS, NOT INTRACTABLE: ICD-10-CM

## 2020-04-08 RX ORDER — SUMATRIPTAN 100 MG/1
TABLET, FILM COATED ORAL
Qty: 20 TABLET | Refills: 1 | OUTPATIENT
Start: 2020-04-08

## 2020-04-08 NOTE — TELEPHONE ENCOUNTER
"Requested Prescriptions   Pending Prescriptions Disp Refills     SUMAtriptan (IMITREX) 100 MG tablet [Pharmacy Med Name: SUMATRIPTAN 100MG TABLETS] 20 tablet 1     Sig: TAKE 1 TABLET(100 MG) BY MOUTH AT ONSET OF HEADACHE FOR MIGRAINE. MAY REPEAT IN 2 HOURS. MAX 4 TABLETS/ 24 HOURS       Serotonin Agonists Failed - 4/6/2020  7:35 AM        Failed - Serotonin Agonist request needs review.     Please review patient's record. If patient has had 8 or more treatments in the past month, please forward to provider.          Passed - Blood pressure under 140/90 in past 12 months     BP Readings from Last 3 Encounters:   11/22/19 104/68   08/02/19 105/70   07/22/19 96/64                 Passed - Recent (12 mo) or future (30 days) visit within the authorizing provider's specialty     Patient has had an office visit with the authorizing provider or a provider within the authorizing providers department within the previous 12 mos or has a future within next 30 days. See \"Patient Info\" tab in inbasket, or \"Choose Columns\" in Meds & Orders section of the refill encounter.              Passed - Medication is active on med list        Passed - Patient is age 18 or older        Passed - No active pregnancy on record        Passed - No positive pregnancy test in past 12 months             "

## 2020-04-08 NOTE — TELEPHONE ENCOUNTER
Refilled yesterday.    Elizabeth Ogden, PharmD  PGY1 Medication Therapy Management Resident  Voicemail: [phone]

## 2020-06-28 ENCOUNTER — MYC REFILL (OUTPATIENT)
Dept: FAMILY MEDICINE | Facility: CLINIC | Age: 50
End: 2020-06-28

## 2020-06-28 DIAGNOSIS — G43.109 MIGRAINE WITH AURA AND WITHOUT STATUS MIGRAINOSUS, NOT INTRACTABLE: ICD-10-CM

## 2020-07-02 RX ORDER — SUMATRIPTAN 100 MG/1
100 TABLET, FILM COATED ORAL
Qty: 20 TABLET | Refills: 1 | Status: SHIPPED | OUTPATIENT
Start: 2020-07-02 | End: 2020-10-01

## 2020-07-15 ENCOUNTER — TRANSFERRED RECORDS (OUTPATIENT)
Dept: HEALTH INFORMATION MANAGEMENT | Facility: CLINIC | Age: 50
End: 2020-07-15

## 2020-07-18 ENCOUNTER — MYC MEDICAL ADVICE (OUTPATIENT)
Dept: FAMILY MEDICINE | Facility: CLINIC | Age: 50
End: 2020-07-18

## 2020-07-20 NOTE — TELEPHONE ENCOUNTER
Writer working remotely--    Triage--please mychart patient the mammogram numbers.    Thanks! Marine Manzano RN

## 2020-07-28 ENCOUNTER — ANCILLARY PROCEDURE (OUTPATIENT)
Dept: MAMMOGRAPHY | Facility: CLINIC | Age: 50
End: 2020-07-28
Attending: FAMILY MEDICINE
Payer: COMMERCIAL

## 2020-07-28 DIAGNOSIS — Z12.31 SCREENING MAMMOGRAM FOR HIGH-RISK PATIENT: ICD-10-CM

## 2020-07-29 ENCOUNTER — TRANSFERRED RECORDS (OUTPATIENT)
Dept: HEALTH INFORMATION MANAGEMENT | Facility: CLINIC | Age: 50
End: 2020-07-29

## 2020-10-01 ENCOUNTER — MYC REFILL (OUTPATIENT)
Dept: FAMILY MEDICINE | Facility: CLINIC | Age: 50
End: 2020-10-01

## 2020-10-01 DIAGNOSIS — G43.109 MIGRAINE WITH AURA AND WITHOUT STATUS MIGRAINOSUS, NOT INTRACTABLE: ICD-10-CM

## 2020-10-02 RX ORDER — SUMATRIPTAN 100 MG/1
100 TABLET, FILM COATED ORAL
Qty: 20 TABLET | Refills: 1 | Status: SHIPPED | OUTPATIENT
Start: 2020-10-02 | End: 2020-12-22

## 2020-10-14 DIAGNOSIS — F41.8 DEPRESSION WITH ANXIETY: ICD-10-CM

## 2020-10-20 RX ORDER — FLUOXETINE 40 MG/1
CAPSULE ORAL
Qty: 90 CAPSULE | Refills: 0 | Status: SHIPPED | OUTPATIENT
Start: 2020-10-20 | End: 2020-12-22

## 2020-10-20 NOTE — TELEPHONE ENCOUNTER
Prescription [place] per [facility] [place].  Cristina Alcala RN

## 2020-12-21 ASSESSMENT — ENCOUNTER SYMPTOMS
HEARTBURN: 0
BREAST MASS: 0
CONSTIPATION: 0
ARTHRALGIAS: 0
SHORTNESS OF BREATH: 0
COUGH: 0
FEVER: 0
SORE THROAT: 0
EYE PAIN: 1
DIARRHEA: 0
FREQUENCY: 0
DIZZINESS: 0
NERVOUS/ANXIOUS: 0
NAUSEA: 0
HEMATURIA: 0
JOINT SWELLING: 0
PALPITATIONS: 0
HEMATOCHEZIA: 0
CHILLS: 0
ABDOMINAL PAIN: 0
MYALGIAS: 0
HEADACHES: 0
WEAKNESS: 0
DYSURIA: 0
PARESTHESIAS: 0

## 2020-12-22 ENCOUNTER — OFFICE VISIT (OUTPATIENT)
Dept: FAMILY MEDICINE | Facility: CLINIC | Age: 50
End: 2020-12-22
Payer: COMMERCIAL

## 2020-12-22 VITALS
TEMPERATURE: 97.8 F | WEIGHT: 139 LBS | DIASTOLIC BLOOD PRESSURE: 62 MMHG | HEART RATE: 70 BPM | OXYGEN SATURATION: 98 % | SYSTOLIC BLOOD PRESSURE: 108 MMHG | RESPIRATION RATE: 14 BRPM | HEIGHT: 61 IN | BODY MASS INDEX: 26.24 KG/M2

## 2020-12-22 DIAGNOSIS — F32.0 MILD MAJOR DEPRESSION (H): ICD-10-CM

## 2020-12-22 DIAGNOSIS — G43.109 MIGRAINE WITH AURA AND WITHOUT STATUS MIGRAINOSUS, NOT INTRACTABLE: ICD-10-CM

## 2020-12-22 DIAGNOSIS — Z00.00 ROUTINE GENERAL MEDICAL EXAMINATION AT A HEALTH CARE FACILITY: Primary | ICD-10-CM

## 2020-12-22 LAB
ALBUMIN SERPL-MCNC: 3.8 G/DL (ref 3.4–5)
ALP SERPL-CCNC: 73 U/L (ref 40–150)
ALT SERPL W P-5'-P-CCNC: 22 U/L (ref 0–50)
ANION GAP SERPL CALCULATED.3IONS-SCNC: 4 MMOL/L (ref 3–14)
AST SERPL W P-5'-P-CCNC: 16 U/L (ref 0–45)
BASOPHILS # BLD AUTO: 0 10E9/L (ref 0–0.2)
BASOPHILS NFR BLD AUTO: 0.4 %
BILIRUB SERPL-MCNC: 0.3 MG/DL (ref 0.2–1.3)
BUN SERPL-MCNC: 12 MG/DL (ref 7–30)
CALCIUM SERPL-MCNC: 9.2 MG/DL (ref 8.5–10.1)
CHLORIDE SERPL-SCNC: 107 MMOL/L (ref 94–109)
CHOLEST SERPL-MCNC: 217 MG/DL
CO2 SERPL-SCNC: 27 MMOL/L (ref 20–32)
CREAT SERPL-MCNC: 0.78 MG/DL (ref 0.52–1.04)
DIFFERENTIAL METHOD BLD: ABNORMAL
EOSINOPHIL # BLD AUTO: 0.1 10E9/L (ref 0–0.7)
EOSINOPHIL NFR BLD AUTO: 2.1 %
ERYTHROCYTE [DISTWIDTH] IN BLOOD BY AUTOMATED COUNT: 13.2 % (ref 10–15)
GFR SERPL CREATININE-BSD FRML MDRD: 87 ML/MIN/{1.73_M2}
GLUCOSE SERPL-MCNC: 79 MG/DL (ref 70–99)
HCT VFR BLD AUTO: 35.2 % (ref 35–47)
HDLC SERPL-MCNC: 79 MG/DL
HGB BLD-MCNC: 11.4 G/DL (ref 11.7–15.7)
LDLC SERPL CALC-MCNC: 116 MG/DL
LYMPHOCYTES # BLD AUTO: 1.4 10E9/L (ref 0.8–5.3)
LYMPHOCYTES NFR BLD AUTO: 20.4 %
MCH RBC QN AUTO: 29 PG (ref 26.5–33)
MCHC RBC AUTO-ENTMCNC: 32.4 G/DL (ref 31.5–36.5)
MCV RBC AUTO: 90 FL (ref 78–100)
MONOCYTES # BLD AUTO: 0.4 10E9/L (ref 0–1.3)
MONOCYTES NFR BLD AUTO: 6.6 %
NEUTROPHILS # BLD AUTO: 4.7 10E9/L (ref 1.6–8.3)
NEUTROPHILS NFR BLD AUTO: 70.5 %
NONHDLC SERPL-MCNC: 138 MG/DL
PLATELET # BLD AUTO: 297 10E9/L (ref 150–450)
POTASSIUM SERPL-SCNC: 4.5 MMOL/L (ref 3.4–5.3)
PROT SERPL-MCNC: 7.8 G/DL (ref 6.8–8.8)
RBC # BLD AUTO: 3.93 10E12/L (ref 3.8–5.2)
SODIUM SERPL-SCNC: 138 MMOL/L (ref 133–144)
TRIGL SERPL-MCNC: 108 MG/DL
WBC # BLD AUTO: 6.7 10E9/L (ref 4–11)

## 2020-12-22 PROCEDURE — 99396 PREV VISIT EST AGE 40-64: CPT | Performed by: FAMILY MEDICINE

## 2020-12-22 PROCEDURE — 85025 COMPLETE CBC W/AUTO DIFF WBC: CPT | Performed by: FAMILY MEDICINE

## 2020-12-22 PROCEDURE — 80053 COMPREHEN METABOLIC PANEL: CPT | Performed by: FAMILY MEDICINE

## 2020-12-22 PROCEDURE — 99214 OFFICE O/P EST MOD 30 MIN: CPT | Mod: 25 | Performed by: FAMILY MEDICINE

## 2020-12-22 PROCEDURE — 80061 LIPID PANEL: CPT | Performed by: FAMILY MEDICINE

## 2020-12-22 PROCEDURE — 36415 COLL VENOUS BLD VENIPUNCTURE: CPT | Performed by: FAMILY MEDICINE

## 2020-12-22 RX ORDER — SUMATRIPTAN 6 MG/.5ML
INJECTION, SOLUTION SUBCUTANEOUS
Qty: 3 ML | Refills: 11 | Status: SHIPPED | OUTPATIENT
Start: 2020-12-22 | End: 2022-02-03

## 2020-12-22 RX ORDER — FLUOXETINE 40 MG/1
40 CAPSULE ORAL DAILY
Qty: 90 CAPSULE | Refills: 3 | Status: SHIPPED | OUTPATIENT
Start: 2020-12-22 | End: 2021-12-10

## 2020-12-22 RX ORDER — SUMATRIPTAN 100 MG/1
100 TABLET, FILM COATED ORAL
Qty: 20 TABLET | Refills: 1 | Status: SHIPPED | OUTPATIENT
Start: 2020-12-22 | End: 2021-03-23

## 2020-12-22 ASSESSMENT — ENCOUNTER SYMPTOMS
PARESTHESIAS: 0
HEMATURIA: 0
SHORTNESS OF BREATH: 0
FEVER: 0
PALPITATIONS: 0
BREAST MASS: 0
MYALGIAS: 0
HEADACHES: 0
DIZZINESS: 0
DIARRHEA: 0
JOINT SWELLING: 0
HEARTBURN: 0
SORE THROAT: 0
ABDOMINAL PAIN: 0
COUGH: 0
ARTHRALGIAS: 0
DYSURIA: 0
NAUSEA: 0
WEAKNESS: 0
HEMATOCHEZIA: 0
NERVOUS/ANXIOUS: 0
EYE PAIN: 1
CONSTIPATION: 0
FREQUENCY: 0
CHILLS: 0

## 2020-12-22 ASSESSMENT — PATIENT HEALTH QUESTIONNAIRE - PHQ9: SUM OF ALL RESPONSES TO PHQ QUESTIONS 1-9: 11

## 2020-12-22 ASSESSMENT — MIFFLIN-ST. JEOR: SCORE: 1169.94

## 2020-12-22 NOTE — PROGRESS NOTES
SUBJECTIVE:   CC: Nikki Hardin is an 50 year old woman who presents for preventive health visit.     {  Healthy Habits:     Getting at least 3 servings of Calcium per day:  NO    Bi-annual eye exam:  Yes    Dental care twice a year:  Yes    Sleep apnea or symptoms of sleep apnea:  Daytime drowsiness    Diet:  Regular (no restrictions)    Frequency of exercise:  2-3 days/week    Duration of exercise:  30-45 minutes    Taking medications regularly:  Yes    Medication side effects:  None    PHQ-2 Total Score: 3    Additional concerns today:  Yes    Current Chronic Medical Conditions  Depression  Migraines HAs  Lymphocytic colitis- followed by GI      Social History  .  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  - 3 kids 25 daughter- struggling with EtOH issues, 23 daughter-struggling with mental health issues-bipolar, 18 son- [employer] in [place]- increased depression first semester in college- doing partial inpatient treatment program at [place].  Nonsmoker.     HCM  PAP  ASCUS HPV- Repeat in 3 years.  Annual mammograms.  Colonoscopy 7-2020.    Additional Concerns  Increased stress- middle daughter currently missing in a bipolar leyla.   Patient in therapy.  Feels okay on fluoxetine 40 mg- does not desire dose increase at this time.  Increased migraines with recent increased stress.  Needs refill of Imitrex po and subQ.    Today's PHQ-2 Score:   PHQ-2 ( 1999 Pfizer) 12/21/2020   Q1: Little interest or pleasure in doing things 1   Q2: Feeling down, depressed or hopeless 2   PHQ-2 Score 3   Q1: Little interest or pleasure in doing things Several days   Q2: Feeling down, depressed or hopeless More than half the days   PHQ-2 Score 3       PHQ 7/12/2019 11/22/2019 10/18/2020   PHQ-9 Total Score 3 8 5   Q9: Thoughts of better off dead/self-harm past 2 weeks Not at all Not at all Not at all     Abuse: Current or Past (Physical, Sexual or  Emotional) - No  Do you feel safe in your environment? Yes        Social History     Tobacco Use     Smoking status: Never Smoker     Smokeless tobacco: Never Used   Substance Use Topics     Alcohol use: Yes     Comment: occasional- once monthly         Alcohol Use 2020   Prescreen: >3 drinks/day or >7 drinks/week? No   Prescreen: >3 drinks/day or >7 drinks/week? -       Reviewed orders with patient.  Reviewed health maintenance and updated orders accordingly - Yes  BP Readings from Last 3 Encounters:   20 108/62   19 104/68   19 105/70    Wt Readings from Last 3 Encounters:   20 63 kg (139 lb)   19 61.2 kg (135 lb)   19 60.8 kg (134 lb)                  Patient Active Problem List   Diagnosis     Irritable bowel syndrome     Other acne     Migraine with aura and without status migrainosus, not intractable     Mild major depression (H)     Sedimentation rate elevation     Elevated vitamin B12 level     Anemia, unspecified type     Hypervitaminosis B6     Lymphocytic colitis     ASCUS of cervix with negative high risk HPV     Past Surgical History:   Procedure Laterality Date     BREAST BIOPSY, RT/LT Left      C IUD,MIRENA  2005     ESOPHAGOSCOPY, GASTROSCOPY, DUODENOSCOPY (EGD), COMBINED  10/10     HC ABLATION, ENDOMETRIAL, THERMAL, W/O HYSTEROSCOPIC GUIDANCE  2010     HC COLONOSCOPY THRU STOMA, DIAGNOSTIC  2004     HC COLONOSCOPY THRU STOMA, DIAGNOSTIC  5/10     HYSTEROSCOPY      D&C     ZZC NONSPECIFIC PROCEDURE       X 3       Social History     Tobacco Use     Smoking status: Never Smoker     Smokeless tobacco: Never Used   Substance Use Topics     Alcohol use: Yes     Comment: occasional- once monthly     Family History   Problem Relation Age of Onset     Neurologic Disorder Mother         MIGRIANES     C.A.D. Father         in his 60's     Depression Father      Heart Disease Father      Lipids Father      Hypertension Father      Arthritis Father       Sleep Disorder Father      Cerebrovascular Disease Father 78     Cancer - colorectal Paternal Grandmother      Cancer Maternal Grandfather         stomach     Heart Disease Maternal Grandfather      Depression Brother      Depression Brother      Asthma Daughter      Heart Disease Paternal Grandfather      Breast Cancer Maternal Aunt      Diabetes No family hx of          Current Outpatient Medications   Medication Sig Dispense Refill     acetaminophen (TYLENOL) 325 MG tablet Take 2 tablets by mouth every 4 hours as needed for pain. 100 tablet 0     Biotin 5000 MCG TABS Take 1,000 mcg by mouth daily       cholecalciferol 5000 UNITS CAPS Take 1 capsule (5,000 Units) by mouth daily FOR VITAMIN D DEFICIENCY (LOW VITAMIN D) 100 capsule 3     finasteride (PROSCAR) 5 MG tablet TK ONE-HALF T PO QD  5     FLUoxetine (PROZAC) 40 MG capsule Take 1 capsule (40 mg) by mouth daily 90 capsule 3     IBUPROFEN PO Take 200 mg by mouth Takes 3 tablets PRN       loperamide (LOPERAMIDE A-D) 2 MG tablet Take 2 mg by mouth daily as needed for diarrhea        Riboflavin 100 MG TABS Take 400 mg by mouth       SUMAtriptan (IMITREX) 100 MG tablet Take 1 tablet (100 mg) by mouth at onset of headache for migraine May repeat in 2 hours. Max 4 tablets/24 hours. 20 tablet 1     SUMAtriptan (IMITREX) 6 MG/0.5ML injection INJECT 0.5ML SUBCUTANEOUS ONCE FOR 1 DOSE. MAY REPEAT DOSE IN 1 HOUR. MAX OF 12MG/24 HOURS. 3 mL 11     Allergies   Allergen Reactions     Hydrocodone Nausea and Vomiting     Recent Labs   Lab Test 01/09/20 11/22/19  0913 11/12/18  1126 08/09/18  0800 03/23/17  1806 06/25/13  1107 06/25/13  1107   LDL  --  132*  --  137*  --   --  109   HDL  --  80  --  71  --   --  70   TRIG  --  64  --  98  --   --  56   ALT  --  21 30  --  26  --   --    CR 0.81 0.77 0.69  --  0.81   < >  --    GFRESTIMATED  --  90 >90  --  75   < >  --    GFRESTBLACK  --  >90 >90  --  >90  African American GFR Calc     < >  --    POTASSIUM 4.7 4.4 4.4  --   4.0   < >  --    TSH  --   --  1.34  --  2.88  --  3.32    < > = values in this interval not displayed.        Mammogram Screening: Patient over age 50, mutual decision to screen reflected in health maintenance.    Pertinent mammograms are reviewed under the imaging tab.  History of abnormal Pap smear:   Last 3 Pap and HPV Results:   PAP / HPV Latest Ref Rng & Units 11/22/2019 6/5/2013 1/30/2012   PAP - ASC-US(A) ASC-US(A) ASC-US(A)   HPV 16 DNA NEG:Negative Negative - -   HPV 18 DNA NEG:Negative Negative - -   OTHER HR HPV NEG:Negative Negative - -     PAP / HPV Latest Ref Rng & Units 11/22/2019 6/5/2013 1/30/2012   PAP - ASC-US(A) ASC-US(A) ASC-US(A)   HPV 16 DNA NEG:Negative Negative - -   HPV 18 DNA NEG:Negative Negative - -   OTHER HR HPV NEG:Negative Negative - -     Reviewed and updated as needed this visit by clinical staff   Allergies  Meds              Reviewed and updated as needed this visit by Provider                    Review of Systems   Constitutional: Negative for chills and fever.   HENT: Negative for congestion, ear pain, hearing loss and sore throat.    Eyes: Positive for pain. Negative for visual disturbance.   Respiratory: Negative for cough and shortness of breath.    Cardiovascular: Negative for chest pain, palpitations and peripheral edema.   Gastrointestinal: Negative for abdominal pain, constipation, diarrhea, heartburn, hematochezia and nausea.   Breasts:  Negative for tenderness, breast mass and discharge.   Genitourinary: Negative for dysuria, frequency, genital sores, hematuria, pelvic pain, urgency, vaginal bleeding and vaginal discharge.   Musculoskeletal: Negative for arthralgias, joint swelling and myalgias.   Skin: Negative for rash.   Neurological: Negative for dizziness, weakness, headaches and paresthesias.   Psychiatric/Behavioral: Negative for mood changes. The patient is not nervous/anxious.          OBJECTIVE:   /62   Pulse 70   Temp 97.8  F (36.6  C)   Resp 14   " Ht 1.537 m (5' 0.5\")   Wt 63 kg (139 lb)   SpO2 98%   Breastfeeding No   BMI 26.70 kg/m    Physical Exam  GENERAL: healthy, alert and no distress  EYES: Eyes grossly normal to inspection, PERRL and conjunctivae and sclerae normal  HENT: ear canals and TM's normal, nose and mouth without ulcers or lesions  NECK: no adenopathy, no asymmetry, masses, or scars and thyroid normal to palpation  RESP: lungs clear to auscultation - no rales, rhonchi or wheezes  CV: regular rate and rhythm, normal S1 S2, no S3 or S4, no murmur, click or rub, no peripheral edema and peripheral pulses strong  ABDOMEN: soft, nontender, no hepatosplenomegaly, no masses and bowel sounds normal  MS: no gross musculoskeletal defects noted, no edema  SKIN: no suspicious lesions or rashes  NEURO: Normal strength and tone, mentation intact and speech normal  PSYCH: mentation appears normal, affect normal/bright    ASSESSMENT/PLAN:   1. Routine general medical examination at a health care facility    - CBC with platelets differential  - Comprehensive metabolic panel  - Lipid panel reflex to direct LDL Fasting    Fasting labs today.  Continue improved diet and exercise.    2. Mild major depression (H)    Refill of fluoxetine 40 mg once daily.  Follow-up if desires dose increase prn with recent increased stressors.    3. Migraine with aura and without status migrainosus, not intractable    - SUMAtriptan (IMITREX) 100 MG tablet; Take 1 tablet (100 mg) by mouth at onset of headache for migraine May repeat in 2 hours. Max 4 tablets/24 hours.  Dispense: 20 tablet; Refill: 1  - SUMAtriptan (IMITREX) 6 MG/0.5ML injection; INJECT 0.5ML SUBCUTANEOUS ONCE FOR 1 DOSE. MAY REPEAT DOSE IN 1 HOUR. MAX OF 12MG/24 HOURS.  Dispense: 3 mL; Refill: 11    Stable on current regimen.    Patient has been advised of split billing requirements and indicates understanding: Yes  COUNSELING:  Reviewed preventive health counseling, as reflected in patient instructions       " "Regular exercise       Healthy diet/nutrition    Estimated body mass index is 26.7 kg/m  as calculated from the following:    Height as of this encounter: 1.537 m (5' 0.5\").    Weight as of this encounter: 63 kg (139 lb).    Weight management plan: Discussed healthy diet and exercise guidelines    She reports that she has never smoked. She has never used smokeless tobacco.      Counseling Resources:  ATP IV Guidelines  Pooled Cohorts Equation Calculator  Breast Cancer Risk Calculator  BRCA-Related Cancer Risk Assessment: FHS-7 Tool  FRAX Risk Assessment  ICSI Preventive Guidelines  Dietary Guidelines for Americans, 2010  USDA's MyPlate  ASA Prophylaxis  Lung CA Screening    Angelique Lott MD  [facility]  "

## 2020-12-24 ENCOUNTER — MYC MEDICAL ADVICE (OUTPATIENT)
Dept: FAMILY MEDICINE | Facility: CLINIC | Age: 50
End: 2020-12-24

## 2020-12-24 NOTE — TELEPHONE ENCOUNTER
This Trunk Archive[employer] message is being routed to provider for response to pt. She is wondering about iron supplements  Lisa Mccray RN

## 2021-03-21 DIAGNOSIS — G43.109 MIGRAINE WITH AURA AND WITHOUT STATUS MIGRAINOSUS, NOT INTRACTABLE: ICD-10-CM

## 2021-03-23 RX ORDER — SUMATRIPTAN 100 MG/1
100 TABLET, FILM COATED ORAL
Qty: 20 TABLET | Refills: 1 | Status: SHIPPED | OUTPATIENT
Start: 2021-03-23 | End: 2021-06-17

## 2021-03-23 NOTE — TELEPHONE ENCOUNTER
---[place] [facility] [place].       Estela Leigh RN BSN     [facility]          --Last visit:  12/22/2020

## 2021-05-24 ENCOUNTER — MYC MEDICAL ADVICE (OUTPATIENT)
Dept: FAMILY MEDICINE | Facility: CLINIC | Age: 51
End: 2021-05-24

## 2021-05-24 DIAGNOSIS — G43.109 MIGRAINE WITH AURA AND WITHOUT STATUS MIGRAINOSUS, NOT INTRACTABLE: ICD-10-CM

## 2021-05-25 RX ORDER — CEFUROXIME AXETIL 250 MG/1
TABLET ORAL
Qty: 1 ML | Refills: 4 | Status: SHIPPED | OUTPATIENT
Start: 2021-05-25 | End: 2022-11-22

## 2021-05-25 NOTE — TELEPHONE ENCOUNTER
Sumatriptan Refilled per [facility] [place].    Lisa Mccray RN

## 2021-05-25 NOTE — TELEPHONE ENCOUNTER
Sumatriptan Refilled per [facility] refill policy.    Lisa Mccray RN

## 2021-06-07 ENCOUNTER — MYC MEDICAL ADVICE (OUTPATIENT)
Dept: FAMILY MEDICINE | Facility: CLINIC | Age: 51
End: 2021-06-07

## 2021-06-15 DIAGNOSIS — G43.109 MIGRAINE WITH AURA AND WITHOUT STATUS MIGRAINOSUS, NOT INTRACTABLE: ICD-10-CM

## 2021-06-17 RX ORDER — SUMATRIPTAN 100 MG/1
TABLET, FILM COATED ORAL
Qty: 20 TABLET | Refills: 1 | Status: SHIPPED | OUTPATIENT
Start: 2021-06-17 | End: 2021-09-09

## 2021-07-07 ENCOUNTER — TRANSFERRED RECORDS (OUTPATIENT)
Dept: HEALTH INFORMATION MANAGEMENT | Facility: CLINIC | Age: 51
End: 2021-07-07

## 2021-07-30 DIAGNOSIS — Z12.31 VISIT FOR SCREENING MAMMOGRAM: ICD-10-CM

## 2021-07-30 PROCEDURE — 77063 BREAST TOMOSYNTHESIS BI: CPT | Mod: GC | Performed by: RADIOLOGY

## 2021-07-30 PROCEDURE — 77067 SCR MAMMO BI INCL CAD: CPT | Mod: GC | Performed by: RADIOLOGY

## 2021-08-05 ENCOUNTER — TRANSFERRED RECORDS (OUTPATIENT)
Dept: HEALTH INFORMATION MANAGEMENT | Facility: CLINIC | Age: 51
End: 2021-08-05

## 2021-08-12 ENCOUNTER — TRANSFERRED RECORDS (OUTPATIENT)
Dept: HEALTH INFORMATION MANAGEMENT | Facility: CLINIC | Age: 51
End: 2021-08-12

## 2021-09-04 ENCOUNTER — HEALTH MAINTENANCE LETTER (OUTPATIENT)
Age: 51
End: 2021-09-04

## 2021-09-07 DIAGNOSIS — G43.109 MIGRAINE WITH AURA AND WITHOUT STATUS MIGRAINOSUS, NOT INTRACTABLE: ICD-10-CM

## 2021-09-09 RX ORDER — SUMATRIPTAN 100 MG/1
TABLET, FILM COATED ORAL
Qty: 20 TABLET | Refills: 1 | Status: SHIPPED | OUTPATIENT
Start: 2021-09-09 | End: 2021-11-29

## 2021-09-09 NOTE — TELEPHONE ENCOUNTER
"Routing refill request to provider for review/approval because:  --I will route this to  as patient will be establishing with her soon.  --Between the orders for tablets and injections patient may be using more than 8 treatments per month.  --I called patient and she says she uses the injectable about once a month \"if that\" for really severe headaches.    --For the tablets she can go a week or two not needing any but then may have a week where for 3-4 days she takes 3-4 pills.      --Last visit:  12/22/2020 Oren faulkner CPE.    --Future Visit: 9/28/21 video with Kimberlyn.    "

## 2021-09-28 ENCOUNTER — VIRTUAL VISIT (OUTPATIENT)
Dept: FAMILY MEDICINE | Facility: CLINIC | Age: 51
End: 2021-09-28
Payer: COMMERCIAL

## 2021-09-28 DIAGNOSIS — G43.109 MIGRAINE WITH AURA AND WITHOUT STATUS MIGRAINOSUS, NOT INTRACTABLE: Primary | ICD-10-CM

## 2021-09-28 DIAGNOSIS — Z76.89 ESTABLISHING CARE WITH NEW DOCTOR, ENCOUNTER FOR: ICD-10-CM

## 2021-09-28 PROCEDURE — 99212 OFFICE O/P EST SF 10 MIN: CPT | Mod: 95 | Performed by: FAMILY MEDICINE

## 2021-09-28 ASSESSMENT — ANXIETY QUESTIONNAIRES
7. FEELING AFRAID AS IF SOMETHING AWFUL MIGHT HAPPEN: NOT AT ALL
7. FEELING AFRAID AS IF SOMETHING AWFUL MIGHT HAPPEN: NOT AT ALL
5. BEING SO RESTLESS THAT IT IS HARD TO SIT STILL: NOT AT ALL
8. IF YOU CHECKED OFF ANY PROBLEMS, HOW DIFFICULT HAVE THESE MADE IT FOR YOU TO DO YOUR WORK, TAKE CARE OF THINGS AT HOME, OR GET ALONG WITH OTHER PEOPLE?: SOMEWHAT DIFFICULT
3. WORRYING TOO MUCH ABOUT DIFFERENT THINGS: SEVERAL DAYS
GAD7 TOTAL SCORE: 3
2. NOT BEING ABLE TO STOP OR CONTROL WORRYING: NOT AT ALL
1. FEELING NERVOUS, ANXIOUS, OR ON EDGE: SEVERAL DAYS
GAD7 TOTAL SCORE: 3
4. TROUBLE RELAXING: NOT AT ALL
GAD7 TOTAL SCORE: 3
6. BECOMING EASILY ANNOYED OR IRRITABLE: SEVERAL DAYS

## 2021-09-28 ASSESSMENT — PATIENT HEALTH QUESTIONNAIRE - PHQ9
SUM OF ALL RESPONSES TO PHQ QUESTIONS 1-9: 3
SUM OF ALL RESPONSES TO PHQ QUESTIONS 1-9: 3
10. IF YOU CHECKED OFF ANY PROBLEMS, HOW DIFFICULT HAVE THESE PROBLEMS MADE IT FOR YOU TO DO YOUR WORK, TAKE CARE OF THINGS AT HOME, OR GET ALONG WITH OTHER PEOPLE: SOMEWHAT DIFFICULT

## 2021-09-28 NOTE — PROGRESS NOTES
Nikki is a 51 year old who is being evaluated via a billable video visit.      How would you like to obtain your AVS? MyChart  If the video visit is dropped, the invitation should be resent by: Text to cell phone: [phone]  Will anyone else be joining your video visit? No      Video Start Time: 9:48 AM    Assessment & Plan   Establishing care with new doctor, encounter for  Migraine with aura and without status migrainosus, not intractable  -No new or worsening symptoms/episodes  -Continue with sumatriptan  -Follow-up if worsening    Return in about 3 months (around 12/28/2021) for Routine preventive, with me.    Srinivasa Sofia [facility]    Subjective   Nikki is a 51 year old who presents for the following health issues:    HPI     New patient to me.    Here to establish care. History of migraines. Has sumatriptan injection and tabs. Uses injection only for severe migraines which occur about every 1-2 months. States generally gets migraines in clusters and can go several weeks without symptoms. Sumatriptan pills usually work for her migraines when they do occur. Has been on prophylactic medication in the past but they generally do not work for her. Has also seen neurology in the past. No changes in her migraine severity or frequency.    Medication Followup of  Sumatriptan     Taking Medication as prescribed: yes    Side Effects:  None    Medication Helping Symptoms:  yes       Review of Systems   CONSTITUTIONAL: NEGATIVE for fever, chills, change in weight  EYES: NEGATIVE for vision changes or irritation  RESP: NEGATIVE for significant cough or SOB  CV: NEGATIVE for chest pain, palpitations or peripheral edema  NEURO: NEGATIVE for weakness, dizziness or paresthesias  PSYCHIATRIC: NEGATIVE for changes in mood or affect      Objective           Vitals:  No vitals were obtained today due to virtual visit.    Physical Exam   GENERAL: Healthy, alert and no distress  EYES: Eyes  grossly normal to inspection.  No discharge or erythema, or obvious scleral/conjunctival abnormalities.  RESP: No audible wheeze, cough, or visible cyanosis.  No visible retractions or increased work of breathing.    SKIN: Visible skin clear. No significant rash, abnormal pigmentation or lesions.  NEURO: Cranial nerves grossly intact.  Mentation and speech appropriate for age.  PSYCH: Mentation appears normal, affect normal/bright, judgement and insight intact, normal speech and appearance well-groomed.        Video-Visit Details    Type of service:  Video Visit    Video End Time:9:48 AM    Originating Location (pt. Location): Home    Distant Location (provider location):  [facility]     Platform used for Video Visit: W[employer]  Answers for HPI/ROS submitted by the patient on 9/28/2021  If you checked off any problems, how difficult have these problems made it for you to do your work, take care of things at home, or get along with other people?: Somewhat difficult  PHQ9 TOTAL SCORE: 3  BYRON 7 TOTAL SCORE: 3

## 2021-09-29 ASSESSMENT — PATIENT HEALTH QUESTIONNAIRE - PHQ9: SUM OF ALL RESPONSES TO PHQ QUESTIONS 1-9: 3

## 2021-09-29 ASSESSMENT — ANXIETY QUESTIONNAIRES: GAD7 TOTAL SCORE: 3

## 2021-10-03 ENCOUNTER — MYC MEDICAL ADVICE (OUTPATIENT)
Dept: FAMILY MEDICINE | Facility: CLINIC | Age: 51
End: 2021-10-03

## 2021-10-03 DIAGNOSIS — M79.10 MUSCLE TENSION PAIN: Primary | ICD-10-CM

## 2021-10-05 NOTE — TELEPHONE ENCOUNTER
"Dr. Sofia:     Please review and advise re: muscle relaxant medication request    \"Dr. Kimberlyn Sow. When we met virtually recently,  I mentioned that so many of my migraines are due to the tension I carry in my neck and shoulder blades. I was wonderingif there might be medication that serves as a muscle relaxant that might be appropriate for me? (Especially because my gastroenterologist doesn't want me to take so much ibuprofen). \"    PETER Contreras RN  [facility]    "

## 2021-10-06 RX ORDER — CYCLOBENZAPRINE HCL 5 MG
5 TABLET ORAL 3 TIMES DAILY PRN
Qty: 30 TABLET | Refills: 3 | Status: SHIPPED | OUTPATIENT
Start: 2021-10-06 | End: 2023-02-28

## 2021-10-06 NOTE — TELEPHONE ENCOUNTER
Yes, you can try a muscle relaxer. It can make you drowsy so please do not drive or operate heavy machinery with it. You can take it every 8 hours as needed for muscle tension. Prescription was sent to the pharmacy.

## 2021-10-13 ENCOUNTER — TRANSFERRED RECORDS (OUTPATIENT)
Dept: HEALTH INFORMATION MANAGEMENT | Facility: CLINIC | Age: 51
End: 2021-10-13
Payer: COMMERCIAL

## 2021-10-19 PROBLEM — F32.9 MAJOR DEPRESSION: Status: ACTIVE | Noted: 2017-02-07

## 2021-10-21 ENCOUNTER — IMMUNIZATION (OUTPATIENT)
Dept: FAMILY MEDICINE | Facility: CLINIC | Age: 51
End: 2021-10-21
Payer: COMMERCIAL

## 2021-10-21 DIAGNOSIS — Z23 NEED FOR PROPHYLACTIC VACCINATION AND INOCULATION AGAINST INFLUENZA: Primary | ICD-10-CM

## 2021-10-21 PROCEDURE — 90471 IMMUNIZATION ADMIN: CPT

## 2021-10-21 PROCEDURE — 90682 RIV4 VACC RECOMBINANT DNA IM: CPT

## 2021-10-21 PROCEDURE — 99207 PR NO CHARGE NURSE ONLY: CPT

## 2021-11-24 ENCOUNTER — IMMUNIZATION (OUTPATIENT)
Dept: NURSING | Facility: CLINIC | Age: 51
End: 2021-11-24
Payer: COMMERCIAL

## 2021-11-24 DIAGNOSIS — G43.109 MIGRAINE WITH AURA AND WITHOUT STATUS MIGRAINOSUS, NOT INTRACTABLE: ICD-10-CM

## 2021-11-24 PROCEDURE — 91300 PR COVID VAC PFIZER DIL RECON 30 MCG/0.3 ML IM: CPT

## 2021-11-24 PROCEDURE — 0004A PR COVID VAC PFIZER DIL RECON 30 MCG/0.3 ML IM: CPT

## 2021-11-29 RX ORDER — SUMATRIPTAN 100 MG/1
TABLET, FILM COATED ORAL
Qty: 20 TABLET | Refills: 1 | Status: SHIPPED | OUTPATIENT
Start: 2021-11-29 | End: 2022-01-26

## 2021-11-29 NOTE — TELEPHONE ENCOUNTER
Routing refill request to provider for review/approval because:  --Based on order history patient most likely has had 8 or less treatments per month but prefer provider decide.            --Last visit:  9/28/21 virtual with Kimberlyn - plan - Return in about 3 months (around 12/28/2021) for Routine preventive, with me.    --Future Visit:   Next 5 appointments (look out 90 days)    Jan 03, 2022  9:00 AM  MyChart Physical Adult with Srinivasa Sofia DO  [facility] ([facility] ) [address] [place] [address] [phone]

## 2021-12-31 ASSESSMENT — ENCOUNTER SYMPTOMS
EYE PAIN: 1
PALPITATIONS: 0
NAUSEA: 0
DIZZINESS: 0
WEAKNESS: 0
HEADACHES: 1
FREQUENCY: 0
CONSTIPATION: 0
HEMATOCHEZIA: 0
HEMATURIA: 0
SORE THROAT: 0
ARTHRALGIAS: 0
DYSURIA: 0
SHORTNESS OF BREATH: 0
NERVOUS/ANXIOUS: 0
DIARRHEA: 1
PARESTHESIAS: 0
ABDOMINAL PAIN: 0
FEVER: 0
COUGH: 0
HEARTBURN: 0
JOINT SWELLING: 0
MYALGIAS: 0
CHILLS: 0
BREAST MASS: 0

## 2022-01-03 ENCOUNTER — OFFICE VISIT (OUTPATIENT)
Dept: FAMILY MEDICINE | Facility: CLINIC | Age: 52
End: 2022-01-03
Payer: COMMERCIAL

## 2022-01-03 VITALS
BODY MASS INDEX: 26.03 KG/M2 | HEART RATE: 71 BPM | WEIGHT: 124 LBS | HEIGHT: 58 IN | TEMPERATURE: 97 F | SYSTOLIC BLOOD PRESSURE: 100 MMHG | RESPIRATION RATE: 16 BRPM | OXYGEN SATURATION: 96 % | DIASTOLIC BLOOD PRESSURE: 62 MMHG

## 2022-01-03 DIAGNOSIS — F32.0 MILD MAJOR DEPRESSION (H): ICD-10-CM

## 2022-01-03 DIAGNOSIS — Z11.59 NEED FOR HEPATITIS C SCREENING TEST: ICD-10-CM

## 2022-01-03 DIAGNOSIS — Z13.1 SCREENING FOR DIABETES MELLITUS: ICD-10-CM

## 2022-01-03 DIAGNOSIS — Z13.220 LIPID SCREENING: ICD-10-CM

## 2022-01-03 DIAGNOSIS — Z00.00 ROUTINE GENERAL MEDICAL EXAMINATION AT A HEALTH CARE FACILITY: Primary | ICD-10-CM

## 2022-01-03 DIAGNOSIS — H52.539: ICD-10-CM

## 2022-01-03 LAB
CHOLEST SERPL-MCNC: 233 MG/DL
FASTING STATUS PATIENT QL REPORTED: YES
FASTING STATUS PATIENT QL REPORTED: YES
GLUCOSE BLD-MCNC: 78 MG/DL (ref 70–99)
HCV AB SERPL QL IA: NONREACTIVE
HDLC SERPL-MCNC: 75 MG/DL
LDLC SERPL CALC-MCNC: 142 MG/DL
NONHDLC SERPL-MCNC: 158 MG/DL
TRIGL SERPL-MCNC: 81 MG/DL

## 2022-01-03 PROCEDURE — 86803 HEPATITIS C AB TEST: CPT | Performed by: FAMILY MEDICINE

## 2022-01-03 PROCEDURE — 36415 COLL VENOUS BLD VENIPUNCTURE: CPT | Performed by: FAMILY MEDICINE

## 2022-01-03 PROCEDURE — 82947 ASSAY GLUCOSE BLOOD QUANT: CPT | Performed by: FAMILY MEDICINE

## 2022-01-03 PROCEDURE — 99396 PREV VISIT EST AGE 40-64: CPT | Performed by: FAMILY MEDICINE

## 2022-01-03 PROCEDURE — 80061 LIPID PANEL: CPT | Performed by: FAMILY MEDICINE

## 2022-01-03 ASSESSMENT — ENCOUNTER SYMPTOMS
COUGH: 0
DIZZINESS: 0
EYE PAIN: 1
PALPITATIONS: 0
PARESTHESIAS: 0
HEADACHES: 1
JOINT SWELLING: 0
WEAKNESS: 0
CONSTIPATION: 0
HEARTBURN: 0
NERVOUS/ANXIOUS: 0
FEVER: 0
BREAST MASS: 0
SORE THROAT: 0
DIARRHEA: 1
CHILLS: 0
HEMATOCHEZIA: 0
ABDOMINAL PAIN: 0
FREQUENCY: 0
HEMATURIA: 0
SHORTNESS OF BREATH: 0
MYALGIAS: 0
NAUSEA: 0
DYSURIA: 0
ARTHRALGIAS: 0

## 2022-01-03 ASSESSMENT — MIFFLIN-ST. JEOR: SCORE: 1049.27

## 2022-01-03 NOTE — PROGRESS NOTES
SUBJECTIVE:   CC: Nikki Hardin is an 51 year old woman who presents for preventive health visit.       Patient has been advised of split billing requirements and indicates understanding: Yes  Healthy Habits:     Getting at least 3 servings of Calcium per day:  NO    Bi-annual eye exam:  Yes    Dental care twice a year:  Yes    Sleep apnea or symptoms of sleep apnea:  Daytime drowsiness    Diet:  Regular (no restrictions)    Frequency of exercise:  2-3 days/week    Duration of exercise:  45-60 minutes    Taking medications regularly:  Yes    Medication side effects:  None    PHQ-2 Total Score: 2    Additional concerns today:  Yes    Other concerns:  Spasm of eyes intermittently for the last 3 years. Goes away on it's own. No other associated symptoms.     Takes fluoxitine for depression. Working well for her.        Today's PHQ-2 Score:   PHQ-2 ( 1999 Pfizer) 12/31/2021   Q1: Little interest or pleasure in doing things 1   Q2: Feeling down, depressed or hopeless 1   PHQ-2 Score 2   PHQ-2 Total Score (12-17 Years)- Positive if 3 or more points; Administer PHQ-A if positive -   Q1: Little interest or pleasure in doing things Several days   Q2: Feeling down, depressed or hopeless Several days   PHQ-2 Score 2       Abuse: Current or Past (Physical, Sexual or Emotional) - No  Do you feel safe in your environment? Yes    Have you ever done Advance Care Planning? (For example, a Health Directive, POLST, or a discussion with a medical provider or your loved ones about your wishes): Yes, advance care planning is on file.    Social History     Tobacco Use     Smoking status: Never Smoker     Smokeless tobacco: Never Used   Substance Use Topics     Alcohol use: Yes     Comment: occasional- once monthly     If you drink alcohol do you typically have >3 drinks per day or >7 drinks per week? No    No flowsheet data found.  Breast Cancer Screening:    FHS-7:   Breast CA Risk Assessment (FHS-7) 7/30/2021 12/31/2021   Did  any of your first-degree relatives have breast or ovarian cancer? No No   Did any of your relatives have bilateral breast cancer? No Unknown   Did any man in your family have breast cancer? No No   Did any woman in your family have breast and ovarian cancer? No No   Did any woman in your family have breast cancer before age 50 y? No Unknown   Do you have 2 or more relatives with breast and/or ovarian cancer? No Unknown   Do you have 2 or more relatives with breast and/or bowel cancer? No Unknown       Mammogram Screening: Recommended annual mammography  Pertinent mammograms are reviewed under the imaging tab.    History of abnormal Pap smear: NO - age 30-65 PAP every 5 years with negative HPV co-testing recommended  PAP / HPV Latest Ref Rng & Units 11/22/2019 6/5/2013 1/30/2012   PAP (Historical) - ASC-US(A) ASC-US(A) ASC-US(A)   HPV16 NEG:Negative Negative - -   HPV18 NEG:Negative Negative - -   HRHPV NEG:Negative Negative - -     Reviewed and updated as needed this visit by clinical staff  Tobacco  Allergies              Reviewed and updated as needed this visit by Provider                 Review of Systems   Constitutional: Negative for chills and fever.   HENT: Positive for hearing loss. Negative for congestion, ear pain and sore throat.    Eyes: Positive for pain. Negative for visual disturbance.   Respiratory: Negative for cough and shortness of breath.    Cardiovascular: Negative for chest pain, palpitations and peripheral edema.   Gastrointestinal: Positive for diarrhea. Negative for abdominal pain, constipation, heartburn, hematochezia and nausea.   Breasts:  Negative for tenderness, breast mass and discharge.   Genitourinary: Negative for dysuria, frequency, genital sores, hematuria, pelvic pain, urgency, vaginal bleeding and vaginal discharge.   Musculoskeletal: Negative for arthralgias, joint swelling and myalgias.   Skin: Negative for rash.   Neurological: Positive for headaches. Negative for  "dizziness, weakness and paresthesias.   Psychiatric/Behavioral: Positive for mood changes. The patient is not nervous/anxious.      OBJECTIVE:   /62 (BP Location: Right arm, Patient Position: Chair, Cuff Size: Adult Regular)   Pulse 71   Temp 97  F (36.1  C) (Temporal)   Resp 16   Ht 1.461 m (4' 9.5\")   Wt 56.2 kg (124 lb)   LMP  (LMP Unknown)   SpO2 96%   BMI 26.37 kg/m    Physical Exam  GENERAL: healthy, alert and no distress  EYES: Eyes grossly normal to inspection, PERRL and conjunctivae and sclerae normal  HENT:nose and mouth without ulcers or lesions  NECK: no adenopathy, no asymmetry, masses, or scars and thyroid normal to palpation  RESP: lungs clear to auscultation - no rales, rhonchi or wheezes  CV: regular rate and rhythm, normal S1 S2, no S3 or S4, no murmur, click or rub, no peripheral edema and peripheral pulses strong  ABDOMEN: soft, nontender, no hepatosplenomegaly, no masses and bowel sounds normal  MS: no gross musculoskeletal defects noted, no edema  SKIN: no suspicious lesions or rashes  NEURO: Normal strength and tone, mentation intact and speech normal  PSYCH: mentation appears normal, affect normal/bright    ASSESSMENT/PLAN:   Nikki was seen today for physical.    Diagnoses and all orders for this visit:    Routine general medical examination at a health care facility  -     REVIEW OF HEALTH MAINTENANCE PROTOCOL ORDERS    Need for hepatitis C screening test  -     Hepatitis C Screen Reflex to HCV RNA Quant and Genotype    Lipid screening  -     Lipid panel reflex to direct LDL Fasting    Screening for diabetes mellitus  -     Glucose    Mild major depression (H)  -Stable  -Continue on fluoxetine    Ciliary muscle spasm, unspecified laterality  -Intermittent, pt will continue to monitor  -discussed possible etiologies including eye strain, stress, anxiety, poor sleep  -If worsening or persistent follow-up for further eval      COUNSELING:  Reviewed preventive health counseling, " "as reflected in patient instructions       Regular exercise       Healthy diet/nutrition    Estimated body mass index is 26.37 kg/m  as calculated from the following:    Height as of this encounter: 1.461 m (4' 9.5\").    Weight as of this encounter: 56.2 kg (124 lb).    She reports that she has never smoked. She has never used smokeless tobacco.      Srinivasa Sofia DO  [facility]  "

## 2022-01-10 ENCOUNTER — DOCUMENTATION ONLY (OUTPATIENT)
Dept: OTHER | Facility: CLINIC | Age: 52
End: 2022-01-10
Payer: COMMERCIAL

## 2022-01-17 ENCOUNTER — TRANSFERRED RECORDS (OUTPATIENT)
Dept: HEALTH INFORMATION MANAGEMENT | Facility: CLINIC | Age: 52
End: 2022-01-17
Payer: COMMERCIAL

## 2022-01-24 DIAGNOSIS — G43.109 MIGRAINE WITH AURA AND WITHOUT STATUS MIGRAINOSUS, NOT INTRACTABLE: ICD-10-CM

## 2022-01-26 RX ORDER — SUMATRIPTAN 100 MG/1
TABLET, FILM COATED ORAL
Qty: 20 TABLET | Refills: 2 | Status: SHIPPED | OUTPATIENT
Start: 2022-01-26 | End: 2022-06-21

## 2022-01-26 NOTE — TELEPHONE ENCOUNTER
Serotonin Agonists Failed 01/24/2022 05:52 PM   Protocol Details  Serotonin Agonist request needs review.    Blood pressure under 140/90 in past 12 months    Recent (12 mo) or future (30 days) visit within the authorizing provider's specialty    Medication is active on med list    Patient is age 18 or older    No active pregnancy on record    No positive pregnancy test in past 12 months     Just ha physical 1/3/2022

## 2022-01-31 ENCOUNTER — MYC MEDICAL ADVICE (OUTPATIENT)
Dept: FAMILY MEDICINE | Facility: CLINIC | Age: 52
End: 2022-01-31
Payer: COMMERCIAL

## 2022-01-31 DIAGNOSIS — G43.109 MIGRAINE WITH AURA AND WITHOUT STATUS MIGRAINOSUS, NOT INTRACTABLE: Primary | ICD-10-CM

## 2022-02-01 NOTE — TELEPHONE ENCOUNTER
Referral signed. Unfortunately we no longer have a neurologist at [place].    Srinivasa Sofia, DO

## 2022-02-01 NOTE — TELEPHONE ENCOUNTER
Writer responded via [employer].    NIR IsbellN, RN  [facility]

## 2022-02-01 NOTE — TELEPHONE ENCOUNTER
"Dr. Alis Davis is requesting a referral to Neurology.    \"My migraines have grown in frequency and I'm getting concerned. I think it's time to see a neurologist again. (It's been a long time). Is there still someone at [facility]? If not, can you please refer me to someone? (Open to other ideas, as well).  Thank you,  Nikki\"    Referral pended.    Bi LANIER  [facility]    "

## 2022-02-03 ENCOUNTER — OFFICE VISIT (OUTPATIENT)
Dept: NEUROLOGY | Facility: CLINIC | Age: 52
End: 2022-02-03
Attending: FAMILY MEDICINE
Payer: COMMERCIAL

## 2022-02-03 VITALS
DIASTOLIC BLOOD PRESSURE: 71 MMHG | HEIGHT: 60 IN | SYSTOLIC BLOOD PRESSURE: 115 MMHG | HEART RATE: 73 BPM | WEIGHT: 128 LBS | BODY MASS INDEX: 25.13 KG/M2

## 2022-02-03 DIAGNOSIS — Z86.59 HISTORY OF DEPRESSION: ICD-10-CM

## 2022-02-03 DIAGNOSIS — G43.719 INTRACTABLE CHRONIC MIGRAINE WITHOUT AURA AND WITHOUT STATUS MIGRAINOSUS: Primary | ICD-10-CM

## 2022-02-03 PROCEDURE — 99215 OFFICE O/P EST HI 40 MIN: CPT | Performed by: PSYCHIATRY & NEUROLOGY

## 2022-02-03 RX ORDER — SUMATRIPTAN 6 MG/.5ML
INJECTION, SOLUTION SUBCUTANEOUS
Qty: 1 ML | Refills: 3 | Status: SHIPPED | OUTPATIENT
Start: 2022-02-03 | End: 2022-10-31

## 2022-02-03 RX ORDER — NORTRIPTYLINE HCL 10 MG
CAPSULE ORAL
Qty: 180 CAPSULE | Refills: 1 | Status: SHIPPED | OUTPATIENT
Start: 2022-02-03 | End: 2022-04-06

## 2022-02-03 RX ORDER — DIAZEPAM 5 MG
TABLET ORAL
Qty: 2 TABLET | Refills: 0 | Status: SHIPPED | OUTPATIENT
Start: 2022-02-03 | End: 2022-04-06

## 2022-02-03 ASSESSMENT — MIFFLIN-ST. JEOR: SCORE: 1107.1

## 2022-02-03 NOTE — NURSING NOTE
Chief Complaint   Patient presents with     New Patient     migraines     Sander Henry MA on 2/3/2022 at 7:32 AM

## 2022-02-03 NOTE — LETTER
2/3/2022         RE: Nikki Hardin  [address] [facility] [address]        Dear Colleague,    Thank you for referring your patient, Nikki Hardin, to the [facility] [place]. Please see a copy of my visit note below.    NEUROLOGY OUTPATIENT CONSULT NOTE  Feb 3, 2022     CHIEF COMPLAINT/REASON FOR VISIT/REASON FOR CONSULT  Patient presents with:  New Patient: migraines    REASON FOR CONSULTATION- Headaches    REFERRAL SOURCE  Dr. Srinivasa Sofia  CC Dr. Srinivasa Sofia    HISTORY OF PRESENT ILLNESS  Nikki Hardin is a 51 year old female seen today for evaluation of headaches. She reports that she has a diagnosis of migraines since age 14. Previously her headaches were sporadic but still frequent. She estimates that she was having at least 10-15 headaches a month. Over the last 2 to 3 months headaches have become much more frequent. She is having a headache every day. Reports no real reasons why the headaches might have gotten worse.    Headaches are generally in the temple on either side and then radiate to the neck. Occasionally she can have more frontal pressure. Takes a throbbing. There is some photophobia and phonophobia. She has never had any auras with the headaches. Reports triggers including working and doing remote work from home, inconsistent sleep, lack of regular meals, going out in the sun sometimes storms. She does have some neck pain though neck pain is worse at the time of headaches.    She is tried Topamax in the past which caused severe side effects. She is also tried propanolol without any benefit. Over-the-counter medications do not help. 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 She has been using sumatriptan injections and tablets for several years which have been working for her. Has tried Maxalt with benefit though insurance would not cover it anymore and then she had to stop it. The sumatriptan works better than the  Marko.    Previous history is reviewed and this is unchanged.    PAST MEDICAL/SURGICAL HISTORY  Past Medical History:   Diagnosis Date     Abnormal Pap smear of cervix 11/22/2019    See problem list.      Anxiety      Colitis     lymphocytic colitis ?     Depressive disorder, not elsewhere classified     sees psych- Dr Blue     Migraine, unspecified, without mention of intractable migraine without mention of status migrainosus age 14 yrs     Other acne      PAIN IN THORACIC SPINE [724.1] 4/13/2006    chiropractic care     Raynaud disease     ? connective tissue dz.  ? sogren's     Patient Active Problem List   Diagnosis     Irritable bowel syndrome     Other acne     Migraine with aura and without status migrainosus, not intractable     Mild major depression (H)     Sedimentation rate elevation     Elevated vitamin B12 level     Anemia, unspecified type     Hypervitaminosis B6     Lymphocytic colitis     ASCUS of cervix with negative high risk HPV   Significant for migraines, arthritis, depression, microcolitis    FAMILY HISTORY  Family History   Problem Relation Age of Onset     Neurologic Disorder Mother         MIGRIANES     C.A.D. Father         in his 60's     Depression Father      Heart Disease Father      Lipids Father      Hypertension Father      Arthritis Father      Sleep Disorder Father      Cerebrovascular Disease Father 78     Cancer - colorectal Paternal Grandmother      Cancer Maternal Grandfather         stomach     Heart Disease Maternal Grandfather      Depression Brother      Depression Brother      Asthma Daughter      Heart Disease Paternal Grandfather      Breast Cancer Maternal Aunt      Diabetes No family hx of    Positive for migraines in her mother and aunt.    SOCIAL HISTORY  Social History     Tobacco Use     Smoking status: Never Smoker     Smokeless tobacco: Never Used   Substance Use Topics     Alcohol use: Yes     Comment: occasional- once monthly     Drug use: No       SYSTEMS  REVIEW  Twelve-system ROS was done and other than the HPI this was negative except for neck pain, dizziness, hearing loss, sleepiness during the day, headaches, depression, bowel problems.    MEDICATIONS  acetaminophen (TYLENOL) 325 MG tablet, Take 2 tablets by mouth every 4 hours as needed for pain.  Biotin 5000 MCG TABS, Take 1,000 mcg by mouth daily  cholecalciferol 5000 UNITS CAPS, Take 1 capsule (5,000 Units) by mouth daily FOR VITAMIN D DEFICIENCY (LOW VITAMIN D)  cyclobenzaprine (FLEXERIL) 5 MG tablet, Take 1 tablet (5 mg) by mouth 3 times daily as needed for muscle spasms  Ferrous Sulfate (IRON) 28 MG TABS,   finasteride (PROSCAR) 5 MG tablet, TK ONE-HALF T PO QD  FLUoxetine (PROZAC) 40 MG capsule, TAKE 1 CAPSULE(40 MG) BY MOUTH DAILY  IBUPROFEN PO, Take 200 mg by mouth Takes 3 tablets PRN  loperamide (LOPERAMIDE A-D) 2 MG tablet, Take 2 mg by mouth daily as needed for diarrhea   Riboflavin 100 MG TABS, Take 400 mg by mouth  SUMAtriptan (IMITREX STATDOSE) 6 MG/0.5ML pen injector kit, INJECT 0.5ML SUBCUTANEOUS ONCE FOR 1 DOSE. MAY REPEAT DOSE IN 1 HOUR. MAX OF 12MG/24 HOURS.  SUMAtriptan (IMITREX) 100 MG tablet, TAKE 1 TABLET(100 MG) BY MOUTH AT ONSET OF HEADACHE FOR MIGRAINE. MAY REPEAT IN 2 HOURS. MAX 4 TABLETS/ 24 HOURS    No current facility-administered medications on file prior to visit.       PHYSICAL EXAMINATION  VITALS: /71 (BP Location: Right arm, Patient Position: Sitting)   Pulse 73   Ht 1.524 m (5')   Wt 58.1 kg (128 lb)   BMI 25.00 kg/m    GENERAL: Healthy appearing, alert, no acute distress, normal habitus.  CARDIOVASCULAR: Extremities warm and well perfused. Pulses present.   EYES: Funduscopic exam limited.  NEUROLOGICAL:  Patient is awake and oriented to self, place and time.  Attention span is normal.  Memory is grossly intact.  Language is fluent and follows commands appropriately.  Appropriate fund of knowledge. Cranial nerves 2-12 are intact. There is no pronator drift.  Motor  exam shows 5/5 strength in all extremities.  Tone is symmetric bilaterally in upper and lower extremities.  Reflexes are symmetric and 2+ in upper extremities and lower extremities. Sensory exam is grossly intact to light touch, pin prick and vibration.  Finger to nose and heel to shin is without dysmetria.  Romberg is negative.  Gait is normal and the patient is able to do tandem walk and walk on toes and heels.        DIAGNOSTICS  CARDIOLOGY  EKG 2008  NSR    RELEVANT LABS  Component      Latest Ref Rng & Units 12/22/2020   WBC      4.0 - 11.0 10e9/L 6.7   RBC Count      3.8 - 5.2 10e12/L 3.93   Hemoglobin      11.7 - 15.7 g/dL 11.4 (L)   Hematocrit      35.0 - 47.0 % 35.2   MCV      78 - 100 fl 90   MCH      26.5 - 33.0 pg 29.0   MCHC      31.5 - 36.5 g/dL 32.4   RDW      10.0 - 15.0 % 13.2   Platelet Count      150 - 450 10e9/L 297   % Neutrophils      % 70.5   % Lymphocytes      % 20.4   % Monocytes      % 6.6   % Eosinophils      % 2.1   % Basophils      % 0.4   Absolute Neutrophil      1.6 - 8.3 10e9/L 4.7   Absolute Lymphocytes      0.8 - 5.3 10e9/L 1.4   Absolute Monocytes      0.0 - 1.3 10e9/L 0.4   Absolute Eosinophils      0.0 - 0.7 10e9/L 0.1   Absolute Basophils      0.0 - 0.2 10e9/L 0.0   Diff Method       Automated Method   Sodium      133 - 144 mmol/L 138   Potassium      3.4 - 5.3 mmol/L 4.5   Chloride      94 - 109 mmol/L 107   Carbon Dioxide      20 - 32 mmol/L 27   Anion Gap      3 - 14 mmol/L 4   Glucose      70 - 99 mg/dL 79   Urea Nitrogen      7 - 30 mg/dL 12   Creatinine      0.52 - 1.04 mg/dL 0.78   GFR Estimate      >60 mL/min/1.73:m2 87   GFR Estimate If Black      >60 mL/min/1.73:m2 >90   Calcium      8.5 - 10.1 mg/dL 9.2   Bilirubin Total      0.2 - 1.3 mg/dL 0.3   Albumin      3.4 - 5.0 g/dL 3.8   Protein Total      6.8 - 8.8 g/dL 7.8   Alkaline Phosphatase      40 - 150 U/L 73   ALT      0 - 50 U/L 22   AST      0 - 45 U/L 16       OUTSIDE RECORDS  Outside referral notes and chart  notes were reviewed and pertinent information has been summarized (in addition to the HPI):-Patient was seen in primary care.  Has issues with depression.  Had a visit in September that was virtual.  Was diagnosed with migraine with aura.  Is on sumatriptan the day worsening.  History of migraines with sumatriptan injections and tablets.  Injections only for severe headaches.  Headaches happen once every 2 months.  Migraines get to cluster.  No significant changes in headaches.  Some concerns with hep C.    IMPRESSION/REPORT/PLAN  Intractable chronic migraine without aura and without status migrainosus  History of depression    This is a 51 year old female with history of migraines with worsening headaches. Headaches are suggestive of chronic migraines at this point. Exam today is noncontributory. Given the worsening headaches with no clear reasons would like to get a MRI of the brain to rule out any structural lesions. Also check blood work and vitamin levels to look for other causes of headaches. She does have some lifestyle issues that might be causing the headaches including working remotely and having irregular sleep schedule. Encouraged her to see what she can do about this. She is trying yoga right now.    For treatment of headaches she is tried Topamax and propanolol in the past. We will try nortriptyline. She is on Prozac and will monitor for serotonin syndrome. She can continue using sumatriptan for abortive therapy and injectable sumatriptan for the more severe headaches. Encouraged her not to overuse her medications. Discussed about risk of coronary artery disease with these medications. Will monitor for side effects.    I can see her back in 2 months. Encourage her to keep a log of her headaches.    -     MR Brain w/o Contrast; Future  -     Vitamin B12; Future  -     TSH with free T4 reflex; Future  -     Ferritin; Future  -     Erythrocyte sedimentation rate auto; Future  -     CBC with Platelets &  Differential; Future  -     nortriptyline (PAMELOR) 10 MG capsule; Take 1 cap/night and then increase by 1 cap/week to a max of 3 caps/night as needed and tolerated.  -     SUMAtriptan (IMITREX) 6 MG/0.5ML injection; INJECT 0.5ML SUBCUTANEOUS ONCE FOR 1 DOSE. MAY REPEAT DOSE IN 1 HOUR. MAX OF 12MG/24 HOURS.    Return in about 2 months (around 4/3/2022) for In-Clinic Visit (must), After testing.    Over 62 minutes were spent coordinating the care for the patient on the day of the encounter.  This includes previsit, during visit and post visit activities as documented above.  Counseling patient. Prescription management. Imaging studies ordered. Blood work ordered. Reviewing chart.  (Activities include but not inclusive of reviewing chart, reviewing outside records, reviewing labs and imaging study results as well as the images, patient visit time including getting history and exam,  use if applicable, review of test results with the patient and coming up with a plan in a shared model, counseling patient and family, education and answering patient questions, EMR , EMR diagnosis entry and problem list management, medication reconciliation and prescription management if applicable, paperwork if applicable, printing documents and documentation of the visit activities.)  Billing-:4 data points, 4 problem points, 4 risk points      Chuy Dunn MD  Neurologist  [facility] Neurology [facility]  Tel:- [phone]    This note was dictated using voice recognition software.  Any grammatical or context distortions are unintentional and inherent to the software.        Again, thank you for allowing me to participate in the care of your patient.        Sincerely,        Chuy Dunn MD

## 2022-02-03 NOTE — PROGRESS NOTES
NEUROLOGY OUTPATIENT CONSULT NOTE  Feb 3, 2022     CHIEF COMPLAINT/REASON FOR VISIT/REASON FOR CONSULT  Patient presents with:  New Patient: migraines    REASON FOR CONSULTATION- Headaches    REFERRAL SOURCE  Dr. Srinivasa Sofia  CC Dr. Srinivasa Sofia    HISTORY OF PRESENT ILLNESS  Nikki Hardin is a 51 year old female seen today for evaluation of headaches. She reports that she has a diagnosis of migraines since age 14. Previously her headaches were sporadic but still frequent. She estimates that she was having at least 10-15 headaches a month. Over the last 2 to 3 months headaches have become much more frequent. She is having a headache every day. Reports no real reasons why the headaches might have gotten worse.    Headaches are generally in the temple on either side and then radiate to the neck. Occasionally she can have more frontal pressure. Takes a throbbing. There is some photophobia and phonophobia. She has never had any auras with the headaches. Reports triggers including working and doing remote work from home, inconsistent sleep, lack of regular meals, going out in the sun sometimes storms. She does have some neck pain though neck pain is worse at the time of headaches.    She is tried Topamax in the past which caused severe side effects. She is also tried propanolol without any benefit. Over-the-counter medications do not help. 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 She has been using sumatriptan injections and tablets for several years which have been working for her. Has tried Maxalt with benefit though insurance would not cover it anymore and then she had to stop it. The sumatriptan works better than the Maxalt.    Previous history is reviewed and this is unchanged.    PAST MEDICAL/SURGICAL HISTORY  Past Medical History:   Diagnosis Date     Abnormal Pap smear of cervix 11/22/2019    See problem list.      Anxiety      Colitis     lymphocytic colitis ?     Depressive disorder, not  elsewhere classified     sees psych- Dr Blue     Migraine, unspecified, without mention of intractable migraine without mention of status migrainosus age 14 yrs     Other acne      PAIN IN THORACIC SPINE [724.1] 4/13/2006    chiropractic care     Raynaud disease     ? connective tissue dz.  ? sogren's     Patient Active Problem List   Diagnosis     Irritable bowel syndrome     Other acne     Migraine with aura and without status migrainosus, not intractable     Mild major depression (H)     Sedimentation rate elevation     Elevated vitamin B12 level     Anemia, unspecified type     Hypervitaminosis B6     Lymphocytic colitis     ASCUS of cervix with negative high risk HPV   Significant for migraines, arthritis, depression, microcolitis    FAMILY HISTORY  Family History   Problem Relation Age of Onset     Neurologic Disorder Mother         MIGRIANES     C.A.D. Father         in his 60's     Depression Father      Heart Disease Father      Lipids Father      Hypertension Father      Arthritis Father      Sleep Disorder Father      Cerebrovascular Disease Father 78     Cancer - colorectal Paternal Grandmother      Cancer Maternal Grandfather         stomach     Heart Disease Maternal Grandfather      Depression Brother      Depression Brother      Asthma Daughter      Heart Disease Paternal Grandfather      Breast Cancer Maternal Aunt      Diabetes No family hx of    Positive for migraines in her mother and aunt.    SOCIAL HISTORY  Social History     Tobacco Use     Smoking status: Never Smoker     Smokeless tobacco: Never Used   Substance Use Topics     Alcohol use: Yes     Comment: occasional- once monthly     Drug use: No       SYSTEMS REVIEW  Twelve-system ROS was done and other than the HPI this was negative except for neck pain, dizziness, hearing loss, sleepiness during the day, headaches, depression, bowel problems.    MEDICATIONS  acetaminophen (TYLENOL) 325 MG tablet, Take 2 tablets by mouth every 4 hours  as needed for pain.  Biotin 5000 MCG TABS, Take 1,000 mcg by mouth daily  cholecalciferol 5000 UNITS CAPS, Take 1 capsule (5,000 Units) by mouth daily FOR VITAMIN D DEFICIENCY (LOW VITAMIN D)  cyclobenzaprine (FLEXERIL) 5 MG tablet, Take 1 tablet (5 mg) by mouth 3 times daily as needed for muscle spasms  Ferrous Sulfate (IRON) 28 MG TABS,   finasteride (PROSCAR) 5 MG tablet, TK ONE-HALF T PO QD  FLUoxetine (PROZAC) 40 MG capsule, TAKE 1 CAPSULE(40 MG) BY MOUTH DAILY  IBUPROFEN PO, Take 200 mg by mouth Takes 3 tablets PRN  loperamide (LOPERAMIDE A-D) 2 MG tablet, Take 2 mg by mouth daily as needed for diarrhea   Riboflavin 100 MG TABS, Take 400 mg by mouth  SUMAtriptan (IMITREX STATDOSE) 6 MG/0.5ML pen injector kit, INJECT 0.5ML SUBCUTANEOUS ONCE FOR 1 DOSE. MAY REPEAT DOSE IN 1 HOUR. MAX OF 12MG/24 HOURS.  SUMAtriptan (IMITREX) 100 MG tablet, TAKE 1 TABLET(100 MG) BY MOUTH AT ONSET OF HEADACHE FOR MIGRAINE. MAY REPEAT IN 2 HOURS. MAX 4 TABLETS/ 24 HOURS    No current facility-administered medications on file prior to visit.       PHYSICAL EXAMINATION  VITALS: /71 (BP Location: Right arm, Patient Position: Sitting)   Pulse 73   Ht 1.524 m (5')   Wt 58.1 kg (128 lb)   BMI 25.00 kg/m    GENERAL: Healthy appearing, alert, no acute distress, normal habitus.  CARDIOVASCULAR: Extremities warm and well perfused. Pulses present.   EYES: Funduscopic exam limited.  NEUROLOGICAL:  Patient is awake and oriented to self, place and time.  Attention span is normal.  Memory is grossly intact.  Language is fluent and follows commands appropriately.  Appropriate fund of knowledge. Cranial nerves 2-12 are intact. There is no pronator drift.  Motor exam shows 5/5 strength in all extremities.  Tone is symmetric bilaterally in upper and lower extremities.  Reflexes are symmetric and 2+ in upper extremities and lower extremities. Sensory exam is grossly intact to light touch, pin prick and vibration.  Finger to nose and heel to  shin is without dysmetria.  Romberg is negative.  Gait is normal and the patient is able to do tandem walk and walk on toes and heels.        DIAGNOSTICS  CARDIOLOGY  EKG 2008  NSR    RELEVANT LABS  Component      Latest Ref Rng & Units 12/22/2020   WBC      4.0 - 11.0 10e9/L 6.7   RBC Count      3.8 - 5.2 10e12/L 3.93   Hemoglobin      11.7 - 15.7 g/dL 11.4 (L)   Hematocrit      35.0 - 47.0 % 35.2   MCV      78 - 100 fl 90   MCH      26.5 - 33.0 pg 29.0   MCHC      31.5 - 36.5 g/dL 32.4   RDW      10.0 - 15.0 % 13.2   Platelet Count      150 - 450 10e9/L 297   % Neutrophils      % 70.5   % Lymphocytes      % 20.4   % Monocytes      % 6.6   % Eosinophils      % 2.1   % Basophils      % 0.4   Absolute Neutrophil      1.6 - 8.3 10e9/L 4.7   Absolute Lymphocytes      0.8 - 5.3 10e9/L 1.4   Absolute Monocytes      0.0 - 1.3 10e9/L 0.4   Absolute Eosinophils      0.0 - 0.7 10e9/L 0.1   Absolute Basophils      0.0 - 0.2 10e9/L 0.0   Diff Method       Automated Method   Sodium      133 - 144 mmol/L 138   Potassium      3.4 - 5.3 mmol/L 4.5   Chloride      94 - 109 mmol/L 107   Carbon Dioxide      20 - 32 mmol/L 27   Anion Gap      3 - 14 mmol/L 4   Glucose      70 - 99 mg/dL 79   Urea Nitrogen      7 - 30 mg/dL 12   Creatinine      0.52 - 1.04 mg/dL 0.78   GFR Estimate      >60 mL/min/1.73:m2 87   GFR Estimate If Black      >60 mL/min/1.73:m2 >90   Calcium      8.5 - 10.1 mg/dL 9.2   Bilirubin Total      0.2 - 1.3 mg/dL 0.3   Albumin      3.4 - 5.0 g/dL 3.8   Protein Total      6.8 - 8.8 g/dL 7.8   Alkaline Phosphatase      40 - 150 U/L 73   ALT      0 - 50 U/L 22   AST      0 - 45 U/L 16       OUTSIDE RECORDS  Outside referral notes and chart notes were reviewed and pertinent information has been summarized (in addition to the HPI):-Patient was seen in primary care.  Has issues with depression.  Had a visit in September that was virtual.  Was diagnosed with migraine with aura.  Is on sumatriptan the day worsening.   History of migraines with sumatriptan injections and tablets.  Injections only for severe headaches.  Headaches happen once every 2 months.  Migraines get to cluster.  No significant changes in headaches.  Some concerns with hep C.    IMPRESSION/REPORT/PLAN  Intractable chronic migraine without aura and without status migrainosus  History of depression    This is a 51 year old female with history of migraines with worsening headaches. Headaches are suggestive of chronic migraines at this point. Exam today is noncontributory. Given the worsening headaches with no clear reasons would like to get a MRI of the brain to rule out any structural lesions. Also check blood work and vitamin levels to look for other causes of headaches. She does have some lifestyle issues that might be causing the headaches including working remotely and having irregular sleep schedule. Encouraged her to see what she can do about this. She is trying yoga right now.    For treatment of headaches she is tried Topamax and propanolol in the past. We will try nortriptyline. She is on Prozac and will monitor for serotonin syndrome. She can continue using sumatriptan for abortive therapy and injectable sumatriptan for the more severe headaches. Encouraged her not to overuse her medications. Discussed about risk of coronary artery disease with these medications. Will monitor for side effects.    I can see her back in 2 months. Encourage her to keep a log of her headaches.    -     MR Brain w/o Contrast; Future  -     Vitamin B12; Future  -     TSH with free T4 reflex; Future  -     Ferritin; Future  -     Erythrocyte sedimentation rate auto; Future  -     CBC with Platelets & Differential; Future  -     nortriptyline (PAMELOR) 10 MG capsule; Take 1 cap/night and then increase by 1 cap/week to a max of 3 caps/night as needed and tolerated.  -     SUMAtriptan (IMITREX) 6 MG/0.5ML injection; INJECT 0.5ML SUBCUTANEOUS ONCE FOR 1 DOSE. MAY REPEAT DOSE IN 1  HOUR. MAX OF 12MG/24 HOURS.  - Valium prescribed.     Return in about 2 months (around 4/3/2022) for In-Clinic Visit (must), After testing.    Over 62 minutes were spent coordinating the care for the patient on the day of the encounter.  This includes previsit, during visit and post visit activities as documented above.  Counseling patient. Prescription management. Imaging studies ordered. Blood work ordered. Reviewing chart.  (Activities include but not inclusive of reviewing chart, reviewing outside records, reviewing labs and imaging study results as well as the images, patient visit time including getting history and exam,  use if applicable, review of test results with the patient and coming up with a plan in a shared model, counseling patient and family, education and answering patient questions, EMR , EMR diagnosis entry and problem list management, medication reconciliation and prescription management if applicable, paperwork if applicable, printing documents and documentation of the visit activities.)  Billing-:4 data points, 4 problem points, 4 risk points      Chuy Dunn MD  Neurologist  [facility] Neurology [facility]  Tel:- [phone]    This note was dictated using voice recognition software.  Any grammatical or context distortions are unintentional and inherent to the software.

## 2022-02-04 ENCOUNTER — TELEPHONE (OUTPATIENT)
Dept: NEUROLOGY | Facility: CLINIC | Age: 52
End: 2022-02-04
Payer: COMMERCIAL

## 2022-02-04 NOTE — TELEPHONE ENCOUNTER
Prior Authorization Retail Medication Request    Medication/Dose: Sumatriptan 6mg/0.5 ml vial for injection   ICD code (if different than what is on RX):    Previously Tried and Failed:    Rationale:      Insurance Name:    Insurance ID:        Pharmacy Information (if different than what is on RX)  Name:    Phone:

## 2022-02-08 ENCOUNTER — LAB (OUTPATIENT)
Dept: LAB | Facility: CLINIC | Age: 52
End: 2022-02-08
Payer: COMMERCIAL

## 2022-02-08 DIAGNOSIS — G43.719 INTRACTABLE CHRONIC MIGRAINE WITHOUT AURA AND WITHOUT STATUS MIGRAINOSUS: ICD-10-CM

## 2022-02-08 LAB
BASOPHILS # BLD AUTO: 0.1 10E3/UL (ref 0–0.2)
BASOPHILS NFR BLD AUTO: 1 %
EOSINOPHIL # BLD AUTO: 0.2 10E3/UL (ref 0–0.7)
EOSINOPHIL NFR BLD AUTO: 3 %
ERYTHROCYTE [DISTWIDTH] IN BLOOD BY AUTOMATED COUNT: 13.2 % (ref 10–15)
ERYTHROCYTE [SEDIMENTATION RATE] IN BLOOD BY WESTERGREN METHOD: 17 MM/HR (ref 0–30)
FERRITIN SERPL-MCNC: 70 NG/ML (ref 8–252)
HCT VFR BLD AUTO: 39.1 % (ref 35–47)
HGB BLD-MCNC: 12.8 G/DL (ref 11.7–15.7)
IMM GRANULOCYTES # BLD: 0 10E3/UL
IMM GRANULOCYTES NFR BLD: 0 %
LYMPHOCYTES # BLD AUTO: 2.4 10E3/UL (ref 0.8–5.3)
LYMPHOCYTES NFR BLD AUTO: 38 %
MCH RBC QN AUTO: 29.4 PG (ref 26.5–33)
MCHC RBC AUTO-ENTMCNC: 32.7 G/DL (ref 31.5–36.5)
MCV RBC AUTO: 90 FL (ref 78–100)
MONOCYTES # BLD AUTO: 0.5 10E3/UL (ref 0–1.3)
MONOCYTES NFR BLD AUTO: 7 %
NEUTROPHILS # BLD AUTO: 3.2 10E3/UL (ref 1.6–8.3)
NEUTROPHILS NFR BLD AUTO: 51 %
PLATELET # BLD AUTO: 332 10E3/UL (ref 150–450)
RBC # BLD AUTO: 4.36 10E6/UL (ref 3.8–5.2)
TSH SERPL DL<=0.005 MIU/L-ACNC: 2.28 MU/L (ref 0.4–4)
VIT B12 SERPL-MCNC: 428 PG/ML (ref 193–986)
WBC # BLD AUTO: 6.3 10E3/UL (ref 4–11)

## 2022-02-08 PROCEDURE — 82607 VITAMIN B-12: CPT

## 2022-02-08 PROCEDURE — 36415 COLL VENOUS BLD VENIPUNCTURE: CPT

## 2022-02-08 PROCEDURE — 82728 ASSAY OF FERRITIN: CPT

## 2022-02-08 PROCEDURE — 85652 RBC SED RATE AUTOMATED: CPT

## 2022-02-08 PROCEDURE — 84443 ASSAY THYROID STIM HORMONE: CPT

## 2022-02-08 PROCEDURE — 85025 COMPLETE CBC W/AUTO DIFF WBC: CPT

## 2022-02-10 NOTE — TELEPHONE ENCOUNTER
PA Initiation    Medication: Sumatriptan 6mg/0.5 ml vial for injection   Insurance Company: [employer] - Phone [phone] Fax [phone]  [id number] [address]  Filling Pharmacy Phone: [phone]  Filling Pharmacy Fax: [phone]  Start Date: 2/10/2022    ALYSSA HADDAD Mc: [id number]

## 2022-02-10 NOTE — TELEPHONE ENCOUNTER
Prior Authorization Not Needed per Insurance    Medication: Sumatriptan 6mg/0.5 ml vial for injection   Insurance Company: [employer] - Phone [phone] Fax [phone]  Expected CoPay:      Pharmacy Filling the Rx: [id number] #[id number] [address]  Pharmacy Notified: Yes  Patient Notified: Yes    Spoke with pharmacy, they were able to get paid claim, no PA needed.

## 2022-02-12 ENCOUNTER — HOSPITAL ENCOUNTER (OUTPATIENT)
Dept: MRI IMAGING | Facility: CLINIC | Age: 52
Discharge: HOME | End: 2022-02-12
Attending: PSYCHIATRY & NEUROLOGY | Admitting: PSYCHIATRY & NEUROLOGY
Payer: COMMERCIAL

## 2022-02-12 DIAGNOSIS — G43.719 INTRACTABLE CHRONIC MIGRAINE WITHOUT AURA AND WITHOUT STATUS MIGRAINOSUS: ICD-10-CM

## 2022-02-12 PROCEDURE — 70551 MRI BRAIN STEM W/O DYE: CPT

## 2022-04-06 ENCOUNTER — OFFICE VISIT (OUTPATIENT)
Dept: NEUROLOGY | Facility: CLINIC | Age: 52
End: 2022-04-06
Payer: COMMERCIAL

## 2022-04-06 VITALS
HEIGHT: 60 IN | SYSTOLIC BLOOD PRESSURE: 105 MMHG | BODY MASS INDEX: 26.11 KG/M2 | DIASTOLIC BLOOD PRESSURE: 74 MMHG | WEIGHT: 133 LBS | HEART RATE: 80 BPM

## 2022-04-06 DIAGNOSIS — G43.719 INTRACTABLE CHRONIC MIGRAINE WITHOUT AURA AND WITHOUT STATUS MIGRAINOSUS: ICD-10-CM

## 2022-04-06 DIAGNOSIS — Z86.59 HISTORY OF DEPRESSION: Primary | ICD-10-CM

## 2022-04-06 PROCEDURE — 99214 OFFICE O/P EST MOD 30 MIN: CPT | Performed by: PSYCHIATRY & NEUROLOGY

## 2022-04-06 RX ORDER — NORTRIPTYLINE HCL 10 MG
CAPSULE ORAL
Qty: 270 CAPSULE | Refills: 1 | Status: SHIPPED | OUTPATIENT
Start: 2022-04-06 | End: 2022-09-30

## 2022-04-06 NOTE — NURSING NOTE
Chief Complaint   Patient presents with     RECHECK     migraine     Sander Henry MA on 4/6/2022 at 1:51 PM

## 2022-04-06 NOTE — PROGRESS NOTES
NEUROLOGY OUTPATIENT PROGRESS NOTE  Apr 6, 2022     CHIEF COMPLAINT/REASON FOR VISIT/REASON FOR CONSULT  Patient presents with:  RECHECK: migraine    REASON FOR CONSULTATION- Headaches    REFERRAL SOURCE  Dr. Srinivasa Sofia  CC Dr. Srinivasa Sofia    HISTORY OF PRESENT ILLNESS  Nikki Hardin is a 51 year old female seen today for evaluation of headaches. She reports that she has a diagnosis of migraines since age 14. Previously her headaches were sporadic but still frequent. She estimates that she was having at least ~15 headaches a month. Over the last 2 to 3 months headaches have become much more frequent. She is having a headache every day. Reports no real reasons why the headaches might have gotten worse.    Headaches are generally in the temple on either side and then radiate to the neck. Occasionally she can have more frontal pressure. Takes a throbbing. There is some photophobia and phonophobia. She has never had any auras with the headaches. Reports triggers including working and doing remote work from home, inconsistent sleep, lack of regular meals, going out in the sun sometimes storms. She does have some neck pain though neck pain is worse at the time of headaches.    She is tried Topamax in the past which caused severe side effects. She is also tried propanolol without any benefit. Over-the-counter medications do not help. 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 She has been using sumatriptan injections and tablets for several years which have been working for her. Has tried Maxalt with benefit though insurance would not cover it anymore and then she had to stop it. The sumatriptan works better than the Maxalt.    4/6/22  Patient returns today.  She did try the nortriptyline and that has helped with the headaches.  Currently is having 7 headaches a month.  Reports no changes in the nature of the headaches.  Imitrex still works with the abortive therapy.  She does complain of some somnolence in  the morning with the nortriptyline.  Is taking 30 mg.  Tries to take it earlier in the evening.  Things are may be slightly turning the corner at this point though she is not sure if this is a good long-term medication or not.  Is interested in Botox.    Previous history is reviewed and this is unchanged.    PAST MEDICAL/SURGICAL HISTORY  Past Medical History:   Diagnosis Date     Abnormal Pap smear of cervix 11/22/2019    See problem list.      Anxiety      Colitis     lymphocytic colitis ?     Depressive disorder, not elsewhere classified     sees psych- Dr Blue     Migraine, unspecified, without mention of intractable migraine without mention of status migrainosus age 14 yrs     Other acne      PAIN IN THORACIC SPINE [724.1] 4/13/2006    chiropractic care     Raynaud disease     ? connective tissue dz.  ? sogren's     Patient Active Problem List   Diagnosis     Irritable bowel syndrome     Other acne     Migraine with aura and without status migrainosus, not intractable     Mild major depression (H)     Sedimentation rate elevation     Elevated vitamin B12 level     Anemia, unspecified type     Hypervitaminosis B6     Lymphocytic colitis     ASCUS of cervix with negative high risk HPV   Significant for migraines, arthritis, depression, microcolitis    FAMILY HISTORY  Family History   Problem Relation Age of Onset     Neurologic Disorder Mother         MIGRIANES     C.A.D. Father         in his 60's     Depression Father      Heart Disease Father      Lipids Father      Hypertension Father      Arthritis Father      Sleep Disorder Father      Cerebrovascular Disease Father 78     Cancer - colorectal Paternal Grandmother      Cancer Maternal Grandfather         stomach     Heart Disease Maternal Grandfather      Depression Brother      Depression Brother      Asthma Daughter      Heart Disease Paternal Grandfather      Breast Cancer Maternal Aunt      Diabetes No family hx of    Positive for migraines in her  mother and aunt.    SOCIAL HISTORY  Social History     Tobacco Use     Smoking status: Never Smoker     Smokeless tobacco: Never Used   Substance Use Topics     Alcohol use: Yes     Comment: occasional- once monthly     Drug use: No       SYSTEMS REVIEW  Twelve-system ROS was done and other than the HPI this was negative except for neck pain, dizziness, hearing loss, sleepiness during the day, headaches, depression, bowel problems.  No new concerns/issues.    MEDICATIONS  acetaminophen (TYLENOL) 325 MG tablet, Take 2 tablets by mouth every 4 hours as needed for pain.  Biotin 5000 MCG TABS, Take 1,000 mcg by mouth daily  cholecalciferol 5000 UNITS CAPS, Take 1 capsule (5,000 Units) by mouth daily FOR VITAMIN D DEFICIENCY (LOW VITAMIN D)  Ferrous Sulfate (IRON) 28 MG TABS,   finasteride (PROSCAR) 5 MG tablet, TK ONE-HALF T PO QD  FLUoxetine (PROZAC) 40 MG capsule, TAKE 1 CAPSULE(40 MG) BY MOUTH DAILY  IBUPROFEN PO, Take 200 mg by mouth Takes 3 tablets PRN  loperamide (LOPERAMIDE A-D) 2 MG tablet, Take 2 mg by mouth daily as needed for diarrhea   Riboflavin 100 MG TABS, Take 400 mg by mouth  SUMAtriptan (IMITREX STATDOSE) 6 MG/0.5ML pen injector kit, INJECT 0.5ML SUBCUTANEOUS ONCE FOR 1 DOSE. MAY REPEAT DOSE IN 1 HOUR. MAX OF 12MG/24 HOURS.  SUMAtriptan (IMITREX) 100 MG tablet, TAKE 1 TABLET(100 MG) BY MOUTH AT ONSET OF HEADACHE FOR MIGRAINE. MAY REPEAT IN 2 HOURS. MAX 4 TABLETS/ 24 HOURS  cyclobenzaprine (FLEXERIL) 5 MG tablet, Take 1 tablet (5 mg) by mouth 3 times daily as needed for muscle spasms (Patient not taking: Reported on 4/6/2022)  SUMAtriptan (IMITREX) 6 MG/0.5ML injection, INJECT 0.5ML SUBCUTANEOUS ONCE FOR 1 DOSE. MAY REPEAT DOSE IN 1 HOUR. MAX OF 12MG/24 HOURS. (Patient not taking: Reported on 4/6/2022)    No current facility-administered medications on file prior to visit.       PHYSICAL EXAMINATION  VITALS: /74 (BP Location: Right arm, Patient Position: Sitting)   Pulse 80   Ht 1.524 m (5')    Wt 60.3 kg (133 lb)   BMI 25.97 kg/m    GENERAL: Healthy appearing, alert, no acute distress, normal habitus.  CARDIOVASCULAR: Extremities warm and well perfused. Pulses present.   NEUROLOGICAL:  Patient is awake and oriented to self, place and time.  Attention span is normal.  Memory is grossly intact.  Language is fluent and follows commands appropriately.  Appropriate fund of knowledge. Cranial nerves 2-12 are intact. There is no pronator drift.  Motor exam shows 5/5 strength in all extremities.  Tone is symmetric bilaterally in upper and lower extremities.  (Previously reflexes are symmetric and 2+ in upper extremities and lower extremities. Sensory exam is grossly intact to light touch, pin prick and vibration.) Finger to nose and heel to shin is without dysmetria.  Romberg is negative.  Gait is normal and the patient is able to do tandem walk and walk on toes and heels.      DIAGNOSTICS  CARDIOLOGY  EKG 2008  [facility]    RELEVANT LABS  Component      Latest Ref Rng & Units 12/22/2020   WBC      4.0 - 11.0 10e9/L 6.7   RBC Count      3.8 - 5.2 10e12/L 3.93   Hemoglobin      11.7 - 15.7 g/dL 11.4 (L)   Hematocrit      35.0 - 47.0 % 35.2   MCV      78 - 100 fl 90   MCH      26.5 - 33.0 pg 29.0   MCHC      31.5 - 36.5 g/dL 32.4   RDW      10.0 - 15.0 % 13.2   Platelet Count      150 - 450 10e9/L 297   % Neutrophils      % 70.5   % Lymphocytes      % 20.4   % Monocytes      % 6.6   % Eosinophils      % 2.1   % Basophils      % 0.4   Absolute Neutrophil      1.6 - 8.3 10e9/L 4.7   Absolute Lymphocytes      0.8 - 5.3 10e9/L 1.4   Absolute Monocytes      0.0 - 1.3 10e9/L 0.4   Absolute Eosinophils      0.0 - 0.7 10e9/L 0.1   Absolute Basophils      0.0 - 0.2 10e9/L 0.0   Diff Method       Automated Method   Sodium      133 - 144 mmol/L 138   Potassium      3.4 - 5.3 mmol/L 4.5   Chloride      94 - 109 mmol/L 107   Carbon Dioxide      20 - 32 mmol/L 27   Anion Gap      3 - 14 mmol/L 4   Glucose      70 - 99 mg/dL 79   Urea  Nitrogen      7 - 30 mg/dL 12   Creatinine      0.52 - 1.04 mg/dL 0.78   GFR Estimate      >60 mL/min/1.73:m2 87   GFR Estimate If Black      >60 mL/min/1.73:m2 >90   Calcium      8.5 - 10.1 mg/dL 9.2   Bilirubin Total      0.2 - 1.3 mg/dL 0.3   Albumin      3.4 - 5.0 g/dL 3.8   Protein Total      6.8 - 8.8 g/dL 7.8   Alkaline Phosphatase      40 - 150 U/L 73   ALT      0 - 50 U/L 22   AST      0 - 45 U/L 16       OUTSIDE RECORDS  Outside referral notes and chart notes were reviewed and pertinent information has been summarized (in addition to the HPI):-Patient was seen in primary care.  Has issues with depression.  Had a visit in September that was virtual.  Was diagnosed with migraine with aura.  Is on sumatriptan the day worsening.  History of migraines with sumatriptan injections and tablets.  Injections only for severe headaches.  Headaches happen once every 2 months.  Migraines get to cluster.  No significant changes in headaches.  Some concerns with hep C.    MRI brain  IMPRESSION:  1.  No acute intracranial abnormality.  2.  Small nonspecific focus of subcortical T2 FLAIR hyperintensity in the right frontal lobe is strictly nonspecific but can be seen in setting of migraines and prior insult or injury. Demyelination is not favored.    LABS  Component      Latest Ref Rng & Units 2/8/2022   WBC      4.0 - 11.0 10e3/uL 6.3   RBC Count      3.80 - 5.20 10e6/uL 4.36   Hemoglobin      11.7 - 15.7 g/dL 12.8   Hematocrit      35.0 - 47.0 % 39.1   MCV      78 - 100 fL 90   MCH      26.5 - 33.0 pg 29.4   MCHC      31.5 - 36.5 g/dL 32.7   RDW      10.0 - 15.0 % 13.2   Platelet Count      150 - 450 10e3/uL 332   % Neutrophils      % 51   % Lymphocytes      % 38   % Monocytes      % 7   % Eosinophils      % 3   % Basophils      % 1   % Immature Granulocytes      % 0   Absolute Neutrophils      1.6 - 8.3 10e3/uL 3.2   Absolute Lymphocytes      0.8 - 5.3 10e3/uL 2.4   Absolute Monocytes      0.0 - 1.3 10e3/uL 0.5    Absolute Eosinophils      0.0 - 0.7 10e3/uL 0.2   Absolute Basophils      0.0 - 0.2 10e3/uL 0.1   Absolute Immature Granulocytes      <=0.4 10e3/uL 0.0   Vitamin B12      193 - 986 pg/mL 428   TSH      0.40 - 4.00 mU/L 2.28   Ferritin      8 - 252 ng/mL 70   Sed Rate      0 - 30 mm/hr 17       IMPRESSION/REPORT/PLAN  Intractable chronic migraine without aura and without status migrainosus  History of depression    This is a 51 year old female with history of migraines with worsening headaches. Headaches are suggestive of chronic migraines at this point. Exam today is noncontributory.  MRI brain has been negative for structural lesions.  Blood work has been noncontributory. She does have some lifestyle issues that might be causing the headaches including working remotely and having irregular sleep schedule. Encouraged her to see what she can do about this. She is trying yoga right now.    For treatment of headaches she is tried Topamax and propanolol in the past.  Nortriptyline has been helpful.  She is on Prozac and will monitor for serotonin syndrome.  She does complain of some hypersomnolence in the morning.  Things might be getting better as she getting used to the medication and will encourage her to continue the nortriptyline for right now.  Did discuss Botox which she is interested in.  She would be a good candidate for Botox and that can be considered if the nortriptyline is not helpful.    She can continue using sumatriptan for abortive therapy and injectable sumatriptan for the more severe headaches. Encouraged her not to overuse her medications. Discussed about risk of coronary artery disease with these medications. Will monitor for side effects.    I can see her back in 2 months. Encourage her to keep a log of her headaches.  Medications refilled    -     nortriptyline (PAMELOR) 10 MG capsule; Take 1 cap/night and then increase by 1 cap/week to a max of 3 caps/night as needed and tolerated.  -      SUMAtriptan (IMITREX) 6 MG/0.5ML injection; INJECT 0.5ML SUBCUTANEOUS ONCE FOR 1 DOSE. MAY REPEAT DOSE IN 1 HOUR. MAX OF 12MG/24 HOURS.      Return in about 2 months (around 6/6/2022) for In-Clinic Visit (must), After testing.    Over 32 minutes were spent coordinating the care for the patient on the day of the encounter.  This includes previsit, during visit and post visit activities as documented above.  Counseling patient.  Discussing Botox.  MRI images reviewed.  Blood work reviewed.  Prescription management.  (Activities include but not inclusive of reviewing chart, reviewing outside records, reviewing labs and imaging study results as well as the images, patient visit time including getting history and exam,  use if applicable, review of test results with the patient and coming up with a plan in a shared model, counseling patient and family, education and answering patient questions, EMR , EMR diagnosis entry and problem list management, medication reconciliation and prescription management if applicable, paperwork if applicable, printing documents and documentation of the visit activities.)    Chuy Dunn MD  Neurologist  [facility]  Tel:- [phone]    This note was dictated using voice recognition software.  Any grammatical or context distortions are unintentional and inherent to the software.

## 2022-04-06 NOTE — LETTER
4/6/2022         RE: Nikki Hardin  [address] [facility] [id number]        Dear Colleague,    Thank you for referring your patient, Nikki Hardin, to the [facility] [place]. Please see a copy of my visit note below.    NEUROLOGY OUTPATIENT PROGRESS NOTE  Apr 6, 2022     CHIEF COMPLAINT/REASON FOR VISIT/REASON FOR CONSULT  Patient presents with:  RECHECK: migraine    REASON FOR CONSULTATION- Headaches    REFERRAL SOURCE  Dr. Srinivasa Sofia  CC Dr. Srinivasa Sofia    HISTORY OF PRESENT ILLNESS  Nikki Hardin is a 51 year old female seen today for evaluation of headaches. She reports that she has a diagnosis of migraines since age 14. Previously her headaches were sporadic but still frequent. She estimates that she was having at least ~15 headaches a month. Over the last 2 to 3 months headaches have become much more frequent. She is having a headache every day. Reports no real reasons why the headaches might have gotten worse.    Headaches are generally in the temple on either side and then radiate to the neck. Occasionally she can have more frontal pressure. Takes a throbbing. There is some photophobia and phonophobia. She has never had any auras with the headaches. Reports triggers including working and doing remote work from home, inconsistent sleep, lack of regular meals, going out in the sun sometimes storms. She does have some neck pain though neck pain is worse at the time of headaches.    She is tried Topamax in the past which caused severe side effects. She is also tried propanolol without any benefit. Over-the-counter medications do not help. 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 She has been using sumatriptan injections and tablets for several years which have been working for her. Has tried Maxalt with benefit though insurance would not cover it anymore and then she had to stop it. The sumatriptan works better than the Maxalt.    4/6/22  Patient  returns today.  She did try the nortriptyline and that has helped with the headaches.  Currently is having 7 headaches a month.  Reports no changes in the nature of the headaches.  Imitrex still works with the abortive therapy.  She does complain of some somnolence in the morning with the nortriptyline.  Is taking 30 mg.  Tries to take it earlier in the evening.  Things are may be slightly turning the corner at this point though she is not sure if this is a good long-term medication or not.  Is interested in Botox.    Previous history is reviewed and this is unchanged.    PAST MEDICAL/SURGICAL HISTORY  Past Medical History:   Diagnosis Date     Abnormal Pap smear of cervix 11/22/2019    See problem list.      Anxiety      Colitis     lymphocytic colitis ?     Depressive disorder, not elsewhere classified     sees psych- Dr Blue     Migraine, unspecified, without mention of intractable migraine without mention of status migrainosus age 14 yrs     Other acne      PAIN IN THORACIC SPINE [724.1] 4/13/2006    chiropractic care     Raynaud disease     ? connective tissue dz.  ? sogren's     Patient Active Problem List   Diagnosis     Irritable bowel syndrome     Other acne     Migraine with aura and without status migrainosus, not intractable     Mild major depression (H)     Sedimentation rate elevation     Elevated vitamin B12 level     Anemia, unspecified type     Hypervitaminosis B6     Lymphocytic colitis     ASCUS of cervix with negative high risk HPV   Significant for migraines, arthritis, depression, microcolitis    FAMILY HISTORY  Family History   Problem Relation Age of Onset     Neurologic Disorder Mother         MIGRIANES     C.A.D. Father         in his 60's     Depression Father      Heart Disease Father      Lipids Father      Hypertension Father      Arthritis Father      Sleep Disorder Father      Cerebrovascular Disease Father 78     Cancer - colorectal Paternal Grandmother      Cancer Maternal  Grandfather         stomach     Heart Disease Maternal Grandfather      Depression Brother      Depression Brother      Asthma Daughter      Heart Disease Paternal Grandfather      Breast Cancer Maternal Aunt      Diabetes No family hx of    Positive for migraines in her mother and aunt.    SOCIAL HISTORY  Social History     Tobacco Use     Smoking status: Never Smoker     Smokeless tobacco: Never Used   Substance Use Topics     Alcohol use: Yes     Comment: occasional- once monthly     Drug use: No       SYSTEMS REVIEW  Twelve-system ROS was done and other than the HPI this was negative except for neck pain, dizziness, hearing loss, sleepiness during the day, headaches, depression, bowel problems.  No new concerns/issues.    MEDICATIONS  acetaminophen (TYLENOL) 325 MG tablet, Take 2 tablets by mouth every 4 hours as needed for pain.  Biotin 5000 MCG TABS, Take 1,000 mcg by mouth daily  cholecalciferol 5000 UNITS CAPS, Take 1 capsule (5,000 Units) by mouth daily FOR VITAMIN D DEFICIENCY (LOW VITAMIN D)  Ferrous Sulfate (IRON) 28 MG TABS,   finasteride (PROSCAR) 5 MG tablet, TK ONE-HALF T PO QD  FLUoxetine (PROZAC) 40 MG capsule, TAKE 1 CAPSULE(40 MG) BY MOUTH DAILY  IBUPROFEN PO, Take 200 mg by mouth Takes 3 tablets PRN  loperamide (LOPERAMIDE A-D) 2 MG tablet, Take 2 mg by mouth daily as needed for diarrhea   Riboflavin 100 MG TABS, Take 400 mg by mouth  SUMAtriptan (IMITREX STATDOSE) 6 MG/0.5ML pen injector kit, INJECT 0.5ML SUBCUTANEOUS ONCE FOR 1 DOSE. MAY REPEAT DOSE IN 1 HOUR. MAX OF 12MG/24 HOURS.  SUMAtriptan (IMITREX) 100 MG tablet, TAKE 1 TABLET(100 MG) BY MOUTH AT ONSET OF HEADACHE FOR MIGRAINE. MAY REPEAT IN 2 HOURS. MAX 4 TABLETS/ 24 HOURS  cyclobenzaprine (FLEXERIL) 5 MG tablet, Take 1 tablet (5 mg) by mouth 3 times daily as needed for muscle spasms (Patient not taking: Reported on 4/6/2022)  SUMAtriptan (IMITREX) 6 MG/0.5ML injection, INJECT 0.5ML SUBCUTANEOUS ONCE FOR 1 DOSE. MAY REPEAT DOSE IN 1  HOUR. MAX OF 12MG/24 HOURS. (Patient not taking: Reported on 4/6/2022)    No current facility-administered medications on file prior to visit.       PHYSICAL EXAMINATION  VITALS: /74 (BP Location: Right arm, Patient Position: Sitting)   Pulse 80   Ht 1.524 m (5')   Wt 60.3 kg (133 lb)   BMI 25.97 kg/m    GENERAL: Healthy appearing, alert, no acute distress, normal habitus.  CARDIOVASCULAR: Extremities warm and well perfused. Pulses present.   NEUROLOGICAL:  Patient is awake and oriented to self, place and time.  Attention span is normal.  Memory is grossly intact.  Language is fluent and follows commands appropriately.  Appropriate fund of knowledge. Cranial nerves 2-12 are intact. There is no pronator drift.  Motor exam shows 5/5 strength in all extremities.  Tone is symmetric bilaterally in upper and lower extremities.  (Previously reflexes are symmetric and 2+ in upper extremities and lower extremities. Sensory exam is grossly intact to light touch, pin prick and vibration.) Finger to nose and heel to shin is without dysmetria.  Romberg is negative.  Gait is normal and the patient is able to do tandem walk and walk on toes and heels.      DIAGNOSTICS  CARDIOLOGY  EKG 2008  NSR    RELEVANT LABS  Component      Latest Ref Rng & Units 12/22/2020   WBC      4.0 - 11.0 10e9/L 6.7   RBC Count      3.8 - 5.2 10e12/L 3.93   Hemoglobin      11.7 - 15.7 g/dL 11.4 (L)   Hematocrit      35.0 - 47.0 % 35.2   MCV      78 - 100 fl 90   MCH      26.5 - 33.0 pg 29.0   MCHC      31.5 - 36.5 g/dL 32.4   RDW      10.0 - 15.0 % 13.2   Platelet Count      150 - 450 10e9/L 297   % Neutrophils      % 70.5   % Lymphocytes      % 20.4   % Monocytes      % 6.6   % Eosinophils      % 2.1   % Basophils      % 0.4   Absolute Neutrophil      1.6 - 8.3 10e9/L 4.7   Absolute Lymphocytes      0.8 - 5.3 10e9/L 1.4   Absolute Monocytes      0.0 - 1.3 10e9/L 0.4   Absolute Eosinophils      0.0 - 0.7 10e9/L 0.1   Absolute Basophils      0.0  - 0.2 10e9/L 0.0   Diff Method       Automated Method   Sodium      133 - 144 mmol/L 138   Potassium      3.4 - 5.3 mmol/L 4.5   Chloride      94 - 109 mmol/L 107   Carbon Dioxide      20 - 32 mmol/L 27   Anion Gap      3 - 14 mmol/L 4   Glucose      70 - 99 mg/dL 79   Urea Nitrogen      7 - 30 mg/dL 12   Creatinine      0.52 - 1.04 mg/dL 0.78   GFR Estimate      >60 mL/min/1.73:m2 87   GFR Estimate If Black      >60 mL/min/1.73:m2 >90   Calcium      8.5 - 10.1 mg/dL 9.2   Bilirubin Total      0.2 - 1.3 mg/dL 0.3   Albumin      3.4 - 5.0 g/dL 3.8   Protein Total      6.8 - 8.8 g/dL 7.8   Alkaline Phosphatase      40 - 150 U/L 73   ALT      0 - 50 U/L 22   AST      0 - 45 U/L 16       OUTSIDE RECORDS  Outside referral notes and chart notes were reviewed and pertinent information has been summarized (in addition to the HPI):-Patient was seen in primary care.  Has issues with depression.  Had a visit in September that was virtual.  Was diagnosed with migraine with aura.  Is on sumatriptan the day worsening.  History of migraines with sumatriptan injections and tablets.  Injections only for severe headaches.  Headaches happen once every 2 months.  Migraines get to cluster.  No significant changes in headaches.  Some concerns with hep C.    MRI brain  IMPRESSION:  1.  No acute intracranial abnormality.  2.  Small nonspecific focus of subcortical T2 FLAIR hyperintensity in the right frontal lobe is strictly nonspecific but can be seen in setting of migraines and prior insult or injury. Demyelination is not favored.    LABS  Component      Latest Ref Rng & Units 2/8/2022   WBC      4.0 - 11.0 10e3/uL 6.3   RBC Count      3.80 - 5.20 10e6/uL 4.36   Hemoglobin      11.7 - 15.7 g/dL 12.8   Hematocrit      35.0 - 47.0 % 39.1   MCV      78 - 100 fL 90   MCH      26.5 - 33.0 pg 29.4   MCHC      31.5 - 36.5 g/dL 32.7   RDW      10.0 - 15.0 % 13.2   Platelet Count      150 - 450 10e3/uL 332   % Neutrophils      % 51   %  Lymphocytes      % 38   % Monocytes      % 7   % Eosinophils      % 3   % Basophils      % 1   % Immature Granulocytes      % 0   Absolute Neutrophils      1.6 - 8.3 10e3/uL 3.2   Absolute Lymphocytes      0.8 - 5.3 10e3/uL 2.4   Absolute Monocytes      0.0 - 1.3 10e3/uL 0.5   Absolute Eosinophils      0.0 - 0.7 10e3/uL 0.2   Absolute Basophils      0.0 - 0.2 10e3/uL 0.1   Absolute Immature Granulocytes      <=0.4 10e3/uL 0.0   Vitamin B12      193 - 986 pg/mL 428   TSH      0.40 - 4.00 mU/L 2.28   Ferritin      8 - 252 ng/mL 70   Sed Rate      0 - 30 mm/hr 17       IMPRESSION/REPORT/PLAN  Intractable chronic migraine without aura and without status migrainosus  History of depression    This is a 51 year old female with history of migraines with worsening headaches. Headaches are suggestive of chronic migraines at this point. Exam today is noncontributory.  MRI brain has been negative for structural lesions.  Blood work has been noncontributory. She does have some lifestyle issues that might be causing the headaches including working remotely and having irregular sleep schedule. Encouraged her to see what she can do about this. She is trying yoga right now.    For treatment of headaches she is tried Topamax and propanolol in the past.  Nortriptyline has been helpful.  She is on Prozac and will monitor for serotonin syndrome.  She does complain of some hypersomnolence in the morning.  Things might be getting better as she getting used to the medication and will encourage her to continue the nortriptyline for right now.  Did discuss Botox which she is interested in.  She would be a good candidate for Botox and that can be considered if the nortriptyline is not helpful.    She can continue using sumatriptan for abortive therapy and injectable sumatriptan for the more severe headaches. Encouraged her not to overuse her medications. Discussed about risk of coronary artery disease with these medications. Will monitor for  side effects.    I can see her back in 2 months. Encourage her to keep a log of her headaches.  Medications refilled    -     nortriptyline (PAMELOR) 10 MG capsule; Take 1 cap/night and then increase by 1 cap/week to a max of 3 caps/night as needed and tolerated.  -     SUMAtriptan (IMITREX) 6 MG/0.5ML injection; INJECT 0.5ML SUBCUTANEOUS ONCE FOR 1 DOSE. MAY REPEAT DOSE IN 1 HOUR. MAX OF 12MG/24 HOURS.      Return in about 2 months (around 6/6/2022) for In-Clinic Visit (must), After testing.    Over 32 minutes were spent coordinating the care for the patient on the day of the encounter.  This includes previsit, during visit and post visit activities as documented above.  Counseling patient.  Discussing Botox.  MRI images reviewed.  Blood work reviewed.  Prescription management.  (Activities include but not inclusive of reviewing chart, reviewing outside records, reviewing labs and imaging study results as well as the images, patient visit time including getting history and exam,  use if applicable, review of test results with the patient and coming up with a plan in a shared model, counseling patient and family, education and answering patient questions, EMR , EMR diagnosis entry and problem list management, medication reconciliation and prescription management if applicable, paperwork if applicable, printing documents and documentation of the visit activities.)    Chuy Dunn MD  Neurologist  [facility] Neurology [facility]  Tel:- [phone]    This note was dictated using voice recognition software.  Any grammatical or context distortions are unintentional and inherent to the software.        Again, thank you for allowing me to participate in the care of your patient.        Sincerely,        Chuy Dunn MD

## 2022-06-07 DIAGNOSIS — F32.0 MILD MAJOR DEPRESSION (H): ICD-10-CM

## 2022-06-09 ENCOUNTER — MYC MEDICAL ADVICE (OUTPATIENT)
Dept: FAMILY MEDICINE | Facility: CLINIC | Age: 52
End: 2022-06-09
Payer: COMMERCIAL

## 2022-06-09 RX ORDER — FLUOXETINE 40 MG/1
CAPSULE ORAL
Qty: 90 CAPSULE | Refills: 0 | Status: SHIPPED | OUTPATIENT
Start: 2022-06-09 | End: 2022-09-06

## 2022-06-09 ASSESSMENT — PATIENT HEALTH QUESTIONNAIRE - PHQ9
SUM OF ALL RESPONSES TO PHQ QUESTIONS 1-9: 3
10. IF YOU CHECKED OFF ANY PROBLEMS, HOW DIFFICULT HAVE THESE PROBLEMS MADE IT FOR YOU TO DO YOUR WORK, TAKE CARE OF THINGS AT HOME, OR GET ALONG WITH OTHER PEOPLE: NOT DIFFICULT AT ALL
SUM OF ALL RESPONSES TO PHQ QUESTIONS 1-9: 3

## 2022-06-09 NOTE — TELEPHONE ENCOUNTER
[employer] message sent to patient with PHQ-9.    Prescription approved per [facility] Refill Protocol.    PETER Isbell, RN  [facility]      Warnings Override History for FLUoxetine (PROZAC) 40 MG capsule [[id number]]    Overridden by Chuy Dunn MD on Apr 6, 2022 2:18 PM   Drug-Drug   1. FLUOXETINE / TRICYCLIC ANTIDEPRESSANTS [Level: Major] [Reason: Benefit outweighs risk]   Other Orders: nortriptyline (PAMELOR) 10 MG capsule         Overridden by Chuy Dunn MD on Feb 3, 2022 4:12 PM   Drug-Drug   1. DIAZEPAM / MODERATE [id number] INHIBITORS [Level: Moderate] [Reason: Benefit outweighs risk]   Other Orders: diazepam (VALIUM) 5 MG tablet         Overridden by Chuy Dunn MD on Feb 3, 2022 8:00 AM   Drug-Drug   1. FLUOXETINE / TRICYCLIC ANTIDEPRESSANTS [Level: Major]   Other Orders: nortriptyline (PAMELOR) 10 MG capsule         Overridden by Isidra Jackson, RN on Dec 10, 2021 9:57 AM   Drug-Drug   1. IBUPROFEN / SELECTIVE SEROTONIN REUPTAKE INHIBITORS [Level: Major]   Other Orders: IBUPROFEN PO      2. IBUPROFEN / SELECTIVE SEROTONIN REUPTAKE INHIBITORS [Level: Major]   Other Orders: IBUPROFEN PO

## 2022-06-29 ENCOUNTER — TRANSFERRED RECORDS (OUTPATIENT)
Dept: HEALTH INFORMATION MANAGEMENT | Facility: CLINIC | Age: 52
End: 2022-06-29

## 2022-07-07 ENCOUNTER — TRANSFERRED RECORDS (OUTPATIENT)
Dept: HEALTH INFORMATION MANAGEMENT | Facility: CLINIC | Age: 52
End: 2022-07-07

## 2022-07-29 ENCOUNTER — TELEPHONE (OUTPATIENT)
Dept: FAMILY MEDICINE | Facility: CLINIC | Age: 52
End: 2022-07-29

## 2022-07-29 DIAGNOSIS — G43.109 MIGRAINE WITH AURA AND WITHOUT STATUS MIGRAINOSUS, NOT INTRACTABLE: ICD-10-CM

## 2022-07-29 RX ORDER — SUMATRIPTAN 100 MG/1
TABLET, FILM COATED ORAL
Qty: 20 TABLET | Refills: 2 | Status: CANCELLED | OUTPATIENT
Start: 2022-07-29

## 2022-07-29 NOTE — TELEPHONE ENCOUNTER
Prior Authorization Approval    Authorization Effective Date: 7/29/2022  Authorization Expiration Date: 7/28/2025  Medication: SUMAtriptan (IMITREX) 100 MG tablet - APPROVED  Insurance Company: [employer] - Phone [phone] Fax [phone]  Which Pharmacy is filling the prescription (Not needed for infusion/clinic administered): [id number] [place], [place] - [address] [place] [address]  Pharmacy Notified: Yes  Patient Notified: Yes (pharmacy will notify patient when ready)

## 2022-07-29 NOTE — TELEPHONE ENCOUNTER
Prior Authorization Retail Medication Request    Medication/Dose: SUMAtriptan (IMITREX) 100 MG tablet  ICD code (if different than what is on RX):  Previously Tried and Failed:  Rationale:    Insurance Name:    Insurance ID: [id number]    Pharmacy Information (if different than what is on RX)  Name:  Phone:    Please include previous medications tried and failed.  Please ask insurance for medications on formulary.

## 2022-07-29 NOTE — TELEPHONE ENCOUNTER
Central Prior Authorization Team   Phone: [phone]      PA Initiation    Medication: SUMAtriptan (IMITREX) 100 MG tablet - INITIATED  Insurance Company: [employer]P - Phone [phone] Fax [phone]  Pharmacy Filling the Rx: [id number] [place], [place] - [address] [place] [address]  Filling Pharmacy Phone: [phone]  Filling Pharmacy Fax:    Start Date: 7/29/2022

## 2022-08-03 ENCOUNTER — ANCILLARY PROCEDURE (OUTPATIENT)
Dept: MAMMOGRAPHY | Facility: CLINIC | Age: 52
End: 2022-08-03
Attending: FAMILY MEDICINE
Payer: COMMERCIAL

## 2022-08-03 DIAGNOSIS — Z12.31 VISIT FOR SCREENING MAMMOGRAM: ICD-10-CM

## 2022-08-03 PROCEDURE — 77063 BREAST TOMOSYNTHESIS BI: CPT | Mod: GC | Performed by: STUDENT IN AN ORGANIZED HEALTH CARE EDUCATION/TRAINING PROGRAM

## 2022-08-03 PROCEDURE — 77067 SCR MAMMO BI INCL CAD: CPT | Mod: GC | Performed by: STUDENT IN AN ORGANIZED HEALTH CARE EDUCATION/TRAINING PROGRAM

## 2022-08-09 ENCOUNTER — TRANSCRIBE ORDERS (OUTPATIENT)
Dept: FAMILY MEDICINE | Facility: CLINIC | Age: 52
End: 2022-08-09

## 2022-08-09 DIAGNOSIS — Z80.3 FAMILY HISTORY OF MALIGNANT NEOPLASM OF BREAST: Primary | ICD-10-CM

## 2022-08-11 ENCOUNTER — VIRTUAL VISIT (OUTPATIENT)
Dept: ONCOLOGY | Facility: CLINIC | Age: 52
End: 2022-08-11
Attending: FAMILY MEDICINE
Payer: COMMERCIAL

## 2022-08-11 DIAGNOSIS — Z80.0 FAMILY HISTORY OF COLON CANCER: ICD-10-CM

## 2022-08-11 DIAGNOSIS — Z80.0 FAMILY HISTORY- STOMACH CANCER: ICD-10-CM

## 2022-08-11 DIAGNOSIS — Z80.3 FAMILY HISTORY OF MALIGNANT NEOPLASM OF BREAST: Primary | ICD-10-CM

## 2022-08-11 DIAGNOSIS — Z80.42 FAMILY HISTORY OF PROSTATE CANCER: ICD-10-CM

## 2022-08-11 PROCEDURE — 96040 HC GENETIC COUNSELING, EACH 30 MINUTES: CPT | Mod: GT,95 | Performed by: GENETIC COUNSELOR, MS

## 2022-08-11 NOTE — PROGRESS NOTES
8/11/2022    Referring Provider: Srinivasa Sofia DO    Present for Today's Visit: Nikki    Presenting Information:   I met with Nikki Wilfred today for genetic counseling (video visit due to carol) to discuss her family history of cancer.  She is here today to review this history, cancer screening recommendations, and available genetic testing options.    Personal History:  Nikki is a 52 year old female. She does not have any personal history of cancer. She reports a history of lymphocytic colitis and migraines.       She had her first menstrual period at age 10, her first child at age 26, and is postmenopausal. Nikki has her ovaries, fallopian tubes and uterus in place.  She reports past history of oral contraceptive use in her 20's and 30's and that she has never been on hormone replacement therapy.      Her most recent OB-GYN exam and Pap smear in 2019 showed ASCUS (neg HPV) and will cotest in 3 years. Her most recent mammogram on 8/3/2022 was normal and her breast density was reported as heterogeneously dense. Most recent colonoscopy in July 2020 was normal and follow-up was recommended in  10 years. She does not regularly do any other cancer screening at this time.  Nikki reported no tobacco use, and about one drink every three months alcohol use.    Family History: Cancer family history and pertinent information reviewed below (Please see scanned pedigree for detailed family history information)    Maternal aunt, in her 60's, with a history of early-onset breast cancer (diagnosed at 45-50 years); and underwent lumpectomy, chemotherapy, and possibly radiation.     Maternal grandfather passed from stomach cancer diagnosed after age 50.    Two paternal uncles who passed from metastatic prostate cancer in their late 70's/80's (Nikki reports that one may have had a different type of cancer).     Paternal grandmother passed in her 60's from colon cancer diagnosed in her early 60's.     There is no known  Ashkenazi Adventism ancestry on either side of her family. There is no reported consanguinity.    Discussion:    Nikki's family history of breast is suggestive of a hereditary cancer syndrome.    We reviewed the features of sporadic, familial, and hereditary cancers. In looking at Nikki's family history, it is possible that a cancer susceptibility gene is present due to her maternal aunt's early-onset breast cancer history.    We discussed the natural history and genetics of hereditary cancers. A detailed handout regarding the information we discussed will be sent to Nikki via [employer]. Topics included: inheritance pattern, cancer risks, cancer screening recommendations, and also risks, benefits and limitations of testing.  We reviewed that the most common cause of hereditary breast cancer is Hereditary Breast and Ovarian Cancer (HBOC) syndrome, which is caused by mutations in the genes BRCA1 and BRCA2. Women with a mutation in either of these genes are at increased risk for breast and ovarian cancer. There is also an increased risk for a second primary breast cancer for women. Men with a mutation in either BRCA1 or BRCA2 are at increased risk for male breast and prostate cancer. Both women and men may also be at increased risk for pancreatic cancer and melanoma.   We also briefly discussed Cano syndrome given her family history of gastrointestinal cancers. Cano syndrome can be caused by a mutation in one of five genes:  MLH1, MSH2, MSH6, PMS2, and EPCAM.  Cano syndrome may present with polyps, but typically a small number.  The highest cancer risks associated with Cano syndrome include colon cancer, endometrial/uterine cancer, gastric cancer, and ovarian cancer.    Based on her family history, Nikki meets current National Comprehensive Cancer Network (NCCN) criteria for genetic testing of high-penetrance breast cancer susceptibility genes (including BRCA1, BRCA2, CDH1, PALB2, PTEN, and TP53).      We  discussed that there are additional genes that could cause increased risk for breast and related cancers. As many of these genes present with overlapping features in a family and accurate cancer risk cannot always be established based upon the pedigree analysis alone, it would be reasonable for Nikki to consider panel genetic testing to analyze multiple genes at once.    We reviewed genetic testing options given Nikki's family history including targeted and expanded options. After our discussion, Nikki opted to proceed with genetic testing via BRCA1/2 analysis with automatic reflex to the Common Hereditary Cancers panel through [facility].   Genetic testing is available for 47 genes associated with cancers of the breast, ovary, uterus, prostate and gastrointestinal system: [facility] Common Hereditary Cancers panel (APC, KWADWO, AXIN2, BARD1, BMPR1A, BRCA1, BRCA2, BRIP1, CDH1, CDK4, CDKN2A, CHEK2, CTNNA1, DICER1, EPCAM, GREM1, HOXB13, KIT, MEN1, MLH1, MSH2, MSH3, MSH6, MUTYH, NBN, NF1, NTHL1, PALB2, PDGFRA, PMS2, POLD1, POLE, PTEN,RAD50, RAD51C, RAD51D, SDHA, SDHB, SDHC, SDHD, SMAD4, SMARCA4, STK11, TP53,TSC1, TSC2, VHL).    We discussed that many of these genes are associated with specific hereditary cancer syndromes and published management guidelines: Hereditary Breast and Ovarian Cancer syndrome (BRCA1, BRCA2), Cano syndrome (MLH1, MSH2, MSH6, PMS2, EPCAM), Familial Adenomatous Polyposis (APC), Hereditary Diffuse Gastric Cancer (CDH1), Familial Atypical Multiple Mole Melanoma syndrome (CDK4, CDKN2A), Multiple Endocrine Neoplasia type 1 (MEN1), Juvenile Polyposis syndrome (BMPR1A, SMAD4), Cowden syndrome (PTEN), Li Fraumeni syndrome (TP53), Hereditary Paraganglioma and Pheochromocytoma syndrome (SDHA, SDHB, SDHC, SDHD), Peutz-Jeghers syndrome (STK11), MUTYH Associated Polyposis (MUTYH), Tuberous sclerosis complex (TSC1, TSC2), Von Hippel-Lindau disease (VHL), and Neurofibromatosis type 1 (NF1).   The KWADWO, AXIN2,  BRIP1, CHEK2, GREM1, MSH3, NBN, NTHL1, PALB2, POLD1, POLE, RAD51C, and RAD51D genes are associated with increased cancer risk and have published management guidelines for certain cancers.   The remaining genes (BARD1, CTNNA1, DICER1, HOXB13, KIT, PDGFRA, RAD50, and SMARCA4) are associated with increased cancer risk and may allow us to make medical recommendations when mutations are identified.     Consent was obtained over video with no witness required due to the current covid19 global pandemic.    Medical Management: For Nikki, we reviewed that the information from genetic testing may determine:    additional cancer screening for which Nikki may qualify (i.e. mammogram and breast MRI, more frequent colonoscopies, more frequent dermatologic exams, etc.),    options for risk reducing surgeries Nikki could consider (i.e. bilateral mastectomy, surgery to remove her ovaries and/or uterus, etc.),      and targeted chemotherapies for Nikki if she were to develop certain cancers in the future (i.e. immunotherapy for individuals with Cano syndrome, PARP inhibitors, etc.).     These recommendations will be discussed in detail once genetic testing is completed.     Nikki will schedule a lab only appointment at any [facility] clinic at her earliest convenience.        Plan:  1) Today Nikki elected to proceed with BRCA1/2 analysis with automatic reflex to Common Hereditary Cancers panel genetic testing (47 genes) through [facility].  2) This information should be available in approximately 2-3 weeks.  3) Nikki will be contacted by our scheduling department to set up a result disclosure appointment.     Brianna Correia MS, [facility]  Licensed, Certified Genetic Counselor  [facility]  [phone]  [email or web address]

## 2022-08-11 NOTE — PATIENT INSTRUCTIONS
Assessing Cancer Risk  Only about 5-10% of cancers are thought to be due to an inherited cancer susceptibility gene.    These families often have:  Several people with the same or related types of cancer  Cancers diagnosed at a young age (before age 50)  Individuals with more than one primary cancer  Multiple generations of the family affected with cancer    Some people may be candidates for genetic testing of more than one gene.  For these families, genetic testing using a multi-gene cancer panel may be offered.  These panels may test genes known to increase the risk for breast (and other) cancers: KWADWO, BRCA1, BRCA2, CDH1, CHEK2, PALB2, PTEN, and TP53.  The purpose of this handout is to serve as a brief summary of the high/moderate-risk breast cancer genes which have published clinical management guidelines for individuals who are found to carry a mutation.    Hereditary Breast and Ovarian Cancer Syndrome (HBOC)  A single mutation in one of the copies of BRCA1 or BRCA2 increases the risk for breast and ovarian cancer, among others.  The risk for pancreatic cancer and melanoma may also be slightly increased in some families.  The tables below list the chance that someone with a BRCA mutation would develop cancer in his or her lifetime1,2,3,4.          Women   Men     General Population BRCA+    General Population BRCA+   Breast  12% 60-90%  Breast <1% ~7%   Ovarian  1-2% Up to 58%  Prostate 16% 20%     A person s ethnic background is also important to consider, as individuals of Ashkenazi Quaker ancestry have a higher chance of having a BRCA gene mutation.  There are three BRCA mutations that occur more frequently in this population.    Li-Fraumeni Syndrome (LFS)  LFS is a cancer predisposition syndrome. Individuals with LFS are at an increased risk for developing cancer at a young age. The general lifetime risk for development of cancer is 50% by age 30 and 90% by age 60.  LFS is caused by a mutation in the TP53  gene.  A single mutation in one of the copies of TP53 increases the risk for multiple cancers.     Core Cancers: Sarcomas, Breast, Brain, Lung, Leukemias/Lymphomas, Adrenocortical carcinomas  Other Cancers: Gastrointestinal, Thyroid, Skin, Genitourinary    Cowden Syndrome  Cowden syndrome is a hereditary condition that increases the risk for breast, thyroid, endometrial, and kidney cancer.  Cowden syndrome is caused by a mutation in the PTEN gene.  A single mutation in one of the copies of PTEN causes Cowden syndrome and increases cancer risk.  The table below shows the chance that someone with a PTEN mutation would develop cancer in their lifetime5,6.  Other benign features seen in some individuals with Cowden syndrome include benign skin lesions (facial papules, keratoses, lipomas), learning disability, autism, thyroid nodules, colon polyps, and larger head size.      Lifetime Cancer Risk   Cancer Type General Population Cowden Syndrome   Breast  12% 25-50%*   Thyroid  1% 35%   Renal 1-2% 35%   Endometrial  2-3% 28%   Colon 5% 9%   Melanoma 2-3% >5%     *One recent study found breast cancer risk to be increased to 85%    Hereditary Diffuse Gastric Cancer (HDGC)  Currently, one gene is known to cause hereditary diffuse gastric cancer: CDH1.  Individuals with HDGC are at increased risk for diffuse gastric cancer and lobular breast cancer. Of people diagnosed with HDGC, 30-50% have a mutation in the CDH1 gene.  This suggests there are likely other genes that may cause HDGC that have not been identified yet.      Lifetime Cancer Risks    General Pop HDGC    Diffuse Gastric  <1% ~80%   Breast 12% 39-52%     Additional Genes Associated with Increased Breast Cancer Risk  PALB2  Mutations in PALB2 have been shown to increase the risk of breast cancer up to 33-58% in some families; where individuals fall within this risk range is dependent upon family history.9 PALB2 mutations have also been associated with increased risk  for pancreatic cancer, although this risk has not been quantified yet.  Individuals who inherit two PALB2 mutations--one from their mother and one from their father--have a condition called Fanconi Anemia.  This rare autosomal recessive condition is associated with short stature, developmental delay, bone marrow failure, and increased risk for childhood cancers.    KWADWO  KWADWO is a moderate-risk breast cancer gene. Women who have a mutation in KWADWO can have between a 2-4 fold increased risk for breast cancer compared to the general population.10  KWADWO mutations have also been associated with increased risk for pancreatic cancer, however an estimate of this cancer risk is not well understood.11  Individuals who inherit two KWADWO mutations have a condition called ataxia-telangiectasia (AT).  This rare autosomal recessive condition affects the nervous system and immune system, and is associated with progressive cerebellar ataxia beginning in childhood.  Individuals with ataxia-telangiectasia often have a weakened immune system and have an increased risk for childhood cancers.           CHEK2   CHEK2 is a moderate-risk breast cancer gene.  Women who have a mutation in CHEK2 have around a 2-fold increased risk for breast cancer compared to the general population, and this risk may be higher depending upon family history.12,13,14  Mutations in CHEK2 have also been shown to increase the risk of a number of other cancers, including colon and prostate, however these cancer risks are currently not well understood.         Inheritance   All of the genes reviewed above are inherited in an autosomal dominant pattern.  This means that if a parent has a mutation, each of his or her children will have a 50% chance of inheriting that same mutation.  Therefore, each child--male or female--would have a 50% chance of being at increased risk for developing cancer.    Image obtained from [employer], 2013     Mutations in some genes  can occur de santos, which means that a person s mutation occurred for the first time in them and was not inherited from a parent.  Now that they have the mutation, however, it can be passed on to future generations.    Genetic Testing  Genetic testing involves a blood test and will look for any harmful mutations within genes that are associated with increased cancer risk.  If possible, it is recommended that the person(s) who has had cancer be tested before other family members.  That person will give us the most useful information about whether or not a specific gene is associated with the cancer in the family.    Results  There are three possible results of genetic testing:  Positive--a harmful mutation was identified in one or more of the genes  Negative--no mutation was identified in any of the genes on this panel  Variant of unknown significance--a variation in one of the genes was identified, but it is unclear how this impacts cancer risk in the family    Advantages and Disadvantages  There are advantages and disadvantages to genetic testing.  Advantages  May clarify your cancer risk  Can help you make medical decisions  May explain the cancers in your family  May give useful information to your family members (if you share your results)    Disadvantages  Possible negative emotional impact of learning about inherited cancer risk  Uncertainty in interpreting a negative test result in some situations  Possible genetic discrimination concerns (see below)    Genetic Information Nondiscrimination Act (WILDA)  WILDA is a federal law that protects individuals from health insurance or employment discrimination based on a genetic test result alone.  Although rare, there are currently no legal discrimination protections in terms of life insurance, long term care, or disability insurances.  Visit the National Human Genome Research [place] genome.gov/86040115 to learn more.    Reducing Cancer Risk  Each of the genes listed  within this handout have nationally recognized cancer screening guidelines that would be recommended for individuals who test positive.  In addition to increased cancer screening, surgeries may be offered or recommended to reduce cancer risk.  Recommendations are based upon an individual s genetic test result as well as their personal and family history of cancer.    Questions to Think About Regarding Genetic Testing  What effect will the test result have on me and my relationship with my family members if I have an inherited gene mutation?  If I don t have a gene mutation?  Should I share my test results, and how will my family react to this news, which may also affect them?  Are my children ready to learn new information that may one day affect their own health?    Resources  FORCE: Facing Our Risk of Cancer Empowered facingourrisk.org   Bright Pink bebrightpink.org   Li-Fraumeni Syndrome Association lfsassociation.org   PTEN World PTENworld.[employer]   No stomach for cancer, Inc. nostomachforcancer.org   Stomach cancer relief network scrnet.org   Collaborative Group of the Americas on Inherited Colorectal Cancer (CGA) cgaicc.com    Cancer Care cancercare.org   American Cancer Society (ACS) cancer.org   National Cancer [place] (NCI) cancer.gov     Please call us if you have any questions or concerns.   Cancer Risk Management Program [phone] ([phone])  Casa Carrillo, MS [facility]              [phone]  Chrissy Henry, MS, [facility] [place] [phone]  Lalita Wick, MS, [facility] [place]  [phone]  Chaparrita Henao, MS, [facility] [place]  [phone]  Mai Zhong, MS, [facility] [place]  [phone]  Jocelyn Freedman, MS, [facility] [phone]  Brianna Correia, MS, [facility] [phone]    -References  A-sophie A, Sofy PDP, Ayaka S, Brady MILLS, Ry JE, Loyd JL, Shagufta N, Brooke H, Kanchan O, Dilip A, Silas B, Rodo P, Henna S, Kia DM, Drew N, Giovanni E, Leroy H, Adeel E, Cynthia J, Devin J, Esther B, Kashif H, Nicholas S, Nabila H, Apurva ABERNATHY,  Loreta KAYE, Vandana FLORES. Average risks of breast and ovarian cancer associated with BRCA1 or BRCA2 mutations detected in case series unselected for family history: a combined analysis of 222 studies. Am J Hum Renetta. 2003;72:1117-30.  Acacia N, Kenya M, Rhonda G.  BRCA1 and BRCA2 Hereditary Breast and Ovarian Cancer. Gene Reviews online. 2013.  Lui YC, Candi S, Fani G, Nowak S. Breast cancer risk among male BRCA1 and BRCA2 mutation carriers. J Natl Cancer Inst. 2007;99:1811-4.  Peterson LUA, Michael I, Sandeep J, Jesus E, Zhao ER, Mayra F. Risk of breast cancer in male BRCA2 carriers. J Med Renetta. 2010;47:710-1.  Vincenzo MH, Gabe J, Yazmin J, Faby LA, Jaiden PARSON, Yannick C. Lifetime cancer risks in individuals with germline PTEN mutations. Clin Cancer Res. 2012;18:400-7.  Ruthie R. Cowden Syndrome: A Critical Review of the Clinical Literature. J Renetta . 2009:18:13-27.  National Comprehensive Cancer Network. Clinical practice guidelines in oncology, colorectal cancer screening. Available online (registration required). 2013.  National Cancer [place]. SEER Cancer Stat Fact Sheets.  December 2013.  Rigo ASIF., et al. Breast-Cancer Risk in Families with Mutations in PALB2. NEJM. 2014; 371(6):497-506.  Darlene A, Henrry D, Galileo S, Millicent P, Socorro T, Shravan M, Bernardo B, Mamie H, Jackie R, Brad K, Angel L, Peterson LUA, Kia D, Owen DF, Maddie MR, The Breast Cancer Susceptibility Collaboration ([place]) & Lul N. KWADWO mutations that cause ataxia-telangiectasia are breast cancer susceptibility alleles. Nature Genetics. 2006;38:873-875  Lowell N , Jas Y, Nerissa ATKINSON, Isela L, Luis GM , Jose Alfredo ML, Neo S, Chung AG, Syngal S, Candida ML, Torrey J , Guanako R, Sherrie SZ, Brown JR, Aniyah VE, Marbin M, Vogelstein B, Cheikh N, Porsha RH, Tameka KW, and Glen AP. KWADWO mutations in patients with hereditary pancreatic cancer. Cancer Discover. 2012;2:41-46  CHEK2 Breast  Cancer Case-Control Consortium. CHEK2*1100delC and susceptibility to breast cancer: A collaborative analysis involving 10,860 breast cancer cases and 9,065 controls from 10 studies. Am J Hum Renetta, 74 (2004), pp. 2699-7555  Stephanie T, Aldo S, Tyrell K, et al. Spectrum of Mutations in BRCA1, BRCA2, CHEK2, and TP53 in Families at High Risk of Breast Cancer. JEFF. 2006;295(12):0375-8730.   Isabella LYON, Meño WHITMORE, Anurag ZEE, et al. Risk of breast cancer in women with a CHEK2 mutation with and without a family history of breast cancer. J Clin Oncol. 2011;29:9695-2966

## 2022-08-11 NOTE — LETTER
8/11/2022         RE: Nikki CHINA Hardin  [address] [address]        Dear Colleague,    Thank you for referring your patient, Nikki Hardin, to the [facility]. Please see a copy of my visit note below.    8/11/2022    Referring Provider: Srinivasa Sofia DO    Present for Today's Visit: Nikki    Presenting Information:   I met with Nikki Wilfred today for genetic counseling (video visit due to covid19) to discuss her family history of cancer.  She is here today to review this history, cancer screening recommendations, and available genetic testing options.    Personal History:  Nikki is a 52 year old female. She does not have any personal history of cancer. She reports a history of lymphocytic colitis and migraines.       She had her first menstrual period at age 10, her first child at age 26, and is postmenopausal. Nikki has her ovaries, fallopian tubes and uterus in place.  She reports past history of oral contraceptive use in her 20's and 30's and that she has never been on hormone replacement therapy.      Her most recent OB-GYN exam and Pap smear in 2019 showed ASCUS (neg HPV) and will cotest in 3 years. Her most recent mammogram on 8/3/2022 was normal and her breast density was reported as heterogeneously dense. Most recent colonoscopy in July 2020 was normal and follow-up was recommended in  10 years. She does not regularly do any other cancer screening at this time.  Nikki reported no tobacco use, and about one drink every three months alcohol use.    Family History: Cancer family history and pertinent information reviewed below (Please see scanned pedigree for detailed family history information)    Maternal aunt, in her 60's, with a history of early-onset breast cancer (diagnosed at 45-50 years); and underwent lumpectomy, chemotherapy, and possibly radiation.     Maternal grandfather passed from stomach cancer diagnosed after age 50.    Two  paternal uncles who passed from metastatic prostate cancer in their late 70's/80's (Nikki reports that one may have had a different type of cancer).     Paternal grandmother passed in her 60's from colon cancer diagnosed in her early 60's.     There is no known Ashkenazi Baptist ancestry on either side of her family. There is no reported consanguinity.    Discussion:    Nikki's family history of breast is suggestive of a hereditary cancer syndrome.    We reviewed the features of sporadic, familial, and hereditary cancers. In looking at Nikki's family history, it is possible that a cancer susceptibility gene is present due to her maternal aunt's early-onset breast cancer history.    We discussed the natural history and genetics of hereditary cancers. A detailed handout regarding the information we discussed will be sent to Nikki via [employer]. Topics included: inheritance pattern, cancer risks, cancer screening recommendations, and also risks, benefits and limitations of testing.  We reviewed that the most common cause of hereditary breast cancer is Hereditary Breast and Ovarian Cancer (HBOC) syndrome, which is caused by mutations in the genes BRCA1 and BRCA2. Women with a mutation in either of these genes are at increased risk for breast and ovarian cancer. There is also an increased risk for a second primary breast cancer for women. Men with a mutation in either BRCA1 or BRCA2 are at increased risk for male breast and prostate cancer. Both women and men may also be at increased risk for pancreatic cancer and melanoma.   We also briefly discussed Cano syndrome given her family history of gastrointestinal cancers. Cano syndrome can be caused by a mutation in one of five genes:  MLH1, MSH2, MSH6, PMS2, and EPCAM.  Cano syndrome may present with polyps, but typically a small number.  The highest cancer risks associated with Cano syndrome include colon cancer, endometrial/uterine cancer, gastric cancer, and  ovarian cancer.    Based on her family history, Nikki meets current National Comprehensive Cancer Network (NCCN) criteria for genetic testing of high-penetrance breast cancer susceptibility genes (including BRCA1, BRCA2, CDH1, PALB2, PTEN, and TP53).      We discussed that there are additional genes that could cause increased risk for breast and related cancers. As many of these genes present with overlapping features in a family and accurate cancer risk cannot always be established based upon the pedigree analysis alone, it would be reasonable for Nikki to consider panel genetic testing to analyze multiple genes at once.    We reviewed genetic testing options given Nikki's family history including targeted and expanded options. After our discussion, Nikki opted to proceed with genetic testing via BRCA1/2 analysis with automatic reflex to the Common Hereditary Cancers panel through [facility].   Genetic testing is available for 47 genes associated with cancers of the breast, ovary, uterus, prostate and gastrointestinal system: [facility] Common Hereditary Cancers panel (APC, KWADWO, AXIN2, BARD1, BMPR1A, BRCA1, BRCA2, BRIP1, CDH1, CDK4, CDKN2A, CHEK2, CTNNA1, DICER1, EPCAM, GREM1, HOXB13, KIT, MEN1, MLH1, MSH2, MSH3, MSH6, MUTYH, NBN, NF1, NTHL1, PALB2, PDGFRA, PMS2, POLD1, POLE, PTEN,RAD50, RAD51C, RAD51D, SDHA, SDHB, SDHC, SDHD, SMAD4, SMARCA4, STK11, TP53,TSC1, TSC2, VHL).    We discussed that many of these genes are associated with specific hereditary cancer syndromes and published management guidelines: Hereditary Breast and Ovarian Cancer syndrome (BRCA1, BRCA2), Cano syndrome (MLH1, MSH2, MSH6, PMS2, EPCAM), Familial Adenomatous Polyposis (APC), Hereditary Diffuse Gastric Cancer (CDH1), Familial Atypical Multiple Mole Melanoma syndrome (CDK4, CDKN2A), Multiple Endocrine Neoplasia type 1 (MEN1), Juvenile Polyposis syndrome (BMPR1A, SMAD4), Cowden syndrome (PTEN), Li Fraumeni syndrome (TP53), Hereditary  Paraganglioma and Pheochromocytoma syndrome (SDHA, SDHB, SDHC, SDHD), Peutz-Jeghers syndrome (STK11), MUTYH Associated Polyposis (MUTYH), Tuberous sclerosis complex (TSC1, TSC2), Von Hippel-Lindau disease (VHL), and Neurofibromatosis type 1 (NF1).   The KWADWO, AXIN2, BRIP1, CHEK2, GREM1, MSH3, NBN, NTHL1, PALB2, POLD1, POLE, RAD51C, and RAD51D genes are associated with increased cancer risk and have published management guidelines for certain cancers.   The remaining genes (BARD1, CTNNA1, DICER1, HOXB13, KIT, PDGFRA, RAD50, and SMARCA4) are associated with increased cancer risk and may allow us to make medical recommendations when mutations are identified.     Consent was obtained over video with no witness required due to the current covid19 global pandemic.    Medical Management: For Nikki, we reviewed that the information from genetic testing may determine:    additional cancer screening for which Nikki may qualify (i.e. mammogram and breast MRI, more frequent colonoscopies, more frequent dermatologic exams, etc.),    options for risk reducing surgeries Nikki could consider (i.e. bilateral mastectomy, surgery to remove her ovaries and/or uterus, etc.),      and targeted chemotherapies for Nikki if she were to develop certain cancers in the future (i.e. immunotherapy for individuals with Cano syndrome, PARP inhibitors, etc.).     These recommendations will be discussed in detail once genetic testing is completed.     Nikki will schedule a lab only appointment at any [facility] clinic at her earliest convenience.        Plan:  1) Today Nikki elected to proceed with BRCA1/2 analysis with automatic reflex to Common Hereditary Cancers panel genetic testing (47 genes) through [facility].  2) This information should be available in approximately 2-3 weeks.  3) Nikki will be contacted by our scheduling department to set up a result disclosure appointment.     Brianna Correia MS, CGC  Licensed, Certified  Genetic Counselor  [facility]  [phone]  [email or web address]

## 2022-08-11 NOTE — PROGRESS NOTES
Nikki is a 52 year old who is being evaluated via a billable video visit.      How would you like to obtain your AVS? [employer]  If the video visit is dropped, the invitation should be resent by: Text to cell phone: [phone]  Will anyone else be joining your video visit? No    Video-Visit Details    Video Start Time: 9:00am  Type of service:  Video Visit  Video End Time:9:48am  Originating Location (pt. Location): Home  Distant Location (provider location):  [facility]   Platform used for Video Visit: [employer]

## 2022-08-14 ENCOUNTER — LAB (OUTPATIENT)
Dept: LAB | Facility: CLINIC | Age: 52
End: 2022-08-14
Payer: COMMERCIAL

## 2022-08-14 DIAGNOSIS — Z80.0 FAMILY HISTORY- STOMACH CANCER: ICD-10-CM

## 2022-08-14 DIAGNOSIS — Z80.3 FAMILY HISTORY OF MALIGNANT NEOPLASM OF BREAST: ICD-10-CM

## 2022-08-14 DIAGNOSIS — Z80.0 FAMILY HISTORY OF COLON CANCER: ICD-10-CM

## 2022-08-14 DIAGNOSIS — Z80.42 FAMILY HISTORY OF PROSTATE CANCER: ICD-10-CM

## 2022-08-14 PROCEDURE — 99000 SPECIMEN HANDLING OFFICE-LAB: CPT

## 2022-08-14 PROCEDURE — 36415 COLL VENOUS BLD VENIPUNCTURE: CPT

## 2022-08-23 LAB — SCANNED LAB RESULT: NORMAL

## 2022-08-30 NOTE — PROGRESS NOTES
"Nikki is a 52 year old who is being evaluated via a billable telephone visit.      What phone number would you like to be contacted at? [phone]  How would you like to obtain your AVS? [place]in[employer]  Phone call duration: 9 minutes    8/31/2022    Referring Provider: Chuy Dunn    Presenting Information:  I spoke to Nikki by video today to discuss her genetic testing results. Her blood was drawn on 8/14/2022. Common Hereditary Cancers panel testing was ordered from [facility]. This testing was done because of Nikki's family history of breast, prostate, and gastrointestinal cancers.    Genetic Testing Result: NEGATIVE  Nikki is negative for mutations in APC, KWADWO, AXIN2, BARD1, BMPR1A, BRCA1, BRCA2, BRIP1, CDH1, CDK4, CDKN2A, CHEK2, CTNNA1, DICER1, EPCAM, GREM1, HOXB13, KIT, MEN1, MLH1, MSH2, MSH3, MSH6, MUTYH, NBN, NF1, NTHL1, PALB2, PDGFRA, PMS2, POLD1, POLE, PTEN, RAD50, RAD51C, RAD51D, SDHA, SDHB, SDHC, SDHD, SMAD4, SMARCA4, STK11, TP53, TSC1, TSC2, and VHL. No mutations were found in any of the 47 genes analyzed. Please see scanned test report for testing methodology/limitations.     A copy of the test report can be found in the Laboratory tab, dated 8/14/2022, and named \"LABORATORY MISCELLANEOUS ORDER\". The report is scanned in as a linked document.    Interpretation:  We discussed several different interpretations of this negative test result.    1. One explanation may be that there is a different gene or combination of genes and environment that are associated with the cancers in this family.  2. It is possible that her other relatives with cancer history did have a mutation in one of the above genes and she did not inherit it.  3. There is also a small possibility that there is a mutation in one of these genes, and the testing laboratory could not find it with their current testing methods.       Screening:  Based on this negative test result, it is important for Nikki and her relatives to refer back " to the family history for appropriate cancer screening.      Based on her personal and family history, Nikki has an 11.8% lifetime risk of developing breast cancer based on the SHEILA (v8) model. Therefore, Nikki does not meet current National Comprehensive Cancer Network (NCCN) guidelines for high risk breast screening, which is offered to women with a 20% lifetime risk or higher. However, it is still important for Nikki to continue with routine breast screening under the care of her physicians. Breast cancer screening is generally recommended to begin approximately 10 years younger than the earliest age of breast cancer diagnosis in the family, or at age 40, whichever comes first. Nikki is encouraged to discuss breast screening with her physicians.     Other population cancer screening options, such as those recommended by the American Cancer Society and the National Comprehensive Cancer Network (NCCN), are also appropriate for Nikki and her family. These screening recommendations may change if there are changes to Nikki's personal and/or family history. Final screening recommendations should be made by each individual's managing physician      Inheritance:  We reviewed the autosomal dominant inheritance of mutations in these 47 genes. We discussed that Nikki cannot/did not pass on an identifiable mutation in these genes to her children based on this test result.  Mutations in these genes do not skip generations.      Additional Testing Considerations:  Although Nikki's genetic testing result was negative, other relatives may still carry a gene mutation associated with their personal or family histories of cancer. Genetic counseling is recommended for her maternal aunt with breast cancer history to discuss genetic testing options. If any of her relatives do pursue genetic testing, Nikki is encouraged to contact me so that we may review the impact of their test results on her.    Summary:  We do not  have an explanation for Nikki's family history of cancer. While no genetic changes were identified, Nikki may still be at risk for certain cancers due to family history, environmental factors, or other genetic causes not identified by this test.  Because of that, it is important that she continue with cancer screening based on her personal and family history as discussed above.    Genetic testing is rapidly advancing, and new cancer susceptibility genes will most likely be identified in the future.  Therefore, I encouraged Nikki to contact me annually or if there are changes in her personal or family history.  This may change how we assess her cancer risk, screening, and the testing we would offer.    Plan:  1. A copy of the test results will be mailed to Nikki.  2. She plans to follow-up with her primary care provider for routine care and cancer screening.  3. She should contact me regularly, or sooner if her family history changes.    Brianna Correia MS, [facility]  Licensed, Certified Genetic Counselor  [facility]  [email or web address]

## 2022-08-31 ENCOUNTER — VIRTUAL VISIT (OUTPATIENT)
Dept: ONCOLOGY | Facility: CLINIC | Age: 52
End: 2022-08-31
Attending: CLINICAL NURSE SPECIALIST
Payer: COMMERCIAL

## 2022-08-31 DIAGNOSIS — Z80.0 FAMILY HISTORY- STOMACH CANCER: ICD-10-CM

## 2022-08-31 DIAGNOSIS — Z80.0 FAMILY HISTORY OF COLON CANCER: ICD-10-CM

## 2022-08-31 DIAGNOSIS — Z80.3 FAMILY HISTORY OF MALIGNANT NEOPLASM OF BREAST: Primary | ICD-10-CM

## 2022-08-31 DIAGNOSIS — Z80.42 FAMILY HISTORY OF PROSTATE CANCER: ICD-10-CM

## 2022-08-31 PROCEDURE — 999N000069 HC STATISTIC GENETIC COUNSELING, < 16 MIN: Mod: TEL,95 | Performed by: GENETIC COUNSELOR, MS

## 2022-08-31 NOTE — LETTER
"    8/31/2022         RE: Nikki ATKINSON Wilfred  [address] [facility] [address]        Dear Colleague,    Thank you for referring your patient, Nikki Hardin, to the [facility]. Please see a copy of my visit note below.      8/31/2022    Referring Provider: Chuy Dunn    Presenting Information:  I spoke to Nikki by video today to discuss her genetic testing results. Her blood was drawn on 8/14/2022. Common Hereditary Cancers panel testing was ordered from [email or web address]. This testing was done because of Nikki's family history of breast, prostate, and gastrointestinal cancers.    Genetic Testing Result: NEGATIVE  Nikki is negative for mutations in APC, KWADWO, AXIN2, BARD1, BMPR1A, BRCA1, BRCA2, BRIP1, CDH1, CDK4, CDKN2A, CHEK2, CTNNA1, DICER1, EPCAM, GREM1, HOXB13, KIT, MEN1, MLH1, MSH2, MSH3, MSH6, MUTYH, NBN, NF1, NTHL1, PALB2, PDGFRA, PMS2, POLD1, POLE, PTEN, RAD50, RAD51C, RAD51D, SDHA, SDHB, SDHC, SDHD, SMAD4, SMARCA4, STK11, TP53, TSC1, TSC2, and VHL. No mutations were found in any of the 47 genes analyzed. Please see scanned test report for testing methodology/limitations.     A copy of the test report can be found in the Laboratory tab, dated 8/14/2022, and named \"LABORATORY MISCELLANEOUS ORDER\". The report is scanned in as a linked document.    Interpretation:  We discussed several different interpretations of this negative test result.    1. One explanation may be that there is a different gene or combination of genes and environment that are associated with the cancers in this family.  2. It is possible that her other relatives with cancer history did have a mutation in one of the above genes and she did not inherit it.  3. There is also a small possibility that there is a mutation in one of these genes, and the testing laboratory could not find it with their current testing methods.       Screening:  Based on this negative test result, it is important for Nikki " and her relatives to refer back to the family history for appropriate cancer screening.      Based on her personal and family history, Nikki has an 11.8% lifetime risk of developing breast cancer based on the SHEILA (v8) model. Therefore, Nikki does not meet current National Comprehensive Cancer Network (NCCN) guidelines for high risk breast screening, which is offered to women with a 20% lifetime risk or higher. However, it is still important for Nikki to continue with routine breast screening under the care of her physicians. Breast cancer screening is generally recommended to begin approximately 10 years younger than the earliest age of breast cancer diagnosis in the family, or at age 40, whichever comes first. Nikki is encouraged to discuss breast screening with her physicians.     Other population cancer screening options, such as those recommended by the American Cancer Society and the National Comprehensive Cancer Network (NCCN), are also appropriate for Nikki and her family. These screening recommendations may change if there are changes to Nikki's personal and/or family history. Final screening recommendations should be made by each individual's managing physician      Inheritance:  We reviewed the autosomal dominant inheritance of mutations in these 47 genes. We discussed that Nikki cannot/did not pass on an identifiable mutation in these genes to her children based on this test result.  Mutations in these genes do not skip generations.      Additional Testing Considerations:  Although Nikki's genetic testing result was negative, other relatives may still carry a gene mutation associated with their personal or family histories of cancer. Genetic counseling is recommended for her maternal aunt with breast cancer history to discuss genetic testing options. If any of her relatives do pursue genetic testing, Nikki is encouraged to contact me so that we may review the impact of their test  results on her.    Summary:  We do not have an explanation for Nikki's family history of cancer. While no genetic changes were identified, Nikki may still be at risk for certain cancers due to family history, environmental factors, or other genetic causes not identified by this test.  Because of that, it is important that she continue with cancer screening based on her personal and family history as discussed above.    Genetic testing is rapidly advancing, and new cancer susceptibility genes will most likely be identified in the future.  Therefore, I encouraged Nikki to contact me annually or if there are changes in her personal or family history.  This may change how we assess her cancer risk, screening, and the testing we would offer.    Plan:  1. A copy of the test results will be mailed to Nikki.  2. She plans to follow-up with her primary care provider for routine care and cancer screening.  3. She should contact me regularly, or sooner if her family history changes.    Brianna Correia MS, [facility]  Licensed, Certified Genetic Counselor  [facility]  [email or web address]

## 2022-08-31 NOTE — PATIENT INSTRUCTIONS
Negative Genetic Test Result    Genetic Testing  You had a blood test that looked at the genetic information in one or more genes associated with increased cancer risk.  The testing looked for any harmful changes that would stop this particular gene from working like it should. If an individual does not have any harmful changes or variants of unknown significance found from their blood test, their genetic test result is reported as negative.       Results  The genetic test did not identify any pathogenic (harmful) changes in the genes that were tested. There are several possible explanations for a negative test result. Without knowing the gene mutation in your family, the cause of the cancer in you or your relatives is still unknown. Your genetic counselor can help interpret the result for you and your relatives. In this case, there are several reasons that may explain the negative test result:  There may be a gene mutation in the family that you did not inherit.   You may have a gene mutation in a different gene that was not included in the test, or has not yet been discovered.   The cancers in you or your family may be due to a combination of genetic factors and environment (multifactorial/familial).  The cancers in you or your family may be sporadic/random cancers.  There is very small chance that a mutation was not found by current testing methods.  As testing technology evolves over time, it may still be possible to identify a mutation in a gene that was not found on this test.    It is important to note which genes were included in your test. A list of these genes can be found on your test result.    Screening Recommendations  Due to this negative test result, cancer screening recommendations should be based on your personal and family history. This may include increased cancer screening for you and/or your family members. Your genetic counselor and health care provider can help make appropriate  recommendations.      Please call us if you have any questions or concerns.   Cancer Risk Management Program [phone] ([phone])  Casa Carrillo, MS [facility] [phone]  Chrissy Henry, MS, [facility] [phone]  Lalita Wick, MS, [facility]  [phone]  Chaparrita Henao, MS, [facility]  [phone]  Mai Zhong, MS, [facility]  [phone]  Jocelyn Freedman, MS, [facility] [phone]  Brianna Correia, MS, [facility] [phone]

## 2022-08-31 NOTE — LETTER
"    Cancer Risk Management  Program [facility]  [facility]  [facility]  [facility]  Mailing Address  Cancer Risk Management Program  [address]  [place] [address]    New patient appointments  [phone]  August 31, 2022      Nikki ATKINSON Wilfred  [address] [facility] [address] [facility] [address]      Dear Nikki,    It was a pleasure speaking with you on the phone on 8/31/2022.  Here is a copy of the progress note from our discussion. If you have any additional questions, please feel free to call.    Referring Provider: Srinivasa Sofia DO    Presenting Information:  I spoke to Nikki by video today to discuss her genetic testing results. Her blood was drawn on 8/14/2022. Common Hereditary Cancers panel testing was ordered from [facility]. This testing was done because of Nikki's family history of breast, prostate, and gastrointestinal cancers.    Genetic Testing Result: NEGATIVE  Nikki is negative for mutations in APC, KWADWO, AXIN2, BARD1, BMPR1A, BRCA1, BRCA2, BRIP1, CDH1, CDK4, CDKN2A, CHEK2, CTNNA1, DICER1, EPCAM, GREM1, HOXB13, KIT, MEN1, MLH1, MSH2, MSH3, MSH6, MUTYH, NBN, NF1, NTHL1, PALB2, PDGFRA, PMS2, POLD1, POLE, PTEN, RAD50, RAD51C, RAD51D, SDHA, SDHB, SDHC, SDHD, SMAD4, SMARCA4, STK11, TP53, TSC1, TSC2, and VHL. No mutations were found in any of the 47 genes analyzed. Please see scanned test report for testing methodology/limitations.     A copy of the test report can be found in the Laboratory tab, dated 8/14/2022, and named \"LABORATORY MISCELLANEOUS ORDER\". The report is scanned in as a linked document.    Interpretation:  We discussed several different interpretations of this negative test result.    1. One explanation may be that there is a different gene or combination of genes and environment that are associated " with the cancers in this family.  2. It is possible that her other relatives with cancer history did have a mutation in one of the above genes and she did not inherit it.  3. There is also a small possibility that there is a mutation in one of these genes, and the testing laboratory could not find it with their current testing methods.       Screening:  Based on this negative test result, it is important for Nikki and her relatives to refer back to the family history for appropriate cancer screening.      Based on her personal and family history, Nikki has an 11.8% lifetime risk of developing breast cancer based on the SHEILA (v8) model. Therefore, Nikki does not meet current National Comprehensive Cancer Network (NCCN) guidelines for high risk breast screening, which is offered to women with a 20% lifetime risk or higher. However, it is still important for Nikki to continue with routine breast screening under the care of her physicians. Breast cancer screening is generally recommended to begin approximately 10 years younger than the earliest age of breast cancer diagnosis in the family, or at age 40, whichever comes first. Nikki is encouraged to discuss breast screening with her physicians.     Other population cancer screening options, such as those recommended by the American Cancer Society and the National Comprehensive Cancer Network (NCCN), are also appropriate for Nikki and her family. These screening recommendations may change if there are changes to Nikki's personal and/or family history. Final screening recommendations should be made by each individual's managing physician      Inheritance:  We reviewed the autosomal dominant inheritance of mutations in these 47 genes. We discussed that Nikki cannot/did not pass on an identifiable mutation in these genes to her children based on this test result.  Mutations in these genes do not skip generations.      Additional Testing  Considerations:  Although Nikki's genetic testing result was negative, other relatives may still carry a gene mutation associated with their personal or family histories of cancer. Genetic counseling is recommended for her maternal aunt with breast cancer history to discuss genetic testing options. If any of her relatives do pursue genetic testing, Nikki is encouraged to contact me so that we may review the impact of their test results on her.    Summary:  We do not have an explanation for Nikki's family history of cancer. While no genetic changes were identified, Nikki may still be at risk for certain cancers due to family history, environmental factors, or other genetic causes not identified by this test.  Because of that, it is important that she continue with cancer screening based on her personal and family history as discussed above.    Genetic testing is rapidly advancing, and new cancer susceptibility genes will most likely be identified in the future.  Therefore, I encouraged Nikki to contact me annually or if there are changes in her personal or family history.  This may change how we assess her cancer risk, screening, and the testing we would offer.    Plan:  1. A copy of the test results will be mailed to Nikki.  2. She plans to follow-up with her primary care provider for routine care and cancer screening.  3. She should contact me regularly, or sooner if her family history changes.      Brianna Correia MS, [facility]  Licensed, Certified Genetic Counselor  [facility]  [email or web address]

## 2022-09-03 DIAGNOSIS — F32.0 MILD MAJOR DEPRESSION (H): ICD-10-CM

## 2022-09-06 RX ORDER — FLUOXETINE 40 MG/1
CAPSULE ORAL
Qty: 90 CAPSULE | Refills: 0 | Status: SHIPPED | OUTPATIENT
Start: 2022-09-06 | End: 2023-01-18

## 2022-09-06 NOTE — TELEPHONE ENCOUNTER
Medication is being filled for 1 time refill only due to:  Patient needs to be seen because due for mental health f/u.     Team Coordinators: Please contact patient to set up follow-up with PCP (can be virtual)  for any further refills.     NIR ContrerasN RN  [facility]

## 2022-09-29 DIAGNOSIS — G43.719 INTRACTABLE CHRONIC MIGRAINE WITHOUT AURA AND WITHOUT STATUS MIGRAINOSUS: ICD-10-CM

## 2022-09-30 RX ORDER — NORTRIPTYLINE HCL 10 MG
CAPSULE ORAL
Qty: 270 CAPSULE | Refills: 1 | Status: SHIPPED | OUTPATIENT
Start: 2022-09-30 | End: 2023-04-11

## 2022-09-30 NOTE — TELEPHONE ENCOUNTER
Medication refill request for nortriptyline (PAMELOR) 10 MG capsule. Last refill was on 4/6/2022.    Patient was last seen on April 06, 2022.  Patient's next appointment is scheduled with Dr. Dunn on October 31, 2022.      Medication T'd for review and signature    NATTY Spears on 9/30/2022 at 8:26 AM

## 2022-10-03 ENCOUNTER — TELEPHONE (OUTPATIENT)
Dept: NEUROLOGY | Facility: CLINIC | Age: 52
End: 2022-10-03

## 2022-10-04 NOTE — TELEPHONE ENCOUNTER
Central Prior Authorization Team   Phone: [phone]      PA Initiation    Medication: SUMAtriptan (IMITREX) 6 MG/0.5ML injection--INITIATED  Insurance Company: [employer] - Phone [phone] Fax [phone]  Pharmacy Filling the Rx: [id number] [address]  Filling Pharmacy Phone: [phone]  Filling Pharmacy Fax:    Start Date: 10/4/2022

## 2022-10-06 NOTE — TELEPHONE ENCOUNTER
Central Prior Authorization Team   Phone: [phone]      Prior Authorization Not Needed per Insurance    Medication: SUMAtriptan (IMITREX) 6 MG/0.5ML injection--INITIATED  Insurance Company: [employer] - Phone [phone] Fax [phone]  Expected CoPay:      Pharmacy Filling the Rx: [facility][id number] [address]  Pharmacy Notified:  YES   Patient Notified:  YES LEFT MESSAGE    MAX QTY OF 20.000  DAYS--PER INSURANCE NO PA IS AVAILABLE FOR QTY LIMIT OVERRIDE

## 2022-10-14 NOTE — TELEPHONE ENCOUNTER
Central Prior Authorization Team   Phone: [phone]      THE INJECTION IS REJECTING OFF OF THE TABLETS. TRYING TO GET OVERRIDE

## 2022-10-14 NOTE — TELEPHONE ENCOUNTER
Central Prior Authorization Team   Phone: [phone]      Prior Authorization Approval    Authorization Effective Date: 10/14/2022  Authorization Expiration Date: 10/14/2025  Medication: SUMAtriptan (IMITREX) 6 MG/0.5ML injection--NOT NEEDED  Approved Dose/Quantity:    Reference #: [id number]   Insurance Company: [employer] - Phone [phone] Fax [phone]  Expected CoPay:       CoPay Card Available:      Foundation Assistance Needed:    Which Pharmacy is filling the prescription (Not needed for infusion/clinic administered): [id number] [place], [place] - [address] [place] [address]  Pharmacy Notified: Yes  Patient Notified: Yes LEFT MESSAGE

## 2022-10-16 ENCOUNTER — HEALTH MAINTENANCE LETTER (OUTPATIENT)
Age: 52
End: 2022-10-16

## 2022-10-29 DIAGNOSIS — G43.719 INTRACTABLE CHRONIC MIGRAINE WITHOUT AURA AND WITHOUT STATUS MIGRAINOSUS: Primary | ICD-10-CM

## 2022-10-31 ENCOUNTER — OFFICE VISIT (OUTPATIENT)
Dept: NEUROLOGY | Facility: CLINIC | Age: 52
End: 2022-10-31
Payer: COMMERCIAL

## 2022-10-31 VITALS
HEART RATE: 72 BPM | DIASTOLIC BLOOD PRESSURE: 64 MMHG | SYSTOLIC BLOOD PRESSURE: 104 MMHG | WEIGHT: 132 LBS | BODY MASS INDEX: 25.78 KG/M2 | RESPIRATION RATE: 16 BRPM

## 2022-10-31 DIAGNOSIS — Z86.59 HISTORY OF DEPRESSION: Primary | ICD-10-CM

## 2022-10-31 DIAGNOSIS — G43.719 INTRACTABLE CHRONIC MIGRAINE WITHOUT AURA AND WITHOUT STATUS MIGRAINOSUS: ICD-10-CM

## 2022-10-31 DIAGNOSIS — G43.109 MIGRAINE WITH AURA AND WITHOUT STATUS MIGRAINOSUS, NOT INTRACTABLE: ICD-10-CM

## 2022-10-31 DIAGNOSIS — T88.7XXA MEDICATION SIDE EFFECTS: ICD-10-CM

## 2022-10-31 PROCEDURE — 99214 OFFICE O/P EST MOD 30 MIN: CPT | Performed by: PSYCHIATRY & NEUROLOGY

## 2022-10-31 RX ORDER — CEFUROXIME AXETIL 250 MG/1
6 TABLET ORAL
Qty: 3 KIT | Refills: 3 | Status: SHIPPED | OUTPATIENT
Start: 2022-10-31 | End: 2024-01-22

## 2022-10-31 RX ORDER — CEFUROXIME AXETIL 250 MG/1
TABLET ORAL
Qty: 1 ML | Refills: 3 | Status: SHIPPED | OUTPATIENT
Start: 2022-10-31 | End: 2022-11-22

## 2022-10-31 NOTE — PROGRESS NOTES
NEUROLOGY OUTPATIENT PROGRESS NOTE  Oct 31, 2022     CHIEF COMPLAINT/REASON FOR VISIT/REASON FOR CONSULT  Patient presents with:  Follow Up    REASON FOR CONSULTATION- Headaches    REFERRAL SOURCE  Dr. Srinivasa Sofia  CC Dr. Srinivasa Sofia    HISTORY OF PRESENT ILLNESS  Nikki Hardin is a 52 year old female seen today for evaluation of headaches. She reports that she has a diagnosis of migraines since age 14. Previously her headaches were sporadic but still frequent. She estimates that she was having at least ~15 headaches a month. Over the last 2 to 3 months headaches have become much more frequent. She is having a headache every day. Reports no real reasons why the headaches might have gotten worse.    Headaches are generally in the temple on either side and then radiate to the neck. Occasionally she can have more frontal pressure. Takes a throbbing. There is some photophobia and phonophobia. She has never had any auras with the headaches. Reports triggers including working and doing remote work from home, inconsistent sleep, lack of regular meals, going out in the sun sometimes storms. She does have some neck pain though neck pain is worse at the time of headaches.    She is tried Topamax in the past which caused severe side effects. She is also tried propanolol without any benefit. Over-the-counter medications do not help. 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 She has been using sumatriptan injections and tablets for several years which have been working for her. Has tried Maxalt with benefit though insurance would not cover it anymore and then she had to stop it. The sumatriptan works better than the Maxalt.    4/6/22  Patient returns today.  She did try the nortriptyline and that has helped with the headaches.  Currently is having 7 headaches a month.  Reports no changes in the nature of the headaches.  Imitrex still works with the abortive therapy.  She does complain of some somnolence in the  morning with the nortriptyline.  Is taking 30 mg.  Tries to take it earlier in the evening.  Things are may be slightly turning the corner at this point though she is not sure if this is a good long-term medication or not.  Is interested in Botox.    10/31/22  Patient returns today.  Brought in the headache log today which I reviewed.  She is still having 10-12 headaches every month.  Previously these were at 15 headaches a month.  Headaches have been minimally improved with the nortriptyline.  Reports side effects of somnolence with the nortriptyline.  Is taking 30 mg.  Wants to get off the medication.  Imitrex is working for abortive therapy.  The Imitrex injections were given to her by the pharmacy that she wants the pen which I prescribed today.  Denies any other new symptoms.  No other triggers for her headaches.  Wants to proceed with the Botox since most of her headaches are coming from the shoulder tension.    Previous history is reviewed and this is unchanged.    PAST MEDICAL/SURGICAL HISTORY  Past Medical History:   Diagnosis Date     Abnormal Pap smear of cervix 11/22/2019    See problem list.      Anxiety      Colitis     lymphocytic colitis ?     Depressive disorder, not elsewhere classified     sees psych- Dr Blue     Migraine, unspecified, without mention of intractable migraine without mention of status migrainosus age 14 yrs     Other acne      PAIN IN THORACIC SPINE [724.1] 4/13/2006    chiropractic care     Raynaud disease     ? connective tissue dz.  ? sogren's     Patient Active Problem List   Diagnosis     Irritable bowel syndrome     Other acne     Migraine with aura and without status migrainosus, not intractable     Mild major depression (H)     Sedimentation rate elevation     Elevated vitamin B12 level     Anemia, unspecified type     Hypervitaminosis B6     Lymphocytic colitis     ASCUS of cervix with negative high risk HPV   Significant for migraines, arthritis, depression,  microcolitis    FAMILY HISTORY  Family History   Problem Relation Age of Onset     Neurologic Disorder Mother         MIGRIANES     C.A.D. Father         in his 60's     Depression Father      Heart Disease Father      Lipids Father      Hypertension Father      Arthritis Father      Sleep Disorder Father      Cerebrovascular Disease Father 78     Cancer - colorectal Paternal Grandmother      Cancer Maternal Grandfather         stomach     Heart Disease Maternal Grandfather      Depression Brother      Depression Brother      Asthma Daughter      Heart Disease Paternal Grandfather      Breast Cancer Maternal Aunt      Diabetes No family hx of    Positive for migraines in her mother and aunt.    SOCIAL HISTORY  Social History     Tobacco Use     Smoking status: Never     Smokeless tobacco: Never   Substance Use Topics     Alcohol use: Yes     Comment: occasional- once monthly     Drug use: No       SYSTEMS REVIEW  Twelve-system ROS was done and other than the HPI this was negative except for neck pain, dizziness, hearing loss, sleepiness during the day, headaches, depression, bowel problems.  No new symptoms reported today.    MEDICATIONS  acetaminophen (TYLENOL) 325 MG tablet, Take 2 tablets by mouth every 4 hours as needed for pain.  Biotin 5000 MCG TABS, Take 1,000 mcg by mouth daily  cholecalciferol 5000 UNITS CAPS, Take 1 capsule (5,000 Units) by mouth daily FOR VITAMIN D DEFICIENCY (LOW VITAMIN D)  Ferrous Sulfate (IRON) 28 MG TABS,   FLUoxetine (PROZAC) 40 MG capsule, TAKE 1 CAPSULE(40 MG) BY MOUTH DAILY  IBUPROFEN PO, Take 200 mg by mouth Takes 3 tablets PRN  loperamide (IMODIUM A-D) 2 MG tablet, Take 2 mg by mouth daily as needed for diarrhea   nortriptyline (PAMELOR) 10 MG capsule, TAKE 1 CAPSULE AT NIGHT INCREASE BY 1 CAPSULE/WEEK TO A MAX OF 3 CAPS/NIGHT AS NEEDED AND TOLERATED  Riboflavin 100 MG TABS, Take 400 mg by mouth  SUMAtriptan (IMITREX STATDOSE) 6 MG/0.5ML pen injector kit, INJECT 0.5ML  SUBCUTANEOUS ONCE FOR 1 DOSE. MAY REPEAT DOSE IN 1 HOUR. MAX OF 12MG/24 HOURS.  SUMAtriptan (IMITREX) 100 MG tablet, TAKE 1 TABLET(100 MG) BY MOUTH AT ONSET OF HEADACHE FOR MIGRAINE. MAY REPEAT IN 2 HOURS. MAX 4 TABLETS/ 24 HOURS  cyclobenzaprine (FLEXERIL) 5 MG tablet, Take 1 tablet (5 mg) by mouth 3 times daily as needed for muscle spasms (Patient not taking: Reported on 4/6/2022)  finasteride (PROSCAR) 5 MG tablet, TK ONE-HALF T PO QD    No current facility-administered medications on file prior to visit.       PHYSICAL EXAMINATION  VITALS: /64   Pulse 72   Resp 16   Wt 59.9 kg (132 lb)   BMI 25.78 kg/m    GENERAL: Healthy appearing, alert, no acute distress, normal habitus.  CARDIOVASCULAR: Extremities warm and well perfused. Pulses present.   NEUROLOGICAL:  Patient is awake and oriented to self, place and time.  Attention span is normal.  Memory is grossly intact.  Language is fluent and follows commands appropriately.  Appropriate fund of knowledge. Cranial nerves 2-12 are intact. There is no pronator drift.  Motor exam shows 5/5 strength in all extremities.  Tone is symmetric bilaterally in upper and lower extremities.  (Previously reflexes are symmetric and 2+ in upper extremities and lower extremities. Sensory exam is grossly intact to light touch, pin prick and vibration.) Finger to nose and heel to shin is without dysmetria.  Romberg is negative.  Gait is normal and the patient is able to do tandem walk and walk on toes and heels.  Exam similar to before.    DIAGNOSTICS  CARDIOLOGY  EKG 2008  NS[facility]    RELEVANT LABS  Component      Latest Ref Rng & Units 12/22/2020   WBC      4.0 - 11.0 10e9/L 6.7   RBC Count      3.8 - 5.2 10e12/L 3.93   Hemoglobin      11.7 - 15.7 g/dL 11.4 (L)   Hematocrit      35.0 - 47.0 % 35.2   MCV      78 - 100 fl 90   MCH      26.5 - 33.0 pg 29.0   MCHC      31.5 - 36.5 g/dL 32.4   RDW      10.0 - 15.0 % 13.2   Platelet Count      150 - 450 10e9/L 297   % Neutrophils       % 70.5   % Lymphocytes      % 20.4   % Monocytes      % 6.6   % Eosinophils      % 2.1   % Basophils      % 0.4   Absolute Neutrophil      1.6 - 8.3 10e9/L 4.7   Absolute Lymphocytes      0.8 - 5.3 10e9/L 1.4   Absolute Monocytes      0.0 - 1.3 10e9/L 0.4   Absolute Eosinophils      0.0 - 0.7 10e9/L 0.1   Absolute Basophils      0.0 - 0.2 10e9/L 0.0   Diff Method       Automated Method   Sodium      133 - 144 mmol/L 138   Potassium      3.4 - 5.3 mmol/L 4.5   Chloride      94 - 109 mmol/L 107   Carbon Dioxide      20 - 32 mmol/L 27   Anion Gap      3 - 14 mmol/L 4   Glucose      70 - 99 mg/dL 79   Urea Nitrogen      7 - 30 mg/dL 12   Creatinine      0.52 - 1.04 mg/dL 0.78   GFR Estimate      >60 mL/min/1.73:m2 87   GFR Estimate If Black      >60 mL/min/1.73:m2 >90   Calcium      8.5 - 10.1 mg/dL 9.2   Bilirubin Total      0.2 - 1.3 mg/dL 0.3   Albumin      3.4 - 5.0 g/dL 3.8   Protein Total      6.8 - 8.8 g/dL 7.8   Alkaline Phosphatase      40 - 150 U/L 73   ALT      0 - 50 U/L 22   AST      0 - 45 U/L 16       OUTSIDE RECORDS  Outside referral notes and chart notes were reviewed and pertinent information has been summarized (in addition to the HPI):-Patient was seen in primary care.  Has issues with depression.  Had a visit in September that was virtual.  Was diagnosed with migraine with aura.  Is on sumatriptan the day worsening.  History of migraines with sumatriptan injections and tablets.  Injections only for severe headaches.  Headaches happen once every 2 months.  Migraines get to cluster.  No significant changes in headaches.  Some concerns with hep C.    MRI brain-images reviewed with the patient again.  There are very minimal T2 hyperintensities noted.  Is appropriate for age.  IMPRESSION:  1.  No acute intracranial abnormality.  2.  Small nonspecific focus of subcortical T2 FLAIR hyperintensity in the right frontal lobe is strictly nonspecific but can be seen in setting of migraines and prior insult or  injury. Demyelination is not favored.    LABS  Component      Latest Ref Rng & Units 2/8/2022   WBC      4.0 - 11.0 10e3/uL 6.3   RBC Count      3.80 - 5.20 10e6/uL 4.36   Hemoglobin      11.7 - 15.7 g/dL 12.8   Hematocrit      35.0 - 47.0 % 39.1   MCV      78 - 100 fL 90   MCH      26.5 - 33.0 pg 29.4   MCHC      31.5 - 36.5 g/dL 32.7   RDW      10.0 - 15.0 % 13.2   Platelet Count      150 - 450 10e3/uL 332   % Neutrophils      % 51   % Lymphocytes      % 38   % Monocytes      % 7   % Eosinophils      % 3   % Basophils      % 1   % Immature Granulocytes      % 0   Absolute Neutrophils      1.6 - 8.3 10e3/uL 3.2   Absolute Lymphocytes      0.8 - 5.3 10e3/uL 2.4   Absolute Monocytes      0.0 - 1.3 10e3/uL 0.5   Absolute Eosinophils      0.0 - 0.7 10e3/uL 0.2   Absolute Basophils      0.0 - 0.2 10e3/uL 0.1   Absolute Immature Granulocytes      <=0.4 10e3/uL 0.0   Vitamin B12      193 - 986 pg/mL 428   TSH      0.40 - 4.00 mU/L 2.28   Ferritin      8 - 252 ng/mL 70   Sed Rate      0 - 30 mm/hr 17       IMPRESSION/REPORT/PLAN  Intractable chronic migraine without aura and without status migrainosus  History of depression  Medication side effect    This is a 52 year old female with history of migraines with worsening headaches. Headaches are suggestive of chronic migraines at this point. Exam today is noncontributory.  MRI brain has been negative for structural lesions.  Blood work has been noncontributory. She does have some lifestyle issues that might be causing the headaches including working remotely and having irregular sleep schedule. Encouraged her to see what she can do about this. She is trying yoga right now.    For treatment of headaches she is tried Topamax and propanolol in the past.  Nortriptyline has been helpful but overall has not made a huge difference.  She is on Prozac and will monitor for serotonin syndrome.  She does complain of some hypersomnolence in the morning related to the nortriptyline does  not want go up on the medication anymore.  All of her headaches are brought in by shoulder tension we will proceed with Botox.  Discussed side effects of Botox.    She can continue using sumatriptan for abortive therapy and injectable sumatriptan (ordered pen per her request) for the more severe headaches.  She is using the injections once every 3 months.  Encouraged her not to overuse her medications. Discussed about risk of coronary artery disease with these medications. Will monitor for side effects.    T2 hyperintensities on MRI are age-appropriate.  Could be related to the migraines.    I can see her back in 1 months for Botox. Encourage her to keep a log of her headaches.  Medications refilled    -     nortriptyline (PAMELOR) 10 MG capsule; Take 1 cap/night and then increase by 1 cap/week to a max of 3 caps/night as needed and tolerated.  -Imitrex 100 mg  -     SUMAtriptan (IMITREX STATDOSE) 6 MG/0.5ML pen injector kit; Inject 0.5 mLs (6 mg) Subcutaneous at onset of headache for migraine May repeat in 1 hour. Max 12 mg/24 hours.  -     Botulinum Toxin Type A (BOTOX) 200 units injection 155 Units    Return in about 1 month (around 11/30/2022) for In-Clinic Visit (must), Botox.    Over 30 minutes were spent coordinating the care for the patient on the day of the encounter.  This includes previsit, during visit and post visit activities as documented above.  Counseling patient.  Prescription management.  Discussion of Botox.  MRI images reviewed again.  (Activities include but not inclusive of reviewing chart, reviewing outside records, reviewing labs and imaging study results as well as the images, patient visit time including getting history and exam,  use if applicable, review of test results with the patient and coming up with a plan in a shared model, counseling patient and family, education and answering patient questions, EMR , EMR diagnosis entry and problem list management,  "medication reconciliation and prescription management if applicable, paperwork if applicable, printing documents and documentation of the visit activities.)    Botox PA  \" The patient has a diagnosis of chronic migraines. She has more than 15 headache days a month with each headache lasting at least 4 hours despite use of triptans. Her headaches have been there for over 6 months. She has been screened for medication overuse headaches and she does not have that. She has tried nortriptyline, Topamax, Propranolol for 3 months without any significant benefit. I would request that the Botox be approved for her. \"      Chuy Dunn MD  Neurologist  [facility]  Tel:- [phone]    This note was dictated using voice recognition software.  Any grammatical or context distortions are unintentional and inherent to the software.    "

## 2022-10-31 NOTE — LETTER
10/31/2022         RE: Nikki Hardin  [address] [facility] [address]        Dear Colleague,    Thank you for referring your patient, Nikki Hardin, to the [facility] [place]. Please see a copy of my visit note below.    NEUROLOGY OUTPATIENT PROGRESS NOTE  Oct 31, 2022     CHIEF COMPLAINT/REASON FOR VISIT/REASON FOR CONSULT  Patient presents with:  Follow Up    REASON FOR CONSULTATION- Headaches    REFERRAL SOURCE  Dr. Srinivasa Sofia  CC Dr. Srinivasa Sofia    HISTORY OF PRESENT ILLNESS  Nikki Hardin is a 52 year old female seen today for evaluation of headaches. She reports that she has a diagnosis of migraines since age 14. Previously her headaches were sporadic but still frequent. She estimates that she was having at least ~15 headaches a month. Over the last 2 to 3 months headaches have become much more frequent. She is having a headache every day. Reports no real reasons why the headaches might have gotten worse.    Headaches are generally in the temple on either side and then radiate to the neck. Occasionally she can have more frontal pressure. Takes a throbbing. There is some photophobia and phonophobia. She has never had any auras with the headaches. Reports triggers including working and doing remote work from home, inconsistent sleep, lack of regular meals, going out in the sun sometimes storms. She does have some neck pain though neck pain is worse at the time of headaches.    She is tried Topamax in the past which caused severe side effects. She is also tried propanolol without any benefit. Over-the-counter medications do not help. 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 She has been using sumatriptan injections and tablets for several years which have been working for her. Has tried Maxalt with benefit though insurance would not cover it anymore and then she had to stop it. The sumatriptan works better than the Maxalt.    4/6/22  Patient returns  today.  She did try the nortriptyline and that has helped with the headaches.  Currently is having 7 headaches a month.  Reports no changes in the nature of the headaches.  Imitrex still works with the abortive therapy.  She does complain of some somnolence in the morning with the nortriptyline.  Is taking 30 mg.  Tries to take it earlier in the evening.  Things are may be slightly turning the corner at this point though she is not sure if this is a good long-term medication or not.  Is interested in Botox.    10/31/22  Patient returns today.  Brought in the headache log today which I reviewed.  She is still having 10-12 headaches every month.  Previously these were at 15 headaches a month.  Headaches have been minimally improved with the nortriptyline.  Reports side effects of somnolence with the nortriptyline.  Is taking 30 mg.  Wants to get off the medication.  Imitrex is working for abortive therapy.  The Imitrex injections were given to her by the pharmacy that she wants the pen which I prescribed today.  Denies any other new symptoms.  No other triggers for her headaches.  Wants to proceed with the Botox since most of her headaches are coming from the shoulder tension.    Previous history is reviewed and this is unchanged.    PAST MEDICAL/SURGICAL HISTORY  Past Medical History:   Diagnosis Date     Abnormal Pap smear of cervix 11/22/2019    See problem list.      Anxiety      Colitis     lymphocytic colitis ?     Depressive disorder, not elsewhere classified     sees psych- Dr Blue     Migraine, unspecified, without mention of intractable migraine without mention of status migrainosus age 14 yrs     Other acne      PAIN IN THORACIC SPINE [724.1] 4/13/2006    chiropractic care     Raynaud disease     ? connective tissue dz.  ? sogren's     Patient Active Problem List   Diagnosis     Irritable bowel syndrome     Other acne     Migraine with aura and without status migrainosus, not intractable     Mild major  depression (H)     Sedimentation rate elevation     Elevated vitamin B12 level     Anemia, unspecified type     Hypervitaminosis B6     Lymphocytic colitis     ASCUS of cervix with negative high risk HPV   Significant for migraines, arthritis, depression, microcolitis    FAMILY HISTORY  Family History   Problem Relation Age of Onset     Neurologic Disorder Mother         MIGRIANES     C.A.D. Father         in his 60's     Depression Father      Heart Disease Father      Lipids Father      Hypertension Father      Arthritis Father      Sleep Disorder Father      Cerebrovascular Disease Father 78     Cancer - colorectal Paternal Grandmother      Cancer Maternal Grandfather         stomach     Heart Disease Maternal Grandfather      Depression Brother      Depression Brother      Asthma Daughter      Heart Disease Paternal Grandfather      Breast Cancer Maternal Aunt      Diabetes No family hx of    Positive for migraines in her mother and aunt.    SOCIAL HISTORY  Social History     Tobacco Use     Smoking status: Never     Smokeless tobacco: Never   Substance Use Topics     Alcohol use: Yes     Comment: occasional- once monthly     Drug use: No       SYSTEMS REVIEW  Twelve-system ROS was done and other than the HPI this was negative except for neck pain, dizziness, hearing loss, sleepiness during the day, headaches, depression, bowel problems.  No new symptoms reported today.    MEDICATIONS  acetaminophen (TYLENOL) 325 MG tablet, Take 2 tablets by mouth every 4 hours as needed for pain.  Biotin 5000 MCG TABS, Take 1,000 mcg by mouth daily  cholecalciferol 5000 UNITS CAPS, Take 1 capsule (5,000 Units) by mouth daily FOR VITAMIN D DEFICIENCY (LOW VITAMIN D)  Ferrous Sulfate (IRON) 28 MG TABS,   FLUoxetine (PROZAC) 40 MG capsule, TAKE 1 CAPSULE(40 MG) BY MOUTH DAILY  IBUPROFEN PO, Take 200 mg by mouth Takes 3 tablets PRN  loperamide (IMODIUM A-D) 2 MG tablet, Take 2 mg by mouth daily as needed for diarrhea    nortriptyline (PAMELOR) 10 MG capsule, TAKE 1 CAPSULE AT NIGHT INCREASE BY 1 CAPSULE/WEEK TO A MAX OF 3 CAPS/NIGHT AS NEEDED AND TOLERATED  Riboflavin 100 MG TABS, Take 400 mg by mouth  SUMAtriptan (IMITREX STATDOSE) 6 MG/0.5ML pen injector kit, INJECT 0.5ML SUBCUTANEOUS ONCE FOR 1 DOSE. MAY REPEAT DOSE IN 1 HOUR. MAX OF 12MG/24 HOURS.  SUMAtriptan (IMITREX) 100 MG tablet, TAKE 1 TABLET(100 MG) BY MOUTH AT ONSET OF HEADACHE FOR MIGRAINE. MAY REPEAT IN 2 HOURS. MAX 4 TABLETS/ 24 HOURS  cyclobenzaprine (FLEXERIL) 5 MG tablet, Take 1 tablet (5 mg) by mouth 3 times daily as needed for muscle spasms (Patient not taking: Reported on 4/6/2022)  finasteride (PROSCAR) 5 MG tablet, TK ONE-HALF T PO QD    No current facility-administered medications on file prior to visit.       PHYSICAL EXAMINATION  VITALS: /64   Pulse 72   Resp 16   Wt 59.9 kg (132 lb)   BMI 25.78 kg/m    GENERAL: Healthy appearing, alert, no acute distress, normal habitus.  CARDIOVASCULAR: Extremities warm and well perfused. Pulses present.   NEUROLOGICAL:  Patient is awake and oriented to self, place and time.  Attention span is normal.  Memory is grossly intact.  Language is fluent and follows commands appropriately.  Appropriate fund of knowledge. Cranial nerves 2-12 are intact. There is no pronator drift.  Motor exam shows 5/5 strength in all extremities.  Tone is symmetric bilaterally in upper and lower extremities.  (Previously reflexes are symmetric and 2+ in upper extremities and lower extremities. Sensory exam is grossly intact to light touch, pin prick and vibration.) Finger to nose and heel to shin is without dysmetria.  Romberg is negative.  Gait is normal and the patient is able to do tandem walk and walk on toes and heels.  Exam similar to before.    DIAGNOSTICS  CARDIOLOGY  EKG 2008  NSR    RELEVANT LABS  Component      Latest Ref Rng & Units 12/22/2020   WBC      4.0 - 11.0 10e9/L 6.7   RBC Count      3.8 - 5.2 10e12/L 3.93    Hemoglobin      11.7 - 15.7 g/dL 11.4 (L)   Hematocrit      35.0 - 47.0 % 35.2   MCV      78 - 100 fl 90   MCH      26.5 - 33.0 pg 29.0   MCHC      31.5 - 36.5 g/dL 32.4   RDW      10.0 - 15.0 % 13.2   Platelet Count      150 - 450 10e9/L 297   % Neutrophils      % 70.5   % Lymphocytes      % 20.4   % Monocytes      % 6.6   % Eosinophils      % 2.1   % Basophils      % 0.4   Absolute Neutrophil      1.6 - 8.3 10e9/L 4.7   Absolute Lymphocytes      0.8 - 5.3 10e9/L 1.4   Absolute Monocytes      0.0 - 1.3 10e9/L 0.4   Absolute Eosinophils      0.0 - 0.7 10e9/L 0.1   Absolute Basophils      0.0 - 0.2 10e9/L 0.0   Diff Method       Automated Method   Sodium      133 - 144 mmol/L 138   Potassium      3.4 - 5.3 mmol/L 4.5   Chloride      94 - 109 mmol/L 107   Carbon Dioxide      20 - 32 mmol/L 27   Anion Gap      3 - 14 mmol/L 4   Glucose      70 - 99 mg/dL 79   Urea Nitrogen      7 - 30 mg/dL 12   Creatinine      0.52 - 1.04 mg/dL 0.78   GFR Estimate      >60 mL/min/1.73:m2 87   GFR Estimate If Black      >60 mL/min/1.73:m2 >90   Calcium      8.5 - 10.1 mg/dL 9.2   Bilirubin Total      0.2 - 1.3 mg/dL 0.3   Albumin      3.4 - 5.0 g/dL 3.8   Protein Total      6.8 - 8.8 g/dL 7.8   Alkaline Phosphatase      40 - 150 U/L 73   ALT      0 - 50 U/L 22   AST      0 - 45 U/L 16       OUTSIDE RECORDS  Outside referral notes and chart notes were reviewed and pertinent information has been summarized (in addition to the HPI):-Patient was seen in primary care.  Has issues with depression.  Had a visit in September that was virtual.  Was diagnosed with migraine with aura.  Is on sumatriptan the day worsening.  History of migraines with sumatriptan injections and tablets.  Injections only for severe headaches.  Headaches happen once every 2 months.  Migraines get to cluster.  No significant changes in headaches.  Some concerns with hep C.    MRI brain-images reviewed with the patient again.  There are very minimal T2  hyperintensities noted.  Is appropriate for age.  IMPRESSION:  1.  No acute intracranial abnormality.  2.  Small nonspecific focus of subcortical T2 FLAIR hyperintensity in the right frontal lobe is strictly nonspecific but can be seen in setting of migraines and prior insult or injury. Demyelination is not favored.    LABS  Component      Latest Ref Rng & Units 2/8/2022   WBC      4.0 - 11.0 10e3/uL 6.3   RBC Count      3.80 - 5.20 10e6/uL 4.36   Hemoglobin      11.7 - 15.7 g/dL 12.8   Hematocrit      35.0 - 47.0 % 39.1   MCV      78 - 100 fL 90   MCH      26.5 - 33.0 pg 29.4   MCHC      31.5 - 36.5 g/dL 32.7   RDW      10.0 - 15.0 % 13.2   Platelet Count      150 - 450 10e3/uL 332   % Neutrophils      % 51   % Lymphocytes      % 38   % Monocytes      % 7   % Eosinophils      % 3   % Basophils      % 1   % Immature Granulocytes      % 0   Absolute Neutrophils      1.6 - 8.3 10e3/uL 3.2   Absolute Lymphocytes      0.8 - 5.3 10e3/uL 2.4   Absolute Monocytes      0.0 - 1.3 10e3/uL 0.5   Absolute Eosinophils      0.0 - 0.7 10e3/uL 0.2   Absolute Basophils      0.0 - 0.2 10e3/uL 0.1   Absolute Immature Granulocytes      <=0.4 10e3/uL 0.0   Vitamin B12      193 - 986 pg/mL 428   TSH      0.40 - 4.00 mU/L 2.28   Ferritin      8 - 252 ng/mL 70   Sed Rate      0 - 30 mm/hr 17       IMPRESSION/REPORT/PLAN  Intractable chronic migraine without aura and without status migrainosus  History of depression  Medication side effect    This is a 52 year old female with history of migraines with worsening headaches. Headaches are suggestive of chronic migraines at this point. Exam today is noncontributory.  MRI brain has been negative for structural lesions.  Blood work has been noncontributory. She does have some lifestyle issues that might be causing the headaches including working remotely and having irregular sleep schedule. Encouraged her to see what she can do about this. She is trying yoga right now.    For treatment of  headaches she is tried Topamax and propanolol in the past.  Nortriptyline has been helpful but overall has not made a huge difference.  She is on Prozac and will monitor for serotonin syndrome.  She does complain of some hypersomnolence in the morning related to the nortriptyline does not want go up on the medication anymore.  All of her headaches are brought in by shoulder tension we will proceed with Botox.  Discussed side effects of Botox.    She can continue using sumatriptan for abortive therapy and injectable sumatriptan (ordered pen per her request) for the more severe headaches.  She is using the injections once every 3 months.  Encouraged her not to overuse her medications. Discussed about risk of coronary artery disease with these medications. Will monitor for side effects.    T2 hyperintensities on MRI are age-appropriate.  Could be related to the migraines.    I can see her back in 1 months for Botox. Encourage her to keep a log of her headaches.  Medications refilled    -     nortriptyline (PAMELOR) 10 MG capsule; Take 1 cap/night and then increase by 1 cap/week to a max of 3 caps/night as needed and tolerated.  -Imitrex 100 mg  -     SUMAtriptan (IMITREX STATDOSE) 6 MG/0.5ML pen injector kit; Inject 0.5 mLs (6 mg) Subcutaneous at onset of headache for migraine May repeat in 1 hour. Max 12 mg/24 hours.  -     Botulinum Toxin Type A (BOTOX) 200 units injection 155 Units    Return in about 1 month (around 11/30/2022) for In-Clinic Visit (must), Botox.    Over 30 minutes were spent coordinating the care for the patient on the day of the encounter.  This includes previsit, during visit and post visit activities as documented above.  Counseling patient.  Prescription management.  Discussion of Botox.  MRI images reviewed again.  (Activities include but not inclusive of reviewing chart, reviewing outside records, reviewing labs and imaging study results as well as the images, patient visit time including  "getting history and exam,  use if applicable, review of test results with the patient and coming up with a plan in a shared model, counseling patient and family, education and answering patient questions, EMR , EMR diagnosis entry and problem list management, medication reconciliation and prescription management if applicable, paperwork if applicable, printing documents and documentation of the visit activities.)    Botox PA  \" The patient has a diagnosis of chronic migraines. She has more than 15 headache days a month with each headache lasting at least 4 hours despite use of triptans. Her headaches have been there for over 6 months. She has been screened for medication overuse headaches and she does not have that. She has tried nortriptyline, Topamax, Propranolol for 3 months without any significant benefit. I would request that the Botox be approved for her. \"      Chuy Dunn MD  Neurologist  [facility]  Tel:- [phone]    This note was dictated using voice recognition software.  Any grammatical or context distortions are unintentional and inherent to the software.        Again, thank you for allowing me to participate in the care of your patient.        Sincerely,        Chuy Dunn MD    "

## 2022-10-31 NOTE — TELEPHONE ENCOUNTER
Refill request for sumatriptan 6mg/.5ml  Last follow-up 4/6/22; Next follow-up today 10/31/22  Medication T'd for review and signature  EUSEBIO Son ATC on 10/31/2022 at 8:29 AM    SUMAtriptan (IMITREX) 6 MG/0.5ML injection 1 mL 3 2/3/2022  No   Sig: INJECT 0.5ML SUBCUTANEOUS ONCE FOR 1 DOSE. MAY REPEAT DOSE IN 1 HOUR. MAX OF 12MG/24 HOURS.

## 2022-10-31 NOTE — TELEPHONE ENCOUNTER
Pt has appt today.     Routing refill request to MD. See T'd below.     Pending Prescriptions:                       Disp   Refills    SUMAtriptan (IMITREX STATDOSE) 6 MG/0.5ML*1 mL   3            Sig: ADMINISTER 0.5 ML UNDER THE SKIN 1 TIME FOR 1           DOSE. MAY REPEAT DOSE IN 1 HOUR. MAX OF 12 MG/ 24           HOURS    Doron Coulter, RN, BSN  [facility]

## 2022-11-01 ENCOUNTER — E-VISIT (OUTPATIENT)
Dept: FAMILY MEDICINE | Facility: CLINIC | Age: 52
End: 2022-11-01
Payer: COMMERCIAL

## 2022-11-01 DIAGNOSIS — M54.50 LOW BACK PAIN, UNSPECIFIED BACK PAIN LATERALITY, UNSPECIFIED CHRONICITY, UNSPECIFIED WHETHER SCIATICA PRESENT: Primary | ICD-10-CM

## 2022-11-01 PROCEDURE — 99207 PR NON-BILLABLE SERV PER CHARTING: CPT | Performed by: FAMILY MEDICINE

## 2022-11-21 ENCOUNTER — MYC MEDICAL ADVICE (OUTPATIENT)
Dept: NEUROLOGY | Facility: CLINIC | Age: 52
End: 2022-11-21

## 2022-11-21 DIAGNOSIS — G43.109 MIGRAINE WITH AURA AND WITHOUT STATUS MIGRAINOSUS, NOT INTRACTABLE: ICD-10-CM

## 2022-11-22 RX ORDER — SUMATRIPTAN 100 MG/1
TABLET, FILM COATED ORAL
Qty: 18 TABLET | Refills: 2 | Status: SHIPPED | OUTPATIENT
Start: 2022-11-22 | End: 2023-05-24

## 2022-11-22 NOTE — TELEPHONE ENCOUNTER
Pt requesting refills of sumatriptan 100mg tabs. Previously prescribed by PCP.     On 10/31 you refilled sumatriptan 6mg/0.5ml pen injector kit.     Please advise if you want pt taking both injectable sumatriptan and oral tablets. If ok to refill oral tablets, I have T'd the prescription below. Please review sig/qty/refills as this was previously prescribed by PCP.     Doron Coulter RN, BSN  [facility]      Note for chart: RN noticed duplicate prescriptions for sumatriptan inj pen sent 10/31. RN cancelled 1 prescription to minimize risk if patient getting refill too soon.     Doron Coulter RN, BSN  [facility]

## 2022-11-29 ENCOUNTER — MYC MEDICAL ADVICE (OUTPATIENT)
Dept: NEUROLOGY | Facility: CLINIC | Age: 52
End: 2022-11-29

## 2022-11-30 ENCOUNTER — OFFICE VISIT (OUTPATIENT)
Dept: NEUROLOGY | Facility: CLINIC | Age: 52
End: 2022-11-30
Payer: COMMERCIAL

## 2022-11-30 VITALS
DIASTOLIC BLOOD PRESSURE: 76 MMHG | HEART RATE: 72 BPM | BODY MASS INDEX: 25.78 KG/M2 | WEIGHT: 132 LBS | SYSTOLIC BLOOD PRESSURE: 109 MMHG | RESPIRATION RATE: 16 BRPM

## 2022-11-30 DIAGNOSIS — R25.3 EYELID TWITCH: ICD-10-CM

## 2022-11-30 DIAGNOSIS — Z86.59 HISTORY OF DEPRESSION: ICD-10-CM

## 2022-11-30 DIAGNOSIS — T88.7XXA MEDICATION SIDE EFFECTS: ICD-10-CM

## 2022-11-30 DIAGNOSIS — G43.719 INTRACTABLE CHRONIC MIGRAINE WITHOUT AURA AND WITHOUT STATUS MIGRAINOSUS: Primary | ICD-10-CM

## 2022-11-30 PROCEDURE — 64615 CHEMODENERV MUSC MIGRAINE: CPT | Performed by: PSYCHIATRY & NEUROLOGY

## 2022-11-30 PROCEDURE — 99214 OFFICE O/P EST MOD 30 MIN: CPT | Mod: 25 | Performed by: PSYCHIATRY & NEUROLOGY

## 2022-11-30 NOTE — PROGRESS NOTES
NEUROLOGY OUTPATIENT PROGRESS NOTE  Nov 30, 2022     CHIEF COMPLAINT/REASON FOR VISIT/REASON FOR CONSULT  Patient presents with:  Botox    REASON FOR CONSULTATION- Headaches    REFERRAL SOURCE  Dr. Srinivasa Sofia  CC Dr. Srinivasa Sofia    HISTORY OF PRESENT ILLNESS  Nikki Hardin is a 52 year old female seen today for evaluation of headaches. She reports that she has a diagnosis of migraines since age 14. Previously her headaches were sporadic but still frequent. She estimates that she was having at least ~15 headaches a month. Over the last 2 to 3 months headaches have become much more frequent. She is having a headache every day. Reports no real reasons why the headaches might have gotten worse.    Headaches are generally in the temple on either side and then radiate to the neck. Occasionally she can have more frontal pressure. Takes a throbbing. There is some photophobia and phonophobia. She has never had any auras with the headaches. Reports triggers including working and doing remote work from home, inconsistent sleep, lack of regular meals, going out in the sun sometimes storms. She does have some neck pain though neck pain is worse at the time of headaches.    She is tried Topamax in the past which caused severe side effects. She is also tried propanolol without any benefit. Over-the-counter medications do not help. 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 She has been using sumatriptan injections and tablets for several years which have been working for her. Has tried Maxalt with benefit though insurance would not cover it anymore and then she had to stop it. The sumatriptan works better than the Maxalt.    4/6/22  Patient returns today.  She did try the nortriptyline and that has helped with the headaches.  Currently is having 7 headaches a month.  Reports no changes in the nature of the headaches.  Imitrex still works with the abortive therapy.  She does complain of some somnolence in the  morning with the nortriptyline.  Is taking 30 mg.  Tries to take it earlier in the evening.  Things are may be slightly turning the corner at this point though she is not sure if this is a good long-term medication or not.  Is interested in Botox.    10/31/22  Patient returns today.  Brought in the headache log today which I reviewed.  She is still having 10-12 headaches every month.  Previously these were at 15 headaches a month.  Headaches have been minimally improved with the nortriptyline.  Reports side effects of somnolence with the nortriptyline.  Is taking 30 mg.  Wants to get off the medication.  Imitrex is working for abortive therapy.  The Imitrex injections were given to her by the pharmacy that she wants the pen which I prescribed today.  Denies any other new symptoms.  No other triggers for her headaches.  Wants to proceed with the Botox since most of her headaches are coming from the shoulder tension.    11/30/22  Patient returns today.  Headaches are about the same as before.  She had called the clinic for an Imitrex prescription which is working for her.  Reports of a new problem ongoing for the last 1 month.  She does have some twitching of her left eye.  This can happen anytime.  There is no clear triggers that she is identified.  Drinks about 2 cups of coffee.  Drinks plenty of water.  No new medications.  The twitching is not severe enough that it causes the eye to close.  Is limited to the left eye only.    Previous history is reviewed and this is unchanged.    PAST MEDICAL/SURGICAL HISTORY  Past Medical History:   Diagnosis Date     Abnormal Pap smear of cervix 11/22/2019    See problem list.      Anxiety      Colitis     lymphocytic colitis ?     Depressive disorder, not elsewhere classified     sees psych- Dr Blue     Migraine, unspecified, without mention of intractable migraine without mention of status migrainosus age 14 yrs     Other acne      PAIN IN THORACIC SPINE [724.1] 4/13/2006     chiropractic care     Raynaud disease     ? connective tissue dz.  ? sogren's     Patient Active Problem List   Diagnosis     Irritable bowel syndrome     Other acne     Migraine with aura and without status migrainosus, not intractable     Mild major depression (H)     Sedimentation rate elevation     Elevated vitamin B12 level     Anemia, unspecified type     Hypervitaminosis B6     Lymphocytic colitis     ASCUS of cervix with negative high risk HPV   Significant for migraines, arthritis, depression, microcolitis    FAMILY HISTORY  Family History   Problem Relation Age of Onset     Neurologic Disorder Mother         MIGRIANES     C.A.D. Father         in his 60's     Depression Father      Heart Disease Father      Lipids Father      Hypertension Father      Arthritis Father      Sleep Disorder Father      Cerebrovascular Disease Father 78     Cancer - colorectal Paternal Grandmother      Cancer Maternal Grandfather         stomach     Heart Disease Maternal Grandfather      Depression Brother      Depression Brother      Asthma Daughter      Heart Disease Paternal Grandfather      Breast Cancer Maternal Aunt      Diabetes No family hx of    Positive for migraines in her mother and aunt.    SOCIAL HISTORY  Social History     Tobacco Use     Smoking status: Never     Smokeless tobacco: Never   Substance Use Topics     Alcohol use: Yes     Comment: occasional- once monthly     Drug use: No       SYSTEMS REVIEW  Twelve-system ROS was done and other than the HPI this was negative except for neck pain, dizziness, hearing loss, sleepiness during the day, headaches, depression, bowel problems.  No new concerns/issues reported today    MEDICATIONS  acetaminophen (TYLENOL) 325 MG tablet, Take 2 tablets by mouth every 4 hours as needed for pain.  Biotin 5000 MCG TABS, Take 1,000 mcg by mouth daily  cholecalciferol 5000 UNITS CAPS, Take 1 capsule (5,000 Units) by mouth daily FOR VITAMIN D DEFICIENCY (LOW VITAMIN  D)  Ferrous Sulfate (IRON) 28 MG TABS,   FLUoxetine (PROZAC) 40 MG capsule, TAKE 1 CAPSULE(40 MG) BY MOUTH DAILY  IBUPROFEN PO, Take 200 mg by mouth Takes 3 tablets PRN  loperamide (IMODIUM A-D) 2 MG tablet, Take 2 mg by mouth daily as needed for diarrhea   nortriptyline (PAMELOR) 10 MG capsule, TAKE 1 CAPSULE AT NIGHT INCREASE BY 1 CAPSULE/WEEK TO A MAX OF 3 CAPS/NIGHT AS NEEDED AND TOLERATED  Riboflavin 100 MG TABS, Take 400 mg by mouth  SUMAtriptan (IMITREX STATDOSE) 6 MG/0.5ML pen injector kit, Inject 0.5 mLs (6 mg) Subcutaneous at onset of headache for migraine May repeat in 1 hour. Max 12 mg/24 hours.  SUMAtriptan (IMITREX) 100 MG tablet, TAKE 1 TABLET(100 MG) BY MOUTH AT ONSET OF HEADACHE FOR MIGRAINE. MAY REPEAT IN 2 HOURS. MAX 4 TABLETS/ 24 HOURS Strength: 100 mg  cyclobenzaprine (FLEXERIL) 5 MG tablet, Take 1 tablet (5 mg) by mouth 3 times daily as needed for muscle spasms (Patient not taking: Reported on 4/6/2022)  finasteride (PROSCAR) 5 MG tablet, TK ONE-HALF T PO QD    Botulinum Toxin Type A (BOTOX) 200 units injection 155 Units         PHYSICAL EXAMINATION  VITALS: /76   Pulse 72   Resp 16   Wt 59.9 kg (132 lb)   BMI 25.78 kg/m    GENERAL: Healthy appearing, alert, no acute distress, normal habitus.  CARDIOVASCULAR: Extremities warm and well perfused. Pulses present.   NEUROLOGICAL:  Patient is awake and oriented to self, place and time.  Attention span is normal.  Memory is grossly intact.  Language is fluent and follows commands appropriately.  Appropriate fund of knowledge. Cranial nerves 2-12 are intact. There is no pronator drift.  Motor exam shows 5/5 strength in all extremities.  Tone is symmetric bilaterally in upper and lower extremities.  (Previously reflexes are symmetric and 2+ in upper extremities and lower extremities. Sensory exam is grossly intact to light touch, pin prick and vibration.) Finger to nose and heel to shin is without dysmetria.  Romberg is negative.  Gait is  normal and the patient is able to do tandem walk and walk on toes and heels.  Exam unchanged from before..    DIAGNOSTICS  CARDIOLOGY  EKG 2008  NS[facility]    RELEVANT LABS  Component      Latest Ref Rng & Units 12/22/2020   WBC      4.0 - 11.0 10e9/L 6.7   RBC Count      3.8 - 5.2 10e12/L 3.93   Hemoglobin      11.7 - 15.7 g/dL 11.4 (L)   Hematocrit      35.0 - 47.0 % 35.2   MCV      78 - 100 fl 90   MCH      26.5 - 33.0 pg 29.0   MCHC      31.5 - 36.5 g/dL 32.4   RDW      10.0 - 15.0 % 13.2   Platelet Count      150 - 450 10e9/L 297   % Neutrophils      % 70.5   % Lymphocytes      % 20.4   % Monocytes      % 6.6   % Eosinophils      % 2.1   % Basophils      % 0.4   Absolute Neutrophil      1.6 - 8.3 10e9/L 4.7   Absolute Lymphocytes      0.8 - 5.3 10e9/L 1.4   Absolute Monocytes      0.0 - 1.3 10e9/L 0.4   Absolute Eosinophils      0.0 - 0.7 10e9/L 0.1   Absolute Basophils      0.0 - 0.2 10e9/L 0.0   Diff Method       Automated Method   Sodium      133 - 144 mmol/L 138   Potassium      3.4 - 5.3 mmol/L 4.5   Chloride      94 - 109 mmol/L 107   Carbon Dioxide      20 - 32 mmol/L 27   Anion Gap      3 - 14 mmol/L 4   Glucose      70 - 99 mg/dL 79   Urea Nitrogen      7 - 30 mg/dL 12   Creatinine      0.52 - 1.04 mg/dL 0.78   GFR Estimate      >60 mL/min/1.73:m2 87   GFR Estimate If Black      >60 mL/min/1.73:m2 >90   Calcium      8.5 - 10.1 mg/dL 9.2   Bilirubin Total      0.2 - 1.3 mg/dL 0.3   Albumin      3.4 - 5.0 g/dL 3.8   Protein Total      6.8 - 8.8 g/dL 7.8   Alkaline Phosphatase      40 - 150 U/L 73   ALT      0 - 50 U/L 22   AST      0 - 45 U/L 16       OUTSIDE RECORDS  Outside referral notes and chart notes were reviewed and pertinent information has been summarized (in addition to the HPI):-Patient was seen in primary care.  Has issues with depression.  Had a visit in September that was virtual.  Was diagnosed with migraine with aura.  Is on sumatriptan the day worsening.  History of migraines with sumatriptan  injections and tablets.  Injections only for severe headaches.  Headaches happen once every 2 months.  Migraines get to cluster.  No significant changes in headaches.  Some concerns with hep C.    MRI brain-images reviewed with the patient again.  There are very minimal T2 hyperintensities noted.  Is appropriate for age.  IMPRESSION:  1.  No acute intracranial abnormality.  2.  Small nonspecific focus of subcortical T2 FLAIR hyperintensity in the right frontal lobe is strictly nonspecific but can be seen in setting of migraines and prior insult or injury. Demyelination is not favored.    LABS  Component      Latest Ref Rng & Units 2/8/2022   WBC      4.0 - 11.0 10e3/uL 6.3   RBC Count      3.80 - 5.20 10e6/uL 4.36   Hemoglobin      11.7 - 15.7 g/dL 12.8   Hematocrit      35.0 - 47.0 % 39.1   MCV      78 - 100 fL 90   MCH      26.5 - 33.0 pg 29.4   MCHC      31.5 - 36.5 g/dL 32.7   RDW      10.0 - 15.0 % 13.2   Platelet Count      150 - 450 10e3/uL 332   % Neutrophils      % 51   % Lymphocytes      % 38   % Monocytes      % 7   % Eosinophils      % 3   % Basophils      % 1   % Immature Granulocytes      % 0   Absolute Neutrophils      1.6 - 8.3 10e3/uL 3.2   Absolute Lymphocytes      0.8 - 5.3 10e3/uL 2.4   Absolute Monocytes      0.0 - 1.3 10e3/uL 0.5   Absolute Eosinophils      0.0 - 0.7 10e3/uL 0.2   Absolute Basophils      0.0 - 0.2 10e3/uL 0.1   Absolute Immature Granulocytes      <=0.4 10e3/uL 0.0   Vitamin B12      193 - 986 pg/mL 428   TSH      0.40 - 4.00 mU/L 2.28   Ferritin      8 - 252 ng/mL 70   Sed Rate      0 - 30 mm/hr 17       IMPRESSION/REPORT/PLAN  Intractable chronic migraine without aura and without status migrainosus  History of depression  Medication side effect  Left eye twitching    This is a 52 year old female with history of migraines with worsening headaches. Headaches are suggestive of chronic migraines at this point. Exam today is noncontributory.  MRI brain has been negative for  structural lesions.  Blood work has been noncontributory. She does have some lifestyle issues that might be causing the headaches including working remotely and having irregular sleep schedule. Encouraged her to see what she can do about this. She is trying yoga right now.    For treatment of headaches she is tried Topamax and propanolol in the past.  Nortriptyline has been helpful but overall has not made a huge difference.  She is on Prozac and will monitor for serotonin syndrome.  She does complain of some hypersomnolence in the morning related to the nortriptyline does not want go up on the medication anymore.  All of her headaches are brought in by shoulder tension we will proceed with Botox.  Plan to do Botox today.    She can continue using sumatriptan for abortive therapy and injectable sumatriptan (ordered pen per her request) for the more severe headaches.  She is using the injections once every 3 months.  Encouraged her not to overuse her medications. Discussed about risk of coronary artery disease with these medications. Will monitor for side effects.  Continue sumatriptan.    T2 hyperintensities on MRI are age-appropriate.  Could be related to the migraines.    With left eye twitching-do not see much on exam.  Previous imaging studies have been negative.  Could be related to ongoing stress or even migraines.  She does not think it is a side effect of any of her medication though she is on multiple antidepressant.  Could consider Botox injections for possible blepharospasm though it does not appear to severe.  Encouraged her to keep a log to see if she can identify triggers.    Can see her back in 2 months.  Plan to do Botox in 3 months    -     nortriptyline (PAMELOR) 10 MG capsule; Take 1 cap/night and then increase by 1 cap/week to a max of 3 caps/night as needed and tolerated.  -Imitrex 100 mg  -     SUMAtriptan (IMITREX STATDOSE) 6 MG/0.5ML pen injector kit; Inject 0.5 mLs (6 mg) Subcutaneous at  "onset of headache for migraine May repeat in 1 hour. Max 12 mg/24 hours.  -     Botulinum Toxin Type A (BOTOX) 200 units injection 155 Units    Return for 2 months meds and 3 months Botox.    Over 20 minutes were spent coordinating the care for the patient on the day of the encounter.  This includes previsit, during visit and post visit activities as documented above.  Counseling patient.  New problem.  Previous problems reviewed.  Reviewing medications.  (Activities include but not inclusive of reviewing chart, reviewing outside records, reviewing labs and imaging study results as well as the images, patient visit time including getting history and exam,  use if applicable, review of test results with the patient and coming up with a plan in a shared model, counseling patient and family, education and answering patient questions, EMR , EMR diagnosis entry and problem list management, medication reconciliation and prescription management if applicable, paperwork if applicable, printing documents and documentation of the visit activities.)    Botox PA  \" The patient has a diagnosis of chronic migraines. She has more than 15 headache days a month with each headache lasting at least 4 hours despite use of triptans. Her headaches have been there for over 6 months. She has been screened for medication overuse headaches and she does not have that. She has tried nortriptyline, Topamax, Propranolol for 3 months without any significant benefit. I would request that the Botox be approved for her. \"      Chuy Dunn MD  Neurologist  [facility]  Tel:- [phone]    This note was dictated using voice recognition software.  Any grammatical or context distortions are unintentional and inherent to the software.    "

## 2022-11-30 NOTE — LETTER
11/30/2022         RE: Nikki Hardin  [address] [facility] [id number]        Dear Colleague,    Thank you for referring your patient, Nikki Hardin, to the [facility] [place]. Please see a copy of my visit note below.    NEUROLOGY OUTPATIENT PROGRESS NOTE  Nov 30, 2022     CHIEF COMPLAINT/REASON FOR VISIT/REASON FOR CONSULT  Patient presents with:  Botox    REASON FOR CONSULTATION- Headaches    REFERRAL SOURCE  Dr. Srinivasa Sofia  CC Dr. Srinivasa Sofia    HISTORY OF PRESENT ILLNESS  Nikki Hardin is a 52 year old female seen today for evaluation of headaches. She reports that she has a diagnosis of migraines since age 14. Previously her headaches were sporadic but still frequent. She estimates that she was having at least ~15 headaches a month. Over the last 2 to 3 months headaches have become much more frequent. She is having a headache every day. Reports no real reasons why the headaches might have gotten worse.    Headaches are generally in the temple on either side and then radiate to the neck. Occasionally she can have more frontal pressure. Takes a throbbing. There is some photophobia and phonophobia. She has never had any auras with the headaches. Reports triggers including working and doing remote work from home, inconsistent sleep, lack of regular meals, going out in the sun sometimes storms. She does have some neck pain though neck pain is worse at the time of headaches.    She is tried Topamax in the past which caused severe side effects. She is also tried propanolol without any benefit. Over-the-counter medications do not help. 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 She has been using sumatriptan injections and tablets for several years which have been working for her. Has tried Maxalt with benefit though insurance would not cover it anymore and then she had to stop it. The sumatriptan works better than the Maxalt.    4/6/22  Patient returns  today.  She did try the nortriptyline and that has helped with the headaches.  Currently is having 7 headaches a month.  Reports no changes in the nature of the headaches.  Imitrex still works with the abortive therapy.  She does complain of some somnolence in the morning with the nortriptyline.  Is taking 30 mg.  Tries to take it earlier in the evening.  Things are may be slightly turning the corner at this point though she is not sure if this is a good long-term medication or not.  Is interested in Botox.    10/31/22  Patient returns today.  Brought in the headache log today which I reviewed.  She is still having 10-12 headaches every month.  Previously these were at 15 headaches a month.  Headaches have been minimally improved with the nortriptyline.  Reports side effects of somnolence with the nortriptyline.  Is taking 30 mg.  Wants to get off the medication.  Imitrex is working for abortive therapy.  The Imitrex injections were given to her by the pharmacy that she wants the pen which I prescribed today.  Denies any other new symptoms.  No other triggers for her headaches.  Wants to proceed with the Botox since most of her headaches are coming from the shoulder tension.    11/30/22  Patient returns today.  Headaches are about the same as before.  She had called the clinic for an Imitrex prescription which is working for her.  Reports of a new problem ongoing for the last 1 month.  She does have some twitching of her left eye.  This can happen anytime.  There is no clear triggers that she is identified.  Drinks about 2 cups of coffee.  Drinks plenty of water.  No new medications.  The twitching is not severe enough that it causes the eye to close.  Is limited to the left eye only.    Previous history is reviewed and this is unchanged.    PAST MEDICAL/SURGICAL HISTORY  Past Medical History:   Diagnosis Date     Abnormal Pap smear of cervix 11/22/2019    See problem list.      Anxiety      Colitis     lymphocytic  colitis ?     Depressive disorder, not elsewhere classified     sees psych- Dr Blue     Migraine, unspecified, without mention of intractable migraine without mention of status migrainosus age 14 yrs     Other acne      PAIN IN THORACIC SPINE [724.1] 4/13/2006    chiropractic care     Raynaud disease     ? connective tissue dz.  ? sogren's     Patient Active Problem List   Diagnosis     Irritable bowel syndrome     Other acne     Migraine with aura and without status migrainosus, not intractable     Mild major depression (H)     Sedimentation rate elevation     Elevated vitamin B12 level     Anemia, unspecified type     Hypervitaminosis B6     Lymphocytic colitis     ASCUS of cervix with negative high risk HPV   Significant for migraines, arthritis, depression, microcolitis    FAMILY HISTORY  Family History   Problem Relation Age of Onset     Neurologic Disorder Mother         MIGRIANES     C.A.D. Father         in his 60's     Depression Father      Heart Disease Father      Lipids Father      Hypertension Father      Arthritis Father      Sleep Disorder Father      Cerebrovascular Disease Father 78     Cancer - colorectal Paternal Grandmother      Cancer Maternal Grandfather         stomach     Heart Disease Maternal Grandfather      Depression Brother      Depression Brother      Asthma Daughter      Heart Disease Paternal Grandfather      Breast Cancer Maternal Aunt      Diabetes No family hx of    Positive for migraines in her mother and aunt.    SOCIAL HISTORY  Social History     Tobacco Use     Smoking status: Never     Smokeless tobacco: Never   Substance Use Topics     Alcohol use: Yes     Comment: occasional- once monthly     Drug use: No       SYSTEMS REVIEW  Twelve-system ROS was done and other than the HPI this was negative except for neck pain, dizziness, hearing loss, sleepiness during the day, headaches, depression, bowel problems.  No new concerns/issues reported  today    MEDICATIONS  acetaminophen (TYLENOL) 325 MG tablet, Take 2 tablets by mouth every 4 hours as needed for pain.  Biotin 5000 MCG TABS, Take 1,000 mcg by mouth daily  cholecalciferol 5000 UNITS CAPS, Take 1 capsule (5,000 Units) by mouth daily FOR VITAMIN D DEFICIENCY (LOW VITAMIN D)  Ferrous Sulfate (IRON) 28 MG TABS,   FLUoxetine (PROZAC) 40 MG capsule, TAKE 1 CAPSULE(40 MG) BY MOUTH DAILY  IBUPROFEN PO, Take 200 mg by mouth Takes 3 tablets PRN  loperamide (IMODIUM A-D) 2 MG tablet, Take 2 mg by mouth daily as needed for diarrhea   nortriptyline (PAMELOR) 10 MG capsule, TAKE 1 CAPSULE AT NIGHT INCREASE BY 1 CAPSULE/WEEK TO A MAX OF 3 CAPS/NIGHT AS NEEDED AND TOLERATED  Riboflavin 100 MG TABS, Take 400 mg by mouth  SUMAtriptan (IMITREX STATDOSE) 6 MG/0.5ML pen injector kit, Inject 0.5 mLs (6 mg) Subcutaneous at onset of headache for migraine May repeat in 1 hour. Max 12 mg/24 hours.  SUMAtriptan (IMITREX) 100 MG tablet, TAKE 1 TABLET(100 MG) BY MOUTH AT ONSET OF HEADACHE FOR MIGRAINE. MAY REPEAT IN 2 HOURS. MAX 4 TABLETS/ 24 HOURS Strength: 100 mg  cyclobenzaprine (FLEXERIL) 5 MG tablet, Take 1 tablet (5 mg) by mouth 3 times daily as needed for muscle spasms (Patient not taking: Reported on 4/6/2022)  finasteride (PROSCAR) 5 MG tablet, TK ONE-HALF T PO QD    Botulinum Toxin Type A (BOTOX) 200 units injection 155 Units         PHYSICAL EXAMINATION  VITALS: /76   Pulse 72   Resp 16   Wt 59.9 kg (132 lb)   BMI 25.78 kg/m    GENERAL: Healthy appearing, alert, no acute distress, normal habitus.  CARDIOVASCULAR: Extremities warm and well perfused. Pulses present.   NEUROLOGICAL:  Patient is awake and oriented to self, place and time.  Attention span is normal.  Memory is grossly intact.  Language is fluent and follows commands appropriately.  Appropriate fund of knowledge. Cranial nerves 2-12 are intact. There is no pronator drift.  Motor exam shows 5/5 strength in all extremities.  Tone is symmetric  bilaterally in upper and lower extremities.  (Previously reflexes are symmetric and 2+ in upper extremities and lower extremities. Sensory exam is grossly intact to light touch, pin prick and vibration.) Finger to nose and heel to shin is without dysmetria.  Romberg is negative.  Gait is normal and the patient is able to do tandem walk and walk on toes and heels.  Exam unchanged from before..    DIAGNOSTICS  CARDIOLOGY  EKG 2008  NSR    RELEVANT LABS  Component      Latest Ref Rng & Units 12/22/2020   WBC      4.0 - 11.0 10e9/L 6.7   RBC Count      3.8 - 5.2 10e12/L 3.93   Hemoglobin      11.7 - 15.7 g/dL 11.4 (L)   Hematocrit      35.0 - 47.0 % 35.2   MCV      78 - 100 fl 90   MCH      26.5 - 33.0 pg 29.0   MCHC      31.5 - 36.5 g/dL 32.4   RDW      10.0 - 15.0 % 13.2   Platelet Count      150 - 450 10e9/L 297   % Neutrophils      % 70.5   % Lymphocytes      % 20.4   % Monocytes      % 6.6   % Eosinophils      % 2.1   % Basophils      % 0.4   Absolute Neutrophil      1.6 - 8.3 10e9/L 4.7   Absolute Lymphocytes      0.8 - 5.3 10e9/L 1.4   Absolute Monocytes      0.0 - 1.3 10e9/L 0.4   Absolute Eosinophils      0.0 - 0.7 10e9/L 0.1   Absolute Basophils      0.0 - 0.2 10e9/L 0.0   Diff Method       Automated Method   Sodium      133 - 144 mmol/L 138   Potassium      3.4 - 5.3 mmol/L 4.5   Chloride      94 - 109 mmol/L 107   Carbon Dioxide      20 - 32 mmol/L 27   Anion Gap      3 - 14 mmol/L 4   Glucose      70 - 99 mg/dL 79   Urea Nitrogen      7 - 30 mg/dL 12   Creatinine      0.52 - 1.04 mg/dL 0.78   GFR Estimate      >60 mL/min/1.73:m2 87   GFR Estimate If Black      >60 mL/min/1.73:m2 >90   Calcium      8.5 - 10.1 mg/dL 9.2   Bilirubin Total      0.2 - 1.3 mg/dL 0.3   Albumin      3.4 - 5.0 g/dL 3.8   Protein Total      6.8 - 8.8 g/dL 7.8   Alkaline Phosphatase      40 - 150 U/L 73   ALT      0 - 50 U/L 22   AST      0 - 45 U/L 16       OUTSIDE RECORDS  Outside referral notes and chart notes were reviewed and  pertinent information has been summarized (in addition to the HPI):-Patient was seen in primary care.  Has issues with depression.  Had a visit in September that was virtual.  Was diagnosed with migraine with aura.  Is on sumatriptan the day worsening.  History of migraines with sumatriptan injections and tablets.  Injections only for severe headaches.  Headaches happen once every 2 months.  Migraines get to cluster.  No significant changes in headaches.  Some concerns with hep C.    MRI brain-images reviewed with the patient again.  There are very minimal T2 hyperintensities noted.  Is appropriate for age.  IMPRESSION:  1.  No acute intracranial abnormality.  2.  Small nonspecific focus of subcortical T2 FLAIR hyperintensity in the right frontal lobe is strictly nonspecific but can be seen in setting of migraines and prior insult or injury. Demyelination is not favored.    LABS  Component      Latest Ref Rng & Units 2/8/2022   WBC      4.0 - 11.0 10e3/uL 6.3   RBC Count      3.80 - 5.20 10e6/uL 4.36   Hemoglobin      11.7 - 15.7 g/dL 12.8   Hematocrit      35.0 - 47.0 % 39.1   MCV      78 - 100 fL 90   MCH      26.5 - 33.0 pg 29.4   MCHC      31.5 - 36.5 g/dL 32.7   RDW      10.0 - 15.0 % 13.2   Platelet Count      150 - 450 10e3/uL 332   % Neutrophils      % 51   % Lymphocytes      % 38   % Monocytes      % 7   % Eosinophils      % 3   % Basophils      % 1   % Immature Granulocytes      % 0   Absolute Neutrophils      1.6 - 8.3 10e3/uL 3.2   Absolute Lymphocytes      0.8 - 5.3 10e3/uL 2.4   Absolute Monocytes      0.0 - 1.3 10e3/uL 0.5   Absolute Eosinophils      0.0 - 0.7 10e3/uL 0.2   Absolute Basophils      0.0 - 0.2 10e3/uL 0.1   Absolute Immature Granulocytes      <=0.4 10e3/uL 0.0   Vitamin B12      193 - 986 pg/mL 428   TSH      0.40 - 4.00 mU/L 2.28   Ferritin      8 - 252 ng/mL 70   Sed Rate      0 - 30 mm/hr 17       IMPRESSION/REPORT/PLAN  Intractable chronic migraine without aura and without status  migrainosus  History of depression  Medication side effect  Left eye twitching    This is a 52 year old female with history of migraines with worsening headaches. Headaches are suggestive of chronic migraines at this point. Exam today is noncontributory.  MRI brain has been negative for structural lesions.  Blood work has been noncontributory. She does have some lifestyle issues that might be causing the headaches including working remotely and having irregular sleep schedule. Encouraged her to see what she can do about this. She is trying yoga right now.    For treatment of headaches she is tried Topamax and propanolol in the past.  Nortriptyline has been helpful but overall has not made a huge difference.  She is on Prozac and will monitor for serotonin syndrome.  She does complain of some hypersomnolence in the morning related to the nortriptyline does not want go up on the medication anymore.  All of her headaches are brought in by shoulder tension we will proceed with Botox.  Plan to do Botox today.    She can continue using sumatriptan for abortive therapy and injectable sumatriptan (ordered pen per her request) for the more severe headaches.  She is using the injections once every 3 months.  Encouraged her not to overuse her medications. Discussed about risk of coronary artery disease with these medications. Will monitor for side effects.  Continue sumatriptan.    T2 hyperintensities on MRI are age-appropriate.  Could be related to the migraines.    With left eye twitching-do not see much on exam.  Previous imaging studies have been negative.  Could be related to ongoing stress or even migraines.  She does not think it is a side effect of any of her medication though she is on multiple antidepressant.  Could consider Botox injections for possible blepharospasm though it does not appear to severe.  Encouraged her to keep a log to see if she can identify triggers.    Can see her back in 2 months.  Plan to do  "Botox in 3 months    -     nortriptyline (PAMELOR) 10 MG capsule; Take 1 cap/night and then increase by 1 cap/week to a max of 3 caps/night as needed and tolerated.  -Imitrex 100 mg  -     SUMAtriptan (IMITREX STATDOSE) 6 MG/0.5ML pen injector kit; Inject 0.5 mLs (6 mg) Subcutaneous at onset of headache for migraine May repeat in 1 hour. Max 12 mg/24 hours.  -     Botulinum Toxin Type A (BOTOX) 200 units injection 155 Units    Return for 2 months meds and 3 months Botox.    Over 20 minutes were spent coordinating the care for the patient on the day of the encounter.  This includes previsit, during visit and post visit activities as documented above.  Counseling patient.  New problem.  Previous problems reviewed.  Reviewing medications.  (Activities include but not inclusive of reviewing chart, reviewing outside records, reviewing labs and imaging study results as well as the images, patient visit time including getting history and exam,  use if applicable, review of test results with the patient and coming up with a plan in a shared model, counseling patient and family, education and answering patient questions, EMR , EMR diagnosis entry and problem list management, medication reconciliation and prescription management if applicable, paperwork if applicable, printing documents and documentation of the visit activities.)    Botox PA  \" The patient has a diagnosis of chronic migraines. She has more than 15 headache days a month with each headache lasting at least 4 hours despite use of triptans. Her headaches have been there for over 6 months. She has been screened for medication overuse headaches and she does not have that. She has tried nortriptyline, Topamax, Propranolol for 3 months without any significant benefit. I would request that the Botox be approved for her. \"      Chuy Dunn MD  Neurologist  [facility]rology [facility]  Tel:- [phone]    This note was " dictated using voice recognition software.  Any grammatical or context distortions are unintentional and inherent to the software.        Again, thank you for allowing me to participate in the care of your patient.        Sincerely,        Chuy Dunn MD

## 2022-11-30 NOTE — PROCEDURES
NEUROLOGY PROCEDURE NOTE  Nov 30, 2022      Chronic migraine Botox injection    CONSENT: The procedure was explained to the patient. The risks and benefits of the procedure and the alternatives were discussed with the patient. The patient was given an opportunity to ask any questions she had about the procedure. A verbal consent was obtained from the patient. A written consent is available in the chart.    PRE-PROCEDURE  Diagnosis: Chronic migraine  Headache frequency before the use of Botox:- 20 headache days per month  Headache frequency with Botox use:-NA (first Botox)    EXAM  GENERAL: Healthy appearing, alert, no acute distress, normal habitus.  NEUROLOGICAL:  Patient is alert with fluent language.  Extraocular movements are intact.  Facial movements are present and symmetric.  No arm drift.    PROCEDURE SUMMARY:   The following muscles were identified after palpating for landmarks and the overlying skin was cleansed with alcohol. These muscles were then injected using a 30 guage- half  inch needle with the following doses of onabotulinumtoxin A. A 5 unit/ 0.1 ml dilution was used for the injections. A 2 x 100 unit vial was used.    1. Corrugators 5 units each side.  2. Procerus 5 units.  3. Frontalis 10 units each side.  4. Temporalis 20 units each side.  5. Occipitalis 15 units each side.  6. Paraspinals 10 units each side.  7. Trapezius 15 units each side.    Units Injected: 155 Units  Units Discarded: 45 Units  Lot Number and Expiration: [id number]; 2/25    The patient tolerated the procedure without any immediate complaints and will call the Neurology clinic if she has any problems or side effects from the medication. The patient will follow up in the Neurology clinic as previously decided.      Chuy Dunn MD  Neurologist  [facility]  [phone]

## 2023-01-10 DIAGNOSIS — F32.0 MILD MAJOR DEPRESSION (H): ICD-10-CM

## 2023-01-18 RX ORDER — FLUOXETINE 40 MG/1
CAPSULE ORAL
Qty: 90 CAPSULE | Refills: 0 | Status: SHIPPED | OUTPATIENT
Start: 2023-01-18 | End: 2023-02-28

## 2023-02-03 ENCOUNTER — ANCILLARY PROCEDURE (OUTPATIENT)
Dept: GENERAL RADIOLOGY | Facility: CLINIC | Age: 53
End: 2023-02-03
Attending: STUDENT IN AN ORGANIZED HEALTH CARE EDUCATION/TRAINING PROGRAM
Payer: COMMERCIAL

## 2023-02-03 ENCOUNTER — OFFICE VISIT (OUTPATIENT)
Dept: FAMILY MEDICINE | Facility: CLINIC | Age: 53
End: 2023-02-03
Payer: COMMERCIAL

## 2023-02-03 VITALS
SYSTOLIC BLOOD PRESSURE: 110 MMHG | OXYGEN SATURATION: 99 % | BODY MASS INDEX: 27.34 KG/M2 | DIASTOLIC BLOOD PRESSURE: 70 MMHG | HEART RATE: 78 BPM | WEIGHT: 139.25 LBS | HEIGHT: 60 IN | TEMPERATURE: 97.3 F

## 2023-02-03 DIAGNOSIS — M79.644 PAIN OF FINGER OF RIGHT HAND: Primary | ICD-10-CM

## 2023-02-03 DIAGNOSIS — M79.644 PAIN OF FINGER OF RIGHT HAND: ICD-10-CM

## 2023-02-03 LAB
CRP SERPL-MCNC: <3 MG/L
ERYTHROCYTE [SEDIMENTATION RATE] IN BLOOD BY WESTERGREN METHOD: 23 MM/HR (ref 0–30)
URATE SERPL-MCNC: 3.2 MG/DL (ref 2.4–5.7)

## 2023-02-03 PROCEDURE — 86039 ANTINUCLEAR ANTIBODIES (ANA): CPT | Performed by: STUDENT IN AN ORGANIZED HEALTH CARE EDUCATION/TRAINING PROGRAM

## 2023-02-03 PROCEDURE — 73130 X-RAY EXAM OF HAND: CPT | Mod: TC | Performed by: RADIOLOGY

## 2023-02-03 PROCEDURE — 84550 ASSAY OF BLOOD/URIC ACID: CPT | Performed by: STUDENT IN AN ORGANIZED HEALTH CARE EDUCATION/TRAINING PROGRAM

## 2023-02-03 PROCEDURE — 86038 ANTINUCLEAR ANTIBODIES: CPT | Performed by: STUDENT IN AN ORGANIZED HEALTH CARE EDUCATION/TRAINING PROGRAM

## 2023-02-03 PROCEDURE — 99214 OFFICE O/P EST MOD 30 MIN: CPT | Performed by: STUDENT IN AN ORGANIZED HEALTH CARE EDUCATION/TRAINING PROGRAM

## 2023-02-03 PROCEDURE — 2894A XR FINGER RIGHT G/E 2 VIEWS: CPT | Mod: TC | Performed by: RADIOLOGY

## 2023-02-03 PROCEDURE — 86140 C-REACTIVE PROTEIN: CPT | Performed by: STUDENT IN AN ORGANIZED HEALTH CARE EDUCATION/TRAINING PROGRAM

## 2023-02-03 PROCEDURE — 85652 RBC SED RATE AUTOMATED: CPT | Performed by: STUDENT IN AN ORGANIZED HEALTH CARE EDUCATION/TRAINING PROGRAM

## 2023-02-03 PROCEDURE — 86431 RHEUMATOID FACTOR QUANT: CPT | Performed by: STUDENT IN AN ORGANIZED HEALTH CARE EDUCATION/TRAINING PROGRAM

## 2023-02-03 PROCEDURE — 36415 COLL VENOUS BLD VENIPUNCTURE: CPT | Performed by: STUDENT IN AN ORGANIZED HEALTH CARE EDUCATION/TRAINING PROGRAM

## 2023-02-03 ASSESSMENT — PAIN SCALES - GENERAL: PAINLEVEL: SEVERE PAIN (7)

## 2023-02-03 NOTE — PROGRESS NOTES
"  Assessment and Plan     52-year-old female who presents with right pointer finger pain going on the last 3 months.  Etiology is not obvious by history or exam.  She does have specific point tenderness over her DIP of her right index finger so possibly arthritis in that joint.  I think this is the most likely.  Other etiologies considered are gout or rheumatoid arthritis.  We will do some lab testing and x-ray to look for these.    1. Pain of finger of right hand  - XR Hand Right G/E 3 Views; Future  - XR Finger Right G/E 2 Views; Future  - Uric acid; Future  - CRP, inflammation; Future  - ESR: Erythrocyte sedimentation rate; Future  - Anti Nuclear Sofia IgG by IFA with Reflex; Future  - Rheumatoid factor; Future    Follow up: Pending results  Options for treatment and follow-up care were reviewed with the patient and/or guardian. Nikki J Wilfred and/or guardian engaged in the decision making process and verbalized understanding of the options discussed and agreed with the final plan.    Dr. Hernandez Espino         HPI:   Nikki Hardin is a 52 year old  female who presents for:    Chief Complaint   Patient presents with     Finger     Swelling, and soreness - no injury      HPI: patient tells me she has had pain in her right pointer finger ging on for the last 3 mths. She describes it as if she \"stubbed her finger\" feeling . All the tie but worse if she types.   Location: as above   Duration: as aboe   Radiation: some in the hand as well   Numbness/ Tingling? none   Weakness? none   Swelling? Yes mild   Imaging? None   Treatment tried: Icing. No medicines     Answers for HPI/ROS submitted by the patient on 1/31/2023  How many servings of fruits and vegetables do you eat daily?: 2-3  On average, how many sweetened beverages do you drink each day (Examples: soda, juice, sweet tea, etc.  Do NOT count diet or artificially sweetened beverages)?: 0  How many minutes a day do you exercise enough to make your heart beat " faster?: 9 or less  How many days a week do you exercise enough to make your heart beat faster?: 3 or less  How many days per week do you miss taking your medication?: 0  What is the reason for your visit today?: Right pointer finger sore and swollen for last few months. Worsening; now interfering w/work (using keyboard)  When did your symptoms begin?: More than a month  What are your symptoms?: Soreness, swollen  How would you describe these symptoms?: Moderate  Are your symptoms:: Worsening  Have you had these symptoms before?: Yes  Have you tried or received treatment for these symptoms before?: No  Is there anything that makes you feel worse?: Typing, texting (although sometimes worsens without having done those actions)  Is there anything that makes you feel better?: Ice sometimes         PMHX:     Patient Active Problem List   Diagnosis     Irritable bowel syndrome     Other acne     Migraine with aura and without status migrainosus, not intractable     Mild major depression (H)     Sedimentation rate elevation     Elevated vitamin B12 level     Anemia, unspecified type     Hypervitaminosis B6     Lymphocytic colitis     ASCUS of cervix with negative high risk HPV       Social History     Tobacco Use     Smoking status: Never     Smokeless tobacco: Never   Substance Use Topics     Alcohol use: Yes     Comment: occasional- once monthly     Drug use: No       Social History     Social History Narrative    Caffeine intake/servings daily - 1    Calcium intake/servings daily - 2    Exercise 1 times weekly - describe walk    Sunscreen used - Yes    Seatbelts used - Yes    Guns stored in the home - No    Self Breast Exam - No    Pap test up to date -  No    Eye exam up to date -  Yes    Dental exam up to date -  Yes    DEXA scan up to date -  No    Flex Sig/Colonoscopy up to date -  Yes    Mammography up to date -  Yes    Immunizations reviewed and up to date - Yes    Abuse: Current or Past (Physical, Sexual or  Emotional) - No    Do you feel safe in your environment - Yes    Do you cope well with stress - No    Do you suffer from insomnia - Yes    Last updated by: Issac George  1/30/2012                   Allergies   Allergen Reactions     Hydrocodone Nausea and Vomiting       No results found for this or any previous visit (from the past 24 hour(s)).         Review of Systems:    ROS: 10 point ROS neg other than the symptoms noted above in the HPI.         Physical Exam:     Vitals:    02/03/23 0745   BP: 110/70   Pulse: 78   Temp: 97.3  F (36.3  C)   SpO2: 99%   Weight: 63.2 kg (139 lb 4 oz)   Height: 1.524 m (5')     Body mass index is 27.2 kg/m .    General appearance: Alert, cooperative, no distress, appears stated age  Head: Normocephalic, atraumatic, without obvious abnormality  Eyes: Pupils equal round, reactive.  Conjunctiva clear.  Nose: Nares normal, no drainage.  Throat: Lips, mucosa, tongue normal mucosa pink and moist  Neck: Supple, symmetric, trachea midline    Right Hand:   Swelling: yes by hx but no compared to left finger   ROM: Flexion - Full/ extension - Full/ pronation - Full / supination- Full.   Strength: 5/5 w/ elbow flexion/ extension   Bony tenderness: yes of DIP joint of right pointer finger  Neuro: Negative carpal tinel test.   Maneuvers: No pain w/ resisted wrist flexion/ pronation ; No pain w/ resisted wrist extension/ supination/ long finger extension; No pain w/ valgus stress

## 2023-02-06 ENCOUNTER — MYC MEDICAL ADVICE (OUTPATIENT)
Dept: FAMILY MEDICINE | Facility: CLINIC | Age: 53
End: 2023-02-06
Payer: COMMERCIAL

## 2023-02-06 DIAGNOSIS — M15.1 DEGENERATIVE ARTHRITIS OF DISTAL INTERPHALANGEAL JOINT OF INDEX FINGER OF RIGHT HAND: Primary | ICD-10-CM

## 2023-02-06 LAB
ANA PAT SER IF-IMP: ABNORMAL
ANA SER QL IF: POSITIVE
ANA TITR SER IF: ABNORMAL {TITER}
RHEUMATOID FACT SER NEPH-ACNC: 8 IU/ML

## 2023-02-06 NOTE — RESULT ENCOUNTER NOTE
I called the patient but no answer. Left message explaining recent clinic results and next steps. Advised to call clinic or send MyChart message with questions. I recommended conservative treatment but if would like further treatment can contact me and would refer to orthopedics hand and also recommended considering rheumatology referral for positive SHILPA.    Dr. Hernandez Espino

## 2023-02-07 ENCOUNTER — VIRTUAL VISIT (OUTPATIENT)
Dept: NEUROLOGY | Facility: CLINIC | Age: 53
End: 2023-02-07
Payer: COMMERCIAL

## 2023-02-07 DIAGNOSIS — T88.7XXA MEDICATION SIDE EFFECTS: ICD-10-CM

## 2023-02-07 DIAGNOSIS — R25.3 EYELID TWITCH: ICD-10-CM

## 2023-02-07 DIAGNOSIS — G43.719 INTRACTABLE CHRONIC MIGRAINE WITHOUT AURA AND WITHOUT STATUS MIGRAINOSUS: Primary | ICD-10-CM

## 2023-02-07 PROCEDURE — 99214 OFFICE O/P EST MOD 30 MIN: CPT | Mod: GT | Performed by: PSYCHIATRY & NEUROLOGY

## 2023-02-07 NOTE — PROGRESS NOTES
"NEUROLOGY OUTPATIENT PROGRESS NOTE (VIDEO)  Feb 7, 2023     CHIEF COMPLAINT/REASON FOR VISIT/REASON FOR CONSULT  Patient presents with:  Follow Up: Review Botox     REASON FOR CONSULTATION- Headaches    REFERRAL SOURCE  Dr. Srinivasa Sofia  CC Dr. Srinivasa Sofia    Video Visit Consent  Patient is being evaluated via a billable video visit. The patient has been notified of following:   \"This video visit will be conducted via a call between you and your physician/provider. We have found that certain health care needs can be provided without the need for an in-person physical exam. This service lets us provide the care you need with a video conversation. If a prescription is necessary we can send it directly to your pharmacy. If lab work is needed we can place an order for that and you can then stop by our lab to have the test done at a later time.  If during the course of the call the physician/provider feels a video visit is not appropriate, you will not be charged for this service.  Physician has received verbal consent for a Video Visit from the patient? YES  Patient would like the video invitation sent by: Email/SMS    Video Visit Details  Type of service: Video Visit  Video Start Time: 10:12 AM  Video End Time (time video stopped): 10:27 AM  Originating Location (pt. Location): Patient's Home  Distant Location (provider location): [facility] [place]   Mode of Communication: Video Conference via [place]      HISTORY OF PRESENT ILLNESS  Nikki Hardin is a 52 year old female seen today for evaluation of headaches. She reports that she has a diagnosis of migraines since age 14. Previously her headaches were sporadic but still frequent. She estimates that she was having at least ~15 headaches a month. Over the last 2 to 3 months headaches have become much more frequent. She is having a headache every day. Reports no real reasons why the headaches might have gotten worse.    Headaches are " generally in the temple on either side and then radiate to the neck. Occasionally she can have more frontal pressure. Takes a throbbing. There is some photophobia and phonophobia. She has never had any auras with the headaches. Reports triggers including working and doing remote work from home, inconsistent sleep, lack of regular meals, going out in the sun sometimes storms. She does have some neck pain though neck pain is worse at the time of headaches.    She is tried Topamax in the past which caused severe side effects. She is also tried propanolol without any benefit. Over-the-counter medications do not help. 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 She has been using sumatriptan injections and tablets for several years which have been working for her. Has tried Maxalt with benefit though insurance would not cover it anymore and then she had to stop it. The sumatriptan works better than the Maxalt.    4/6/22  Patient returns today.  She did try the nortriptyline and that has helped with the headaches.  Currently is having 7 headaches a month.  Reports no changes in the nature of the headaches.  Imitrex still works with the abortive therapy.  She does complain of some somnolence in the morning with the nortriptyline.  Is taking 30 mg.  Tries to take it earlier in the evening.  Things are may be slightly turning the corner at this point though she is not sure if this is a good long-term medication or not.  Is interested in Botox.    10/31/22  Patient returns today.  Brought in the headache log today which I reviewed.  She is still having 10-12 headaches every month.  Previously these were at 15 headaches a month.  Headaches have been minimally improved with the nortriptyline.  Reports side effects of somnolence with the nortriptyline.  Is taking 30 mg.  Wants to get off the medication.  Imitrex is working for abortive therapy.  The Imitrex injections were given to her by the pharmacy that she wants the  pen which I prescribed today.  Denies any other new symptoms.  No other triggers for her headaches.  Wants to proceed with the Botox since most of her headaches are coming from the shoulder tension.    11/30/22  Patient returns today.  Headaches are about the same as before.  She had called the clinic for an Imitrex prescription which is working for her.  Reports of a new problem ongoing for the last 1 month.  She does have some twitching of her left eye.  This can happen anytime.  There is no clear triggers that she is identified.  Drinks about 2 cups of coffee.  Drinks plenty of water.  No new medications.  The twitching is not severe enough that it causes the eye to close.  Is limited to the left eye only.    2/7/23  Patient returns today.  Headaches have significantly improved with the Botox.  She reports 50% reduction in frequency.  She is having 9 headaches a month.  Also reports that the headaches are less severe and not lasting that long.  They are shorter in intensity.  They still would last for couple of days.  Sumatriptan is still effective.    Left eye twitching has not happened since the Botox.  She did go on a vacation and the stress is less at this point.    She further reports some cosmetic changes in the frontal region with the Botox and wants to avoid those in the future.  She wants to do a lower dose of Botox in the frontal region.    Previous history is reviewed and this is unchanged.    PAST MEDICAL/SURGICAL HISTORY  Past Medical History:   Diagnosis Date     Abnormal Pap smear of cervix 11/22/2019    See problem list.      Anxiety      Colitis     lymphocytic colitis ?     Depressive disorder, not elsewhere classified     sees psych- Dr Blue     Migraine, unspecified, without mention of intractable migraine without mention of status migrainosus age 14 yrs     Other acne      PAIN IN THORACIC SPINE [724.1] 4/13/2006    chiropractic care     Raynaud disease     ? connective tissue dz.  ?  sogren's     Patient Active Problem List   Diagnosis     Irritable bowel syndrome     Other acne     Migraine with aura and without status migrainosus, not intractable     Mild major depression (H)     Sedimentation rate elevation     Elevated vitamin B12 level     Anemia, unspecified type     Hypervitaminosis B6     Lymphocytic colitis     ASCUS of cervix with negative high risk HPV   Significant for migraines, arthritis, depression, microcolitis    FAMILY HISTORY  Family History   Problem Relation Age of Onset     Neurologic Disorder Mother         MIGRIANES     C.A.D. Father         in his 60's     Depression Father      Heart Disease Father      Lipids Father      Hypertension Father      Arthritis Father      Sleep Disorder Father      Cerebrovascular Disease Father 78     Cancer - colorectal Paternal Grandmother      Cancer Maternal Grandfather         stomach     Heart Disease Maternal Grandfather      Depression Brother      Depression Brother      Asthma Daughter      Heart Disease Paternal Grandfather      Breast Cancer Maternal Aunt      Diabetes No family hx of    Positive for migraines in her mother and aunt.    SOCIAL HISTORY  Social History     Tobacco Use     Smoking status: Never     Smokeless tobacco: Never   Substance Use Topics     Alcohol use: Yes     Comment: occasional- once monthly     Drug use: No       SYSTEMS REVIEW  Twelve-system ROS was done and other than the HPI this was negative except for neck pain, dizziness, hearing loss, sleepiness during the day, headaches, depression, bowel problems.  No new issues/concerns    MEDICATIONS  acetaminophen (TYLENOL) 325 MG tablet, Take 2 tablets by mouth every 4 hours as needed for pain.  Biotin 5000 MCG TABS, Take 1,000 mcg by mouth daily  cholecalciferol 5000 UNITS CAPS, Take 1 capsule (5,000 Units) by mouth daily FOR VITAMIN D DEFICIENCY (LOW VITAMIN D)  Ferrous Sulfate (IRON) 28 MG TABS,   FLUoxetine (PROZAC) 40 MG capsule, TAKE 1 CAPSULE(40  MG) BY MOUTH DAILY  IBUPROFEN PO, Take 200 mg by mouth Takes 3 tablets PRN  loperamide (IMODIUM A-D) 2 MG tablet, Take 2 mg by mouth daily as needed for diarrhea   nortriptyline (PAMELOR) 10 MG capsule, TAKE 1 CAPSULE AT NIGHT INCREASE BY 1 CAPSULE/WEEK TO A MAX OF 3 CAPS/NIGHT AS NEEDED AND TOLERATED  Riboflavin 100 MG TABS, Take 400 mg by mouth  SUMAtriptan (IMITREX STATDOSE) 6 MG/0.5ML pen injector kit, Inject 0.5 mLs (6 mg) Subcutaneous at onset of headache for migraine May repeat in 1 hour. Max 12 mg/24 hours.  SUMAtriptan (IMITREX) 100 MG tablet, TAKE 1 TABLET(100 MG) BY MOUTH AT ONSET OF HEADACHE FOR MIGRAINE. MAY REPEAT IN 2 HOURS. MAX 4 TABLETS/ 24 HOURS Strength: 100 mg  cyclobenzaprine (FLEXERIL) 5 MG tablet, Take 1 tablet (5 mg) by mouth 3 times daily as needed for muscle spasms (Patient not taking: Reported on 4/6/2022)  finasteride (PROSCAR) 5 MG tablet, TK ONE-HALF T PO QD    Botulinum Toxin Type A (BOTOX) 200 units injection 155 Units       PHYSICAL EXAM  Exam was limited due to video encounter.    Vitals-Unable to do on video  GENERAL -Health appearing, No apparent distress  EYES- No scleral icterus, no eyelid droop, Pupils symmetric  HEENT - Normocephalic, atraumatic, Hearing grossly intact; Oral mucosa moist and pink in color. External Ears and nose intact.   Neck - soft/flexible with normal ROM on visual inspection.  PULM - Good spontaneous respiratory effort;  CV- No edema on visual inspection  MSK- Gait - see Neuro section; Strength and tone- see Neuro section; Range of motion grossly intact.  Psych- Normal mood and affect. Good judgment and insight.     Neurological  Mental status - Patient is awake and oriented. Attention span is normal. Language is fluent and follows commands appropriately.   Cranial nerves - Pupils are symmetric; EOMI, NLF symmetric  Motor - There is no pronator drift. Antigravity in all 4 ext.  Tone - No evidence of rigidity on visual inspection. No tremor.  Reflexes -  Unable to do on video  Sensation - Unable to do on Video  Coordination - Finger to nose without dysmetria.   Gait and station - Romberg is negative. Gait is steady    Previous PHYSICAL EXAMINATION  VITALS: There were no vitals taken for this visit.  GENERAL: Healthy appearing, alert, no acute distress, normal habitus.  CARDIOVASCULAR: Extremities warm and well perfused. Pulses present.   NEUROLOGICAL:  Patient is awake and oriented to self, place and time.  Attention span is normal.  Memory is grossly intact.  Language is fluent and follows commands appropriately.  Appropriate fund of knowledge. Cranial nerves 2-12 are intact. There is no pronator drift.  Motor exam shows 5/5 strength in all extremities.  Tone is symmetric bilaterally in upper and lower extremities.  (Previously reflexes are symmetric and 2+ in upper extremities and lower extremities. Sensory exam is grossly intact to light touch, pin prick and vibration.) Finger to nose and heel to shin is without dysmetria.  Romberg is negative.  Gait is normal and the patient is able to do tandem walk and walk on toes and heels.  Exam unchanged from before..    DIAGNOSTICS  CARDIOLOGY  EKG 2008  NSR    RELEVANT LABS  Component      Latest Ref Rng & Units 12/22/2020   WBC      4.0 - 11.0 10e9/L 6.7   RBC Count      3.8 - 5.2 10e12/L 3.93   Hemoglobin      11.7 - 15.7 g/dL 11.4 (L)   Hematocrit      35.0 - 47.0 % 35.2   MCV      78 - 100 fl 90   MCH      26.5 - 33.0 pg 29.0   MCHC      31.5 - 36.5 g/dL 32.4   RDW      10.0 - 15.0 % 13.2   Platelet Count      150 - 450 10e9/L 297   % Neutrophils      % 70.5   % Lymphocytes      % 20.4   % Monocytes      % 6.6   % Eosinophils      % 2.1   % Basophils      % 0.4   Absolute Neutrophil      1.6 - 8.3 10e9/L 4.7   Absolute Lymphocytes      0.8 - 5.3 10e9/L 1.4   Absolute Monocytes      0.0 - 1.3 10e9/L 0.4   Absolute Eosinophils      0.0 - 0.7 10e9/L 0.1   Absolute Basophils      0.0 - 0.2 10e9/L 0.0   Diff Method        Automated Method   Sodium      133 - 144 mmol/L 138   Potassium      3.4 - 5.3 mmol/L 4.5   Chloride      94 - 109 mmol/L 107   Carbon Dioxide      20 - 32 mmol/L 27   Anion Gap      3 - 14 mmol/L 4   Glucose      70 - 99 mg/dL 79   Urea Nitrogen      7 - 30 mg/dL 12   Creatinine      0.52 - 1.04 mg/dL 0.78   GFR Estimate      >60 mL/min/1.73:m2 87   GFR Estimate If Black      >60 mL/min/1.73:m2 >90   Calcium      8.5 - 10.1 mg/dL 9.2   Bilirubin Total      0.2 - 1.3 mg/dL 0.3   Albumin      3.4 - 5.0 g/dL 3.8   Protein Total      6.8 - 8.8 g/dL 7.8   Alkaline Phosphatase      40 - 150 U/L 73   ALT      0 - 50 U/L 22   AST      0 - 45 U/L 16       OUTSIDE RECORDS  Outside referral notes and chart notes were reviewed and pertinent information has been summarized (in addition to the HPI):-Patient was seen in primary care.  Has issues with depression.  Had a visit in September that was virtual.  Was diagnosed with migraine with aura.  Is on sumatriptan the day worsening.  History of migraines with sumatriptan injections and tablets.  Injections only for severe headaches.  Headaches happen once every 2 months.  Migraines get to cluster.  No significant changes in headaches.  Some concerns with hep C.    MRI brain-images reviewed with the patient again.  There are very minimal T2 hyperintensities noted.  Is appropriate for age.  IMPRESSION:  1.  No acute intracranial abnormality.  2.  Small nonspecific focus of subcortical T2 FLAIR hyperintensity in the right frontal lobe is strictly nonspecific but can be seen in setting of migraines and prior insult or injury. Demyelination is not favored.    LABS  Component      Latest Ref Rng & Units 2/8/2022   WBC      4.0 - 11.0 10e3/uL 6.3   RBC Count      3.80 - 5.20 10e6/uL 4.36   Hemoglobin      11.7 - 15.7 g/dL 12.8   Hematocrit      35.0 - 47.0 % 39.1   MCV      78 - 100 fL 90   MCH      26.5 - 33.0 pg 29.4   MCHC      31.5 - 36.5 g/dL 32.7   RDW      10.0 - 15.0 % 13.2    Platelet Count      150 - 450 10e3/uL 332   % Neutrophils      % 51   % Lymphocytes      % 38   % Monocytes      % 7   % Eosinophils      % 3   % Basophils      % 1   % Immature Granulocytes      % 0   Absolute Neutrophils      1.6 - 8.3 10e3/uL 3.2   Absolute Lymphocytes      0.8 - 5.3 10e3/uL 2.4   Absolute Monocytes      0.0 - 1.3 10e3/uL 0.5   Absolute Eosinophils      0.0 - 0.7 10e3/uL 0.2   Absolute Basophils      0.0 - 0.2 10e3/uL 0.1   Absolute Immature Granulocytes      <=0.4 10e3/uL 0.0   Vitamin B12      193 - 986 pg/mL 428   TSH      0.40 - 4.00 mU/L 2.28   Ferritin      8 - 252 ng/mL 70   Sed Rate      0 - 30 mm/hr 17       IMPRESSION/REPORT/PLAN  Intractable chronic migraine without aura and without status migrainosus  History of depression  Medication side effect  Left eye twitching    This is a 52 year old female with history of migraines with worsening headaches. Headaches are suggestive of chronic migraines at this point. Exam today is noncontributory.  MRI brain has been negative for structural lesions.  Blood work has been noncontributory. She does have some lifestyle issues that might be causing the headaches including working remotely and having irregular sleep schedule. Encouraged her to see what she can do about this. She is trying yoga right now.    For treatment of headaches she is tried Topamax and propanolol in the past.  Nortriptyline has been helpful but overall has not made a huge difference.  She is on Prozac and will monitor for serotonin syndrome.  She does complain of some hypersomnolence in the morning related to the nortriptyline does not want go up on the medication anymore.  All of her headaches are brought in by shoulder tension we will proceed with Botox.  Botox has provided 50% benefit at least and will continue.  Discussed that the next session should be on better.  She does have some cosmetic side effects in the frontal region and she wants to lower the dose in the  frontal region.  We will try reducing the dose by 50%.  Did discuss that this might make the Botox less effective.  Headaches are more in the right temporal/occipital region.    She can continue using sumatriptan for abortive therapy and injectable sumatriptan (ordered pen per her request) for the more severe headaches.  She is using the injections once every 3 months.  Encouraged her not to overuse her medications. Discussed about risk of coronary artery disease with these medications. Will monitor for side effects.  Continue sumatriptan.  She is using it less often now that the Botox is working.    T2 hyperintensities on MRI are age-appropriate.  Could be related to the migraines.    With left eye twitching-do not see much on exam.  Previous imaging studies have been negative.  Could be related to ongoing stress or even migraines.  She does not think it is a side effect of any of her medication though she is on multiple antidepressant.  Could consider Botox injections for possible blepharospasm though it does not appear to severe.  Encouraged her to keep a log to see if she can identify triggers.  Currently this has improved and will monitor.    Return in 1 month for Botox in 3 months for medication management.    -     nortriptyline (PAMELOR) 10 MG capsule; Take 1 cap/night and then increase by 1 cap/week to a max of 3 caps/night as needed and tolerated.  -Imitrex 100 mg  -     SUMAtriptan (IMITREX STATDOSE) 6 MG/0.5ML pen injector kit; Inject 0.5 mLs (6 mg) Subcutaneous at onset of headache for migraine May repeat in 1 hour. Max 12 mg/24 hours.  -     Botulinum Toxin Type A (BOTOX) 200 units injection 155 Units  - Reduce Botox by 50% in frontal region.    Return for 1 month Botox and 3 months meds, In-Clinic Visit (must).    Over 30 minutes were spent coordinating the care for the patient on the day of the encounter.  This includes previsit, during visit and post visit activities as documented above.  Counseling  "patient.  Multiple problems reviewed/addressed.  Medication side effect  (Activities include but not inclusive of reviewing chart, reviewing outside records, reviewing labs and imaging study results as well as the images, patient visit time including getting history and exam,  use if applicable, review of test results with the patient and coming up with a plan in a shared model, counseling patient and family, education and answering patient questions, EMR , EMR diagnosis entry and problem list management, medication reconciliation and prescription management if applicable, paperwork if applicable, printing documents and documentation of the visit activities.)    Botox PA  \" The patient has a diagnosis of chronic migraines. She has more than 15 headache days a month with each headache lasting at least 4 hours despite use of triptans. Her headaches have been there for over 6 months. She has been screened for medication overuse headaches and she does not have that. She has tried nortriptyline, Topamax, Propranolol for 3 months without any significant benefit. I would request that the Botox be approved for her.  50% benefit after first session.\"      Chuy Dunn MD  Neurologist  [facility]  Tel:- [phone]    This note was dictated using voice recognition software.  Any grammatical or context distortions are unintentional and inherent to the software.    "

## 2023-02-07 NOTE — NURSING NOTE
Nephrology Attending  Progress Note        SUMMARY :  We are following this patient for NI/ Hyponatremia    Admitted  With generalized weakness , epistaxis ( High PT ) , dyspnea, cough  Has a known Rt Hilar mass   Had Rt side Thoracocentesis 1/28      SUBJECTIVE:   Patient progress reviewed. The patient is awake, alert, wife by bedside       Physical Exam:    VITALS:  /72   Pulse 85   Temp 97.6 °F (36.4 °C) (Temporal)   Resp 18   Ht 5' 9\" (1.753 m)   Wt 205 lb 0.4 oz (93 kg)   SpO2 96%   BMI 30.28 kg/m²   BLOOD PRESSURE RANGE:  Systolic (18VNJ), MYB:363 , Min:98 , PPQ:482   ; Diastolic (72FIR), GAA:63, Min:58, Max:83    24HR INTAKE/OUTPUT:      Intake/Output Summary (Last 24 hours) at 1/31/2021 1054  Last data filed at 1/31/2021 0800  Gross per 24 hour   Intake 803.33 ml   Output 900 ml   Net -96.67 ml       Gen:  alert, oriented x 3  PERRL , EOM +  Pallor +, No icterus  JVP not raised   CV: IRRR no murmur or rub . Lungs:B/ L air entry, Reduced Rt base, Dull to percussion rt base, no pleural rub heard  Abd: soft, bowel sounds + , No organomegaly   Ext: 1+ leg  edema, no cyanosis  Skin: Warm.   No rash  Neuro: nonfocal.      DATA:    CBC with Differential:    Lab Results   Component Value Date    WBC 14.6 01/30/2021    RBC 3.01 01/30/2021    HGB 8.1 01/30/2021    HCT 25.4 01/30/2021     01/30/2021    MCV 84.3 01/30/2021    MCH 26.9 01/30/2021    MCHC 31.9 01/30/2021    RDW 18.9 01/30/2021    SEGSPCT 68.9 12/09/2020    LYMPHOPCT 7.0 01/25/2021    MONOPCT 6.0 01/25/2021    BASOPCT 0.0 01/25/2021    MONOSABS 0.9 01/25/2021    LYMPHSABS 1.0 01/25/2021    EOSABS 0.0 01/25/2021    BASOSABS 0.0 01/25/2021     CMP:    Lab Results   Component Value Date     01/30/2021    K 3.6 01/30/2021     01/30/2021    CO2 22 01/30/2021    BUN 19 01/30/2021    CREATININE 0.6 01/30/2021    GFRAA >60 01/30/2021    AGRATIO 0.6 01/25/2021    LABGLOM >60 01/30/2021    LABGLOM 92 06/08/2018 Virtual visit- No vitals.

## 2023-02-07 NOTE — LETTER
"    2/7/2023         RE: Nikki Hardin  [address] [facility] [address] [id number]        Dear Colleague,    Thank you for referring your patient, Nikki Hardin, to the [facility] [place]. Please see a copy of my visit note below.    NEUROLOGY OUTPATIENT PROGRESS NOTE (VIDEO)  Feb 7, 2023     CHIEF COMPLAINT/REASON FOR VISIT/REASON FOR CONSULT  Patient presents with:  Follow Up: Review Botox     REASON FOR CONSULTATION- Headaches    REFERRAL SOURCE  Dr. Srinivasa Sofia  CC Dr. Srinivasa Sofia    Video Visit Consent  Patient is being evaluated via a billable video visit. The patient has been notified of following:   \"This video visit will be conducted via a call between you and your physician/provider. We have found that certain health care needs can be provided without the need for an in-person physical exam. This service lets us provide the care you need with a video conversation. If a prescription is necessary we can send it directly to your pharmacy. If lab work is needed we can place an order for that and you can then stop by our lab to have the test done at a later time.  If during the course of the call the physician/provider feels a video visit is not appropriate, you will not be charged for this service.  Physician has received verbal consent for a Video Visit from the patient? YES  Patient would like the video invitation sent by: Email/SMS    Video Visit Details  Type of service: Video Visit  Video Start Time: 10:12 AM  Video End Time (time video stopped): 10:27 AM  Originating Location (pt. Location): Patient's Home  Distant Location (provider location): [facility]   Mode of Communication: Video Conference via [employer]      HISTORY OF PRESENT ILLNESS  Nikki Hardin is a 52 year old female seen today for evaluation of headaches. She reports that she has a diagnosis of migraines since age 14. Previously her headaches were sporadic but still " frequent. She estimates that she was having at least ~15 headaches a month. Over the last 2 to 3 months headaches have become much more frequent. She is having a headache every day. Reports no real reasons why the headaches might have gotten worse.    Headaches are generally in the temple on either side and then radiate to the neck. Occasionally she can have more frontal pressure. Takes a throbbing. There is some photophobia and phonophobia. She has never had any auras with the headaches. Reports triggers including working and doing remote work from home, inconsistent sleep, lack of regular meals, going out in the sun sometimes storms. She does have some neck pain though neck pain is worse at the time of headaches.    She is tried Topamax in the past which caused severe side effects. She is also tried propanolol without any benefit. Over-the-counter medications do not help. 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 She has been using sumatriptan injections and tablets for several years which have been working for her. Has tried Maxalt with benefit though insurance would not cover it anymore and then she had to stop it. The sumatriptan works better than the Maxalt.    4/6/22  Patient returns today.  She did try the nortriptyline and that has helped with the headaches.  Currently is having 7 headaches a month.  Reports no changes in the nature of the headaches.  Imitrex still works with the abortive therapy.  She does complain of some somnolence in the morning with the nortriptyline.  Is taking 30 mg.  Tries to take it earlier in the evening.  Things are may be slightly turning the corner at this point though she is not sure if this is a good long-term medication or not.  Is interested in Botox.    10/31/22  Patient returns today.  Brought in the headache log today which I reviewed.  She is still having 10-12 headaches every month.  Previously these were at 15 headaches a month.  Headaches have been minimally  improved with the nortriptyline.  Reports side effects of somnolence with the nortriptyline.  Is taking 30 mg.  Wants to get off the medication.  Imitrex is working for abortive therapy.  The Imitrex injections were given to her by the pharmacy that she wants the pen which I prescribed today.  Denies any other new symptoms.  No other triggers for her headaches.  Wants to proceed with the Botox since most of her headaches are coming from the shoulder tension.    11/30/22  Patient returns today.  Headaches are about the same as before.  She had called the clinic for an Imitrex prescription which is working for her.  Reports of a new problem ongoing for the last 1 month.  She does have some twitching of her left eye.  This can happen anytime.  There is no clear triggers that she is identified.  Drinks about 2 cups of coffee.  Drinks plenty of water.  No new medications.  The twitching is not severe enough that it causes the eye to close.  Is limited to the left eye only.    2/7/23  Patient returns today.  Headaches have significantly improved with the Botox.  She reports 50% reduction in frequency.  She is having 9 headaches a month.  Also reports that the headaches are less severe and not lasting that long.  They are shorter in intensity.  They still would last for couple of days.  Sumatriptan is still effective.    Left eye twitching has not happened since the Botox.  She did go on a vacation and the stress is less at this point.    She further reports some cosmetic changes in the frontal region with the Botox and wants to avoid those in the future.  She wants to do a lower dose of Botox in the frontal region.    Previous history is reviewed and this is unchanged.    PAST MEDICAL/SURGICAL HISTORY  Past Medical History:   Diagnosis Date     Abnormal Pap smear of cervix 11/22/2019    See problem list.      Anxiety      Colitis     lymphocytic colitis ?     Depressive disorder, not elsewhere classified     sees psych-  Dr Blue     Migraine, unspecified, without mention of intractable migraine without mention of status migrainosus age 14 yrs     Other acne      PAIN IN THORACIC SPINE [724.1] 4/13/2006    chiropractic care     Raynaud disease     ? connective tissue dz.  ? sogren's     Patient Active Problem List   Diagnosis     Irritable bowel syndrome     Other acne     Migraine with aura and without status migrainosus, not intractable     Mild major depression (H)     Sedimentation rate elevation     Elevated vitamin B12 level     Anemia, unspecified type     Hypervitaminosis B6     Lymphocytic colitis     ASCUS of cervix with negative high risk HPV   Significant for migraines, arthritis, depression, microcolitis    FAMILY HISTORY  Family History   Problem Relation Age of Onset     Neurologic Disorder Mother         MIGRIANES     C.A.D. Father         in his 60's     Depression Father      Heart Disease Father      Lipids Father      Hypertension Father      Arthritis Father      Sleep Disorder Father      Cerebrovascular Disease Father 78     Cancer - colorectal Paternal Grandmother      Cancer Maternal Grandfather         stomach     Heart Disease Maternal Grandfather      Depression Brother      Depression Brother      Asthma Daughter      Heart Disease Paternal Grandfather      Breast Cancer Maternal Aunt      Diabetes No family hx of    Positive for migraines in her mother and aunt.    SOCIAL HISTORY  Social History     Tobacco Use     Smoking status: Never     Smokeless tobacco: Never   Substance Use Topics     Alcohol use: Yes     Comment: occasional- once monthly     Drug use: No       SYSTEMS REVIEW  Twelve-system ROS was done and other than the HPI this was negative except for neck pain, dizziness, hearing loss, sleepiness during the day, headaches, depression, bowel problems.  No new issues/concerns    MEDICATIONS  acetaminophen (TYLENOL) 325 MG tablet, Take 2 tablets by mouth every 4 hours as needed for  pain.  Biotin 5000 MCG TABS, Take 1,000 mcg by mouth daily  cholecalciferol 5000 UNITS CAPS, Take 1 capsule (5,000 Units) by mouth daily FOR VITAMIN D DEFICIENCY (LOW VITAMIN D)  Ferrous Sulfate (IRON) 28 MG TABS,   FLUoxetine (PROZAC) 40 MG capsule, TAKE 1 CAPSULE(40 MG) BY MOUTH DAILY  IBUPROFEN PO, Take 200 mg by mouth Takes 3 tablets PRN  loperamide (IMODIUM A-D) 2 MG tablet, Take 2 mg by mouth daily as needed for diarrhea   nortriptyline (PAMELOR) 10 MG capsule, TAKE 1 CAPSULE AT NIGHT INCREASE BY 1 CAPSULE/WEEK TO A MAX OF 3 CAPS/NIGHT AS NEEDED AND TOLERATED  Riboflavin 100 MG TABS, Take 400 mg by mouth  SUMAtriptan (IMITREX STATDOSE) 6 MG/0.5ML pen injector kit, Inject 0.5 mLs (6 mg) Subcutaneous at onset of headache for migraine May repeat in 1 hour. Max 12 mg/24 hours.  SUMAtriptan (IMITREX) 100 MG tablet, TAKE 1 TABLET(100 MG) BY MOUTH AT ONSET OF HEADACHE FOR MIGRAINE. MAY REPEAT IN 2 HOURS. MAX 4 TABLETS/ 24 HOURS Strength: 100 mg  cyclobenzaprine (FLEXERIL) 5 MG tablet, Take 1 tablet (5 mg) by mouth 3 times daily as needed for muscle spasms (Patient not taking: Reported on 4/6/2022)  finasteride (PROSCAR) 5 MG tablet, TK ONE-HALF T PO QD    Botulinum Toxin Type A (BOTOX) 200 units injection 155 Units       PHYSICAL EXAM  Exam was limited due to video encounter.    Vitals-Unable to do on video  GENERAL -Health appearing, No apparent distress  EYES- No scleral icterus, no eyelid droop, Pupils symmetric  HEENT - Normocephalic, atraumatic, Hearing grossly intact; Oral mucosa moist and pink in color. External Ears and nose intact.   Neck - soft/flexible with normal ROM on visual inspection.  PULM - Good spontaneous respiratory effort;  CV- No edema on visual inspection  MSK- Gait - see Neuro section; Strength and tone- see Neuro section; Range of motion grossly intact.  Psych- Normal mood and affect. Good judgment and insight.     Neurological  Mental status - Patient is awake and oriented. Attention span  is normal. Language is fluent and follows commands appropriately.   Cranial nerves - Pupils are symmetric; EOMI, NLF symmetric  Motor - There is no pronator drift. Antigravity in all 4 ext.  Tone - No evidence of rigidity on visual inspection. No tremor.  Reflexes - Unable to do on video  Sensation - Unable to do on Video  Coordination - Finger to nose without dysmetria.   Gait and station - Romberg is negative. Gait is steady    Previous PHYSICAL EXAMINATION  VITALS: There were no vitals taken for this visit.  GENERAL: Healthy appearing, alert, no acute distress, normal habitus.  CARDIOVASCULAR: Extremities warm and well perfused. Pulses present.   NEUROLOGICAL:  Patient is awake and oriented to self, place and time.  Attention span is normal.  Memory is grossly intact.  Language is fluent and follows commands appropriately.  Appropriate fund of knowledge. Cranial nerves 2-12 are intact. There is no pronator drift.  Motor exam shows 5/5 strength in all extremities.  Tone is symmetric bilaterally in upper and lower extremities.  (Previously reflexes are symmetric and 2+ in upper extremities and lower extremities. Sensory exam is grossly intact to light touch, pin prick and vibration.) Finger to nose and heel to shin is without dysmetria.  Romberg is negative.  Gait is normal and the patient is able to do tandem walk and walk on toes and heels.  Exam unchanged from before..    DIAGNOSTICS  CARDIOLOGY  EKG 2008  NSR    RELEVANT LABS  Component      Latest Ref Rng & Units 12/22/2020   WBC      4.0 - 11.0 10e9/L 6.7   RBC Count      3.8 - 5.2 10e12/L 3.93   Hemoglobin      11.7 - 15.7 g/dL 11.4 (L)   Hematocrit      35.0 - 47.0 % 35.2   MCV      78 - 100 fl 90   MCH      26.5 - 33.0 pg 29.0   MCHC      31.5 - 36.5 g/dL 32.4   RDW      10.0 - 15.0 % 13.2   Platelet Count      150 - 450 10e9/L 297   % Neutrophils      % 70.5   % Lymphocytes      % 20.4   % Monocytes      % 6.6   % Eosinophils      % 2.1   % Basophils       % 0.4   Absolute Neutrophil      1.6 - 8.3 10e9/L 4.7   Absolute Lymphocytes      0.8 - 5.3 10e9/L 1.4   Absolute Monocytes      0.0 - 1.3 10e9/L 0.4   Absolute Eosinophils      0.0 - 0.7 10e9/L 0.1   Absolute Basophils      0.0 - 0.2 10e9/L 0.0   Diff Method       Automated Method   Sodium      133 - 144 mmol/L 138   Potassium      3.4 - 5.3 mmol/L 4.5   Chloride      94 - 109 mmol/L 107   Carbon Dioxide      20 - 32 mmol/L 27   Anion Gap      3 - 14 mmol/L 4   Glucose      70 - 99 mg/dL 79   Urea Nitrogen      7 - 30 mg/dL 12   Creatinine      0.52 - 1.04 mg/dL 0.78   GFR Estimate      >60 mL/min/1.73:m2 87   GFR Estimate If Black      >60 mL/min/1.73:m2 >90   Calcium      8.5 - 10.1 mg/dL 9.2   Bilirubin Total      0.2 - 1.3 mg/dL 0.3   Albumin      3.4 - 5.0 g/dL 3.8   Protein Total      6.8 - 8.8 g/dL 7.8   Alkaline Phosphatase      40 - 150 U/L 73   ALT      0 - 50 U/L 22   AST      0 - 45 U/L 16       OUTSIDE RECORDS  Outside referral notes and chart notes were reviewed and pertinent information has been summarized (in addition to the HPI):-Patient was seen in primary care.  Has issues with depression.  Had a visit in September that was virtual.  Was diagnosed with migraine with aura.  Is on sumatriptan the day worsening.  History of migraines with sumatriptan injections and tablets.  Injections only for severe headaches.  Headaches happen once every 2 months.  Migraines get to cluster.  No significant changes in headaches.  Some concerns with hep C.    MRI brain-images reviewed with the patient again.  There are very minimal T2 hyperintensities noted.  Is appropriate for age.  IMPRESSION:  1.  No acute intracranial abnormality.  2.  Small nonspecific focus of subcortical T2 FLAIR hyperintensity in the right frontal lobe is strictly nonspecific but can be seen in setting of migraines and prior insult or injury. Demyelination is not favored.    LABS  Component      Latest Ref Rng & Units 2/8/2022   WBC       4.0 - 11.0 10e3/uL 6.3   RBC Count      3.80 - 5.20 10e6/uL 4.36   Hemoglobin      11.7 - 15.7 g/dL 12.8   Hematocrit      35.0 - 47.0 % 39.1   MCV      78 - 100 fL 90   MCH      26.5 - 33.0 pg 29.4   MCHC      31.5 - 36.5 g/dL 32.7   RDW      10.0 - 15.0 % 13.2   Platelet Count      150 - 450 10e3/uL 332   % Neutrophils      % 51   % Lymphocytes      % 38   % Monocytes      % 7   % Eosinophils      % 3   % Basophils      % 1   % Immature Granulocytes      % 0   Absolute Neutrophils      1.6 - 8.3 10e3/uL 3.2   Absolute Lymphocytes      0.8 - 5.3 10e3/uL 2.4   Absolute Monocytes      0.0 - 1.3 10e3/uL 0.5   Absolute Eosinophils      0.0 - 0.7 10e3/uL 0.2   Absolute Basophils      0.0 - 0.2 10e3/uL 0.1   Absolute Immature Granulocytes      <=0.4 10e3/uL 0.0   Vitamin B12      193 - 986 pg/mL 428   TSH      0.40 - 4.00 mU/L 2.28   Ferritin      8 - 252 ng/mL 70   Sed Rate      0 - 30 mm/hr 17       IMPRESSION/REPORT/PLAN  Intractable chronic migraine without aura and without status migrainosus  History of depression  Medication side effect  Left eye twitching    This is a 52 year old female with history of migraines with worsening headaches. Headaches are suggestive of chronic migraines at this point. Exam today is noncontributory.  MRI brain has been negative for structural lesions.  Blood work has been noncontributory. She does have some lifestyle issues that might be causing the headaches including working remotely and having irregular sleep schedule. Encouraged her to see what she can do about this. She is trying yoga right now.    For treatment of headaches she is tried Topamax and propanolol in the past.  Nortriptyline has been helpful but overall has not made a huge difference.  She is on Prozac and will monitor for serotonin syndrome.  She does complain of some hypersomnolence in the morning related to the nortriptyline does not want go up on the medication anymore.  All of her headaches are brought in by  shoulder tension we will proceed with Botox.  Botox has provided 50% benefit at least and will continue.  Discussed that the next session should be on better.  She does have some cosmetic side effects in the frontal region and she wants to lower the dose in the frontal region.  We will try reducing the dose by 50%.  Did discuss that this might make the Botox less effective.  Headaches are more in the right temporal/occipital region.    She can continue using sumatriptan for abortive therapy and injectable sumatriptan (ordered pen per her request) for the more severe headaches.  She is using the injections once every 3 months.  Encouraged her not to overuse her medications. Discussed about risk of coronary artery disease with these medications. Will monitor for side effects.  Continue sumatriptan.  She is using it less often now that the Botox is working.    T2 hyperintensities on MRI are age-appropriate.  Could be related to the migraines.    With left eye twitching-do not see much on exam.  Previous imaging studies have been negative.  Could be related to ongoing stress or even migraines.  She does not think it is a side effect of any of her medication though she is on multiple antidepressant.  Could consider Botox injections for possible blepharospasm though it does not appear to severe.  Encouraged her to keep a log to see if she can identify triggers.  Currently this has improved and will monitor.    Return in 1 month for Botox in 3 months for medication management.    -     nortriptyline (PAMELOR) 10 MG capsule; Take 1 cap/night and then increase by 1 cap/week to a max of 3 caps/night as needed and tolerated.  -Imitrex 100 mg  -     SUMAtriptan (IMITREX STATDOSE) 6 MG/0.5ML pen injector kit; Inject 0.5 mLs (6 mg) Subcutaneous at onset of headache for migraine May repeat in 1 hour. Max 12 mg/24 hours.  -     Botulinum Toxin Type A (BOTOX) 200 units injection 155 Units  - Reduce Botox by 50% in frontal  "region.    Return for 1 month Botox and 3 months meds, In-Clinic Visit (must).    Over 30 minutes were spent coordinating the care for the patient on the day of the encounter.  This includes previsit, during visit and post visit activities as documented above.  Counseling patient.  Multiple problems reviewed/addressed.  Medication side effect  (Activities include but not inclusive of reviewing chart, reviewing outside records, reviewing labs and imaging study results as well as the images, patient visit time including getting history and exam,  use if applicable, review of test results with the patient and coming up with a plan in a shared model, counseling patient and family, education and answering patient questions, EMR , EMR diagnosis entry and problem list management, medication reconciliation and prescription management if applicable, paperwork if applicable, printing documents and documentation of the visit activities.)    Botox PA  \" The patient has a diagnosis of chronic migraines. She has more than 15 headache days a month with each headache lasting at least 4 hours despite use of triptans. Her headaches have been there for over 6 months. She has been screened for medication overuse headaches and she does not have that. She has tried nortriptyline, Topamax, Propranolol for 3 months without any significant benefit. I would request that the Botox be approved for her.  50% benefit after first session.\"      Chuy Dunn MD  Neurologist  [facility]  Tel:- [phone]    This note was dictated using voice recognition software.  Any grammatical or context distortions are unintentional and inherent to the software.        Again, thank you for allowing me to participate in the care of your patient.        Sincerely,        Chuy Dunn MD    "

## 2023-02-22 ASSESSMENT — ENCOUNTER SYMPTOMS
EYE PAIN: 0
PARESTHESIAS: 0
ARTHRALGIAS: 1
COUGH: 0
CHILLS: 0
CONSTIPATION: 0
HEADACHES: 1
DIZZINESS: 0
BREAST MASS: 0
NAUSEA: 0
ABDOMINAL PAIN: 0
MYALGIAS: 0
WEAKNESS: 0
FREQUENCY: 0
DYSURIA: 0
JOINT SWELLING: 1
SORE THROAT: 0
NERVOUS/ANXIOUS: 0
DIARRHEA: 0
PALPITATIONS: 0
HEMATURIA: 0
SHORTNESS OF BREATH: 0
FEVER: 0
HEMATOCHEZIA: 0
HEARTBURN: 0

## 2023-02-28 ENCOUNTER — OFFICE VISIT (OUTPATIENT)
Dept: FAMILY MEDICINE | Facility: CLINIC | Age: 53
End: 2023-02-28
Payer: COMMERCIAL

## 2023-02-28 VITALS
OXYGEN SATURATION: 99 % | WEIGHT: 138 LBS | HEART RATE: 57 BPM | HEIGHT: 61 IN | RESPIRATION RATE: 16 BRPM | TEMPERATURE: 97.2 F | DIASTOLIC BLOOD PRESSURE: 74 MMHG | BODY MASS INDEX: 26.06 KG/M2 | SYSTOLIC BLOOD PRESSURE: 108 MMHG

## 2023-02-28 DIAGNOSIS — M35.05 SJOGREN'S SYNDROME WITH INFLAMMATORY ARTHRITIS (H): ICD-10-CM

## 2023-02-28 DIAGNOSIS — Z12.4 CERVICAL CANCER SCREENING: ICD-10-CM

## 2023-02-28 DIAGNOSIS — F32.0 MILD MAJOR DEPRESSION (H): ICD-10-CM

## 2023-02-28 DIAGNOSIS — Z00.00 ROUTINE GENERAL MEDICAL EXAMINATION AT A HEALTH CARE FACILITY: Primary | ICD-10-CM

## 2023-02-28 DIAGNOSIS — Z12.31 ENCOUNTER FOR SCREENING MAMMOGRAM FOR BREAST CANCER: ICD-10-CM

## 2023-02-28 PROCEDURE — G0124 SCREEN C/V THIN LAYER BY MD: HCPCS | Performed by: PATHOLOGY

## 2023-02-28 PROCEDURE — 99396 PREV VISIT EST AGE 40-64: CPT | Performed by: FAMILY MEDICINE

## 2023-02-28 PROCEDURE — G0145 SCR C/V CYTO,THINLAYER,RESCR: HCPCS | Performed by: FAMILY MEDICINE

## 2023-02-28 PROCEDURE — 99213 OFFICE O/P EST LOW 20 MIN: CPT | Mod: 25 | Performed by: FAMILY MEDICINE

## 2023-02-28 PROCEDURE — 87624 HPV HI-RISK TYP POOLED RSLT: CPT | Performed by: FAMILY MEDICINE

## 2023-02-28 RX ORDER — BUDESONIDE 3 MG/1
3 CAPSULE, COATED PELLETS ORAL EVERY OTHER DAY
COMMUNITY
Start: 2023-01-09

## 2023-02-28 RX ORDER — FLUOXETINE 40 MG/1
40 CAPSULE ORAL DAILY
Qty: 90 CAPSULE | Refills: 3 | Status: SHIPPED | OUTPATIENT
Start: 2023-02-28 | End: 2024-03-12

## 2023-02-28 NOTE — PROGRESS NOTES
SUBJECTIVE:   CC: Nikki is an 52 year old who presents for preventive health visit.     Healthy Habits:     Getting at least 3 servings of Calcium per day:  NO    Bi-annual eye exam:  Yes    Dental care twice a year:  Yes    Sleep apnea or symptoms of sleep apnea:  Daytime drowsiness    Diet:  Regular (no restrictions)    Frequency of exercise:  1 day/week    Duration of exercise:  15-30 minutes    Taking medications regularly:  Yes    Medication side effects:  None    PHQ-2 Total Score: 1    Additional concerns today:  No    Other concerns:  Hx of depression. Takes fluoxetine, continues to work well for her.       Today's PHQ-2 Score:   PHQ-2 ( 1999 [employer]) 2/22/2023   Q1: Little interest or pleasure in doing things 0   Q2: Feeling down, depressed or hopeless 1   PHQ-2 Score 1   PHQ-2 Total Score (12-17 Years)- Positive if 3 or more points; Administer PHQ-A if positive -   Q1: Little interest or pleasure in doing things Not at all   Q2: Feeling down, depressed or hopeless Several days   PHQ-2 Score 1       Social History     Tobacco Use     Smoking status: Never     Smokeless tobacco: Never   Substance Use Topics     Alcohol use: Yes     Comment: occasional- once monthly         Alcohol Use 2/22/2023   Prescreen: >3 drinks/day or >7 drinks/week? No       Breast Cancer Screening:    FHS-7:   Breast CA Risk Assessment (FHS-7) 7/30/2021 12/31/2021 8/3/2022   Did any of your first-degree relatives have breast or ovarian cancer? No No No   Did any of your relatives have bilateral breast cancer? No Unknown No   Did any man in your family have breast cancer? No No No   Did any woman in your family have breast and ovarian cancer? No No No   Did any woman in your family have breast cancer before age 50 y? No Unknown No   Do you have 2 or more relatives with breast and/or ovarian cancer? No Unknown No   Do you have 2 or more relatives with breast and/or bowel cancer? No Unknown Yes       Mammogram Screening: Recommended  "annual mammography  Pertinent mammograms are reviewed under the imaging tab.    History of abnormal Pap smear: YES - updated in Problem List and Health Maintenance accordingly  PAP / HPV Latest Ref Rng & Units 11/22/2019 6/5/2013 1/30/2012   PAP (Historical) - ASC-US(A) ASC-US(A) ASC-US(A)   HPV16 NEG:Negative Negative - -   HPV18 NEG:Negative Negative - -   HRHPV NEG:Negative Negative - -     Reviewed and updated as needed this visit by clinical staff   Tobacco  Allergies  Meds              Reviewed and updated as needed this visit by Provider                     Review of Systems       OBJECTIVE:   /74 (BP Location: Right arm, Patient Position: Sitting, Cuff Size: Adult Regular)   Pulse 57   Temp 97.2  F (36.2  C) (Temporal)   Resp 16   Ht 1.55 m (5' 1.02\")   Wt 62.6 kg (138 lb)   SpO2 99%   BMI 26.05 kg/m    Physical Exam  GENERAL: healthy, alert and no distress  EYES: Eyes grossly normal to inspection, PERRL and conjunctivae and sclerae normal  HENT: nose and mouth without ulcers or lesions  NECK: no adenopathy, no asymmetry, masses, or scars and thyroid normal to palpation  RESP: lungs clear to auscultation - no rales, rhonchi or wheezes  CV: regular rate and rhythm, normal S1 S2, no S3 or S4, no murmur, click or rub, no peripheral edema and peripheral pulses strong  ABDOMEN: soft, nontender, no hepatosplenomegaly, no masses and bowel sounds normal   (female): normal female external genitalia, normal urethral meatus, vaginal mucosa pink, moist, well rugated, and normal cervix  MS: no gross musculoskeletal defects noted, no edema  SKIN: no suspicious lesions or rashes  NEURO: Normal strength and tone, mentation intact and speech normal  PSYCH: mentation appears normal, affect normal/bright    ASSESSMENT/PLAN:   Nikki was seen today for physical.    Diagnoses and all orders for this visit:    Routine general medical examination at a health care facility  -     REVIEW OF HEALTH MAINTENANCE " "PROTOCOL ORDERS    Cervical cancer screening  -     Pap thin layer screen with HPV - recommended age 30 - 65 years    Encounter for screening mammogram for breast cancer  -     MA Screening Digital Bilateral; Future    Mild major depression (H)  -Stable with medication  -     FLUoxetine (PROZAC) 40 MG capsule; Take 1 capsule (40 mg) by mouth daily    Sjogren's syndrome with inflammatory arthritis (H)  -Has seen rheum in the past  -Symptoms stable      COUNSELING:  Reviewed preventive health counseling, as reflected in patient instructions       Regular exercise       Healthy diet/nutrition      BMI:   Estimated body mass index is 26.05 kg/m  as calculated from the following:    Height as of this encounter: 1.55 m (5' 1.02\").    Weight as of this encounter: 62.6 kg (138 lb).   Weight management plan: Discussed healthy diet and exercise guidelines      She reports that she has never smoked. She has never used smokeless tobacco.      Srinivasa Sofia DO  [facility]  "

## 2023-03-01 ENCOUNTER — ANCILLARY PROCEDURE (OUTPATIENT)
Dept: GENERAL RADIOLOGY | Facility: CLINIC | Age: 53
End: 2023-03-01
Attending: PODIATRIST
Payer: COMMERCIAL

## 2023-03-01 ENCOUNTER — OFFICE VISIT (OUTPATIENT)
Dept: PODIATRY | Facility: CLINIC | Age: 53
End: 2023-03-01
Payer: COMMERCIAL

## 2023-03-01 VITALS
SYSTOLIC BLOOD PRESSURE: 106 MMHG | BODY MASS INDEX: 26.06 KG/M2 | WEIGHT: 138 LBS | DIASTOLIC BLOOD PRESSURE: 72 MMHG | HEIGHT: 61 IN

## 2023-03-01 DIAGNOSIS — M21.42 PES PLANUS OF BOTH FEET: ICD-10-CM

## 2023-03-01 DIAGNOSIS — M20.11 HALLUX VALGUS OF RIGHT FOOT: ICD-10-CM

## 2023-03-01 DIAGNOSIS — M20.5X1 HALLUX LIMITUS, RIGHT: ICD-10-CM

## 2023-03-01 DIAGNOSIS — M25.571 PAIN IN JOINT OF RIGHT FOOT: ICD-10-CM

## 2023-03-01 DIAGNOSIS — M25.572 PAIN IN JOINT OF LEFT FOOT: ICD-10-CM

## 2023-03-01 DIAGNOSIS — M25.571 PAIN IN JOINT OF RIGHT FOOT: Primary | ICD-10-CM

## 2023-03-01 DIAGNOSIS — M21.41 PES PLANUS OF BOTH FEET: ICD-10-CM

## 2023-03-01 PROCEDURE — 99203 OFFICE O/P NEW LOW 30 MIN: CPT | Performed by: PODIATRIST

## 2023-03-01 PROCEDURE — 73630 X-RAY EXAM OF FOOT: CPT | Mod: TC | Performed by: RADIOLOGY

## 2023-03-01 NOTE — PROGRESS NOTES
ASSESSMENT:  Encounter Diagnoses   Name Primary?     Pain in joint of right foot Yes     Hallux limitus, right      Hallux valgus of right foot      Pes planus of both feet      Pain in joint of left foot      MEDICAL DECISION MAKING:  Nikki's primary focus of pain is the right first metatarsal phalangeal joint.  She also reports having similar pain on the left side.    I personally reviewed the x-ray images with Nikki.  I showed her the degenerative changes including incongruency and narrowing of the joint space, some sclerosis, mild spurring, and lateral subluxation of the proximal phalanx and the metatarsal head.    Recommendations:  Stiff soled shoes offload the forefoot during the propulsive phase of gait  Consider custom orthoses to help offload and also support her flatfoot structure -referral placed  Avoidance of barefoot walking and nonsupportive footwear  Anti-inflammatory measures.  However due to colitis, she cannot take oral anti-inflammatories.  We discussed Voltaren gel and cold application    I also discussed injection therapy, yet this does not cure the arthritis.  It sometimes can lead to increased activity and increased rate of joint degeneration.    Surgical options were discussed including arthrodesis, osteotomy, referral for information regarding joint implant.    Disclaimer: This note consists of symbols derived from keyboarding, dictation and/or voice recognition software. As a result, there may be errors in the script that have gone undetected. Please consider this when interpreting information found in this chart.    Jackson Carrizales DPM, FACFAS, [facility] Department of Podiatry/Foot & Ankle Surgery      ____________________________________________________________________    HPI:       Nikki presents today reporting degenerative arthritis in the joint at the base of her right great toe.  She saw a podiatrist 3.5 years ago.  Since that time the discomfort has progressed.  She  experiences an aching pain and soreness. Pain rating 7/10 at worst. Pain worse with enclosed shoes. Self treatment - ice, massage.  *  Past Medical History:   Diagnosis Date     Abnormal Pap smear of cervix 2019    See problem list.      Anxiety      Colitis     lymphocytic colitis ?     Depressive disorder, not elsewhere classified     sees psych- Dr Blue     Migraine, unspecified, without mention of intractable migraine without mention of status migrainosus age 14 yrs     Other acne      PAIN IN THORACIC SPINE [724.1] 2006    chiropractic care     Raynaud disease     ? connective tissue dz.  ? sogren's   *  *  Past Surgical History:   Procedure Laterality Date     BREAST BIOPSY, RT/LT Left      C IUD,MIRENA  2005     ESOPHAGOSCOPY, GASTROSCOPY, DUODENOSCOPY (EGD), COMBINED  10/10     HC ABLATION, ENDOMETRIAL, THERMAL, W/O HYSTEROSCOPIC GUIDANCE  2010     HYSTEROSCOPY      D&C     Z NONSPECIFIC PROCEDURE       X 3     ZZ COLONOSCOPY THRU STOMA, DIAGNOSTIC  2004     [facility]  [facility]   *  *  Current Outpatient Medications   Medication Sig Dispense Refill     acetaminophen (TYLENOL) 325 MG tablet Take 2 tablets by mouth every 4 hours as needed for pain. 100 tablet 0     Biotin 5000 MCG TABS Take 1,000 mcg by mouth daily       budesonide (ENTOCORT EC) 3 MG EC capsule Take 3 mg by mouth every other day       cholecalciferol 5000 UNITS CAPS Take 1 capsule (5,000 Units) by mouth daily FOR VITAMIN D DEFICIENCY (LOW VITAMIN D) 100 capsule 3     Ferrous Sulfate (IRON) 28 MG TABS        FLUoxetine (PROZAC) 40 MG capsule Take 1 capsule (40 mg) by mouth daily 90 capsule 3     IBUPROFEN PO Take 200 mg by mouth Takes 3 tablets PRN       loperamide (IMODIUM A-D) 2 MG tablet Take 2 mg by mouth daily as needed for diarrhea        nortriptyline (PAMELOR) 10 MG capsule TAKE 1 CAPSULE AT NIGHT INCREASE BY 1 CAPSULE/WEEK TO A MAX OF 3 CAPS/NIGHT AS NEEDED AND TOLERATED 270  "capsule 1     Riboflavin 100 MG TABS Take 400 mg by mouth       SUMAtriptan (IMITREX STATDOSE) 6 MG/0.5ML pen injector kit Inject 0.5 mLs (6 mg) Subcutaneous at onset of headache for migraine May repeat in 1 hour. Max 12 mg/24 hours. 3 kit 3     SUMAtriptan (IMITREX) 100 MG tablet TAKE 1 TABLET(100 MG) BY MOUTH AT ONSET OF HEADACHE FOR MIGRAINE. MAY REPEAT IN 2 HOURS. MAX 4 TABLETS/ 24 HOURS Strength: 100 mg 18 tablet 2         EXAM:    Vitals: /72   Ht 1.549 m (5' 1\")   Wt 62.6 kg (138 lb)   BMI 26.07 kg/m    BMI: Body mass index is 26.07 kg/m .    Constitutional:  Nikki Hardin is in no apparent distress, appears well-nourished.  Cooperative with history and physical exam.    Vascular:  Pedal pulses are palpable for both the DP and PT arteries.  CFT < 3 sec.  No edema.      Neuro: Light touch sensation is intact to the L4, L5, S1 distributions  No evidence of weakness, spasticity, or contracture in the lower extremities.     Derm: Normal texture and turgor.  No erythema, ecchymosis, or cyanosis.  No open lesions.     Musculoskeletal:    Lower extremity muscle strength is normal.  Decrease in medial longitudinal arch height with weightbearing.  Hallux abduction.  Significantly limited hallux dorsiflexion with loading of the right forefoot.  Dorsal medial eminence at the first metatarsal head.    XR Findings: Please see comments above.      "

## 2023-03-01 NOTE — PATIENT INSTRUCTIONS
"Thank you for choosing [facility] Podiatry / Foot & Ankle Surgery!    DR. BALBUENA'S CLINIC LOCATIONS:     [facility] TRIAGE LINE: [phone]   [address] APPOINTMENTS: [phone]   [place] [address] RADIOLOGY: [phone]   (Every other Tues - Wed - Fri PM) SET UP SURGERY: [phone]    PHYSICAL THERAPY: [phone]   [facility] SPECIALTY BILLING QUESTIONS: [phone] [address] FAX: [phone]   [place] [address]    (Thurs & Fri AM)       DEGENERATIVE ARTHRITIS OF THE BIG TOE JOINT   (hallux limitus/hallux rigidus)   Arthritis of the joint at the base of the big toe (metatarsophalangeal joint) has several causes. Usually it results from repetitive trauma to the joint, secondary to abnormal foot mechanics. Often it is hereditary. However, a one-time traumatic event can lead to arthritis. The condition doesn't improve with time, and even with treatment, can worsen. The cartilage wears out, joint surfaces are no longer smooth, bone rubs on bone, inflammation occurs with pain, and eventually bone spurs and loose fragments might develop.   The joint is often painful with activity, worse with flimsy shoes or walking barefoot, and it slowly progresses over time. A person might notice the toe \"locking up\" with walking. There often is an obvious, and irritating, bony bump on top of the foot. Shoes might be uncomfortable. In some people the pain is so bothersome that recreational activities sometimes even normal daily activities are difficult to perform.   The pain from this arthritis is likely a combination of joint jamming, cartilage loss and inflammation, and irritation from shoes rubbing on the bump. Sometimes other parts of the foot, leg, or back hurt from altering one's walk to compensate for the painful joint.     Ways to help a person live with the discomfort include wearing a good, supportive shoe with a rigid, rocker-type bottom. An example is a hiking boot. A " rigid sole minimizes bending of the joint, and therefore, joint motion and pain. Shoes with a high toe box allow for less rubbing on the bump. Avoiding barefoot walking, sandals, flip-flops and slippers usually helps.   Sometimes an insert or orthotic provides symptom relief. This might make shoe fit more difficult. Pads over the bump and occasionally injections into the joint provide relief.   Surgery for this condition is aimed towards alleviating pain. It does not cure the arthritis nor does it guarantee better joint motion. Depending on the condition of the metatarsophalangeal joint, there are several surgiqal options:    1.  Cutting off the bony bump(s) and cleaning the joint    2.  Loosening the joint up by making cuts in the first metatarsal bone or the big toe bone and removing a small section of bone.    3.  Repositioning bone to minimize jamming of the joint.    4.  In severe cases, the joint is fused. By fusing the joint, it will never bend again. This resolves the pain, because it's the movement of a worn out joint that causes pain. Oftentimes the operation involves a combination of these procedures and. requires the use of screws, pins, and/or a small surgical plate.     Healing after surgery requires about six weeks of protection. This allows the bone to heal. Maximum recovery takes about one year. The scar tissue and joint structures require this amount of time to finish the healing process. Expect stiffness, swelling and numbness during that time frame.   Surgery for arthritis of the metatarsophalangeal joint does involve side effects. Some side effects are predictable and others are less common but do occur. A scar will be visible and could be irritated by shoes. The shoe may rub on the screw or internal pin requiring surgical removal of these fixation devices. The screw and pin would likely be left in place for a full year. The first toe may remain stiff after surgery. The amount of stiffness is  variable. Most people never regain normal motion of the first toe. This is due to scar tissue inherent to any surgery, in addition to the cumUlative effects of arthritis. Sometimes the big toe drifts to one side or the other. Joint fusion is one option to correct an unstable, drifting toe. This procedure straightens the toe, however, no motion remains.   All surgical procedures involve risk of infection, numbness, pain, delayed bone healing, osteotomy (bone cut) dislocation, blood clots, continued foot pain, etc. Arthritic joint surgery is quite complex and should not be taken lightly.    Any skin incision can lead to infection. Deep infection might involve the bone and thus repeat surgery and six weeks of IV antibiotics. Scar tissue can cause nerve pain or numbness. his is generally temporary but can be permanent. We do not have treatments that cure nerve problems. Second toe pain could be related to altered mechanics and pressure transferred to the second toe. Delayed bone healing would lengthen the healing time. Some bones simply do not heal. This requires repeat surgery, electronic bone stimulation and/or extended protection. Smokers have an approximate 20% chance of poor bone healing. This is double that of a non-smoker. The bone cut may displace. This may need to be repaired with a second operation. Displacement can cause joint malalignment. Immobility after surgery can cause a blood clot in the legs and lungs. This could result in death.   Foot pain is complex. Most feet hurt for more than one reason. Operating on the arthritic   big toe joint will not necessarily create a pain free foot. Appropriate shoes, healthy body weight, avoidance of bare foot walking and moderation of activity will always be   necessary to enjoy foot comfort. Arthritis is incurable even with surgery.     Surgery for this type of arthritis is nevertheless quite successful. Most surgical patients are pleased with their foot following  surgery. Many of the issues described above can be controlled by taking proper care of your foot during the healing process.   Cosmetic bump surgery is discouraged for the reasons listed above. A bump and joint that is comfortable when wearing appropriate shoes should simply be treated with appropriate shoes.   Your surgeon would be happy to fully describe any of the above issues. You should pursue a full understanding of the operation, recovery process and any potential problems that could develop.     [facility] LOCATIONS  [facility]  [address]  [place] [address]  Phone: [phone]  Fax: [phone] [facility]  [address]  [place] [address]  Phone: [phone]   Fax: [phone]   [facility]  [address]  [place] [address]  Phone: [phone]  Fax: [phone] [facility]  [address]  [place] [address]  Phone: [phone]   Fax: [phone]   [facility]   [address]  [place] [address]  Phone: [phone]  Fax: [phone] * Please call any location listed to make an appointment for a casting/fitting. Your referral was sent to their central office and they will all have the order on file.     FOREFOOT PAIN RECOMMENDATIONS    1) Please consider stiffer/ rigid - soled shoes as much as possible. These take stress off of the ball of your foot.    2) Avoid barefoot walking, walking in socks, flexible shoes and flip flops.    3) It is a good idea to wear a shoe at all times, including when at home.    4) Consider purchasing a felt metatarsal pad    5) Consider a quality over-the-counter arch support. [facility] at [facility].  If Dr. Carrizales prescribed custom orthoses, please consider this option.    6) Ice and anti inflammatories can be helpful, earlier on in the process.  Anti inflammatories are not good  for you, if take for a long period of time.     Follow Up: As Needed

## 2023-03-01 NOTE — LETTER
3/1/2023         RE: Nikki ATKINSON Wilfred  [address] [address]        Dear Colleague,    Thank you for referring your patient, Nikki Hardin, to the [facility]. Please see a copy of my visit note below.    ASSESSMENT:  Encounter Diagnoses   Name Primary?     Pain in joint of right foot Yes     Hallux limitus, right      Hallux valgus of right foot      Pes planus of both feet      Pain in joint of left foot      MEDICAL DECISION MAKING:  Nikki's primary focus of pain is the right first metatarsal phalangeal joint.  She also reports having similar pain on the left side.    I personally reviewed the x-ray images with Nikki.  I showed her the degenerative changes including incongruency and narrowing of the joint space, some sclerosis, mild spurring, and lateral subluxation of the proximal phalanx and the metatarsal head.    Recommendations:  Stiff soled shoes offload the forefoot during the propulsive phase of gait  Consider custom orthoses to help offload and also support her flatfoot structure -referral placed  Avoidance of barefoot walking and nonsupportive footwear  Anti-inflammatory measures.  However due to colitis, she cannot take oral anti-inflammatories.  We discussed Voltaren gel and cold application    I also discussed injection therapy, yet this does not cure the arthritis.  It sometimes can lead to increased activity and increased rate of joint degeneration.    Surgical options were discussed including arthrodesis, osteotomy, referral for information regarding joint implant.    Disclaimer: This note consists of symbols derived from keyboarding, dictation and/or voice recognition software. As a result, there may be errors in the script that have gone undetected. Please consider this when interpreting information found in this chart.    Jackson Carrizales DPM, FACFAS, MS    [place] Department of Podiatry/Foot & Ankle  Surgery      ____________________________________________________________________    HPI:       Nikki presents today reporting degenerative arthritis in the joint at the base of her right great toe.  She saw a podiatrist 3.5 years ago.  Since that time the discomfort has progressed.  She experiences an aching pain and soreness. Pain rating 7/10 at worst. Pain worse with enclosed shoes. Self treatment - ice, massage.  *  Past Medical History:   Diagnosis Date     Abnormal Pap smear of cervix 2019    See problem list.      Anxiety      Colitis     lymphocytic colitis ?     Depressive disorder, not elsewhere classified     sees psych- Dr Blue     Migraine, unspecified, without mention of intractable migraine without mention of status migrainosus age 14 yrs     Other acne      PAIN IN THORACIC SPINE [724.1] 2006    chiropractic care     Raynaud disease     ? connective tissue dz.  ? sogren's   *  *  Past Surgical History:   Procedure Laterality Date     BREAST BIOPSY, RT/LT Left      C IUD,MIRENA  2005     ESOPHAGOSCOPY, GASTROSCOPY, DUODENOSCOPY (EGD), COMBINED  10/10     HC ABLATION, ENDOMETRIAL, THERMAL, W/O HYSTEROSCOPIC GUIDANCE  2010     HYSTEROSCOPY      D&C     [facility] NONSPECIFIC PROCEDURE       X 3     [facility] COLONOSCOPY THRU STOMA, DIAGNOSTIC  2004     [facility] COLONOSCOPY THRU STOMA, DIAGNOSTIC  5/10   *  *  Current Outpatient Medications   Medication Sig Dispense Refill     acetaminophen (TYLENOL) 325 MG tablet Take 2 tablets by mouth every 4 hours as needed for pain. 100 tablet 0     Biotin 5000 MCG TABS Take 1,000 mcg by mouth daily       budesonide (ENTOCORT EC) 3 MG EC capsule Take 3 mg by mouth every other day       cholecalciferol 5000 UNITS CAPS Take 1 capsule (5,000 Units) by mouth daily FOR VITAMIN D DEFICIENCY (LOW VITAMIN D) 100 capsule 3     Ferrous Sulfate (IRON) 28 MG TABS        FLUoxetine (PROZAC) 40 MG capsule Take 1 capsule (40 mg) by mouth daily 90 capsule 3      "IBUPROFEN PO Take 200 mg by mouth Takes 3 tablets PRN       loperamide (IMODIUM A-D) 2 MG tablet Take 2 mg by mouth daily as needed for diarrhea        nortriptyline (PAMELOR) 10 MG capsule TAKE 1 CAPSULE AT NIGHT INCREASE BY 1 CAPSULE/WEEK TO A MAX OF 3 CAPS/NIGHT AS NEEDED AND TOLERATED 270 capsule 1     Riboflavin 100 MG TABS Take 400 mg by mouth       SUMAtriptan (IMITREX STATDOSE) 6 MG/0.5ML pen injector kit Inject 0.5 mLs (6 mg) Subcutaneous at onset of headache for migraine May repeat in 1 hour. Max 12 mg/24 hours. 3 kit 3     SUMAtriptan (IMITREX) 100 MG tablet TAKE 1 TABLET(100 MG) BY MOUTH AT ONSET OF HEADACHE FOR MIGRAINE. MAY REPEAT IN 2 HOURS. MAX 4 TABLETS/ 24 HOURS Strength: 100 mg 18 tablet 2         EXAM:    Vitals: /72   Ht 1.549 m (5' 1\")   Wt 62.6 kg (138 lb)   BMI 26.07 kg/m    BMI: Body mass index is 26.07 kg/m .    Constitutional:  Nikki Hardin is in no apparent distress, appears well-nourished.  Cooperative with history and physical exam.    Vascular:  Pedal pulses are palpable for both the DP and PT arteries.  CFT < 3 sec.  No edema.      Neuro: Light touch sensation is intact to the L4, L5, S1 distributions  No evidence of weakness, spasticity, or contracture in the lower extremities.     Derm: Normal texture and turgor.  No erythema, ecchymosis, or cyanosis.  No open lesions.     Musculoskeletal:    Lower extremity muscle strength is normal.  Decrease in medial longitudinal arch height with weightbearing.  Hallux abduction.  Significantly limited hallux dorsiflexion with loading of the right forefoot.  Dorsal medial eminence at the first metatarsal head.    XR Findings: Please see comments above.          Again, thank you for allowing me to participate in the care of your patient.        Sincerely,        Jackson Carrizales, KENNEDY    "

## 2023-03-02 LAB
BKR LAB AP GYN ADEQUACY: ABNORMAL
BKR LAB AP GYN INTERPRETATION: ABNORMAL
BKR LAB AP HPV REFLEX: ABNORMAL
BKR LAB AP PREVIOUS ABNORMAL: ABNORMAL
PATH REPORT.COMMENTS IMP SPEC: ABNORMAL
PATH REPORT.COMMENTS IMP SPEC: ABNORMAL
PATH REPORT.RELEVANT HX SPEC: ABNORMAL

## 2023-03-03 LAB
HUMAN PAPILLOMA VIRUS 16 DNA: NEGATIVE
HUMAN PAPILLOMA VIRUS 18 DNA: NEGATIVE
HUMAN PAPILLOMA VIRUS FINAL DIAGNOSIS: NORMAL
HUMAN PAPILLOMA VIRUS OTHER HR: NEGATIVE

## 2023-03-06 ENCOUNTER — PATIENT OUTREACH (OUTPATIENT)
Dept: FAMILY MEDICINE | Facility: CLINIC | Age: 53
End: 2023-03-06

## 2023-03-06 ENCOUNTER — OFFICE VISIT (OUTPATIENT)
Dept: NEUROLOGY | Facility: CLINIC | Age: 53
End: 2023-03-06
Payer: COMMERCIAL

## 2023-03-06 VITALS
DIASTOLIC BLOOD PRESSURE: 62 MMHG | WEIGHT: 140.2 LBS | BODY MASS INDEX: 26.49 KG/M2 | HEART RATE: 71 BPM | SYSTOLIC BLOOD PRESSURE: 96 MMHG

## 2023-03-06 DIAGNOSIS — G43.719 INTRACTABLE CHRONIC MIGRAINE WITHOUT AURA AND WITHOUT STATUS MIGRAINOSUS: Primary | ICD-10-CM

## 2023-03-06 PROBLEM — R87.610 ASCUS OF CERVIX WITH NEGATIVE HIGH RISK HPV: Status: ACTIVE | Noted: 2018-08-01

## 2023-03-06 PROCEDURE — 64615 CHEMODENERV MUSC MIGRAINE: CPT | Performed by: PSYCHIATRY & NEUROLOGY

## 2023-03-06 NOTE — NURSING NOTE
"Nikki Hardin is a 52 year old female who presents for:  Chief Complaint   Patient presents with     Botox Migraine        Initial Vitals:  BP 96/62   Pulse 71   Wt 63.6 kg (140 lb 3.2 oz)   BMI 26.49 kg/m   Estimated body mass index is 26.49 kg/m  as calculated from the following:    Height as of 3/1/23: 1.549 m (5' 1\").    Weight as of this encounter: 63.6 kg (140 lb 3.2 oz).. Body surface area is 1.65 meters squared. BP completed using cuff size: wrist cuff    Thuan Amor  "

## 2023-03-06 NOTE — PROCEDURES
NEUROLOGY PROCEDURE NOTE  Mar 6, 2023      Chronic migraine Botox injection    CONSENT: The procedure was explained to the patient. The risks and benefits of the procedure and the alternatives were discussed with the patient. The patient was given an opportunity to ask any questions she had about the procedure. A verbal consent was obtained from the patient. A written consent is available in the chart.    PRE-PROCEDURE  Diagnosis: Chronic migraine  Headache frequency before the use of Botox:- 20 headache days per month  Headache frequency with Botox use:-9/month    EXAM  GENERAL: Healthy appearing, alert, no acute distress, normal habitus.  NEUROLOGICAL:  Patient is alert with fluent language.  Extraocular movements are intact.  Facial movements are present and symmetric.  No arm drift.    PROCEDURE SUMMARY:   The following muscles were identified after palpating for landmarks and the overlying skin was cleansed with alcohol. These muscles were then injected using a 30 guage- half  inch needle with the following doses of onabotulinumtoxin A. A 5 unit/ 0.1 ml dilution was used for the injections. A 2 x 100 unit vial was used.    1. Corrugators 2.5 units each side. (per patient request)  2. Procerus 2.5 units. (per patient request)  3. Frontalis 2.5x2 units each side. (per patient request)  4. Temporalis 20 units each side.  5. Occipitalis 15 units each side.  6. Paraspinals 10 units each side.  7. Trapezius 15 units each side.    Units Injected: 137.5 Units  Units Discarded: 62.5 Units  Lot Number and Expiration: [id number]; 2/25    The patient tolerated the procedure without any immediate complaints and will call the Neurology clinic if she has any problems or side effects from the medication. The patient will follow up in the Neurology clinic as previously decided.      Chuy Dunn MD  Neurologist  [facility]  [phone]

## 2023-03-06 NOTE — LETTER
3/6/2023         RE: Nikki Hardin  [address] [facility] [facility] [address]        Dear Colleague,    Thank you for referring your patient, Nikki ATKINSON Wilfred, to the [facility] [place]. Please see a copy of my visit note below.    See procedure note.       Again, thank you for allowing me to participate in the care of your patient.        Sincerely,        Chuy Dunn MD

## 2023-03-07 ENCOUNTER — TRANSFERRED RECORDS (OUTPATIENT)
Dept: HEALTH INFORMATION MANAGEMENT | Facility: CLINIC | Age: 53
End: 2023-03-07

## 2023-04-10 DIAGNOSIS — G43.719 INTRACTABLE CHRONIC MIGRAINE WITHOUT AURA AND WITHOUT STATUS MIGRAINOSUS: ICD-10-CM

## 2023-04-11 RX ORDER — NORTRIPTYLINE HCL 10 MG
CAPSULE ORAL
Qty: 90 CAPSULE | Refills: 1 | Status: SHIPPED | OUTPATIENT
Start: 2023-04-11 | End: 2023-06-30

## 2023-04-11 NOTE — TELEPHONE ENCOUNTER
Refill request for: Nortriptyline      LOV: 3/6/23  NOV: 6/7/23    30 day supply with 1 refills Medication T'd for review and signature

## 2023-04-17 ENCOUNTER — TRANSFERRED RECORDS (OUTPATIENT)
Dept: HEALTH INFORMATION MANAGEMENT | Facility: CLINIC | Age: 53
End: 2023-04-17
Payer: COMMERCIAL

## 2023-05-17 ENCOUNTER — OFFICE VISIT (OUTPATIENT)
Dept: FAMILY MEDICINE | Facility: CLINIC | Age: 53
End: 2023-05-17
Payer: COMMERCIAL

## 2023-05-17 VITALS
BODY MASS INDEX: 27.48 KG/M2 | HEIGHT: 60 IN | OXYGEN SATURATION: 100 % | DIASTOLIC BLOOD PRESSURE: 67 MMHG | WEIGHT: 140 LBS | SYSTOLIC BLOOD PRESSURE: 100 MMHG | RESPIRATION RATE: 16 BRPM | TEMPERATURE: 98.3 F | HEART RATE: 77 BPM

## 2023-05-17 DIAGNOSIS — M15.1 DEGENERATIVE ARTHRITIS OF DISTAL INTERPHALANGEAL JOINT OF INDEX FINGER OF RIGHT HAND: ICD-10-CM

## 2023-05-17 DIAGNOSIS — Z01.818 PREOP GENERAL PHYSICAL EXAM: Primary | ICD-10-CM

## 2023-05-17 PROCEDURE — 99213 OFFICE O/P EST LOW 20 MIN: CPT | Performed by: FAMILY MEDICINE

## 2023-05-17 NOTE — PROGRESS NOTES
[facility]  [address] [place] [address]  Phone: [phone]  Fax: [phone]  Primary Provider: Srinivasa Sofia  Pre-op Performing Provider: SIMRAN PENNINGTON  PREOPERATIVE EVALUATION:  Today's date: 5/17/2023  Nikki Hardin is a 53 year old female who presents for a preoperative evaluation.      5/17/2023     8:53 AM   Additional Questions   Roomed by Ghazal ABERNATHY     Surgical Information:  Surgery/Procedure: Right IF Dip fusion   Surgery Location: [facility]   Surgeon: Dr Samanta Ivory  Surgery Date: 5/19/2023  Time of Surgery: TBA  Where patient plans to recover: At home with family  Fax number for surgical facility: [phone]  Assessment & Plan   The proposed surgical procedure is considered LOW risk.    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   - No identified additional risk factors other than previously addressed    Antiplatelet or Anticoagulation Medication Instructions:   - Patient is on no antiplatelet or anticoagulation medications.   - Bleeding risk is low for this procedure (e.g. dental, skin, cataract).  Additional Medication Instructions:  Patient is to take all scheduled medications on the day of surgery EXCEPT for modifications listed below:  She is instructed not to take ibuprofen for three days before surgery.  RECOMMENDATION:  APPROVAL GIVEN to proceed with proposed procedure, without further diagnostic evaluation.    Review of external notes as documented elsewhere in note  Review of the result(s) of each unique test - CBC's  No LOS data to display   Time spent by me doing chart review, history and exam, documentation and further activities per the note  Subjective   HPI related to upcoming procedure: She has battled arthritis. It is affecting her job with pain at the DIP of her index finger when using the  computer. She is on ibuprofen and Tylenol without significant relief.        5/12/2023     6:54 PM   Preop Questions   1. Have you ever had a heart attack or stroke? No   2. Have you ever had surgery on your heart or blood vessels, such as a stent placement, a coronary artery bypass, or surgery on an artery in your head, neck, heart, or legs? No   3. Do you have chest pain with activity? No   4. Do you have a history of  heart failure? No   5. Do you currently have a cold, bronchitis or symptoms of other infection? No   6. Do you have a cough, shortness of breath, or wheezing? No   7. Do you or anyone in your family have previous history of blood clots? No   8. Do you or does anyone in your family have a serious bleeding problem such as prolonged bleeding following surgeries or cuts? No   9. Have you ever had problems with anemia or been told to take iron pills? YES - Last hemoglobin was normal (12.8)   10. Have you had any abnormal blood loss such as black, tarry or bloody stools, or abnormal vaginal bleeding? No   11. Have you ever had a blood transfusion? No   12. Are you willing to have a blood transfusion if it is medically needed before, during, or after your surgery? Yes   13. Have you or any of your relatives ever had problems with anesthesia? No   14. Do you have sleep apnea, excessive snoring or daytime drowsiness? No   15. Do you have any artifical heart valves or other implanted medical devices like a pacemaker, defibrillator, or continuous glucose monitor? No   16. Do you have artificial joints? No   17. Are you allergic to latex? No   18. Is there any chance that you may be pregnant? No     Health Care Directive:  Patient does not have a Health Care Directive or Living Will: Patient states has Advance Directive and will bring in a copy to clinic.    Preoperative Review of :   reviewed - no record of controlled substances prescribed.  Status of Chronic Conditions:  See problem list for active  medical problems.  Problems all longstanding and stable, except as noted/documented.  See ROS for pertinent symptoms related to these conditions.  Review of Systems   All other systems reviewed and are negative.    Patient Active Problem List    Diagnosis Date Noted     Sjogren's syndrome with inflammatory arthritis (H) 02/28/2023     Priority: Medium     ASCUS of cervix with negative high risk HPV 08/01/2018     Priority: Medium     03/08/04:  ASCUS Pap with Neg HR HPV result.   07/07/04:  ASCUS Pap with Neg HR HPV result.   01/09/06: NIL Pap  0402/07: NIL Pap  04/14/08:  ASCUS Pap with Neg HR HPV result.   04/22/09: NIL Pap  06/04/10: NIL Pap  01/30/12:  ASCUS Pap with Neg HR HPV result.   06/05/13:  ASCUS Pap with Neg HR HPV result.   07/11/16: ASCUS Pap with Neg HR HPV result. (care everywhere)  11/22/19: ASCUS Pap with Neg HR HPV result. Plan 3 yr co-test  2/28/23 ASCUS pap, Neg HPV. Plan 1 yr co-test       Lymphocytic colitis 12/21/2017     Priority: Medium     Sedimentation rate elevation 04/13/2017     Priority: Medium     Elevated vitamin B12 level 04/13/2017     Priority: Medium     NOT ON B-12 SUPPLEMENTS       Anemia, unspecified type 04/13/2017     Priority: Medium     Hypervitaminosis B6 04/13/2017     Priority: Medium     Migraine with aura and without status migrainosus, not intractable 02/07/2017     Priority: Medium     Mild major depression (H) 02/07/2017     Priority: Medium     Other acne 02/28/2006     Priority: Medium     Irritable bowel syndrome 03/23/2005     Priority: Medium     diarrhea        Past Medical History:   Diagnosis Date     Abnormal Pap smear of cervix 11/22/2019    See problem list.      Anxiety      Colitis     lymphocytic colitis ?     Depressive disorder, not elsewhere classified     sees psych- Dr Blue     Migraine, unspecified, without mention of intractable migraine without mention of status migrainosus age 14 yrs     Other acne      PAIN IN THORACIC SPINE [724.1]  2006    chiropractic care     Raynaud disease     ? connective tissue dz.  ? sogren's     Past Surgical History:   Procedure Laterality Date     BREAST BIOPSY, RT/LT Left      C IUD,MIRENA  2005     ESOPHAGOSCOPY, GASTROSCOPY, DUODENOSCOPY (EGD), COMBINED  10/10     HC ABLATION, ENDOMETRIAL, THERMAL, W/O HYSTEROSCOPIC GUIDANCE  2010     HYSTEROSCOPY      D&C     [facility] NONSPECIFIC PROCEDURE       X 3     ZZ COLONOSCOPY THRU STOMA, DIAGNOSTIC  2004     ZZHC COLONOSCOPY THRU STOMA, DIAGNOSTIC  5/10     Current Outpatient Medications   Medication Sig Dispense Refill     acetaminophen (TYLENOL) 325 MG tablet Take 2 tablets by mouth every 4 hours as needed for pain. 100 tablet 0     Biotin 5000 MCG TABS Take 1,000 mcg by mouth daily       budesonide (ENTOCORT EC) 3 MG EC capsule Take 3 mg by mouth every other day       cholecalciferol 5000 UNITS CAPS Take 1 capsule (5,000 Units) by mouth daily FOR VITAMIN D DEFICIENCY (LOW VITAMIN D) 100 capsule 3     Ferrous Sulfate (IRON) 28 MG TABS        FLUoxetine (PROZAC) 40 MG capsule Take 1 capsule (40 mg) by mouth daily 90 capsule 3     IBUPROFEN PO Take 200 mg by mouth Takes 3 tablets PRN       loperamide (IMODIUM A-D) 2 MG tablet Take 2 mg by mouth daily as needed for diarrhea        nortriptyline (PAMELOR) 10 MG capsule TAKE ONE CAPSULE BY MOUTH AT NIGHT, INCREASE BY ONE CAPSULE PER WEEK TO A MAX OF 3 CAPSULES AT NIGHT AS NEEDED AND TOLERATED 90 capsule 1     Riboflavin 100 MG TABS Take 400 mg by mouth       SUMAtriptan (IMITREX STATDOSE) 6 MG/0.5ML pen injector kit Inject 0.5 mLs (6 mg) Subcutaneous at onset of headache for migraine May repeat in 1 hour. Max 12 mg/24 hours. 3 kit 3     SUMAtriptan (IMITREX) 100 MG tablet TAKE 1 TABLET(100 MG) BY MOUTH AT ONSET OF HEADACHE FOR MIGRAINE. MAY REPEAT IN 2 HOURS. MAX 4 TABLETS/ 24 HOURS Strength: 100 mg 18 tablet 2       Allergies   Allergen Reactions     Hydrocodone Nausea and Vomiting        Social History      Tobacco Use     Smoking status: Never     Smokeless tobacco: Never   Vaping Use     Vaping status: Not on file   Substance Use Topics     Alcohol use: Yes     Comment: occasional- once monthly       History   Drug Use No         Objective     /67 (BP Location: Left arm, Patient Position: Sitting, Cuff Size: Adult Regular)   Pulse 77   Temp 98.3  F (36.8  C) (Tympanic)   Resp 16   Ht 1.524 m (5')   Wt 63.5 kg (140 lb)   LMP  (LMP Unknown)   SpO2 100%   BMI 27.34 kg/m      Physical Exam    GENERAL APPEARANCE: healthy, alert and no distress     EYES: EOMI, PERRL     HENT: ear canals and TM's normal and nose and mouth without ulcers or lesions     NECK: no adenopathy, no asymmetry, masses, or scars and thyroid normal to palpation     RESP: lungs clear to auscultation - no rales, rhonchi or wheezes     CV: regular rates and rhythm, normal S1 S2, no S3 or S4 and no murmur, click or rub     ABDOMEN:  soft, nontender, no HSM or masses and bowel sounds normal     MS: extremities normal- no gross deformities noted, no evidence of inflammation in joints, FROM in all extremities.     SKIN: no suspicious lesions or rashes     NEURO: Normal strength and tone, sensory exam grossly normal, mentation intact and speech normal     PSYCH: mentation appears normal. and affect normal/bright     LYMPHATICS: No cervical adenopathy    Recent Labs   Lab Test 02/08/22  0742   HGB 12.8       Diagnostics:  No labs were ordered during this visit.   No EKG required for low risk surgery (cataract, skin procedure, breast biopsy, etc).  Revised Cardiac Risk Index (RCRI):  The patient has the following serious cardiovascular risks for perioperative complications:   - No serious cardiac risks = 0 points   RCRI Interpretation: 0 points: Class I (very low risk - 0.4% complication rate)  Signed Electronically by: SIMRAN PENNINGTON MD  Copy of this evaluation report is provided to requesting physician.

## 2023-05-17 NOTE — PATIENT INSTRUCTIONS
For informational purposes only. Not to replace the advice of your health care provider. Copyright   2003,  [facility]. All rights reserved. Clinically reviewed by Rosa Sevilla MD. [employer] [id number] - REV .  Preparing for Your Surgery  Getting started  A nurse will call you to review your health history and instructions. They will give you an arrival time based on your scheduled surgery time. Please be ready to share:    Your doctor's clinic name and phone number    Your medical, surgical, and anesthesia history    A list of allergies and sensitivities    A list of medicines, including herbal treatments and over-the-counter drugs    Whether the patient has a legal guardian (ask how to send us the papers in advance)  Please tell us if you're pregnant--or if there's any chance you might be pregnant. Some surgeries may injure a fetus (unborn baby), so they require a pregnancy test. Surgeries that are safe for a fetus don't always need a test, and you can choose whether to have one.   If you have a child who's having surgery, please ask for a copy of Preparing for Your Child's Surgery.    Preparing for surgery    Within 10 to 30 days of surgery: Have a pre-op exam (sometimes called an H&P, or History and Physical). This can be done at a clinic or pre-operative center.  ? If you're having a , you may not need this exam. Talk to your care team.    At your pre-op exam, talk to your care team about all medicines you take. If you need to stop any medicines before surgery, ask when to start taking them again.  ? We do this for your safety. Many medicines can make you bleed too much during surgery. Some change how well surgery (anesthesia) drugs work.    Call your insurance company to let them know you're having surgery. (If you don't have insurance, call [phone].)    Call your clinic if there's any change in your health. This includes signs of a cold or flu (sore throat, runny nose,  cough, rash, fever). It also includes a scrape or scratch near the surgery site.    If you have questions on the day of surgery, call your hospital or surgery center.  Eating and drinking guidelines  For your safety: Unless your surgeon tells you otherwise, follow the guidelines below.    Eat and drink as usual until 8 hours before you arrive for surgery. After that, no food or milk.    Drink clear liquids until 2 hours before you arrive. These are liquids you can see through, like water, Gatorade, and Propel Water. They also include plain black coffee and tea (no cream or milk), candy, and breath mints. You can spit out gum when you arrive.    If you drink alcohol: Stop drinking it the night before surgery.    If your care team tells you to take medicine on the morning of surgery, it's okay to take it with a sip of water.  Preventing infection    Shower or bathe the night before and morning of your surgery. Follow the instructions your clinic gave you. (If no instructions, use regular soap.)    Don't shave or clip hair near your surgery site. We'll remove the hair if needed.    Don't smoke or vape the morning of surgery. You may chew nicotine gum up to 2 hours before surgery. A nicotine patch is okay.  ? Note: Some surgeries require you to completely quit smoking and nicotine. Check with your surgeon.    Your care team will make every effort to keep you safe from infection. We will:  ? Clean our hands often with soap and water (or an alcohol-based hand rub).  ? Clean the skin at your surgery site with a special soap that kills germs.  ? Give you a special gown to keep you warm. (Cold raises the risk of infection.)  ? Wear special hair covers, masks, gowns and gloves during surgery.  ? Give antibiotic medicine, if prescribed. Not all surgeries need antibiotics.  What to bring on the day of surgery    Photo ID and insurance card    Copy of your health care directive, if you have one    Glasses and hearing aids (bring  cases)  ? You can't wear contacts during surgery    Inhaler and eye drops, if you use them (tell us about these when you arrive)    CPAP machine or breathing device, if you use them    A few personal items, if spending the night    If you have . . .  ? A pacemaker, ICD (cardiac defibrillator) or other implant: Bring the ID card.  ? An implanted stimulator: Bring the remote control.  ? A legal guardian: Bring a copy of the certified (court-stamped) guardianship papers.  Please remove any jewelry, including body piercings. Leave jewelry and other valuables at home.  If you're going home the day of surgery    You must have a responsible adult drive you home. They should stay with you overnight as well.    If you don't have someone to stay with you, and you aren't safe to go home alone, we may keep you overnight. Insurance often won't pay for this.  After surgery  If it's hard to control your pain or you need more pain medicine, please call your surgeon's office.  Questions?   If you have any questions for your care team, list them here: _________________________________________________________________________________________________________________________________________________________________________ ____________________________________ ____________________________________ ____________________________________

## 2023-05-19 ENCOUNTER — TRANSFERRED RECORDS (OUTPATIENT)
Dept: HEALTH INFORMATION MANAGEMENT | Facility: CLINIC | Age: 53
End: 2023-05-19
Payer: COMMERCIAL

## 2023-05-24 DIAGNOSIS — G43.109 MIGRAINE WITH AURA AND WITHOUT STATUS MIGRAINOSUS, NOT INTRACTABLE: ICD-10-CM

## 2023-05-24 RX ORDER — SUMATRIPTAN 100 MG/1
TABLET, FILM COATED ORAL
Qty: 18 TABLET | Refills: 2 | Status: SHIPPED | OUTPATIENT
Start: 2023-05-24 | End: 2023-08-15

## 2023-05-24 NOTE — TELEPHONE ENCOUNTER
.Refill request for: SUMAtriptan (IMITREX) 100 MG tablet  Directions: TAKE 1 TABLET(100 MG) BY MOUTH AT ONSET OF HEADACHE FOR MIGRAINE. MAY REPEAT IN 2 HOURS. MAX 4 TABLETS/ 24 HOURS     LOV: 3/6/23  NOV: 6/7/23    #18 with 2  refills Medication T'd for review and signature  Lori Schumacher CMA on 5/24/2023 at 12:26 PM

## 2023-05-30 ENCOUNTER — TRANSFERRED RECORDS (OUTPATIENT)
Dept: HEALTH INFORMATION MANAGEMENT | Facility: CLINIC | Age: 53
End: 2023-05-30
Payer: COMMERCIAL

## 2023-06-07 ENCOUNTER — OFFICE VISIT (OUTPATIENT)
Dept: NEUROLOGY | Facility: CLINIC | Age: 53
End: 2023-06-07
Payer: COMMERCIAL

## 2023-06-07 VITALS
DIASTOLIC BLOOD PRESSURE: 64 MMHG | BODY MASS INDEX: 27.34 KG/M2 | WEIGHT: 140 LBS | HEART RATE: 76 BPM | SYSTOLIC BLOOD PRESSURE: 100 MMHG | RESPIRATION RATE: 16 BRPM

## 2023-06-07 DIAGNOSIS — G43.719 INTRACTABLE CHRONIC MIGRAINE WITHOUT AURA AND WITHOUT STATUS MIGRAINOSUS: Primary | ICD-10-CM

## 2023-06-07 PROCEDURE — 64615 CHEMODENERV MUSC MIGRAINE: CPT | Performed by: PSYCHIATRY & NEUROLOGY

## 2023-06-07 NOTE — LETTER
6/7/2023         RE: Nikki Hardin  [address] [facility] [address]        Dear Colleague,    Thank you for referring your patient, Nikki ATIKNSON Wiflred, to the [facility] [place]. Please see a copy of my visit note below.    See procedure note.       Again, thank you for allowing me to participate in the care of your patient.        Sincerely,        Chuy Dunn MD

## 2023-06-07 NOTE — PROCEDURES
NEUROLOGY PROCEDURE NOTE  Jun 7, 2023      Chronic migraine Botox injection    CONSENT: The procedure was explained to the patient. The risks and benefits of the procedure and the alternatives were discussed with the patient. The patient was given an opportunity to ask any questions she had about the procedure. A verbal consent was obtained from the patient. A written consent is available in the chart.    PRE-PROCEDURE  Diagnosis: Chronic migraine  Headache frequency before the use of Botox:- 20 headache days per month  Headache frequency with Botox use:-9/month    EXAM  GENERAL: Healthy appearing, alert, no acute distress, normal habitus.  NEUROLOGICAL:  Patient is alert with fluent language.  Extraocular movements are intact.  Facial movements are present and symmetric.  No arm drift.    PROCEDURE SUMMARY:   The following muscles were identified after palpating for landmarks and the overlying skin was cleansed with alcohol. These muscles were then injected using a 30 guage- half  inch needle with the following doses of onabotulinumtoxin A. A 5 unit/ 0.1 ml dilution was used for the injections. A 2 x 100 unit vial was used.    1. Corrugators 2.5 units each side. (per patient request)  2. Procerus 2.5 units. (per patient request)  3. Frontalis 2.5x2 units each side. (per patient request)  4. Temporalis 20 units each side.  5. Occipitalis 15 units each side.  6. Paraspinals 10 units each side.  7. Trapezius 15 units each side.    Units Injected: 137.5 Units  Units Discarded: 62.5 Units  Lot Number and Expiration: [id number]; 5/25    The patient tolerated the procedure without any immediate complaints and will call the Neurology clinic if she has any problems or side effects from the medication. The patient will follow up in the Neurology clinic as previously decided.      Chuy Dunn MD  Neurologist  [facility]  [phone]

## 2023-06-26 ENCOUNTER — TRANSFERRED RECORDS (OUTPATIENT)
Dept: HEALTH INFORMATION MANAGEMENT | Facility: CLINIC | Age: 53
End: 2023-06-26
Payer: COMMERCIAL

## 2023-06-29 DIAGNOSIS — G43.719 INTRACTABLE CHRONIC MIGRAINE WITHOUT AURA AND WITHOUT STATUS MIGRAINOSUS: ICD-10-CM

## 2023-06-30 NOTE — TELEPHONE ENCOUNTER
Refill request for: Nortriptyline    Directions: Take 3 capsules at night as needed and tolerated     LOV: 8/7/23  NOV: 6/7/23    30 day supply with 1 refills Medication T'd for review and signature

## 2023-07-05 RX ORDER — NORTRIPTYLINE HCL 10 MG
CAPSULE ORAL
Qty: 90 CAPSULE | Refills: 1 | Status: SHIPPED | OUTPATIENT
Start: 2023-07-05 | End: 2023-08-15

## 2023-07-12 ENCOUNTER — TRANSFERRED RECORDS (OUTPATIENT)
Dept: HEALTH INFORMATION MANAGEMENT | Facility: CLINIC | Age: 53
End: 2023-07-12
Payer: COMMERCIAL

## 2023-07-25 ENCOUNTER — TRANSFERRED RECORDS (OUTPATIENT)
Dept: HEALTH INFORMATION MANAGEMENT | Facility: CLINIC | Age: 53
End: 2023-07-25
Payer: COMMERCIAL

## 2023-08-14 ENCOUNTER — ANCILLARY PROCEDURE (OUTPATIENT)
Dept: MAMMOGRAPHY | Facility: CLINIC | Age: 53
End: 2023-08-14
Attending: FAMILY MEDICINE
Payer: COMMERCIAL

## 2023-08-14 DIAGNOSIS — Z12.31 ENCOUNTER FOR SCREENING MAMMOGRAM FOR BREAST CANCER: ICD-10-CM

## 2023-08-14 PROCEDURE — 77063 BREAST TOMOSYNTHESIS BI: CPT | Performed by: STUDENT IN AN ORGANIZED HEALTH CARE EDUCATION/TRAINING PROGRAM

## 2023-08-14 PROCEDURE — 77067 SCR MAMMO BI INCL CAD: CPT | Performed by: STUDENT IN AN ORGANIZED HEALTH CARE EDUCATION/TRAINING PROGRAM

## 2023-08-15 ENCOUNTER — OFFICE VISIT (OUTPATIENT)
Dept: NEUROLOGY | Facility: CLINIC | Age: 53
End: 2023-08-15
Payer: COMMERCIAL

## 2023-08-15 VITALS
SYSTOLIC BLOOD PRESSURE: 118 MMHG | RESPIRATION RATE: 16 BRPM | DIASTOLIC BLOOD PRESSURE: 68 MMHG | WEIGHT: 135 LBS | BODY MASS INDEX: 26.37 KG/M2 | HEART RATE: 84 BPM

## 2023-08-15 DIAGNOSIS — G43.109 MIGRAINE WITH AURA AND WITHOUT STATUS MIGRAINOSUS, NOT INTRACTABLE: ICD-10-CM

## 2023-08-15 DIAGNOSIS — G43.719 INTRACTABLE CHRONIC MIGRAINE WITHOUT AURA AND WITHOUT STATUS MIGRAINOSUS: Primary | ICD-10-CM

## 2023-08-15 PROCEDURE — 99214 OFFICE O/P EST MOD 30 MIN: CPT | Performed by: PSYCHIATRY & NEUROLOGY

## 2023-08-15 RX ORDER — NORTRIPTYLINE HCL 10 MG
CAPSULE ORAL
Qty: 540 CAPSULE | Refills: 1 | Status: SHIPPED | OUTPATIENT
Start: 2023-08-15 | End: 2024-02-13

## 2023-08-15 RX ORDER — SUMATRIPTAN 100 MG/1
TABLET, FILM COATED ORAL
Qty: 18 TABLET | Refills: 11 | Status: SHIPPED | OUTPATIENT
Start: 2023-08-15 | End: 2024-02-13

## 2023-08-15 NOTE — PROGRESS NOTES
NEUROLOGY OUTPATIENT PROGRESS NOTE   Aug 15, 2023     CHIEF COMPLAINT/REASON FOR VISIT/REASON FOR CONSULT  Patient presents with:  Follow Up    REASON FOR CONSULTATION- Headaches    REFERRAL SOURCE  Dr. Srinivasa Sofia  CC Dr. Srinivasa Sofia    HISTORY OF PRESENT ILLNESS  Nikki Hardin is a 53 year old female seen today for evaluation of headaches. She reports that she has a diagnosis of migraines since age 14. Previously her headaches were sporadic but still frequent. She estimates that she was having at least ~15 headaches a month. Over the last 2 to 3 months headaches have become much more frequent. She is having a headache every day. Reports no real reasons why the headaches might have gotten worse.    Headaches are generally in the temple on either side and then radiate to the neck. Occasionally she can have more frontal pressure. Takes a throbbing. There is some photophobia and phonophobia. She has never had any auras with the headaches. Reports triggers including working and doing remote work from home, inconsistent sleep, lack of regular meals, going out in the sun sometimes storms. She does have some neck pain though neck pain is worse at the time of headaches.    She is tried Topamax in the past which caused severe side effects. She is also tried propanolol without any benefit. Over-the-counter medications do not help. 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 She has been using sumatriptan injections and tablets for several years which have been working for her. Has tried Maxalt with benefit though insurance would not cover it anymore and then she had to stop it. The sumatriptan works better than the Maxalt.    4/6/22  Patient returns today.  She did try the nortriptyline and that has helped with the headaches.  Currently is having 7 headaches a month.  Reports no changes in the nature of the headaches.  Imitrex still works with the abortive therapy.  She does complain of some somnolence in the  morning with the nortriptyline.  Is taking 30 mg.  Tries to take it earlier in the evening.  Things are may be slightly turning the corner at this point though she is not sure if this is a good long-term medication or not.  Is interested in Botox.    10/31/22  Patient returns today.  Brought in the headache log today which I reviewed.  She is still having 10-12 headaches every month.  Previously these were at 15 headaches a month.  Headaches have been minimally improved with the nortriptyline.  Reports side effects of somnolence with the nortriptyline.  Is taking 30 mg.  Wants to get off the medication.  Imitrex is working for abortive therapy.  The Imitrex injections were given to her by the pharmacy that she wants the pen which I prescribed today.  Denies any other new symptoms.  No other triggers for her headaches.  Wants to proceed with the Botox since most of her headaches are coming from the shoulder tension.    11/30/22  Patient returns today.  Headaches are about the same as before.  She had called the clinic for an Imitrex prescription which is working for her.  Reports of a new problem ongoing for the last 1 month.  She does have some twitching of her left eye.  This can happen anytime.  There is no clear triggers that she is identified.  Drinks about 2 cups of coffee.  Drinks plenty of water.  No new medications.  The twitching is not severe enough that it causes the eye to close.  Is limited to the left eye only.    2/7/23  Patient returns today.  Headaches have significantly improved with the Botox.  She reports 50% reduction in frequency.  She is having 9 headaches a month.  Also reports that the headaches are less severe and not lasting that long.  They are shorter in intensity.  They still would last for couple of days.  Sumatriptan is still effective.    Left eye twitching has not happened since the Botox.  She did go on a vacation and the stress is less at this point.    She further reports some  cosmetic changes in the frontal region with the Botox and wants to avoid those in the future.  She wants to do a lower dose of Botox in the frontal region.    8/15/23  Patient returns today.  1.  In the last 3 months since her Botox she has had 2 headaches in the month of June 9 in the month of July and 7 in the month of August.  Her headaches are about the same as before though might have been worse because of heat and irregular sleep schedule.  She starts work again tomorrow.  2.  No further eye twitching with the Botox  3.  We are injecting less Botox in the frontal region and that has not made the headaches any severe  4.  Imitrex still works as abortive therapy.  5.  Nortriptyline is making her more sleepy though wants to try a higher dose to see if the headaches can get better.  6.  Does complain of stiff neck when she works on the computer which could be leading to the headaches.    Previous history is reviewed and this is unchanged.    PAST MEDICAL/SURGICAL HISTORY  Past Medical History:   Diagnosis Date    Abnormal Pap smear of cervix 11/22/2019    See problem list.     Anxiety     Colitis     lymphocytic colitis ?    Depressive disorder, not elsewhere classified     sees psych- Dr Blue    Migraine, unspecified, without mention of intractable migraine without mention of status migrainosus age 14 yrs    Other acne     PAIN IN THORACIC SPINE [724.1] 4/13/2006    chiropractic care    Raynaud disease     ? connective tissue dz.  ? sogren's     Patient Active Problem List   Diagnosis    Irritable bowel syndrome    Other acne    Migraine with aura and without status migrainosus, not intractable    Mild major depression (H)    Sedimentation rate elevation    Elevated vitamin B12 level    Anemia, unspecified type    Hypervitaminosis B6    Lymphocytic colitis    ASCUS of cervix with negative high risk HPV    Sjogren's syndrome with inflammatory arthritis (H)   Significant for migraines, arthritis, depression,  microcolitis    FAMILY HISTORY  Family History   Problem Relation Age of Onset    Neurologic Disorder Mother         MIGRIANES    C.A.D. Father         in his 60's    Depression Father     Heart Disease Father     Lipids Father     Hypertension Father     Arthritis Father     Sleep Disorder Father     Cerebrovascular Disease Father 78    Cancer - colorectal Paternal Grandmother     Cancer Maternal Grandfather         stomach    Heart Disease Maternal Grandfather     Depression Brother     Depression Brother     Asthma Daughter     Heart Disease Paternal Grandfather     Breast Cancer Maternal Aunt     Diabetes No family hx of    Positive for migraines in her mother and aunt.    SOCIAL HISTORY  Social History     Tobacco Use    Smoking status: Never    Smokeless tobacco: Never   Substance Use Topics    Alcohol use: Yes     Comment: occasional- once monthly    Drug use: No       SYSTEMS REVIEW  Twelve-system ROS was done and other than the HPI this was negative except for neck pain, dizziness, hearing loss, sleepiness during the day, headaches, depression, bowel problems.  No new concerns/issues.    MEDICATIONS  acetaminophen (TYLENOL) 325 MG tablet, Take 2 tablets by mouth every 4 hours as needed for pain.  Biotin 5000 MCG TABS, Take 1,000 mcg by mouth daily  budesonide (ENTOCORT EC) 3 MG EC capsule, Take 3 mg by mouth every other day  cholecalciferol 5000 UNITS CAPS, Take 1 capsule (5,000 Units) by mouth daily FOR VITAMIN D DEFICIENCY (LOW VITAMIN D)  Ferrous Sulfate (IRON) 28 MG TABS,   FLUoxetine (PROZAC) 40 MG capsule, Take 1 capsule (40 mg) by mouth daily  IBUPROFEN PO, Take 200 mg by mouth Takes 3 tablets PRN  loperamide (IMODIUM A-D) 2 MG tablet, Take 2 mg by mouth daily as needed for diarrhea   Riboflavin 100 MG TABS, Take 400 mg by mouth  SUMAtriptan (IMITREX STATDOSE) 6 MG/0.5ML pen injector kit, Inject 0.5 mLs (6 mg) Subcutaneous at onset of headache for migraine May repeat in 1 hour. Max 12 mg/24  hours.    Botulinum Toxin Type A (BOTOX) 200 units injection 155 Units       PHYSICAL EXAMINATION  VITALS: /68   Pulse 84   Resp 16   Wt 61.2 kg (135 lb)   BMI 26.37 kg/m    GENERAL: Healthy appearing, alert, no acute distress, normal habitus.  CARDIOVASCULAR: Extremities warm and well perfused. Pulses present.   NEUROLOGICAL:  Patient is awake and oriented to self, place and time.  Attention span is normal.  Memory is grossly intact.  Language is fluent and follows commands appropriately.  Appropriate fund of knowledge. Cranial nerves 2-12 are intact. There is no pronator drift.  Motor exam shows 5/5 strength in all extremities.  Tone is symmetric bilaterally in upper and lower extremities.  (Previously reflexes are symmetric and 2+ in upper extremities and lower extremities. Sensory exam is grossly intact to light touch, pin prick and vibration.) Finger to nose and heel to shin is without dysmetria.  Romberg is negative.  Gait is normal and the patient is able to do tandem walk and walk on toes and heels.  Exam stable.    DIAGNOSTICS  CARDIOLOGY  EKG 2008  [facility]    RELEVANT LABS  Component      Latest Ref Rng & Units 12/22/2020   WBC      4.0 - 11.0 10e9/L 6.7   RBC Count      3.8 - 5.2 10e12/L 3.93   Hemoglobin      11.7 - 15.7 g/dL 11.4 (L)   Hematocrit      35.0 - 47.0 % 35.2   MCV      78 - 100 fl 90   MCH      26.5 - 33.0 pg 29.0   MCHC      31.5 - 36.5 g/dL 32.4   RDW      10.0 - 15.0 % 13.2   Platelet Count      150 - 450 10e9/L 297   % Neutrophils      % 70.5   % Lymphocytes      % 20.4   % Monocytes      % 6.6   % Eosinophils      % 2.1   % Basophils      % 0.4   Absolute Neutrophil      1.6 - 8.3 10e9/L 4.7   Absolute Lymphocytes      0.8 - 5.3 10e9/L 1.4   Absolute Monocytes      0.0 - 1.3 10e9/L 0.4   Absolute Eosinophils      0.0 - 0.7 10e9/L 0.1   Absolute Basophils      0.0 - 0.2 10e9/L 0.0   Diff Method       Automated Method   Sodium      133 - 144 mmol/L 138   Potassium      3.4 - 5.3  mmol/L 4.5   Chloride      94 - 109 mmol/L 107   Carbon Dioxide      20 - 32 mmol/L 27   Anion Gap      3 - 14 mmol/L 4   Glucose      70 - 99 mg/dL 79   Urea Nitrogen      7 - 30 mg/dL 12   Creatinine      0.52 - 1.04 mg/dL 0.78   GFR Estimate      >60 mL/min/1.73:m2 87   GFR Estimate If Black      >60 mL/min/1.73:m2 >90   Calcium      8.5 - 10.1 mg/dL 9.2   Bilirubin Total      0.2 - 1.3 mg/dL 0.3   Albumin      3.4 - 5.0 g/dL 3.8   Protein Total      6.8 - 8.8 g/dL 7.8   Alkaline Phosphatase      40 - 150 U/L 73   ALT      0 - 50 U/L 22   AST      0 - 45 U/L 16       OUTSIDE RECORDS  Outside referral notes and chart notes were reviewed and pertinent information has been summarized (in addition to the HPI):-Patient was seen in primary care.  Has issues with depression.  Had a visit in September that was virtual.  Was diagnosed with migraine with aura.  Is on sumatriptan the day worsening.  History of migraines with sumatriptan injections and tablets.  Injections only for severe headaches.  Headaches happen once every 2 months.  Migraines get to cluster.  No significant changes in headaches.  Some concerns with hep C.    MRI brain-images reviewed with the patient again.  There are very minimal T2 hyperintensities noted.  Is appropriate for age.  IMPRESSION:  1.  No acute intracranial abnormality.  2.  Small nonspecific focus of subcortical T2 FLAIR hyperintensity in the right frontal lobe is strictly nonspecific but can be seen in setting of migraines and prior insult or injury. Demyelination is not favored.      LABS  Component      Latest Ref Rng & Units 2/8/2022   WBC      4.0 - 11.0 10e3/uL 6.3   RBC Count      3.80 - 5.20 10e6/uL 4.36   Hemoglobin      11.7 - 15.7 g/dL 12.8   Hematocrit      35.0 - 47.0 % 39.1   MCV      78 - 100 fL 90   MCH      26.5 - 33.0 pg 29.4   MCHC      31.5 - 36.5 g/dL 32.7   RDW      10.0 - 15.0 % 13.2   Platelet Count      150 - 450 10e3/uL 332   % Neutrophils      % 51   %  Lymphocytes      % 38   % Monocytes      % 7   % Eosinophils      % 3   % Basophils      % 1   % Immature Granulocytes      % 0   Absolute Neutrophils      1.6 - 8.3 10e3/uL 3.2   Absolute Lymphocytes      0.8 - 5.3 10e3/uL 2.4   Absolute Monocytes      0.0 - 1.3 10e3/uL 0.5   Absolute Eosinophils      0.0 - 0.7 10e3/uL 0.2   Absolute Basophils      0.0 - 0.2 10e3/uL 0.1   Absolute Immature Granulocytes      <=0.4 10e3/uL 0.0   Vitamin B12      193 - 986 pg/mL 428   TSH      0.40 - 4.00 mU/L 2.28   Ferritin      8 - 252 ng/mL 70   Sed Rate      0 - 30 mm/hr 17       IMPRESSION/REPORT/PLAN  Intractable chronic migraine without aura and without status migrainosus  History of depression  Medication side effect  Left eye twitching    This is a 53 year old female with headache suggestive of chronic migraines.  Exam/MRI have been negative.  Blood work has been noncontributory.  There might be some lifestyle issues like irregular sleep schedule that might be leading to her headaches.  It can also be a trigger for headaches.  She does do some yoga which helps with the headaches.    T2 hyperintensities on MRI are age-appropriate.  Could be related to the migraines.    For prevention of headaches Topamax and propanolol have been tried in the past.  Nortriptyline has provided minimal benefit and will try increasing the dose further.  She is also on Prozac and will monitor for serotonin syndrome.  She further has been doing Botox which has been extremely helpful though because of cosmetic side effects wants to do a lower dose in the frontal region.  She does have more headaches in the right temporal and occipital region.  Shoulder tension could also be causing her headaches and we will try injecting more in the trapezius muscles next time and less in the frontal region.      She can continue using sumatriptan for abortive therapy and injectable sumatriptan (ordered pen per her request) for the more severe headaches.  She is  using the injections once every 3 months.  Encouraged her not to overuse her medications. Discussed about risk of coronary artery disease with these medications. Will monitor for side effects.  Continue sumatriptan.  She is using it less often now that the Botox is working.    With left eye twitching-do not see much on exam.  Previous imaging studies have been negative.  Could be related to ongoing stress or even migraines.  She does not think it is a side effect of any of her medication though she is on multiple antidepressant.  Could consider Botox injections for possible blepharospasm though it does not appear to severe.  Encouraged her to keep a log to see if she can identify triggers.  Currently this has improved and will monitor.  Stable.    Return in 6 months to document benefit from Botox to see if triggers like heat were making her headaches worse over the summer..  Botox in 1 month.    -     SUMAtriptan (IMITREX STATDOSE) 6 MG/0.5ML pen injector kit; Inject 0.5 mLs (6 mg) Subcutaneous at onset of headache for migraine May repeat in 1 hour. Max 12 mg/24 hours.  -     Botulinum Toxin Type A (BOTOX) 200 units injection 155 Units  - Reduce Botox by 50% in frontal region.  Inject more in the trapezius.  -     nortriptyline (PAMELOR) 10 MG capsule; Start with 4 caps/night. Increase by 1 cap/every few weeks. Max of 6 caps/night.  -     SUMAtriptan (IMITREX) 100 MG tablet; TAKE 1 TABLET(100 MG) BY MOUTH AT ONSET OF HEADACHE FOR MIGRAINE. MAY REPEAT IN 2 HOURS. MAX 4 TABLETS/ 24 HOURS STRENGTH: 100 MG    Return in about 6 months (around 2/15/2024) for In-Clinic Visit (must).    Over 32 minutes were spent coordinating the care for the patient on the day of the encounter.  This includes previsit, during visit and post visit activities as documented above.  Counseling patient.  Prescription management.  Refractory problem.  (Activities include but not inclusive of reviewing chart, reviewing outside records, reviewing  "labs and imaging study results as well as the images, patient visit time including getting history and exam,  use if applicable, review of test results with the patient and coming up with a plan in a shared model, counseling patient and family, education and answering patient questions, EMR , EMR diagnosis entry and problem list management, medication reconciliation and prescription management if applicable, paperwork if applicable, printing documents and documentation of the visit activities.)    Botox PA  \" The patient has a diagnosis of chronic migraines. She has more than 15 headache days a month with each headache lasting at least 4 hours despite use of triptans. Her headaches have been there for over 6 months. She has been screened for medication overuse headaches and she does not have that. She has tried nortriptyline, Topamax, Propranolol for 3 months without any significant benefit. I would request that the Botox be approved for her.  50% benefit after first session.\"      Chuy Dunn MD  Neurologist  [facility]  Tel:- [phone]    This note was dictated using voice recognition software.  Any grammatical or context distortions are unintentional and inherent to the software.  "

## 2023-08-15 NOTE — LETTER
8/15/2023         RE: Nikki Hardin  [address] [address] [address]        Dear Colleague,    Thank you for referring your patient, Nikki Hardin, to the [facility] [place]. Please see a copy of my visit note below.    NEUROLOGY OUTPATIENT PROGRESS NOTE   Aug 15, 2023     CHIEF COMPLAINT/REASON FOR VISIT/REASON FOR CONSULT  Patient presents with:  Follow Up    REASON FOR CONSULTATION- Headaches    REFERRAL SOURCE  Dr. Srinivasa Sofia  CC Dr. Srinivasa Sofia    HISTORY OF PRESENT ILLNESS  Nikki Hardin is a 53 year old female seen today for evaluation of headaches. She reports that she has a diagnosis of migraines since age 14. Previously her headaches were sporadic but still frequent. She estimates that she was having at least ~15 headaches a month. Over the last 2 to 3 months headaches have become much more frequent. She is having a headache every day. Reports no real reasons why the headaches might have gotten worse.    Headaches are generally in the temple on either side and then radiate to the neck. Occasionally she can have more frontal pressure. Takes a throbbing. There is some photophobia and phonophobia. She has never had any auras with the headaches. Reports triggers including working and doing remote work from home, inconsistent sleep, lack of regular meals, going out in the sun sometimes storms. She does have some neck pain though neck pain is worse at the time of headaches.    She is tried Topamax in the past which caused severe side effects. She is also tried propanolol without any benefit. Over-the-counter medications do not help. 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 She has been using sumatriptan injections and tablets for several years which have been working for her. Has tried Maxalt with benefit though insurance would not cover it anymore and then she had to stop it. The sumatriptan works better than the Maxalt.    4/6/22  Patient returns  today.  She did try the nortriptyline and that has helped with the headaches.  Currently is having 7 headaches a month.  Reports no changes in the nature of the headaches.  Imitrex still works with the abortive therapy.  She does complain of some somnolence in the morning with the nortriptyline.  Is taking 30 mg.  Tries to take it earlier in the evening.  Things are may be slightly turning the corner at this point though she is not sure if this is a good long-term medication or not.  Is interested in Botox.    10/31/22  Patient returns today.  Brought in the headache log today which I reviewed.  She is still having 10-12 headaches every month.  Previously these were at 15 headaches a month.  Headaches have been minimally improved with the nortriptyline.  Reports side effects of somnolence with the nortriptyline.  Is taking 30 mg.  Wants to get off the medication.  Imitrex is working for abortive therapy.  The Imitrex injections were given to her by the pharmacy that she wants the pen which I prescribed today.  Denies any other new symptoms.  No other triggers for her headaches.  Wants to proceed with the Botox since most of her headaches are coming from the shoulder tension.    11/30/22  Patient returns today.  Headaches are about the same as before.  She had called the clinic for an Imitrex prescription which is working for her.  Reports of a new problem ongoing for the last 1 month.  She does have some twitching of her left eye.  This can happen anytime.  There is no clear triggers that she is identified.  Drinks about 2 cups of coffee.  Drinks plenty of water.  No new medications.  The twitching is not severe enough that it causes the eye to close.  Is limited to the left eye only.    2/7/23  Patient returns today.  Headaches have significantly improved with the Botox.  She reports 50% reduction in frequency.  She is having 9 headaches a month.  Also reports that the headaches are less severe and not lasting that  long.  They are shorter in intensity.  They still would last for couple of days.  Sumatriptan is still effective.    Left eye twitching has not happened since the Botox.  She did go on a vacation and the stress is less at this point.    She further reports some cosmetic changes in the frontal region with the Botox and wants to avoid those in the future.  She wants to do a lower dose of Botox in the frontal region.    8/15/23  Patient returns today.  1.  In the last 3 months since her Botox she has had 2 headaches in the month of June 9 in the month of July and 7 in the month of August.  Her headaches are about the same as before though might have been worse because of heat and irregular sleep schedule.  She starts work again tomorrow.  2.  No further eye twitching with the Botox  3.  We are injecting less Botox in the frontal region and that has not made the headaches any severe  4.  Imitrex still works as abortive therapy.  5.  Nortriptyline is making her more sleepy though wants to try a higher dose to see if the headaches can get better.  6.  Does complain of stiff neck when she works on the computer which could be leading to the headaches.    Previous history is reviewed and this is unchanged.    PAST MEDICAL/SURGICAL HISTORY  Past Medical History:   Diagnosis Date     Abnormal Pap smear of cervix 11/22/2019    See problem list.      Anxiety      Colitis     lymphocytic colitis ?     Depressive disorder, not elsewhere classified     sees psych- Dr Blue     Migraine, unspecified, without mention of intractable migraine without mention of status migrainosus age 14 yrs     Other acne      PAIN IN THORACIC SPINE [724.1] 4/13/2006    chiropractic care     Raynaud disease     ? connective tissue dz.  ? sogren's     Patient Active Problem List   Diagnosis     Irritable bowel syndrome     Other acne     Migraine with aura and without status migrainosus, not intractable     Mild major depression (H)     Sedimentation  rate elevation     Elevated vitamin B12 level     Anemia, unspecified type     Hypervitaminosis B6     Lymphocytic colitis     ASCUS of cervix with negative high risk HPV     Sjogren's syndrome with inflammatory arthritis (H)   Significant for migraines, arthritis, depression, microcolitis    FAMILY HISTORY  Family History   Problem Relation Age of Onset     Neurologic Disorder Mother         MIGRIANES     C.A.D. Father         in his 60's     Depression Father      Heart Disease Father      Lipids Father      Hypertension Father      Arthritis Father      Sleep Disorder Father      Cerebrovascular Disease Father 78     Cancer - colorectal Paternal Grandmother      Cancer Maternal Grandfather         stomach     Heart Disease Maternal Grandfather      Depression Brother      Depression Brother      Asthma Daughter      Heart Disease Paternal Grandfather      Breast Cancer Maternal Aunt      Diabetes No family hx of    Positive for migraines in her mother and aunt.    SOCIAL HISTORY  Social History     Tobacco Use     Smoking status: Never     Smokeless tobacco: Never   Substance Use Topics     Alcohol use: Yes     Comment: occasional- once monthly     Drug use: No       SYSTEMS REVIEW  Twelve-system ROS was done and other than the HPI this was negative except for neck pain, dizziness, hearing loss, sleepiness during the day, headaches, depression, bowel problems.  No new concerns/issues.    MEDICATIONS  acetaminophen (TYLENOL) 325 MG tablet, Take 2 tablets by mouth every 4 hours as needed for pain.  Biotin 5000 MCG TABS, Take 1,000 mcg by mouth daily  budesonide (ENTOCORT EC) 3 MG EC capsule, Take 3 mg by mouth every other day  cholecalciferol 5000 UNITS CAPS, Take 1 capsule (5,000 Units) by mouth daily FOR VITAMIN D DEFICIENCY (LOW VITAMIN D)  Ferrous Sulfate (IRON) 28 MG TABS,   FLUoxetine (PROZAC) 40 MG capsule, Take 1 capsule (40 mg) by mouth daily  IBUPROFEN PO, Take 200 mg by mouth Takes 3 tablets  PRN  loperamide (IMODIUM A-D) 2 MG tablet, Take 2 mg by mouth daily as needed for diarrhea   Riboflavin 100 MG TABS, Take 400 mg by mouth  SUMAtriptan (IMITREX STATDOSE) 6 MG/0.5ML pen injector kit, Inject 0.5 mLs (6 mg) Subcutaneous at onset of headache for migraine May repeat in 1 hour. Max 12 mg/24 hours.    Botulinum Toxin Type A (BOTOX) 200 units injection 155 Units       PHYSICAL EXAMINATION  VITALS: /68   Pulse 84   Resp 16   Wt 61.2 kg (135 lb)   BMI 26.37 kg/m    GENERAL: Healthy appearing, alert, no acute distress, normal habitus.  CARDIOVASCULAR: Extremities warm and well perfused. Pulses present.   NEUROLOGICAL:  Patient is awake and oriented to self, place and time.  Attention span is normal.  Memory is grossly intact.  Language is fluent and follows commands appropriately.  Appropriate fund of knowledge. Cranial nerves 2-12 are intact. There is no pronator drift.  Motor exam shows 5/5 strength in all extremities.  Tone is symmetric bilaterally in upper and lower extremities.  (Previously reflexes are symmetric and 2+ in upper extremities and lower extremities. Sensory exam is grossly intact to light touch, pin prick and vibration.) Finger to nose and heel to shin is without dysmetria.  Romberg is negative.  Gait is normal and the patient is able to do tandem walk and walk on toes and heels.  Exam stable.    DIAGNOSTICS  CARDIOLOGY  EKG 2008  NS[facility]    RELEVANT LABS  Component      Latest Ref Rng & Units 12/22/2020   WBC      4.0 - 11.0 10e9/L 6.7   RBC Count      3.8 - 5.2 10e12/L 3.93   Hemoglobin      11.7 - 15.7 g/dL 11.4 (L)   Hematocrit      35.0 - 47.0 % 35.2   MCV      78 - 100 fl 90   MCH      26.5 - 33.0 pg 29.0   MCHC      31.5 - 36.5 g/dL 32.4   RDW      10.0 - 15.0 % 13.2   Platelet Count      150 - 450 10e9/L 297   % Neutrophils      % 70.5   % Lymphocytes      % 20.4   % Monocytes      % 6.6   % Eosinophils      % 2.1   % Basophils      % 0.4   Absolute Neutrophil      1.6 - 8.3  10e9/L 4.7   Absolute Lymphocytes      0.8 - 5.3 10e9/L 1.4   Absolute Monocytes      0.0 - 1.3 10e9/L 0.4   Absolute Eosinophils      0.0 - 0.7 10e9/L 0.1   Absolute Basophils      0.0 - 0.2 10e9/L 0.0   Diff Method       Automated Method   Sodium      133 - 144 mmol/L 138   Potassium      3.4 - 5.3 mmol/L 4.5   Chloride      94 - 109 mmol/L 107   Carbon Dioxide      20 - 32 mmol/L 27   Anion Gap      3 - 14 mmol/L 4   Glucose      70 - 99 mg/dL 79   Urea Nitrogen      7 - 30 mg/dL 12   Creatinine      0.52 - 1.04 mg/dL 0.78   GFR Estimate      >60 mL/min/1.73:m2 87   GFR Estimate If Black      >60 mL/min/1.73:m2 >90   Calcium      8.5 - 10.1 mg/dL 9.2   Bilirubin Total      0.2 - 1.3 mg/dL 0.3   Albumin      3.4 - 5.0 g/dL 3.8   Protein Total      6.8 - 8.8 g/dL 7.8   Alkaline Phosphatase      40 - 150 U/L 73   ALT      0 - 50 U/L 22   AST      0 - 45 U/L 16       OUTSIDE RECORDS  Outside referral notes and chart notes were reviewed and pertinent information has been summarized (in addition to the HPI):-Patient was seen in primary care.  Has issues with depression.  Had a visit in September that was virtual.  Was diagnosed with migraine with aura.  Is on sumatriptan the day worsening.  History of migraines with sumatriptan injections and tablets.  Injections only for severe headaches.  Headaches happen once every 2 months.  Migraines get to cluster.  No significant changes in headaches.  Some concerns with hep C.    MRI brain-images reviewed with the patient again.  There are very minimal T2 hyperintensities noted.  Is appropriate for age.  IMPRESSION:  1.  No acute intracranial abnormality.  2.  Small nonspecific focus of subcortical T2 FLAIR hyperintensity in the right frontal lobe is strictly nonspecific but can be seen in setting of migraines and prior insult or injury. Demyelination is not favored.      LABS  Component      Latest Ref Rng & Units 2/8/2022   WBC      4.0 - 11.0 10e3/uL 6.3   RBC Count       3.80 - 5.20 10e6/uL 4.36   Hemoglobin      11.7 - 15.7 g/dL 12.8   Hematocrit      35.0 - 47.0 % 39.1   MCV      78 - 100 fL 90   MCH      26.5 - 33.0 pg 29.4   MCHC      31.5 - 36.5 g/dL 32.7   RDW      10.0 - 15.0 % 13.2   Platelet Count      150 - 450 10e3/uL 332   % Neutrophils      % 51   % Lymphocytes      % 38   % Monocytes      % 7   % Eosinophils      % 3   % Basophils      % 1   % Immature Granulocytes      % 0   Absolute Neutrophils      1.6 - 8.3 10e3/uL 3.2   Absolute Lymphocytes      0.8 - 5.3 10e3/uL 2.4   Absolute Monocytes      0.0 - 1.3 10e3/uL 0.5   Absolute Eosinophils      0.0 - 0.7 10e3/uL 0.2   Absolute Basophils      0.0 - 0.2 10e3/uL 0.1   Absolute Immature Granulocytes      <=0.4 10e3/uL 0.0   Vitamin B12      193 - 986 pg/mL 428   TSH      0.40 - 4.00 mU/L 2.28   Ferritin      8 - 252 ng/mL 70   Sed Rate      0 - 30 mm/hr 17       IMPRESSION/REPORT/PLAN  Intractable chronic migraine without aura and without status migrainosus  History of depression  Medication side effect  Left eye twitching    This is a 53 year old female with headache suggestive of chronic migraines.  Exam/MRI have been negative.  Blood work has been noncontributory.  There might be some lifestyle issues like irregular sleep schedule that might be leading to her headaches.  It can also be a trigger for headaches.  She does do some yoga which helps with the headaches.    T2 hyperintensities on MRI are age-appropriate.  Could be related to the migraines.    For prevention of headaches Topamax and propanolol have been tried in the past.  Nortriptyline has provided minimal benefit and will try increasing the dose further.  She is also on Prozac and will monitor for serotonin syndrome.  She further has been doing Botox which has been extremely helpful though because of cosmetic side effects wants to do a lower dose in the frontal region.  She does have more headaches in the right temporal and occipital region.  Shoulder  Airway patent, Nasal mucosa clear. Mouth with normal mucosa tension could also be causing her headaches and we will try injecting more in the trapezius muscles next time and less in the frontal region.      She can continue using sumatriptan for abortive therapy and injectable sumatriptan (ordered pen per her request) for the more severe headaches.  She is using the injections once every 3 months.  Encouraged her not to overuse her medications. Discussed about risk of coronary artery disease with these medications. Will monitor for side effects.  Continue sumatriptan.  She is using it less often now that the Botox is working.    With left eye twitching-do not see much on exam.  Previous imaging studies have been negative.  Could be related to ongoing stress or even migraines.  She does not think it is a side effect of any of her medication though she is on multiple antidepressant.  Could consider Botox injections for possible blepharospasm though it does not appear to severe.  Encouraged her to keep a log to see if she can identify triggers.  Currently this has improved and will monitor.  Stable.    Return in 6 months to document benefit from Botox to see if triggers like heat were making her headaches worse over the summer..  Botox in 1 month.    -     SUMAtriptan (IMITREX STATDOSE) 6 MG/0.5ML pen injector kit; Inject 0.5 mLs (6 mg) Subcutaneous at onset of headache for migraine May repeat in 1 hour. Max 12 mg/24 hours.  -     Botulinum Toxin Type A (BOTOX) 200 units injection 155 Units  - Reduce Botox by 50% in frontal region.  Inject more in the trapezius.  -     nortriptyline (PAMELOR) 10 MG capsule; Start with 4 caps/night. Increase by 1 cap/every few weeks. Max of 6 caps/night.  -     SUMAtriptan (IMITREX) 100 MG tablet; TAKE 1 TABLET(100 MG) BY MOUTH AT ONSET OF HEADACHE FOR MIGRAINE. MAY REPEAT IN 2 HOURS. MAX 4 TABLETS/ 24 HOURS STRENGTH: 100 MG    Return in about 6 months (around 2/15/2024) for In-Clinic Visit (must).    Over 32 minutes were spent coordinating the  "care for the patient on the day of the encounter.  This includes previsit, during visit and post visit activities as documented above.  Counseling patient.  Prescription management.  Refractory problem.  (Activities include but not inclusive of reviewing chart, reviewing outside records, reviewing labs and imaging study results as well as the images, patient visit time including getting history and exam,  use if applicable, review of test results with the patient and coming up with a plan in a shared model, counseling patient and family, education and answering patient questions, EMR , EMR diagnosis entry and problem list management, medication reconciliation and prescription management if applicable, paperwork if applicable, printing documents and documentation of the visit activities.)    Botox PA  \" The patient has a diagnosis of chronic migraines. She has more than 15 headache days a month with each headache lasting at least 4 hours despite use of triptans. Her headaches have been there for over 6 months. She has been screened for medication overuse headaches and she does not have that. She has tried nortriptyline, Topamax, Propranolol for 3 months without any significant benefit. I would request that the Botox be approved for her.  50% benefit after first session.\"      Chuy Dunn MD  Neurologist  [facility]  Tel:- [phone]    This note was dictated using voice recognition software.  Any grammatical or context distortions are unintentional and inherent to the software.      Again, thank you for allowing me to participate in the care of your patient.        Sincerely,        Chuy Dunn MD  "

## 2023-09-05 ENCOUNTER — TRANSFERRED RECORDS (OUTPATIENT)
Dept: HEALTH INFORMATION MANAGEMENT | Facility: CLINIC | Age: 53
End: 2023-09-05
Payer: COMMERCIAL

## 2023-09-12 ENCOUNTER — OFFICE VISIT (OUTPATIENT)
Dept: NEUROLOGY | Facility: CLINIC | Age: 53
End: 2023-09-12
Payer: COMMERCIAL

## 2023-09-12 DIAGNOSIS — G43.719 INTRACTABLE CHRONIC MIGRAINE WITHOUT AURA AND WITHOUT STATUS MIGRAINOSUS: Primary | ICD-10-CM

## 2023-09-12 PROCEDURE — 64615 CHEMODENERV MUSC MIGRAINE: CPT | Performed by: PSYCHIATRY & NEUROLOGY

## 2023-09-12 NOTE — PROCEDURES
NEUROLOGY PROCEDURE NOTE  Sep 12, 2023      Chronic migraine Botox injection    CONSENT: The procedure was explained to the patient. The risks and benefits of the procedure and the alternatives were discussed with the patient. The patient was given an opportunity to ask any questions she had about the procedure. A verbal consent was obtained from the patient. A written consent is available in the chart.    PRE-PROCEDURE  Diagnosis: Chronic migraine  Headache frequency before the use of Botox:- 20 headache days per month  Headache frequency with Botox use:-9/month    EXAM  GENERAL: Healthy appearing, alert, no acute distress, normal habitus.  NEUROLOGICAL:  Patient is alert with fluent language.  Extraocular movements are intact.  Facial movements are present and symmetric.  No arm drift.    PROCEDURE SUMMARY:   The following muscles were identified after palpating for landmarks and the overlying skin was cleansed with alcohol. These muscles were then injected using a 30 guage- half  inch needle with the following doses of onabotulinumtoxin A. A 5 unit/ 0.1 ml dilution was used for the injections. A 2 x 100 unit vial was used.    Corrugators 2.5 units each side. (per patient request)  Procerus 2.5 units. (per patient request)  Frontalis 2.5x2 units each side. (per patient request)  Temporalis 20 units each side.  Occipitalis 15 units each side.  Paraspinals 13.75 units each side.  Trapezius 20 units each side.    Units Injected: 155 Units  Units Discarded: 45 Units  Lot Number and Expiration: [id number]; 2/26    The patient tolerated the procedure without any immediate complaints and will call the Neurology clinic if she has any problems or side effects from the medication. The patient will follow up in the Neurology clinic as previously decided.      Chuy Dunn MD  Neurologist  [facility]  [phone]

## 2023-09-12 NOTE — LETTER
9/12/2023         RE: Nikki Hardin  [address] [facility] [address]        Dear Colleague,    Thank you for referring your patient, Nikki ATKINSON Wilfred, to the [facility] [place]. Please see a copy of my visit note below.    See procedure note.       Again, thank you for allowing me to participate in the care of your patient.        Sincerely,        Chuy Dunn MD

## 2023-09-17 NOTE — MR AVS SNAPSHOT
After Visit Summary   6/26/2017    Nikki Hardin    MRN: [id number]           Patient Information     Date Of Birth          1970        Visit Information        Provider Department      6/26/2017 10:30 AM Goltz, Bennett Ezra, PA-C [facility] Instructions     Sleep restriction: Bedtime should be set based on wake up time and number of hours you feel you sleep per night on average.  Keep the same wake up time daily. Set alarm every day at the same time.  Do not rely on your insomnia to wake you up for the day.  Adjust bedtime by 15 minutes each week depending on percentage of night you are able to sleep (out of time you are allowed in bed). If you sleep >90% of the night, you may go to bed 15 minutes earlier. If you sleep less than 85% of your time in bed, go to bed 15 minutes later.  If between 85 and 90%, keep the same bed time.   Stimulus control: Only go to bed when sleepy. No TV in bed.  Leave room if cannot fall asleep within 20 minutes. While out of bed in the middle of the night, avoid bright lights, physical activity, work activities and chores. Do relaxing activities like reading or listening to relaxing music. Return to bed if starting to feel drowsy or after 30 minutes.        Exercise during the day, no strenuous exercise 4 hours before bed        Literature provided for Sleep Hygiene and Insomnia (American Academy of Sleep Medicine)    Instructions for treating Delayed Sleep Phase Syndrome:    Delayed Sleep Phase Syndrome (DSPS) means that your body's internal timing is set late compared to the 24 hour day. Therefore, it is often difficult to get up on time for work in the morning and sometimes difficult to fall asleep on time, in order to get enough sleep. People with DSPS often tend to like to stay up late on weekends and sleep in until between 10 AM and noon, sometimes even later.This is actually a bad habit that will perpetuate the problem. It  Recommended Community Resources   [facility] - Food Pantry    [address] [place] [address]    Phone: [phone]    Website: https://communityservicPocket Tales.[employer]/food-pantry/    Resource for: Food Insecurity   [facility] - Free Food Giveaway    [address] [address]    Phone: [phone]    Website: https://covucc.org/    Resource for: Food Insecurity   [facility] [employer]    [address] [place] [address]    Phone: [phone]    Website: http://www.innermissioninc.[employer]/    Resource for: Food Insecurity   [facility]    [address] [address]    Phone: [phone]    Website: https://restorationministries.net/recovery-programs/    Resource for: Alcohol Use, Inadequate Housing, Tobacco Use   [facility] - Soup Kitchen    [address] [place] [address]    Phone: [phone]    Website: http://oursaintjoseph.org    Resource for: Food Insecurity   [facility] - Food Pantry    [address], [facility] [place] [address]    Phone: [phone]    Website: https://Kosmix.org/resources/    Resource for: Food Insecurity   [employer] - Family Housing    [address] [address]    Phone: [phone]    Website: http://[employer]/family-housing    Resource for: Inadequate Housing    [employer] - Smoking Cessation       [address], [place] [address]       Phone:  [phone]       Website:  hhtps://www.lung.org       Resource for:  Tobacco Use  [facility]       [address] [place] [address]       Phone:  [phone]       Website:  https://www.catholiccharities.net       Resource for: Social Connections, Food Insecurity, Transportation  [facility]       [address] [place] [address]        Phone:  [phone] ext [phone] or [phone]       Website:  http://www.[employer]ide [employer]/departments/transportation/       Resource for:  Transportation  [employer]       [address], [place] [address]       Phone:  [phone] ([phone])       Website:  https://rtachicago.org       Resource for: Transportation  [facility]       [address] [address]       Phone:  [phone]       Website:  https://[facility].org/       Resource for:  Housing  [facility]       [address], [place] [address]       Phone:  [phone]       Website:  https://www.cedaorCoolClouds.net/find-services/       Resource for:  Financial Resources Strain, Food Insecurity, Housing  [facility]       [address] [place] [address]       Phone:  [phone]       Website:  https://[facility].org/       Resource for:  Financial Resource Strain, Food Insecurity, Housing  [facility]       [address] [place] [address]       Phone:  [phone]       Website:  https://[facility].org/       Resource for:  Housing  [facility]       [address], [place] [address]       Phone:  [phone]       Website:  https://[facility].org/       Resource for: Housing  [facility]       [address], [place] [address]       Phone:  [phone], ext. [phone] or [phone]       Resource for:  Social Connections, Physical ActivityHEART FAILURE  You have heart failure.  This means that your heart muscle is weakened and cannot pump blood the way it should.    MEDICATIONS:  See Medication List (keep list up to date and bring it to your doctor appointments).  Talk to your doctor if you have problems getting or taking medications.    VACCINES:  Most Recent Immunizations   Administered Date(s)  reinforces your body's tendency to be on that later schedule.    You should go to bed when you are sleepy and ready to sleep. During this entire process, you should not engage in activities that may make it worse, such as watching TV in bed, leaving the TV on all night, drinking any caffeine 6 hours before bed or exercising 1-2 hours before bed.     Start taking Melatonin, 1 mg tablet 3-5 hours before the time that you fall asleep on average (not your desired bedtime or time that you get in bed, but the time you normally fall asleep on your own).     Upon awakening, get exposure to sun-light for about 30-45 minutes. You do not need to look at the sun, in fact, this is dangerous. Reading the paper with the sun shining on you is adequate.  Alternatively, you may use a Seasonal Affective Disorder Lamp (intensity 10,000 Lux) instead of the sun. The lamp should be positioned 1-2 arms lengths away from you. They lamps are sold at [employer] such as [employer] or [employer]. A prescription can be written to get insurance coverage in some cases. They are also sold on Amazon.com.    Using the light and melatonin should help march your internal clock (known as your circadian rhythms) gradually earlier. As your bedtime advances, remember to take your melatonin earlier, keeping it 5 hours before your fall asleep time.    Avoid naps and sleeping in because sleeping during the day will delay your body's clock and you will have to start from scratch.     More information about light therapy:  If the cost of any light box is too much, you can also purchase a compact fluorescent all spectrum light bulb at a local hardware store for about $8.  The most commonly available bulb is 1400 lumen.  You would need two of these positioned within 1 meter of yourself to be equivalent to 2,500 lux.  The bulbs can be placed in a standard light fixture.  Additionally, they can be placed in a mountable fixture that is  Administered    COVID Moderna 0.5 mL 12Y+ 02/17/2022    Influenza, Unspecified Formulation 01/31/2019    Influenza, quadrivalent, preserve-free 10/29/2021    Pneumococcal Polysaccharide Vacc (Pneumovax 23) 11/17/2017       ACTIVITY:  Continue activity as you were doing in the hospital.  You can slowly increase to what you were doing before you were hospitalized.  Balance activity with rest.  Elevate legs when resting.  Daily Weight:  Weigh yourself first thing every morning (at the same time with same amount of clothes on).  Keep a record of your weights, and bring it to your doctor appointments.    SMOKING:  Avoid all tobacco products and secondhand smoke.  Smoking Cessation Counseling offered.  [place] Toll Free Quit Line: [phone]  [place] Toll Free Quite Line: 5-738-QUIT-YES ([phone]).     LOW SALT (SODIUM) DIET:  Limit salt to help prevent fluid build-up in your body.  This will help prevent shortness of breath and swelling of feet and ankles.  Avoid adding salt to food at the table and use products labeled \"low sodium\" or \"no salt\" (<300 mg per serving).  Avoid sarah salt foods like canned soup, sauces, bouillon, lunch meat, cheese, fast food, salty snacks, canned and packaged foods.    HEART FAILURE ACTION PLAN:  Use this plan to help you decide when to change your routine or call the doctor.    CALL 911 IF YOU:  Have pain or tightness in my chest.  Have trouble breathing.  Have dizzy spells or feel faint.  Feel anxious or like something bad will happen.    CONTACT YOUR DOCTOR (even on weekends and holidays) IF YOU:  Have to sleep sitting up.  Start coughing at night.  Notice swelling in your ankles or any part of your body.  Lose your appetite.  Gain more than 3 pounds in 1-2 days or 5 pounds in a week.  Lose more than 5 pounds in 2 days.  Become tired faster or feel yourself losing energy.  Have noisy breathing.      American Heart Association (AHA) - My HF Guide/Heart Failure Interactive  used in greenhouses.  Mountable fixtures are also available at [employer] stores for about $9.  Do not look directly at the light.  If you have any concerns regarding the safety of bright light therapy for you, it is recommended that you consult an ophthalmologist before using a light box.  If you have a condition that makes your eyes very sensitive to light, macular degeneration, a family history of such problems, or diabetic changes to your eyes, consult an ophthalmologist before using a light box. If you have anxiety disorder and have an increase in anxiety discontinue use.   Your BMI is Body mass index is 26.91 kg/(m^2).  Weight management is a personal decision.  If you are interested in exploring weight loss strategies, the following discussion covers the approaches that may be successful. Body mass index (BMI) is one way to tell whether you are at a healthy weight, overweight, or obese. It measures your weight in relation to your height.  A BMI of 18.5 to 24.9 is in the healthy range. A person with a BMI of 25 to 29.9 is considered overweight, and someone with a BMI of 30 or greater is considered obese. More than two-thirds of American adults are considered overweight or obese.  Being overweight or obese increases the risk for further weight gain. Excess weight may lead to heart disease and diabetes.  Creating and following plans for healthy eating and physical activity may help you improve your health.  Weight control is part of healthy lifestyle and includes exercise, emotional health, and healthy eating habits. Careful eating habits lifelong are the mainstay of weight control. Though there are significant health benefits from weight loss, long-term weight loss with diet alone may be very difficult to achieve- studies show long-term success with dietary management in less than 10% of people. Attaining a healthy weight may be especially difficult to achieve in those with severe obesity. In some cases,  Workbook  To help you manage your Heart Failure symptoms and reach your treatment goals, the American Heart Association offers a FREE Interactive Heart Failure Workbook online “Healthier Living with Heart Failure.   You may visit: http://ahaheartfailure.Goblinworks.com/ or scan the QR code:                                                                     medications, devices and surgical management might be considered.  What can you do?  If you are overweight or obese and are interested in methods for weight loss, you should discuss this with your provider.     Consider reducing daily calorie intake by 500 calories.     Keep a food journal.     Avoiding skipping meals, consider cutting portions instead.    Diet combined with exercise helps maintain muscle while optimizing fat loss. Strength training is particularly important for building and maintaining muscle mass. Exercise helps reduce stress, increase energy, and improves fitness. Increasing exercise without diet control, however, may not burn enough calories to loose weight.       Start walking three days a week 10-20 minutes at a time    Work towards walking thirty minutes five days a week     Eventually, increase the speed of your walking for 1-2 minutes at time    In addition, we recommend that you review healthy lifestyles and methods for weight loss available through the National Institutes of Health patient information sites:  http://win.niddk.nih.gov/publications/index.htm    And look into health and wellness programs that may be available through your health insurance provider, employer, local community center, or cole club.    Weight management plan: Patient was referred to their PCP to discuss a diet and exercise plan.              Follow-ups after your visit        Who to contact     If you have questions or need follow up information about today's clinic visit or your schedule please contact [facility] directly at [phone].  Normal or non-critical lab and imaging results will be communicated to you by MyChart, letter or phone within 4 business days after the clinic has received the results. If you do not hear from us within 7 days, please contact the clinic through MyChart or phone. If you have a critical or abnormal lab result, we will notify you by phone as soon as  possible.  Submit refill requests through [facility] or call your pharmacy and they will forward the refill request to us. Please allow 3 business days for your refill to be completed.          Additional Information About Your Visit        Desk[employer]hart Information     [facility] gives you secure access to your electronic health record. If you see a primary care provider, you can also send messages to your care team and make appointments. If you have questions, please call your primary care clinic.  If you do not have a primary care provider, please call [phone] and they will assist you.        Care EveryWhere ID     This is your Care EveryWhere ID. This could be used by other organizations to access your [place] medical records  [id number]        Your Vitals Were     Pulse Temperature Respirations Height Pulse Oximetry BMI (Body Mass Index)    66 97.5  F (36.4  C) (Oral) 16 1.524 m (5') 97% 26.91 kg/m2       Blood Pressure from Last 3 Encounters:   06/26/17 98/63   05/31/17 92/60   05/17/17 98/68    Weight from Last 3 Encounters:   06/26/17 62.5 kg (137 lb 12.8 oz)   05/31/17 63.4 kg (139 lb 11.2 oz)   05/17/17 64.3 kg (141 lb 11.2 oz)              Today, you had the following     No orders found for display       Primary Care Provider Office Phone # Fax #    Vandana Castellanos [phone]       [facility] [address] [facility] [address]        Equal Access to Services     SIMBA FARRAR : Hadii aad ku hadasho Soomaali, waaxda luqadaha, qaybta kaalmada adeegyada, samanta navarro . So [facility] [place] [phone].    ATENCIÓN: Si habla español, tiene a taveras disposición servicios gratuitos de asistencia lingüística. Llame al [phone].    We comply with applicable federal civil rights laws and [place] laws. We do not discriminate on the basis of race, color, national origin, age, disability sex, sexual orientation or gender identity.            Thank you!     Thank you for  choosing [facility]  for your care. Our goal is always to provide you with excellent care. Hearing back from our patients is one way we can continue to improve our services. Please take a few minutes to complete the written survey that you may receive in the mail after your visit with us. Thank you!             Your Updated Medication List - Protect others around you: Learn how to safely use, store and throw away your medicines at www.disposemymeds.org.          This list is accurate as of: 6/26/17 11:15 AM.  Always use your most recent med list.                   Brand Name Dispense Instructions for use Diagnosis    acetaminophen 325 MG tablet    TYLENOL    100 tablet    Take 2 tablets by mouth every 4 hours as needed for pain.        CALCIUM-MAGNESIUM PO      Take 2 tablets by mouth daily WITH POTASSIUM        cholecalciferol 5000 UNITS Caps     100 capsule    Take 1 capsule (5,000 Units) by mouth daily FOR VITAMIN D DEFICIENCY (LOW VITAMIN D)        ferrous fumarate 65 mg ([place]. FE)-Vitamin C 125 mg  MG Tabs tablet    VITRON C    100 tablet    Take 1 tablet by mouth daily INDICATION: IRON SUPPLEMENT    Migraine with aura and without status migrainosus, not intractable       FISH OIL CONCENTRATE PO      Take 850 mg by mouth daily        FLUoxetine 40 MG capsule    PROzac    90 capsule    Take 1 capsule (40 mg) by mouth daily    Depression with anxiety       IBUPROFEN PO      Take 200 mg by mouth Takes 3 tablets PRN        LOPERAMIDE A-D 2 MG tablet   Generic drug:  loperamide      Take 2 mg by mouth 4 times daily as needed for diarrhea        naproxen sodium 220 MG tablet    ANAPROX    60 tablet    Take by mouth as needed Takes 2 tablets PRN        rizatriptan 10 MG tablet    MAXALT    12 tablet    Take 1 tablet (10 mg) by mouth at onset of headache May repeat in 2 hours if needed: max 2/day;    Migraine with aura and without status migrainosus, not intractable       vitamin B complex with  vitamin C Tabs tablet      Take 1 tablet by mouth daily        Zinc Acetate (Oral) 50 MG Caps     90 capsule

## 2023-12-12 ENCOUNTER — OFFICE VISIT (OUTPATIENT)
Dept: NEUROLOGY | Facility: CLINIC | Age: 53
End: 2023-12-12
Payer: COMMERCIAL

## 2023-12-12 VITALS
SYSTOLIC BLOOD PRESSURE: 102 MMHG | WEIGHT: 135 LBS | RESPIRATION RATE: 16 BRPM | HEART RATE: 80 BPM | BODY MASS INDEX: 26.37 KG/M2 | DIASTOLIC BLOOD PRESSURE: 62 MMHG

## 2023-12-12 DIAGNOSIS — G43.719 INTRACTABLE CHRONIC MIGRAINE WITHOUT AURA AND WITHOUT STATUS MIGRAINOSUS: Primary | ICD-10-CM

## 2023-12-12 PROCEDURE — 64615 CHEMODENERV MUSC MIGRAINE: CPT | Performed by: PSYCHIATRY & NEUROLOGY

## 2023-12-12 NOTE — LETTER
12/12/2023         RE: Nikki Hardin  [address] [address]        Dear Colleague,    Thank you for referring your patient, Nikki ATKINSON Wilfred, to the [facility] [place]. Please see a copy of my visit note below.    See procedure note.       Again, thank you for allowing me to participate in the care of your patient.        Sincerely,        Chuy Dunn MD

## 2023-12-12 NOTE — PROCEDURES
NEUROLOGY PROCEDURE NOTE  Dec 12, 2023      Chronic migraine Botox injection    CONSENT: The procedure was explained to the patient. The risks and benefits of the procedure and the alternatives were discussed with the patient. The patient was given an opportunity to ask any questions she had about the procedure. A verbal consent was obtained from the patient. A written consent is available in the chart.    PRE-PROCEDURE  Diagnosis: Chronic migraine  Headache frequency before the use of Botox:- 20 headache days per month  Headache frequency with Botox use:-7/month    EXAM  GENERAL: Healthy appearing, alert, no acute distress, normal habitus.  NEUROLOGICAL:  Patient is alert with fluent language.  Extraocular movements are intact.  Facial movements are present and symmetric.  No arm drift.    PROCEDURE SUMMARY:   The following muscles were identified after palpating for landmarks and the overlying skin was cleansed with alcohol. These muscles were then injected using a 30 guage- half  inch needle with the following doses of onabotulinumtoxin A. A 5 unit/ 0.1 ml dilution was used for the injections. A 2 x 100 unit vial was used.    Corrugators 2.5 units each side. (per patient request)  Procerus 2.5 units. (per patient request)  Frontalis 2.5x2 units each side. (per patient request)  Temporalis 20 units each side.  Occipitalis 15 units each side.  Paraspinals 13.75 units each side.  Trapezius 20 units each side.    Units Injected: 155 Units  Units Discarded: 45 Units  Lot Number and Expiration: [id number]; 4/26    The patient tolerated the procedure without any immediate complaints and will call the Neurology clinic if she has any problems or side effects from the medication. The patient will follow up in the Neurology clinic as previously decided.      Chuy Dunn MD  Neurologist  [facility]  [phone]

## 2023-12-27 ENCOUNTER — ANCILLARY PROCEDURE (OUTPATIENT)
Dept: BONE DENSITY | Facility: CLINIC | Age: 53
End: 2023-12-27
Attending: FAMILY MEDICINE
Payer: COMMERCIAL

## 2023-12-27 DIAGNOSIS — Z13.820 SCREENING FOR OSTEOPOROSIS: ICD-10-CM

## 2023-12-27 DIAGNOSIS — Z79.52 LONG TERM SYSTEMIC STEROID USER: ICD-10-CM

## 2023-12-27 PROCEDURE — 77080 DXA BONE DENSITY AXIAL: CPT | Mod: TC | Performed by: RADIOLOGY

## 2023-12-28 ENCOUNTER — MYC MEDICAL ADVICE (OUTPATIENT)
Dept: FAMILY MEDICINE | Facility: CLINIC | Age: 53
End: 2023-12-28
Payer: COMMERCIAL

## 2023-12-28 NOTE — TELEPHONE ENCOUNTER
Dr. Sofia please review. Writer reviewed your message and is prepared to advise yes at scheduled March visit. Please advise if you meant to be seen sooner etc-thanks!

## 2024-01-20 DIAGNOSIS — G43.109 MIGRAINE WITH AURA AND WITHOUT STATUS MIGRAINOSUS, NOT INTRACTABLE: ICD-10-CM

## 2024-01-22 RX ORDER — CEFUROXIME AXETIL 250 MG/1
TABLET ORAL
Qty: 3 ML | Refills: 0 | Status: SHIPPED | OUTPATIENT
Start: 2024-01-22 | End: 2024-02-13

## 2024-01-22 NOTE — TELEPHONE ENCOUNTER
PLEASE REVIEW  Patient has oral sumatriptan, botox injections and the last time this was filled was 10/31/22.     Refill request for: Sumatriptan    Directions:  Inject 0.5 mL under the skin at onset of headache, may repeat in 1 hour     LOV: 8/15/23  NOV: 2/13/24

## 2024-01-24 ENCOUNTER — TRANSFERRED RECORDS (OUTPATIENT)
Dept: HEALTH INFORMATION MANAGEMENT | Facility: CLINIC | Age: 54
End: 2024-01-24
Payer: COMMERCIAL

## 2024-01-30 ENCOUNTER — TELEPHONE (OUTPATIENT)
Dept: NEUROLOGY | Facility: CLINIC | Age: 54
End: 2024-01-30
Payer: COMMERCIAL

## 2024-02-12 NOTE — TELEPHONE ENCOUNTER
PA Initiation    Medication: SUMATRIPTAN SUCCINATE 6 MG/0.5ML SC SOAJ  Insurance Company: [employer] - Phone [phone] Fax [phone]  [id number] [address]  Filling Pharmacy Phone: [phone]  Filling Pharmacy Fax:    Start Date: 2/12/2024

## 2024-02-13 ENCOUNTER — OFFICE VISIT (OUTPATIENT)
Dept: NEUROLOGY | Facility: CLINIC | Age: 54
End: 2024-02-13
Payer: COMMERCIAL

## 2024-02-13 VITALS
HEART RATE: 76 BPM | WEIGHT: 136 LBS | DIASTOLIC BLOOD PRESSURE: 68 MMHG | RESPIRATION RATE: 16 BRPM | BODY MASS INDEX: 26.56 KG/M2 | SYSTOLIC BLOOD PRESSURE: 92 MMHG

## 2024-02-13 DIAGNOSIS — R25.3 EYELID TWITCH: Primary | ICD-10-CM

## 2024-02-13 DIAGNOSIS — G43.109 MIGRAINE WITH AURA AND WITHOUT STATUS MIGRAINOSUS, NOT INTRACTABLE: ICD-10-CM

## 2024-02-13 DIAGNOSIS — M54.2 NECK PAIN: ICD-10-CM

## 2024-02-13 DIAGNOSIS — G43.719 INTRACTABLE CHRONIC MIGRAINE WITHOUT AURA AND WITHOUT STATUS MIGRAINOSUS: ICD-10-CM

## 2024-02-13 PROCEDURE — 99214 OFFICE O/P EST MOD 30 MIN: CPT | Performed by: PSYCHIATRY & NEUROLOGY

## 2024-02-13 RX ORDER — SUMATRIPTAN 100 MG/1
TABLET, FILM COATED ORAL
Qty: 18 TABLET | Refills: 11 | Status: SHIPPED | OUTPATIENT
Start: 2024-02-13

## 2024-02-13 RX ORDER — TIZANIDINE 2 MG/1
2 TABLET ORAL 3 TIMES DAILY PRN
Qty: 180 TABLET | Refills: 0 | Status: SHIPPED | OUTPATIENT
Start: 2024-02-13

## 2024-02-13 RX ORDER — NORTRIPTYLINE HCL 10 MG
CAPSULE ORAL
Qty: 270 CAPSULE | Refills: 1 | Status: SHIPPED | OUTPATIENT
Start: 2024-02-13 | End: 2024-09-17

## 2024-02-13 RX ORDER — CEFUROXIME AXETIL 250 MG/1
TABLET ORAL
Qty: 12 ML | Refills: 3 | Status: SHIPPED | OUTPATIENT
Start: 2024-02-13

## 2024-02-13 NOTE — LETTER
2/13/2024         RE: Nikki Hardin  [address] [id number]        Dear Colleague,    Thank you for referring your patient, Nikki Hardin, to the [facility] [place]. Please see a copy of my visit note below.    NEUROLOGY OUTPATIENT PROGRESS NOTE   Feb 13, 2024     CHIEF COMPLAINT/REASON FOR VISIT/REASON FOR CONSULT  Patient presents with:  Follow Up    REASON FOR CONSULTATION- Headaches    REFERRAL SOURCE  Dr. Srinivasa Sofia  CC Dr. Srinivasa Sofia    HISTORY OF PRESENT ILLNESS  Nikki Hardin is a 53 year old female seen today for evaluation of headaches. She reports that she has a diagnosis of migraines since age 14. Previously her headaches were sporadic but still frequent. She estimates that she was having at least ~15 headaches a month. Over the last 2 to 3 months headaches have become much more frequent. She is having a headache every day. Reports no real reasons why the headaches might have gotten worse.    Headaches are generally in the temple on either side and then radiate to the neck. Occasionally she can have more frontal pressure. Takes a throbbing. There is some photophobia and phonophobia. She has never had any auras with the headaches. Reports triggers including working and doing remote work from home, inconsistent sleep, lack of regular meals, going out in the sun sometimes storms. She does have some neck pain though neck pain is worse at the time of headaches.    She is tried Topamax in the past which caused severe side effects. She is also tried propanolol without any benefit. Over-the-counter medications do not help. 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 She has been using sumatriptan injections and tablets for several years which have been working for her. Has tried Maxalt with benefit though insurance would not cover it anymore and then she had to stop it. The sumatriptan works better than the Maxalt.    4/6/22  Patient returns  today.  She did try the nortriptyline and that has helped with the headaches.  Currently is having 7 headaches a month.  Reports no changes in the nature of the headaches.  Imitrex still works with the abortive therapy.  She does complain of some somnolence in the morning with the nortriptyline.  Is taking 30 mg.  Tries to take it earlier in the evening.  Things are may be slightly turning the corner at this point though she is not sure if this is a good long-term medication or not.  Is interested in Botox.    10/31/22  Patient returns today.  Brought in the headache log today which I reviewed.  She is still having 10-12 headaches every month.  Previously these were at 15 headaches a month.  Headaches have been minimally improved with the nortriptyline.  Reports side effects of somnolence with the nortriptyline.  Is taking 30 mg.  Wants to get off the medication.  Imitrex is working for abortive therapy.  The Imitrex injections were given to her by the pharmacy that she wants the pen which I prescribed today.  Denies any other new symptoms.  No other triggers for her headaches.  Wants to proceed with the Botox since most of her headaches are coming from the shoulder tension.    11/30/22  Patient returns today.  Headaches are about the same as before.  She had called the clinic for an Imitrex prescription which is working for her.  Reports of a new problem ongoing for the last 1 month.  She does have some twitching of her left eye.  This can happen anytime.  There is no clear triggers that she is identified.  Drinks about 2 cups of coffee.  Drinks plenty of water.  No new medications.  The twitching is not severe enough that it causes the eye to close.  Is limited to the left eye only.    2/7/23  Patient returns today.  Headaches have significantly improved with the Botox.  She reports 50% reduction in frequency.  She is having 9 headaches a month.  Also reports that the headaches are less severe and not lasting that  long.  They are shorter in intensity.  They still would last for couple of days.  Sumatriptan is still effective.    Left eye twitching has not happened since the Botox.  She did go on a vacation and the stress is less at this point.    She further reports some cosmetic changes in the frontal region with the Botox and wants to avoid those in the future.  She wants to do a lower dose of Botox in the frontal region.    8/15/23  Patient returns today.  1.  In the last 3 months since her Botox she has had 2 headaches in the month of June 9 in the month of July and 7 in the month of August.  Her headaches are about the same as before though might have been worse because of heat and irregular sleep schedule.  She starts work again tomorrow.  2.  No further eye twitching with the Botox  3.  We are injecting less Botox in the frontal region and that has not made the headaches any severe  4.  Imitrex still works as abortive therapy.  5.  Nortriptyline is making her more sleepy though wants to try a higher dose to see if the headaches can get better.  6.  Does complain of stiff neck when she works on the computer which could be leading to the headaches.    2/13/24  Patient returns today  1.  She is getting good benefit with the Botox though does have a few more headaches in the first month and the first half of the second month.  Second and third month do better.  We discussed that this is most likely related to other triggers and the Botox is working well.  2.  She is concerned about daytime fatigue with the nortriptyline and wants to cut down the dose.  It is providing some benefit with sleep  3.  Imitrex is working with abortive therapy.  Insurance is not approving both the oral and injections.  4.  Neck pain is better with the Botox.  Still has a lot of tension when she is working at the computer.  5.  No longer is having eye twitching.  No other new concerns.    Previous history is reviewed and this is unchanged.    PAST  MEDICAL/SURGICAL HISTORY  Past Medical History:   Diagnosis Date     Abnormal Pap smear of cervix 11/22/2019    See problem list.      Anxiety      Colitis     lymphocytic colitis ?     Depressive disorder, not elsewhere classified     sees psych- Dr Blue     Migraine, unspecified, without mention of intractable migraine without mention of status migrainosus age 14 yrs     Other acne      PAIN IN THORACIC SPINE [724.1] 4/13/2006    chiropractic care     Raynaud disease     ? connective tissue dz.  ? sogren's     Patient Active Problem List   Diagnosis     Irritable bowel syndrome     Other acne     Migraine with aura and without status migrainosus, not intractable     Mild major depression (H)     Sedimentation rate elevation     Elevated vitamin B12 level     Anemia, unspecified type     Hypervitaminosis B6     Lymphocytic colitis     ASCUS of cervix with negative high risk HPV     Sjogren's syndrome with inflammatory arthritis (H24)   Significant for migraines, arthritis, depression, microcolitis    FAMILY HISTORY  Family History   Problem Relation Age of Onset     Neurologic Disorder Mother         MIGRIANES     C.A.D. Father         in his 60's     Depression Father      Heart Disease Father      Lipids Father      Hypertension Father      Arthritis Father      Sleep Disorder Father      Cerebrovascular Disease Father 78     Cancer - colorectal Paternal Grandmother      Cancer Maternal Grandfather         stomach     Heart Disease Maternal Grandfather      Depression Brother      Depression Brother      Asthma Daughter      Heart Disease Paternal Grandfather      Breast Cancer Maternal Aunt      Diabetes No family hx of    Positive for migraines in her mother and aunt.    SOCIAL HISTORY  Social History     Tobacco Use     Smoking status: Never     Smokeless tobacco: Never   Substance Use Topics     Alcohol use: Yes     Comment: occasional- once monthly     Drug use: No       SYSTEMS REVIEW  Twelve-system ROS  was done and other than the HPI this was negative except for neck pain, dizziness, hearing loss, sleepiness during the day, headaches, depression, bowel problems.  No new symptoms/issues.    MEDICATIONS  acetaminophen (TYLENOL) 325 MG tablet, Take 2 tablets by mouth every 4 hours as needed for pain.  Biotin 5000 MCG TABS, Take 1,000 mcg by mouth daily  budesonide (ENTOCORT EC) 3 MG EC capsule, Take 3 mg by mouth every other day  cholecalciferol 5000 UNITS CAPS, Take 1 capsule (5,000 Units) by mouth daily FOR VITAMIN D DEFICIENCY (LOW VITAMIN D)  Ferrous Sulfate (IRON) 28 MG TABS,   FLUoxetine (PROZAC) 40 MG capsule, Take 1 capsule (40 mg) by mouth daily  IBUPROFEN PO, Take 200 mg by mouth Takes 3 tablets PRN  loperamide (IMODIUM A-D) 2 MG tablet, Take 2 mg by mouth daily as needed for diarrhea   Riboflavin 100 MG TABS, Take 400 mg by mouth    No current facility-administered medications on file prior to visit.     PHYSICAL EXAMINATION  VITALS: BP 92/68   Pulse 76   Resp 16   Wt 61.7 kg (136 lb)   BMI 26.56 kg/m    GENERAL: Healthy appearing, alert, no acute distress, normal habitus.  CARDIOVASCULAR: Extremities warm and well perfused. Pulses present.   NEUROLOGICAL:  Patient is awake and oriented to self, place and time.  Attention span is normal.  Memory is grossly intact.  Language is fluent and follows commands appropriately.  Appropriate fund of knowledge. Cranial nerves 2-12 are intact. There is no pronator drift.  Motor exam shows 5/5 strength in all extremities.  Tone is symmetric bilaterally in upper and lower extremities.  (Previously reflexes are symmetric and 2+ in upper extremities and lower extremities. Sensory exam is grossly intact to light touch, pin prick and vibration.) Finger to nose and heel to shin is without dysmetria.  Romberg is negative.  Gait is normal and the patient is able to do tandem walk and walk on toes and heels.  No change in exam.    DIAGNOSTICS  CARDIOLOGY  EKG  2008  [facility]    RELEVANT LABS  Component      Latest Ref Rng & Units 12/22/2020   WBC      4.0 - 11.0 10e9/L 6.7   RBC Count      3.8 - 5.2 10e12/L 3.93   Hemoglobin      11.7 - 15.7 g/dL 11.4 (L)   Hematocrit      35.0 - 47.0 % 35.2   MCV      78 - 100 fl 90   MCH      26.5 - 33.0 pg 29.0   MCHC      31.5 - 36.5 g/dL 32.4   RDW      10.0 - 15.0 % 13.2   Platelet Count      150 - 450 10e9/L 297   % Neutrophils      % 70.5   % Lymphocytes      % 20.4   % Monocytes      % 6.6   % Eosinophils      % 2.1   % Basophils      % 0.4   Absolute Neutrophil      1.6 - 8.3 10e9/L 4.7   Absolute Lymphocytes      0.8 - 5.3 10e9/L 1.4   Absolute Monocytes      0.0 - 1.3 10e9/L 0.4   Absolute Eosinophils      0.0 - 0.7 10e9/L 0.1   Absolute Basophils      0.0 - 0.2 10e9/L 0.0   Diff Method       Automated Method   Sodium      133 - 144 mmol/L 138   Potassium      3.4 - 5.3 mmol/L 4.5   Chloride      94 - 109 mmol/L 107   Carbon Dioxide      20 - 32 mmol/L 27   Anion Gap      3 - 14 mmol/L 4   Glucose      70 - 99 mg/dL 79   Urea Nitrogen      7 - 30 mg/dL 12   Creatinine      0.52 - 1.04 mg/dL 0.78   GFR Estimate      >60 mL/min/1.73:m2 87   GFR Estimate If Black      >60 mL/min/1.73:m2 >90   Calcium      8.5 - 10.1 mg/dL 9.2   Bilirubin Total      0.2 - 1.3 mg/dL 0.3   Albumin      3.4 - 5.0 g/dL 3.8   Protein Total      6.8 - 8.8 g/dL 7.8   Alkaline Phosphatase      40 - 150 U/L 73   ALT      0 - 50 U/L 22   AST      0 - 45 U/L 16       OUTSIDE RECORDS  Outside referral notes and chart notes were reviewed and pertinent information has been summarized (in addition to the HPI):-Patient was seen in primary care.  Has issues with depression.  Had a visit in September that was virtual.  Was diagnosed with migraine with aura.  Is on sumatriptan the day worsening.  History of migraines with sumatriptan injections and tablets.  Injections only for severe headaches.  Headaches happen once every 2 months.  Migraines get to cluster.  No  significant changes in headaches.  Some concerns with hep C.    MRI brain-images reviewed with the patient again.  There are very minimal T2 hyperintensities noted.  Is appropriate for age.  IMPRESSION:  1.  No acute intracranial abnormality.  2.  Small nonspecific focus of subcortical T2 FLAIR hyperintensity in the right frontal lobe is strictly nonspecific but can be seen in setting of migraines and prior insult or injury. Demyelination is not favored.      LABS  Component      Latest Ref Rng & Units 2/8/2022   WBC      4.0 - 11.0 10e3/uL 6.3   RBC Count      3.80 - 5.20 10e6/uL 4.36   Hemoglobin      11.7 - 15.7 g/dL 12.8   Hematocrit      35.0 - 47.0 % 39.1   MCV      78 - 100 fL 90   MCH      26.5 - 33.0 pg 29.4   MCHC      31.5 - 36.5 g/dL 32.7   RDW      10.0 - 15.0 % 13.2   Platelet Count      150 - 450 10e3/uL 332   % Neutrophils      % 51   % Lymphocytes      % 38   % Monocytes      % 7   % Eosinophils      % 3   % Basophils      % 1   % Immature Granulocytes      % 0   Absolute Neutrophils      1.6 - 8.3 10e3/uL 3.2   Absolute Lymphocytes      0.8 - 5.3 10e3/uL 2.4   Absolute Monocytes      0.0 - 1.3 10e3/uL 0.5   Absolute Eosinophils      0.0 - 0.7 10e3/uL 0.2   Absolute Basophils      0.0 - 0.2 10e3/uL 0.1   Absolute Immature Granulocytes      <=0.4 10e3/uL 0.0   Vitamin B12      193 - 986 pg/mL 428   TSH      0.40 - 4.00 mU/L 2.28   Ferritin      8 - 252 ng/mL 70   Sed Rate      0 - 30 mm/hr 17       IMPRESSION/REPORT/PLAN  Intractable chronic migraine without aura and without status migrainosus  History of depression  Medication side effect  Left eye twitching  Insomnia    This is a 53 year old female with headache suggestive of chronic migraines.  Exam/MRI have been negative.  Blood work has been noncontributory.  There might be some lifestyle issues like irregular sleep schedule that might be leading to her headaches.  It can also be a trigger for headaches.  She does do some yoga which helps with  the headaches.  Does have neck issues related to working on the computer too long which could also be leading to headaches.  MRI showed some T2 hyperintensities.  No cause for headaches    For prevention of headaches she has tried Topamax and propranolol in the past.  Nortriptyline has provided minimal benefit and possibly helping with sleep.  She does complain of daytime fatigue with the nortriptyline will reduce the dose.  Will add tizanidine for neck pain/neck tension provoked headaches to see if it helps.    Botox is helping.  Because of cosmetic reasons she does not want us injecting a lot in the frontal region.  Will inject more in the trapezius region to see if that helps with the headaches.  Headaches are more in the right temporal and occipital region.    For abortive therapy will continue oral and injectable Imitrex.    Previously she was having some left eyelid twitching which is now improved.  Will monitor.    Return in 6 months for medical visit and continue Botox every 3 months.    -     nortriptyline (PAMELOR) 10 MG capsule; Start with 3 caps/night.  -     tiZANidine (ZANAFLEX) 2 MG tablet; Take 1 tablet (2 mg) by mouth 3 times daily as needed for muscle spasms  -     SUMAtriptan (IMITREX STATDOSE) 6 MG/0.5ML pen injector kit; INJECT 0.5 ML UNDER THE SKIN AT ONSET OF HEADACHE FOR MIGRAINE. MAY REPEAT IN ONE HOUR. MAX 12 MG IN 24 HOURS  -     SUMAtriptan (IMITREX) 100 MG tablet; TAKE 1 TABLET(100 MG) BY MOUTH AT ONSET OF HEADACHE FOR MIGRAINE. MAY REPEAT IN 2 HOURS. MAX 4 TABLETS/ 24 HOURS STRENGTH: 100 MG  - Botox every 3 months    Return in about 6 months (around 8/13/2024) for In-Clinic Visit (must).    Over 30 minutes were spent coordinating the care for the patient on the day of the encounter.  This includes previsit, during visit and post visit activities as documented above.  Counseling patient.  Prescription management.  Multiple issues reviewed/addressed.  (Activities include but not inclusive  "of reviewing chart, reviewing outside records, reviewing labs and imaging study results as well as the images, patient visit time including getting history and exam,  use if applicable, review of test results with the patient and coming up with a plan in a shared model, counseling patient and family, education and answering patient questions, EMR , EMR diagnosis entry and problem list management, medication reconciliation and prescription management if applicable, paperwork if applicable, printing documents and documentation of the visit activities.)    Botox PA  \" The patient has a diagnosis of chronic migraines. She has more than 15 headache days a month with each headache lasting at least 4 hours despite use of triptans. Her headaches have been there for over 6 months. She has been screened for medication overuse headaches and she does not have that. She has tried nortriptyline, Topamax, Propranolol for 3 months without any significant benefit. I would request that the Botox be approved for her.  60-70% benefit after multiple sessions.\"      Chuy Dunn MD  Neurologist  [facility]  Tel:- [phone]    This note was dictated using voice recognition software.  Any grammatical or context distortions are unintentional and inherent to the software.      Again, thank you for allowing me to participate in the care of your patient.        Sincerely,        Chuy Dunn MD  "

## 2024-02-13 NOTE — PROGRESS NOTES
NEUROLOGY OUTPATIENT PROGRESS NOTE   Feb 13, 2024     CHIEF COMPLAINT/REASON FOR VISIT/REASON FOR CONSULT  Patient presents with:  Follow Up    REASON FOR CONSULTATION- Headaches    REFERRAL SOURCE  Dr. Srinivasa Sofia  CC Dr. Srinivasa Sofia    HISTORY OF PRESENT ILLNESS  Nikki Hardin is a 53 year old female seen today for evaluation of headaches. She reports that she has a diagnosis of migraines since age 14. Previously her headaches were sporadic but still frequent. She estimates that she was having at least ~15 headaches a month. Over the last 2 to 3 months headaches have become much more frequent. She is having a headache every day. Reports no real reasons why the headaches might have gotten worse.    Headaches are generally in the temple on either side and then radiate to the neck. Occasionally she can have more frontal pressure. Takes a throbbing. There is some photophobia and phonophobia. She has never had any auras with the headaches. Reports triggers including working and doing remote work from home, inconsistent sleep, lack of regular meals, going out in the sun sometimes storms. She does have some neck pain though neck pain is worse at the time of headaches.    She is tried Topamax in the past which caused severe side effects. She is also tried propanolol without any benefit. Over-the-counter medications do not help. 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 She has been using sumatriptan injections and tablets for several years which have been working for her. Has tried Maxalt with benefit though insurance would not cover it anymore and then she had to stop it. The sumatriptan works better than the Maxalt.    4/6/22  Patient returns today.  She did try the nortriptyline and that has helped with the headaches.  Currently is having 7 headaches a month.  Reports no changes in the nature of the headaches.  Imitrex still works with the abortive therapy.  She does complain of some somnolence in the  morning with the nortriptyline.  Is taking 30 mg.  Tries to take it earlier in the evening.  Things are may be slightly turning the corner at this point though she is not sure if this is a good long-term medication or not.  Is interested in Botox.    10/31/22  Patient returns today.  Brought in the headache log today which I reviewed.  She is still having 10-12 headaches every month.  Previously these were at 15 headaches a month.  Headaches have been minimally improved with the nortriptyline.  Reports side effects of somnolence with the nortriptyline.  Is taking 30 mg.  Wants to get off the medication.  Imitrex is working for abortive therapy.  The Imitrex injections were given to her by the pharmacy that she wants the pen which I prescribed today.  Denies any other new symptoms.  No other triggers for her headaches.  Wants to proceed with the Botox since most of her headaches are coming from the shoulder tension.    11/30/22  Patient returns today.  Headaches are about the same as before.  She had called the clinic for an Imitrex prescription which is working for her.  Reports of a new problem ongoing for the last 1 month.  She does have some twitching of her left eye.  This can happen anytime.  There is no clear triggers that she is identified.  Drinks about 2 cups of coffee.  Drinks plenty of water.  No new medications.  The twitching is not severe enough that it causes the eye to close.  Is limited to the left eye only.    2/7/23  Patient returns today.  Headaches have significantly improved with the Botox.  She reports 50% reduction in frequency.  She is having 9 headaches a month.  Also reports that the headaches are less severe and not lasting that long.  They are shorter in intensity.  They still would last for couple of days.  Sumatriptan is still effective.    Left eye twitching has not happened since the Botox.  She did go on a vacation and the stress is less at this point.    She further reports some  cosmetic changes in the frontal region with the Botox and wants to avoid those in the future.  She wants to do a lower dose of Botox in the frontal region.    8/15/23  Patient returns today.  1.  In the last 3 months since her Botox she has had 2 headaches in the month of June 9 in the month of July and 7 in the month of August.  Her headaches are about the same as before though might have been worse because of heat and irregular sleep schedule.  She starts work again tomorrow.  2.  No further eye twitching with the Botox  3.  We are injecting less Botox in the frontal region and that has not made the headaches any severe  4.  Imitrex still works as abortive therapy.  5.  Nortriptyline is making her more sleepy though wants to try a higher dose to see if the headaches can get better.  6.  Does complain of stiff neck when she works on the computer which could be leading to the headaches.    2/13/24  Patient returns today  1.  She is getting good benefit with the Botox though does have a few more headaches in the first month and the first half of the second month.  Second and third month do better.  We discussed that this is most likely related to other triggers and the Botox is working well.  2.  She is concerned about daytime fatigue with the nortriptyline and wants to cut down the dose.  It is providing some benefit with sleep  3.  Imitrex is working with abortive therapy.  Insurance is not approving both the oral and injections.  4.  Neck pain is better with the Botox.  Still has a lot of tension when she is working at the computer.  5.  No longer is having eye twitching.  No other new concerns.    Previous history is reviewed and this is unchanged.    PAST MEDICAL/SURGICAL HISTORY  Past Medical History:   Diagnosis Date    Abnormal Pap smear of cervix 11/22/2019    See problem list.     Anxiety     Colitis     lymphocytic colitis ?    Depressive disorder, not elsewhere classified     sees psych- Dr Blue     Migraine, unspecified, without mention of intractable migraine without mention of status migrainosus age 14 yrs    Other acne     PAIN IN THORACIC SPINE [724.1] 4/13/2006    chiropractic care    Raynaud disease     ? connective tissue dz.  ? sogren's     Patient Active Problem List   Diagnosis    Irritable bowel syndrome    Other acne    Migraine with aura and without status migrainosus, not intractable    Mild major depression (H)    Sedimentation rate elevation    Elevated vitamin B12 level    Anemia, unspecified type    Hypervitaminosis B6    Lymphocytic colitis    ASCUS of cervix with negative high risk HPV    Sjogren's syndrome with inflammatory arthritis (H24)   Significant for migraines, arthritis, depression, microcolitis    FAMILY HISTORY  Family History   Problem Relation Age of Onset    Neurologic Disorder Mother         MIGRIANES    C.A.D. Father         in his 60's    Depression Father     Heart Disease Father     Lipids Father     Hypertension Father     Arthritis Father     Sleep Disorder Father     Cerebrovascular Disease Father 78    Cancer - colorectal Paternal Grandmother     Cancer Maternal Grandfather         stomach    Heart Disease Maternal Grandfather     Depression Brother     Depression Brother     Asthma Daughter     Heart Disease Paternal Grandfather     Breast Cancer Maternal Aunt     Diabetes No family hx of    Positive for migraines in her mother and aunt.    SOCIAL HISTORY  Social History     Tobacco Use    Smoking status: Never    Smokeless tobacco: Never   Substance Use Topics    Alcohol use: Yes     Comment: occasional- once monthly    Drug use: No       SYSTEMS REVIEW  Twelve-system ROS was done and other than the HPI this was negative except for neck pain, dizziness, hearing loss, sleepiness during the day, headaches, depression, bowel problems.  No new symptoms/issues.    MEDICATIONS  acetaminophen (TYLENOL) 325 MG tablet, Take 2 tablets by mouth every 4 hours as needed for  pain.  Biotin 5000 MCG TABS, Take 1,000 mcg by mouth daily  budesonide (ENTOCORT EC) 3 MG EC capsule, Take 3 mg by mouth every other day  cholecalciferol 5000 UNITS CAPS, Take 1 capsule (5,000 Units) by mouth daily FOR VITAMIN D DEFICIENCY (LOW VITAMIN D)  Ferrous Sulfate (IRON) 28 MG TABS,   FLUoxetine (PROZAC) 40 MG capsule, Take 1 capsule (40 mg) by mouth daily  IBUPROFEN PO, Take 200 mg by mouth Takes 3 tablets PRN  loperamide (IMODIUM A-D) 2 MG tablet, Take 2 mg by mouth daily as needed for diarrhea   Riboflavin 100 MG TABS, Take 400 mg by mouth    No current facility-administered medications on file prior to visit.     PHYSICAL EXAMINATION  VITALS: BP 92/68   Pulse 76   Resp 16   Wt 61.7 kg (136 lb)   BMI 26.56 kg/m    GENERAL: Healthy appearing, alert, no acute distress, normal habitus.  CARDIOVASCULAR: Extremities warm and well perfused. Pulses present.   NEUROLOGICAL:  Patient is awake and oriented to self, place and time.  Attention span is normal.  Memory is grossly intact.  Language is fluent and follows commands appropriately.  Appropriate fund of knowledge. Cranial nerves 2-12 are intact. There is no pronator drift.  Motor exam shows 5/5 strength in all extremities.  Tone is symmetric bilaterally in upper and lower extremities.  (Previously reflexes are symmetric and 2+ in upper extremities and lower extremities. Sensory exam is grossly intact to light touch, pin prick and vibration.) Finger to nose and heel to shin is without dysmetria.  Romberg is negative.  Gait is normal and the patient is able to do tandem walk and walk on toes and heels.  No change in exam.    DIAGNOSTICS  CARDIOLOGY  EKG 2008  [facility]    RELEVANT LABS  Component      Latest Ref Rng & Units 12/22/2020   WBC      4.0 - 11.0 10e9/L 6.7   RBC Count      3.8 - 5.2 10e12/L 3.93   Hemoglobin      11.7 - 15.7 g/dL 11.4 (L)   Hematocrit      35.0 - 47.0 % 35.2   MCV      78 - 100 fl 90   MCH      26.5 - 33.0 pg 29.0   MCHC      31.5 -  36.5 g/dL 32.4   RDW      10.0 - 15.0 % 13.2   Platelet Count      150 - 450 10e9/L 297   % Neutrophils      % 70.5   % Lymphocytes      % 20.4   % Monocytes      % 6.6   % Eosinophils      % 2.1   % Basophils      % 0.4   Absolute Neutrophil      1.6 - 8.3 10e9/L 4.7   Absolute Lymphocytes      0.8 - 5.3 10e9/L 1.4   Absolute Monocytes      0.0 - 1.3 10e9/L 0.4   Absolute Eosinophils      0.0 - 0.7 10e9/L 0.1   Absolute Basophils      0.0 - 0.2 10e9/L 0.0   Diff Method       Automated Method   Sodium      133 - 144 mmol/L 138   Potassium      3.4 - 5.3 mmol/L 4.5   Chloride      94 - 109 mmol/L 107   Carbon Dioxide      20 - 32 mmol/L 27   Anion Gap      3 - 14 mmol/L 4   Glucose      70 - 99 mg/dL 79   Urea Nitrogen      7 - 30 mg/dL 12   Creatinine      0.52 - 1.04 mg/dL 0.78   GFR Estimate      >60 mL/min/1.73:m2 87   GFR Estimate If Black      >60 mL/min/1.73:m2 >90   Calcium      8.5 - 10.1 mg/dL 9.2   Bilirubin Total      0.2 - 1.3 mg/dL 0.3   Albumin      3.4 - 5.0 g/dL 3.8   Protein Total      6.8 - 8.8 g/dL 7.8   Alkaline Phosphatase      40 - 150 U/L 73   ALT      0 - 50 U/L 22   AST      0 - 45 U/L 16       OUTSIDE RECORDS  Outside referral notes and chart notes were reviewed and pertinent information has been summarized (in addition to the HPI):-Patient was seen in primary care.  Has issues with depression.  Had a visit in September that was virtual.  Was diagnosed with migraine with aura.  Is on sumatriptan the day worsening.  History of migraines with sumatriptan injections and tablets.  Injections only for severe headaches.  Headaches happen once every 2 months.  Migraines get to cluster.  No significant changes in headaches.  Some concerns with hep C.    MRI brain-images reviewed with the patient again.  There are very minimal T2 hyperintensities noted.  Is appropriate for age.  IMPRESSION:  1.  No acute intracranial abnormality.  2.  Small nonspecific focus of subcortical T2 FLAIR hyperintensity in  the right frontal lobe is strictly nonspecific but can be seen in setting of migraines and prior insult or injury. Demyelination is not favored.      LABS  Component      Latest Ref Rng & Units 2/8/2022   WBC      4.0 - 11.0 10e3/uL 6.3   RBC Count      3.80 - 5.20 10e6/uL 4.36   Hemoglobin      11.7 - 15.7 g/dL 12.8   Hematocrit      35.0 - 47.0 % 39.1   MCV      78 - 100 fL 90   MCH      26.5 - 33.0 pg 29.4   MCHC      31.5 - 36.5 g/dL 32.7   RDW      10.0 - 15.0 % 13.2   Platelet Count      150 - 450 10e3/uL 332   % Neutrophils      % 51   % Lymphocytes      % 38   % Monocytes      % 7   % Eosinophils      % 3   % Basophils      % 1   % Immature Granulocytes      % 0   Absolute Neutrophils      1.6 - 8.3 10e3/uL 3.2   Absolute Lymphocytes      0.8 - 5.3 10e3/uL 2.4   Absolute Monocytes      0.0 - 1.3 10e3/uL 0.5   Absolute Eosinophils      0.0 - 0.7 10e3/uL 0.2   Absolute Basophils      0.0 - 0.2 10e3/uL 0.1   Absolute Immature Granulocytes      <=0.4 10e3/uL 0.0   Vitamin B12      193 - 986 pg/mL 428   TSH      0.40 - 4.00 mU/L 2.28   Ferritin      8 - 252 ng/mL 70   Sed Rate      0 - 30 mm/hr 17       IMPRESSION/REPORT/PLAN  Intractable chronic migraine without aura and without status migrainosus  History of depression  Medication side effect  Left eye twitching  Insomnia    This is a 53 year old female with headache suggestive of chronic migraines.  Exam/MRI have been negative.  Blood work has been noncontributory.  There might be some lifestyle issues like irregular sleep schedule that might be leading to her headaches.  It can also be a trigger for headaches.  She does do some yoga which helps with the headaches.  Does have neck issues related to working on the computer too long which could also be leading to headaches.  MRI showed some T2 hyperintensities.  No cause for headaches    For prevention of headaches she has tried Topamax and propranolol in the past.  Nortriptyline has provided minimal benefit and  possibly helping with sleep.  She does complain of daytime fatigue with the nortriptyline will reduce the dose.  Will add tizanidine for neck pain/neck tension provoked headaches to see if it helps.    Botox is helping.  Because of cosmetic reasons she does not want us injecting a lot in the frontal region.  Will inject more in the trapezius region to see if that helps with the headaches.  Headaches are more in the right temporal and occipital region.    For abortive therapy will continue oral and injectable Imitrex.    Previously she was having some left eyelid twitching which is now improved.  Will monitor.    Return in 6 months for medical visit and continue Botox every 3 months.    -     nortriptyline (PAMELOR) 10 MG capsule; Start with 3 caps/night.  -     tiZANidine (ZANAFLEX) 2 MG tablet; Take 1 tablet (2 mg) by mouth 3 times daily as needed for muscle spasms  -     SUMAtriptan (IMITREX STATDOSE) 6 MG/0.5ML pen injector kit; INJECT 0.5 ML UNDER THE SKIN AT ONSET OF HEADACHE FOR MIGRAINE. MAY REPEAT IN ONE HOUR. MAX 12 MG IN 24 HOURS  -     SUMAtriptan (IMITREX) 100 MG tablet; TAKE 1 TABLET(100 MG) BY MOUTH AT ONSET OF HEADACHE FOR MIGRAINE. MAY REPEAT IN 2 HOURS. MAX 4 TABLETS/ 24 HOURS STRENGTH: 100 MG  - Botox every 3 months    Return in about 6 months (around 8/13/2024) for In-Clinic Visit (must).    Over 30 minutes were spent coordinating the care for the patient on the day of the encounter.  This includes previsit, during visit and post visit activities as documented above.  Counseling patient.  Prescription management.  Multiple issues reviewed/addressed.  (Activities include but not inclusive of reviewing chart, reviewing outside records, reviewing labs and imaging study results as well as the images, patient visit time including getting history and exam,  use if applicable, review of test results with the patient and coming up with a plan in a shared model, counseling patient and family,  "education and answering patient questions, EMR , EMR diagnosis entry and problem list management, medication reconciliation and prescription management if applicable, paperwork if applicable, printing documents and documentation of the visit activities.)    Botox PA  \" The patient has a diagnosis of chronic migraines. She has more than 15 headache days a month with each headache lasting at least 4 hours despite use of triptans. Her headaches have been there for over 6 months. She has been screened for medication overuse headaches and she does not have that. She has tried nortriptyline, Topamax, Propranolol for 3 months without any significant benefit. I would request that the Botox be approved for her.  60-70% benefit after multiple sessions.\"      Chuy Dunn MD  Neurologist  [facility]  Tel:- [phone]    This note was dictated using voice recognition software.  Any grammatical or context distortions are unintentional and inherent to the software.  "

## 2024-02-13 NOTE — TELEPHONE ENCOUNTER
Prior Authorization Not Needed per Insurance    Medication: SUMATRIPTAN SUCCINATE 6 MG/0.5ML SC SOAJ  Insurance Company: [email or web address] - Phone [phone] Fax [phone]  Expected CoPay: $    Pharmacy Filling the Rx: [id number] [place], [place] - [address] [place] [address]  Pharmacy Notified: Yes  Patient Notified: Yes    Refill Too Soon

## 2024-03-05 SDOH — HEALTH STABILITY: PHYSICAL HEALTH: ON AVERAGE, HOW MANY MINUTES DO YOU ENGAGE IN EXERCISE AT THIS LEVEL?: 50 MIN

## 2024-03-05 SDOH — HEALTH STABILITY: PHYSICAL HEALTH: ON AVERAGE, HOW MANY DAYS PER WEEK DO YOU ENGAGE IN MODERATE TO STRENUOUS EXERCISE (LIKE A BRISK WALK)?: 1 DAY

## 2024-03-05 ASSESSMENT — SOCIAL DETERMINANTS OF HEALTH (SDOH): HOW OFTEN DO YOU GET TOGETHER WITH FRIENDS OR RELATIVES?: TWICE A WEEK

## 2024-03-12 ENCOUNTER — TELEPHONE (OUTPATIENT)
Dept: NEUROLOGY | Facility: CLINIC | Age: 54
End: 2024-03-12

## 2024-03-12 ENCOUNTER — OFFICE VISIT (OUTPATIENT)
Dept: NEUROLOGY | Facility: CLINIC | Age: 54
End: 2024-03-12
Payer: COMMERCIAL

## 2024-03-12 ENCOUNTER — OFFICE VISIT (OUTPATIENT)
Dept: FAMILY MEDICINE | Facility: CLINIC | Age: 54
End: 2024-03-12
Payer: COMMERCIAL

## 2024-03-12 VITALS
TEMPERATURE: 96.8 F | WEIGHT: 138 LBS | DIASTOLIC BLOOD PRESSURE: 72 MMHG | HEIGHT: 60 IN | HEART RATE: 76 BPM | OXYGEN SATURATION: 99 % | BODY MASS INDEX: 27.09 KG/M2 | RESPIRATION RATE: 16 BRPM | SYSTOLIC BLOOD PRESSURE: 110 MMHG

## 2024-03-12 VITALS
HEART RATE: 84 BPM | BODY MASS INDEX: 26.95 KG/M2 | DIASTOLIC BLOOD PRESSURE: 60 MMHG | SYSTOLIC BLOOD PRESSURE: 94 MMHG | WEIGHT: 138 LBS | RESPIRATION RATE: 16 BRPM

## 2024-03-12 DIAGNOSIS — D64.9 ANEMIA, UNSPECIFIED TYPE: ICD-10-CM

## 2024-03-12 DIAGNOSIS — Z12.4 CERVICAL CANCER SCREENING: ICD-10-CM

## 2024-03-12 DIAGNOSIS — G43.719 INTRACTABLE CHRONIC MIGRAINE WITHOUT AURA AND WITHOUT STATUS MIGRAINOSUS: Primary | ICD-10-CM

## 2024-03-12 DIAGNOSIS — Z00.00 ROUTINE GENERAL MEDICAL EXAMINATION AT A HEALTH CARE FACILITY: Primary | ICD-10-CM

## 2024-03-12 DIAGNOSIS — F32.0 MILD MAJOR DEPRESSION (H): ICD-10-CM

## 2024-03-12 DIAGNOSIS — E55.9 VITAMIN D DEFICIENCY: ICD-10-CM

## 2024-03-12 DIAGNOSIS — Z23 ENCOUNTER FOR VACCINATION: ICD-10-CM

## 2024-03-12 DIAGNOSIS — M85.89 OSTEOPENIA OF MULTIPLE SITES: ICD-10-CM

## 2024-03-12 DIAGNOSIS — Z13.220 LIPID SCREENING: ICD-10-CM

## 2024-03-12 LAB
ERYTHROCYTE [DISTWIDTH] IN BLOOD BY AUTOMATED COUNT: 13.2 % (ref 10–15)
HCT VFR BLD AUTO: 38.3 % (ref 35–47)
HGB BLD-MCNC: 12.5 G/DL (ref 11.7–15.7)
MCH RBC QN AUTO: 28.5 PG (ref 26.5–33)
MCHC RBC AUTO-ENTMCNC: 32.6 G/DL (ref 31.5–36.5)
MCV RBC AUTO: 87 FL (ref 78–100)
PLATELET # BLD AUTO: 324 10E3/UL (ref 150–450)
RBC # BLD AUTO: 4.38 10E6/UL (ref 3.8–5.2)
WBC # BLD AUTO: 7.2 10E3/UL (ref 4–11)

## 2024-03-12 PROCEDURE — 87624 HPV HI-RISK TYP POOLED RSLT: CPT | Performed by: FAMILY MEDICINE

## 2024-03-12 PROCEDURE — 90715 TDAP VACCINE 7 YRS/> IM: CPT | Performed by: FAMILY MEDICINE

## 2024-03-12 PROCEDURE — G0145 SCR C/V CYTO,THINLAYER,RESCR: HCPCS | Performed by: FAMILY MEDICINE

## 2024-03-12 PROCEDURE — 85027 COMPLETE CBC AUTOMATED: CPT | Performed by: FAMILY MEDICINE

## 2024-03-12 PROCEDURE — 99396 PREV VISIT EST AGE 40-64: CPT | Mod: 25 | Performed by: FAMILY MEDICINE

## 2024-03-12 PROCEDURE — 96127 BRIEF EMOTIONAL/BEHAV ASSMT: CPT | Performed by: FAMILY MEDICINE

## 2024-03-12 PROCEDURE — 82306 VITAMIN D 25 HYDROXY: CPT | Performed by: FAMILY MEDICINE

## 2024-03-12 PROCEDURE — 36415 COLL VENOUS BLD VENIPUNCTURE: CPT | Performed by: FAMILY MEDICINE

## 2024-03-12 PROCEDURE — 64615 CHEMODENERV MUSC MIGRAINE: CPT | Performed by: PSYCHIATRY & NEUROLOGY

## 2024-03-12 PROCEDURE — 80061 LIPID PANEL: CPT | Performed by: FAMILY MEDICINE

## 2024-03-12 PROCEDURE — 90471 IMMUNIZATION ADMIN: CPT | Performed by: FAMILY MEDICINE

## 2024-03-12 PROCEDURE — 82728 ASSAY OF FERRITIN: CPT | Performed by: FAMILY MEDICINE

## 2024-03-12 PROCEDURE — 99214 OFFICE O/P EST MOD 30 MIN: CPT | Mod: 25 | Performed by: FAMILY MEDICINE

## 2024-03-12 PROCEDURE — 80048 BASIC METABOLIC PNL TOTAL CA: CPT | Performed by: FAMILY MEDICINE

## 2024-03-12 RX ORDER — FLUOXETINE 40 MG/1
40 CAPSULE ORAL DAILY
Qty: 90 CAPSULE | Refills: 3 | Status: SHIPPED | OUTPATIENT
Start: 2024-03-12

## 2024-03-12 ASSESSMENT — PATIENT HEALTH QUESTIONNAIRE - PHQ9
SUM OF ALL RESPONSES TO PHQ QUESTIONS 1-9: 4
SUM OF ALL RESPONSES TO PHQ QUESTIONS 1-9: 4
10. IF YOU CHECKED OFF ANY PROBLEMS, HOW DIFFICULT HAVE THESE PROBLEMS MADE IT FOR YOU TO DO YOUR WORK, TAKE CARE OF THINGS AT HOME, OR GET ALONG WITH OTHER PEOPLE: SOMEWHAT DIFFICULT

## 2024-03-12 NOTE — PROCEDURES
NEUROLOGY PROCEDURE NOTE  Mar 12, 2024      Chronic migraine Botox injection    CONSENT: The procedure was explained to the patient. The risks and benefits of the procedure and the alternatives were discussed with the patient. The patient was given an opportunity to ask any questions she had about the procedure. A verbal consent was obtained from the patient. A written consent is available in the chart.    PRE-PROCEDURE  Diagnosis: Chronic migraine  Headache frequency before the use of Botox:- 20 headache days per month  Headache frequency with Botox use:-7/month    EXAM  GENERAL: Healthy appearing, alert, no acute distress, normal habitus.  NEUROLOGICAL:  Patient is alert with fluent language.  Extraocular movements are intact.  Facial movements are present and symmetric.  No arm drift.    PROCEDURE SUMMARY:   The following muscles were identified after palpating for landmarks and the overlying skin was cleansed with alcohol. These muscles were then injected using a 30 guage- half  inch needle with the following doses of onabotulinumtoxin A. A 5 unit/ 0.1 ml dilution was used for the injections. A 2 x 100 unit vial was used.    Corrugators 2.5 units each side. (per patient request)  Procerus 2.5 units. (per patient request)  Frontalis 2.5x2 units each side. (per patient request)  Temporalis 20 units each side.  Occipitalis 15 units each side.  Paraspinals 13.75 units each side.  Trapezius 20 units each side.    Units Injected: 155 Units  Units Discarded: 45 Units  Lot Number and Expiration: [id number]; 6/26    The patient tolerated the procedure without any immediate complaints and will call the Neurology clinic if she has any problems or side effects from the medication. The patient will follow up in the Neurology clinic as previously decided.      Chuy Dunn MD  Neurologist  [facility]  [phone]

## 2024-03-12 NOTE — TELEPHONE ENCOUNTER
Prior Authorization Specialty Medication Request    Medication/Dose: Sumatriptan Injection 6mg/0.5mL  Diagnosis and ICD code (if different than what is on RX):    New/renewal/insurance change PA/secondary ins. PA:  Previously Tried and Failed:      Important Lab Values:   Rationale:     Insurance   Primary:   Insurance ID:      Secondary (if applicable):  Insurance ID:      Pharmacy Information (if different than what is on RX)  Name:    Phone:    Fax:

## 2024-03-12 NOTE — LETTER
3/12/2024         RE: Nikki Hardin  [address] [facility] [id number]        Dear Colleague,    Thank you for referring your patient, Nikki ATKINSON Wilfred, to the [facility] [place]. Please see a copy of my visit note below.    See procedure note.       Again, thank you for allowing me to participate in the care of your patient.        Sincerely,        Chuy Dunn MD

## 2024-03-12 NOTE — PROGRESS NOTES
Preventive Care Visit  [facility]  Srinivasa Sofia DO, Family Medicine  Mar 12, 2024    Assessment & Plan     Routine general medical examination at a health care facility  - Basic metabolic panel  (Ca, Cl, CO2, Creat, Gluc, K, Na, BUN)    Lipid screening  - Lipid panel reflex to direct LDL Fasting    Cervical cancer screening  - Pap thin layer screen with HPV - recommended age 30 - 65 years    Encounter for vaccination  - TDAP 10-64Y (ADACEL,BOOSTRIX)    Vitamin D deficiency  - Vitamin D Deficiency    Anemia, unspecified type  - CBC with platelets  - Ferritin    Osteopenia of multiple sites -hx of long-term steroid use, currently weaning off.  -Discussed calcium supplementation, 600 mg BID  -Continue vitamin D  -Encouraged increased physical activity and exercise to help maintain bone density  -Repeat DEXA in 5 years for monitoring    Mild major depression (H24)  -Stable with medication  - FLUoxetine (PROZAC) 40 MG capsule  Dispense: 90 capsule; Refill: 3                BMI  Estimated body mass index is 26.95 kg/m  as calculated from the following:    Height as of this encounter: 1.524 m (5').    Weight as of this encounter: 62.6 kg (138 lb).   Weight management plan: Discussed healthy diet and exercise guidelines    Counseling  Appropriate preventive services were discussed with this patient, including applicable screening as appropriate for fall prevention, nutrition, physical activity, Tobacco-use cessation, weight loss and cognition.  Checklist reviewing preventive services available has been given to the patient.  Reviewed patient's diet, addressing concerns and/or questions.   She is at risk for lack of exercise and has been provided with information to increase physical activity for the benefit of her well-being.           Johnie Jordan is a 53 year old, presenting for the following:  Physical        3/12/2024     7:49 AM   Additional Questions   Roomed by Clay Velazquez  by self        Health Care Directive  Patient does not have a Health Care Directive or Living Will: Patient states has Advance Directive and will bring in a copy to clinic.    HPI    Hx of colitis, follows with [facility]. Hx of long-term steroid use. Had DEXA done recently, showed osteopenia. Currently only taking vitamin d, unsure of dosage.     Hx of depression, taking fluoxetine. No side effects with medication.              3/5/2024   General Health   How would you rate your overall physical health? Good   Feel stress (tense, anxious, or unable to sleep) To some extent   (!) STRESS CONCERN      3/5/2024   Nutrition   Three or more servings of calcium each day? (!) NO   Diet: Regular (no restrictions)   How many servings of fruit and vegetables per day? (!) 2-3   How many sweetened beverages each day? 0-1         3/5/2024   Exercise   Days per week of moderate/strenous exercise 1 day   Average minutes spent exercising at this level 50 min   (!) EXERCISE CONCERN      3/5/2024   Social Factors   Frequency of gathering with friends or relatives Twice a week   Worry food won't last until get money to buy more No   Food not last or not have enough money for food? No   Do you have housing?  Yes   Are you worried about losing your housing? No   Lack of transportation? No   Unable to get utilities (heat,electricity)? No         3/5/2024   Fall Risk   Fallen 2 or more times in the past year? No   Trouble with walking or balance? No          3/5/2024   Dental   Dentist two times every year? Yes         3/5/2024   TB Screening   Were you born outside of [place]?  No       Today's PHQ-9 Score:       3/12/2024     7:45 AM   PHQ-9 SCORE   PHQ-9 Total Score MyChart 4 (Minimal depression)   PHQ-9 Total Score 4         3/5/2024   Substance Use   Alcohol more than 3/day or more than 7/wk No   Do you use any other substances recreationally? (!) PRESCRIPTION DRUGS     Social History     Tobacco Use    Smoking status: Never    Smokeless  tobacco: Never   Vaping Use    Vaping Use: Never used   Substance Use Topics    Alcohol use: Yes     Comment: occasionally    Drug use: No           8/14/2023   LAST FHS-7 RESULTS   1st degree relative breast or ovarian cancer No   Any relative bilateral breast cancer No   Any male have breast cancer No   Any ONE woman have BOTH breast AND ovarian cancer No   Any woman with breast cancer before 50yrs Yes   2 or more relatives with breast AND/OR ovarian cancer No   2 or more relatives with breast AND/OR bowel cancer Yes                3/5/2024   STI Screening   New sexual partner(s) since last STI/HIV test? No     History of abnormal Pap smear: YES - updated in Problem List and Health Maintenance accordingly        Latest Ref Rng & Units 2/28/2023    11:22 AM 11/22/2019     1:19 PM 11/22/2019     9:00 AM   PAP / HPV   PAP  Atypical squamous cells of undetermined significance (ASC-US)      PAP (Historical)   ASC-US     HPV 16 DNA Negative Negative   Negative    HPV 18 DNA Negative Negative   Negative    Other HR HPV Negative Negative   Negative      ASCVD Risk   The 10-year ASCVD risk score (Padmini BRIDGES, et al., 2019) is: 1.3%    Values used to calculate the score:      Age: 53 years      Sex: Female      Is Non- : No      Diabetic: No      Tobacco smoker: No      Systolic Blood Pressure: 110 mmHg      Is BP treated: No      HDL Cholesterol: 75 mg/dL      Total Cholesterol: 233 mg/dL           Reviewed and updated as needed this visit by Provider   Tobacco   Meds      Soc Hx Sexual Activity                   Objective    Exam  /72 (BP Location: Right arm, Patient Position: Sitting, Cuff Size: Adult Regular)   Pulse 76   Temp 96.8  F (36  C) (Temporal)   Resp 16   Ht 1.524 m (5')   Wt 62.6 kg (138 lb)   LMP  (LMP Unknown)   SpO2 99%   BMI 26.95 kg/m     Estimated body mass index is 26.95 kg/m  as calculated from the following:    Height as of this encounter: 1.524 m  (5').    Weight as of this encounter: 62.6 kg (138 lb).    Physical Exam  GENERAL: alert and no distress  EYES: Eyes grossly normal to inspection, PERRL and conjunctivae and sclerae normal  HENT: nose and mouth without ulcers or lesions  NECK: no adenopathy, no asymmetry, masses, or scars  RESP: lungs clear to auscultation - no rales, rhonchi or wheezes  CV: regular rate and rhythm, normal S1 S2, no S3 or S4, no murmur, click or rub, no peripheral edema  ABDOMEN: soft, nontender, no hepatosplenomegaly, no masses and bowel sounds normal   (female): normal female external genitalia, normal urethral meatus , normal vaginal mucosa, and normal cervix, adnexae, and uterus without masses.  MS: no gross musculoskeletal defects noted, no edema  SKIN: no suspicious lesions or rashes  NEURO: Normal strength and tone, mentation intact and speech normal  PSYCH: mentation appears normal, affect normal/bright      Signed Electronically by: Srinivasa Sofia, DO

## 2024-03-12 NOTE — PATIENT INSTRUCTIONS
Preventive Care Advice   This is general advice given by our system to help you stay healthy. However, your care team may have specific advice just for you. Please talk to your care team about your preventive care needs.  Nutrition  Eat 5 or more servings of fruits and vegetables each day.  Try wheat bread, brown rice and whole grain pasta (instead of white bread, rice, and pasta).  Get enough calcium and vitamin D. Check the label on foods and aim for 100% of the RDA (recommended daily allowance).  Lifestyle  Exercise at least 150 minutes each week   (30 minutes a day, 5 days a week).  Do muscle strengthening activities 2 days a week. These help control your weight and prevent disease.  No smoking.  Wear sunscreen to prevent skin cancer.  Have a dental exam and cleaning every 6 months.  Yearly exams  See your health care team every year to talk about:  Any changes in your health.  Any medicines your care team has prescribed.  Preventive care, family planning, and ways to prevent chronic diseases.  Shots (vaccines)   HPV shots (up to age 26), if you've never had them before.  Hepatitis B shots (up to age 59), if you've never had them before.  COVID-19 shot: Get this shot when it's due.  Flu shot: Get a flu shot every year.  Tetanus shot: Get a tetanus shot every 10 years.  Pneumococcal, hepatitis A, and RSV shots: Ask your care team if you need these based on your risk.  Shingles shot (for age 50 and up).  General health tests  Diabetes screening:  Starting at age 35, Get screened for diabetes at least every 3 years.  If you are younger than age 35, ask your care team if you should be screened for diabetes.  Cholesterol test: At age 39, start having a cholesterol test every 5 years, or more often if advised.  Bone density scan (DEXA): At age 50, ask your care team if you should have this scan for osteoporosis (brittle bones).  Hepatitis C: Get tested at least once in your life.  STIs (sexually transmitted  infections)  Before age 24: Ask your care team if you should be screened for STIs.  After age 24: Get screened for STIs if you're at risk. You are at risk for STIs (including HIV) if:  You are sexually active with more than one person.  You don't use condoms every time.  You or a partner was diagnosed with a sexually transmitted infection.  If you are at risk for HIV, ask about PrEP medicine to prevent HIV.  Get tested for HIV at least once in your life, whether you are at risk for HIV or not.  Cancer screening tests  Cervical cancer screening: If you have a cervix, begin getting regular cervical cancer screening tests at age 21. Most people who have regular screenings with normal results can stop after age 65. Talk about this with your provider.  Breast cancer scan (mammogram): If you've ever had breasts, begin having regular mammograms starting at age 40. This is a scan to check for breast cancer.  Colon cancer screening: It is important to start screening for colon cancer at age 45.  Have a colonoscopy test every 10 years (or more often if you're at risk) Or, ask your provider about stool tests like a FIT test every year or Cologuard test every 3 years.  To learn more about your testing options, visit: https://www.[employer]/174320.pdf.  For help making a decision, visit: https://bit.ly/dd18281.  Prostate cancer screening test: If you have a prostate and are age 55 to 69, ask your provider if you would benefit from a yearly prostate cancer screening test.  Lung cancer screening: If you are a current or former smoker age 50 to 80, ask your care team if ongoing lung cancer screenings are right for you.  For informational purposes only. Not to replace the advice of your health care provider. Copyright   2023 [employer]. All rights reserved. Clinically reviewed by the [facility]. [employer] 400359 - REV 01/24.

## 2024-03-12 NOTE — LETTER
March 18, 2024      Nikki ATKINSON Wilfred  [address] [address]        Dear Ms.Wilfred,    We are writing to inform you of your test results.    Kidney function and electrolytes are normal.   Lipid panel is stable   Diabetes screening is negative.   Your vitamin D level is on the higher side. Please check on the dosage of vitamin D you are taking, it should be no more than 1000 to 2000 international unit(s) daily. Please let me know if you have questions.     Resulted Orders   Ferritin   Result Value Ref Range    Ferritin 71 11 - 328 ng/mL       If you have any questions or concerns, please call the clinic at the number listed above.       Sincerely,      Srinivasa Sofia DO / H.H

## 2024-03-12 NOTE — PROGRESS NOTES
Prior to immunization administration, verified patients identity using patient s name and date of birth. Please see Immunization Activity for additional information.     Screening Questionnaire for Adult Immunization    Are you sick today?   No   Do you have allergies to medications, food, a vaccine component or latex?   Yes, hydrocodone   Have you ever had a serious reaction after receiving a vaccination?   No   Do you have a long-term health problem with heart, lung, kidney, or metabolic disease (e.g., diabetes), asthma, a blood disorder, no spleen, complement component deficiency, a cochlear implant, or a spinal fluid leak?  Are you on long-term aspirin therapy?   No   Do you have cancer, leukemia, HIV/AIDS, or any other immune system problem?   No   Do you have a parent, brother, or sister with an immune system problem?   No   In the past 3 months, have you taken medications that affect  your immune system, such as prednisone, other steroids, or anticancer drugs; drugs for the treatment of rheumatoid arthritis, Crohn s disease, or psoriasis; or have you had radiation treatments?   No   Have you had a seizure, or a brain or other nervous system problem?   No   During the past year, have you received a transfusion of blood or blood    products, or been given immune (gamma) globulin or antiviral drug?   No   For women: Are you pregnant or is there a chance you could become       pregnant during the next month?   No   Have you received any vaccinations in the past 4 weeks?   No     Immunization questionnaire was positive for at least one answer.  Notified Dr. Sofia.      Patient instructed to remain in clinic for 15 minutes afterwards, and to report any adverse reactions.     Screening performed by Clay Aguirre RN on 3/12/2024 at 8:23 AM.

## 2024-03-13 LAB
ANION GAP SERPL CALCULATED.3IONS-SCNC: 11 MMOL/L (ref 7–15)
BUN SERPL-MCNC: 21.2 MG/DL (ref 6–20)
CALCIUM SERPL-MCNC: 9.7 MG/DL (ref 8.6–10)
CHLORIDE SERPL-SCNC: 102 MMOL/L (ref 98–107)
CHOLEST SERPL-MCNC: 257 MG/DL
CREAT SERPL-MCNC: 0.88 MG/DL (ref 0.51–0.95)
DEPRECATED HCO3 PLAS-SCNC: 25 MMOL/L (ref 22–29)
EGFRCR SERPLBLD CKD-EPI 2021: 77 ML/MIN/1.73M2
FASTING STATUS PATIENT QL REPORTED: YES
FERRITIN SERPL-MCNC: 71 NG/ML (ref 11–328)
GLUCOSE SERPL-MCNC: 75 MG/DL (ref 70–99)
HDLC SERPL-MCNC: 85 MG/DL
LDLC SERPL CALC-MCNC: 154 MG/DL
NONHDLC SERPL-MCNC: 172 MG/DL
POTASSIUM SERPL-SCNC: 4.3 MMOL/L (ref 3.4–5.3)
SODIUM SERPL-SCNC: 138 MMOL/L (ref 135–145)
TRIGL SERPL-MCNC: 88 MG/DL
VIT D+METAB SERPL-MCNC: 81 NG/ML (ref 20–50)

## 2024-03-15 LAB
BKR LAB AP GYN ADEQUACY: NORMAL
BKR LAB AP GYN INTERPRETATION: NORMAL
BKR LAB AP HPV REFLEX: NORMAL
BKR LAB AP PREVIOUS ABNORMAL: NORMAL
PATH REPORT.COMMENTS IMP SPEC: NORMAL
PATH REPORT.COMMENTS IMP SPEC: NORMAL
PATH REPORT.RELEVANT HX SPEC: NORMAL

## 2024-03-18 NOTE — TELEPHONE ENCOUNTER
PA Initiation    Medication: SUMATRIPTAN SUCCINATE 6 MG/0.5ML SC SOAJ  Insurance Company: [employer] - Phone [phone] Fax [phone]  Pharm[facility]illing the Rx: [id number] [address]  Filling Pharmacy Phone: [phone]  Filling Pharmacy Fax:    Start Date: 3/18/2024

## 2024-03-19 ENCOUNTER — MYC MEDICAL ADVICE (OUTPATIENT)
Dept: FAMILY MEDICINE | Facility: CLINIC | Age: 54
End: 2024-03-19
Payer: COMMERCIAL

## 2024-03-19 NOTE — TELEPHONE ENCOUNTER
Prior Authorization Not Needed per Insurance    Medication: SUMATRIPTAN SUCCINATE 6 MG/0.5ML SC SOAJ  Insurance Company: [employer] - Phone [phone] Fax [phone]  Expected CoPay: $    Pharmacy Filling the Rx: [facility] #[id number] [address]  Pharmacy Notified: Yes  Patient Notified: Yes    Insurance only covers #18 every 75 days on this medication, it looks like patient has filled the max allowed. She isn't able to fill again until 4/28/2024.    Thank You!  Sherri Howard CPhT  Prior Auth Specialist

## 2024-03-19 NOTE — TELEPHONE ENCOUNTER
"Urea nitrogen is not a concern.       To improve your cholesterol I recommend the followin. Eat heart-healthy foods and reduce intake of foods with \"bad\" cholesterol (LDL).    Reduce saturated fats -red meat and full-fat dairy products.  Eliminate trans fats / \"partially hydrogenated vegetable oil,\" are often used in margarines and store-bought cookies, crackers and cakes.   Eat foods rich in omega-3 fatty acids. Foods with omega-3 fatty acids include salmon, mackerel, herring, walnuts and flaxseeds.  Increase soluble fiber, found in such foods as oatmeal, kidney beans, [place] sprouts, apples and pears.    2. Exercise on most days of the week and increase your physical activity  Moderate physical activity can help raise high-density lipoprotein (HDL) cholesterol, the \"good\" cholesterol.   Adding physical activity, even in short intervals several times a day, can help you begin to lose weight.   Consider:  Taking a brisk daily walk during your lunch hour  Riding your bike to work  Playing a favorite sport  To stay motivated, consider finding an exercise hernan or joining an exercise group.    3. Quit smoking. If you don't smoke, you're already off to a great start!    4. Lose weight  Carrying even a few extra pounds contributes to high cholesterol.     5. Drink alcohol only in moderation  For healthy adults, that means up to one drink a day for women of all ages and men older than age 65, and up to two drinks a day for men age 65 and younger.    Too much alcohol can lead to serious health problems, including high blood pressure, heart failure and strokes.    Adapted from the [facility]. Checkout this link for the full article of recommendations:   https://www.[facility].org/diseases-conditions/high-blood-cholesterol/in-depth/reduce-cholesterol/art-77279546   "

## 2024-03-19 NOTE — TELEPHONE ENCOUNTER
She is needing the Sumatriptan Injection IN ADDITION to her tablets.   The PA is for the Injection Sumatriptan.

## 2024-06-12 ENCOUNTER — OFFICE VISIT (OUTPATIENT)
Dept: NEUROLOGY | Facility: CLINIC | Age: 54
End: 2024-06-12
Payer: COMMERCIAL

## 2024-06-12 ENCOUNTER — MYC MEDICAL ADVICE (OUTPATIENT)
Dept: NEUROLOGY | Facility: CLINIC | Age: 54
End: 2024-06-12

## 2024-06-12 VITALS
WEIGHT: 137 LBS | BODY MASS INDEX: 26.9 KG/M2 | SYSTOLIC BLOOD PRESSURE: 92 MMHG | HEART RATE: 74 BPM | DIASTOLIC BLOOD PRESSURE: 61 MMHG | HEIGHT: 60 IN

## 2024-06-12 DIAGNOSIS — G43.719 INTRACTABLE CHRONIC MIGRAINE WITHOUT AURA AND WITHOUT STATUS MIGRAINOSUS: Primary | ICD-10-CM

## 2024-06-12 PROCEDURE — 64615 CHEMODENERV MUSC MIGRAINE: CPT | Performed by: PSYCHIATRY & NEUROLOGY

## 2024-06-12 NOTE — NURSING NOTE
Chief Complaint   Patient presents with    Headache     Botox     Cristina Pratt on 6/12/2024 at 10:15 AM

## 2024-06-12 NOTE — LETTER
6/12/2024      Nikki Hardin  [address] [facility] [address]      Dear Colleague,    Thank you for referring your patient, Nikki ATKINSON Wilfred, to the [facility] [place]. Please see a copy of my visit note below.    See procedure note.       Again, thank you for allowing me to participate in the care of your patient.        Sincerely,        Chuy Dunn MD

## 2024-06-12 NOTE — PROCEDURES
NEUROLOGY PROCEDURE NOTE  Jun 12, 2024      Chronic migraine Botox injection    CONSENT: The procedure was explained to the patient. The risks and benefits of the procedure and the alternatives were discussed with the patient. The patient was given an opportunity to ask any questions she had about the procedure. A verbal consent was obtained from the patient. A written consent is available in the chart.    PRE-PROCEDURE  Diagnosis: Chronic migraine  Headache frequency before the use of Botox:- 20 headache days per month  Headache frequency with Botox use:-7/month    EXAM  GENERAL: Healthy appearing, alert, no acute distress, normal habitus.  NEUROLOGICAL:  Patient is alert with fluent language.  Extraocular movements are intact.  Facial movements are present and symmetric.  No arm drift.    PROCEDURE SUMMARY:   The following muscles were identified after palpating for landmarks and the overlying skin was cleansed with alcohol. These muscles were then injected using a 30 guage- half  inch needle with the following doses of onabotulinumtoxin A. A 5 unit/ 0.1 ml dilution was used for the injections. A 2 x 100 unit vial was used.    Corrugators 2.5 units each side. (per patient request)  Procerus 2.5 units. (per patient request)  Frontalis 2.5x2 units each side. (per patient request)  Temporalis 20 units each side.  Occipitalis 15 units each side.  Paraspinals 13.75 units each side.  Trapezius 20 units each side.    Units Injected: 155 Units  Units Discarded: 45 Units  Lot Number and Expiration: [id number]; 7/26    The patient tolerated the procedure without any immediate complaints and will call the Neurology clinic if she has any problems or side effects from the medication. The patient will follow up in the Neurology clinic as previously decided.      Chuy Dunn MD  Neurologist  [facility]  [phone]

## 2024-06-12 NOTE — TELEPHONE ENCOUNTER
PA #[id number] Pending The insurance is having to reconfigure the Qty limits on both tablets and injectable and will get back to me when it is done-  Thank You!  Sherri Howard CP  Prior Auth Specialist

## 2024-06-12 NOTE — TELEPHONE ENCOUNTER
Can we do a PA for the injection form of the Sumatriptan as she should be getting this IN ADDITION to the imitrex tablets each month.     The last time the injection was filled was almost a year ago and she has been unable to get it in addition to the tablets.

## 2024-06-13 ENCOUNTER — TELEPHONE (OUTPATIENT)
Dept: NEUROLOGY | Facility: CLINIC | Age: 54
End: 2024-06-13
Payer: COMMERCIAL

## 2024-06-13 NOTE — TELEPHONE ENCOUNTER
Retail Pharmacy Prior Authorization Team   Phone: [phone]    Note: Due to record-high volumes, our turn-around time is taking longer than usual . We are currently 2 weeks behind in the pools.   We are working diligently to submit all requests in a timely manner and in the order they are received. Please only flag TRUE URGENT requests as high priority to the pool at this time.   If you have questions on status of PA's,  please send a note/message in the active PA encounter and send back to the [facility] PA pool [[id number]].    If you have questions about the turn-around time or about our process, please reach out to our supervisor Crystal Monge.   Thank you!   RPPA (Retail Pharmacy Prior Authorization) team

## 2024-06-18 NOTE — TELEPHONE ENCOUNTER
PA- [id number]- is the new PA number- the insurance keeps closing this out as they aren't seeing its a QTY Exception happening.  Thank You!  Sherri Howard CPhT  Prior Auth Specialist

## 2024-06-20 NOTE — TELEPHONE ENCOUNTER
Prior Authorization Not Needed per Insurance    Medication: SUMAtriptan (IMITREX STATDOSE) 6 MG/0.5ML pen injector kit  Insurance Company: [employer] - Phone [phone] Fax [phone]  Expected CoPay:      Pharmacy Filling the Rx: [facility] #[id number] [address]  Pharmacy Notified:  Yes  Patient Notified:  No    Pharmacy stated that PA is Not Needed at This Time. Pharmacy stated that medication is covered. **Instructed pharmacy to notify patient when script is ready to /ship.** Pharmacy has a paid claim on medication quantity 12 ml and will notify the patient on medication.

## 2024-06-20 NOTE — TELEPHONE ENCOUNTER
So the insurance has denied this- they only allow up to 18 of a combined total, even though they previously stated it was 6 of one and 18 of the other. So she can cut back on the amount of tablets for the injections if she would like. Or you can try to appeal it.

## 2024-06-26 NOTE — TELEPHONE ENCOUNTER
Could you check with patient -how she wants to handle this.  How many of the oral and how many of the nasal spray she wants?

## 2024-06-26 NOTE — TELEPHONE ENCOUNTER
[employer] message sent to patient    Costa Jordan,    Your insurance denied the sumatriptan injections and tablets due to exceeding quantity limit. They only allow a monthly total dose of 18.  Do you want us to send in 12 tablets and 6 injections or how would you like us to send it in the 2 prescriptions to total 18? (Has to be even #s)    Please let us know,  Lori

## 2024-07-15 ENCOUNTER — PATIENT OUTREACH (OUTPATIENT)
Dept: CARE COORDINATION | Facility: CLINIC | Age: 54
End: 2024-07-15
Payer: COMMERCIAL

## 2024-07-30 ENCOUNTER — TRANSFERRED RECORDS (OUTPATIENT)
Dept: HEALTH INFORMATION MANAGEMENT | Facility: CLINIC | Age: 54
End: 2024-07-30
Payer: COMMERCIAL

## 2024-08-12 ENCOUNTER — PATIENT OUTREACH (OUTPATIENT)
Dept: CARE COORDINATION | Facility: CLINIC | Age: 54
End: 2024-08-12
Payer: COMMERCIAL

## 2024-08-16 ENCOUNTER — MYC MEDICAL ADVICE (OUTPATIENT)
Dept: FAMILY MEDICINE | Facility: CLINIC | Age: 54
End: 2024-08-16

## 2024-08-16 ENCOUNTER — ANCILLARY PROCEDURE (OUTPATIENT)
Dept: MAMMOGRAPHY | Facility: CLINIC | Age: 54
End: 2024-08-16
Attending: FAMILY MEDICINE
Payer: COMMERCIAL

## 2024-08-16 DIAGNOSIS — Z12.31 VISIT FOR SCREENING MAMMOGRAM: ICD-10-CM

## 2024-08-16 PROCEDURE — 77063 BREAST TOMOSYNTHESIS BI: CPT | Mod: TC | Performed by: RADIOLOGY

## 2024-08-16 PROCEDURE — 77067 SCR MAMMO BI INCL CAD: CPT | Mod: TC | Performed by: RADIOLOGY

## 2024-08-19 NOTE — TELEPHONE ENCOUNTER
Writer replied to patient via [employer].  NIR HawleyN, RN (she/her)  [facility] RN

## 2024-09-16 ENCOUNTER — TRANSFERRED RECORDS (OUTPATIENT)
Dept: HEALTH INFORMATION MANAGEMENT | Facility: CLINIC | Age: 54
End: 2024-09-16
Payer: COMMERCIAL

## 2024-09-16 LAB — TSH SERPL-ACNC: 1.1 UIU/ML (ref 0.45–4.5)

## 2024-09-17 ENCOUNTER — OFFICE VISIT (OUTPATIENT)
Dept: NEUROLOGY | Facility: CLINIC | Age: 54
End: 2024-09-17
Payer: COMMERCIAL

## 2024-09-17 VITALS
DIASTOLIC BLOOD PRESSURE: 82 MMHG | SYSTOLIC BLOOD PRESSURE: 126 MMHG | WEIGHT: 137 LBS | HEIGHT: 60 IN | BODY MASS INDEX: 26.9 KG/M2 | HEART RATE: 79 BPM

## 2024-09-17 DIAGNOSIS — G43.719 INTRACTABLE CHRONIC MIGRAINE WITHOUT AURA AND WITHOUT STATUS MIGRAINOSUS: ICD-10-CM

## 2024-09-17 PROCEDURE — 64615 CHEMODENERV MUSC MIGRAINE: CPT | Performed by: PSYCHIATRY & NEUROLOGY

## 2024-09-17 RX ORDER — NORTRIPTYLINE HCL 10 MG
CAPSULE ORAL
Qty: 270 CAPSULE | Refills: 1 | Status: SHIPPED | OUTPATIENT
Start: 2024-09-17

## 2024-09-17 NOTE — LETTER
9/17/2024      Nikki Hardin  [address] [facility] [address]      Dear Colleague,    Thank you for referring your patient, Nikki Hardin, to the [facility] [place]. Please see a copy of my visit note below.    NEUROLOGY OUTPATIENT PROGRESS NOTE   Sep 17, 2024     CHIEF COMPLAINT/REASON FOR VISIT/REASON FOR CONSULT  Patient presents with:  Botox    REASON FOR CONSULTATION- Headaches    REFERRAL SOURCE  Dr. Srinivasa Sofia  CC Dr. Srinivasa Sofia    HISTORY OF PRESENT ILLNESS  Nikki Hardin is a 54 year old female seen today for evaluation of headaches. She reports that she has a diagnosis of migraines since age 14. Previously her headaches were sporadic but still frequent. She estimates that she was having at least ~15 headaches a month. Over the last 2 to 3 months headaches have become much more frequent. She is having a headache every day. Reports no real reasons why the headaches might have gotten worse.    Headaches are generally in the temple on either side and then radiate to the neck. Occasionally she can have more frontal pressure. Takes a throbbing. There is some photophobia and phonophobia. She has never had any auras with the headaches. Reports triggers including working and doing remote work from home, inconsistent sleep, lack of regular meals, going out in the sun sometimes storms. She does have some neck pain though neck pain is worse at the time of headaches.    She is tried Topamax in the past which caused severe side effects. She is also tried propanolol without any benefit. Over-the-counter medications do not help. 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 She has been using sumatriptan injections and tablets for several years which have been working for her. Has tried Maxalt with benefit though insurance would not cover it anymore and then she had to stop it. The sumatriptan works better than the Maxalt.    4/6/22  Patient returns today.  She did  try the nortriptyline and that has helped with the headaches.  Currently is having 7 headaches a month.  Reports no changes in the nature of the headaches.  Imitrex still works with the abortive therapy.  She does complain of some somnolence in the morning with the nortriptyline.  Is taking 30 mg.  Tries to take it earlier in the evening.  Things are may be slightly turning the corner at this point though she is not sure if this is a good long-term medication or not.  Is interested in Botox.    10/31/22  Patient returns today.  Brought in the headache log today which I reviewed.  She is still having 10-12 headaches every month.  Previously these were at 15 headaches a month.  Headaches have been minimally improved with the nortriptyline.  Reports side effects of somnolence with the nortriptyline.  Is taking 30 mg.  Wants to get off the medication.  Imitrex is working for abortive therapy.  The Imitrex injections were given to her by the pharmacy that she wants the pen which I prescribed today.  Denies any other new symptoms.  No other triggers for her headaches.  Wants to proceed with the Botox since most of her headaches are coming from the shoulder tension.    11/30/22  Patient returns today.  Headaches are about the same as before.  She had called the clinic for an Imitrex prescription which is working for her.  Reports of a new problem ongoing for the last 1 month.  She does have some twitching of her left eye.  This can happen anytime.  There is no clear triggers that she is identified.  Drinks about 2 cups of coffee.  Drinks plenty of water.  No new medications.  The twitching is not severe enough that it causes the eye to close.  Is limited to the left eye only.    2/7/23  Patient returns today.  Headaches have significantly improved with the Botox.  She reports 50% reduction in frequency.  She is having 9 headaches a month.  Also reports that the headaches are less severe and not lasting that long.  They are  shorter in intensity.  They still would last for couple of days.  Sumatriptan is still effective.    Left eye twitching has not happened since the Botox.  She did go on a vacation and the stress is less at this point.    She further reports some cosmetic changes in the frontal region with the Botox and wants to avoid those in the future.  She wants to do a lower dose of Botox in the frontal region.    8/15/23  Patient returns today.  1.  In the last 3 months since her Botox she has had 2 headaches in the month of June 9 in the month of July and 7 in the month of August.  Her headaches are about the same as before though might have been worse because of heat and irregular sleep schedule.  She starts work again tomorrow.  2.  No further eye twitching with the Botox  3.  We are injecting less Botox in the frontal region and that has not made the headaches any severe  4.  Imitrex still works as abortive therapy.  5.  Nortriptyline is making her more sleepy though wants to try a higher dose to see if the headaches can get better.  6.  Does complain of stiff neck when she works on the computer which could be leading to the headaches.    2/13/24  Patient returns today  1.  She is getting good benefit with the Botox though does have a few more headaches in the first month and the first half of the second month.  Second and third month do better.  We discussed that this is most likely related to other triggers and the Botox is working well.  2.  She is concerned about daytime fatigue with the nortriptyline and wants to cut down the dose.  It is providing some benefit with sleep  3.  Imitrex is working with abortive therapy.  Insurance is not approving both the oral and injections.  4.  Neck pain is better with the Botox.  Still has a lot of tension when she is working at the computer.  5.  No longer is having eye twitching.  No other new concerns.    9/17/24  Patient events.  1.  Continues to have 7-8 headaches a month.   There is no clear major triggers that she is identified.  Could be related to the summer season.  Headaches are generally worse in the summers.  2.  Remains on the nortriptyline which she feels is helping.  Has not been using the tizanidine.  Has not needed it.  3.  Imitrex does work for abortive therapy.  Occasionally headaches are not severe enough that she would need abortive therapy  No other new concerns.  No side effects to the Botox.    Previous history is reviewed and this is unchanged.    PAST MEDICAL/SURGICAL HISTORY  Past Medical History:   Diagnosis Date     Abnormal Pap smear of cervix 11/22/2019    See problem list.      Anxiety      Arthritis Couple yrs ago?    Had surgery on one of fingers last may     Colitis     lymphocytic colitis ?     Depressive disorder Since young adult    On fluoxetine and attend therapy periodically     Depressive disorder, not elsewhere classified     sees psych- Dr Blue     Migraine, unspecified, without mention of intractable migraine without mention of status migrainosus age 14 yrs     Other acne      PAIN IN THORACIC SPINE [724.1] 04/13/2006    chiropractic care     Raynaud disease     ? connective tissue dz.  ? sogren's     Patient Active Problem List   Diagnosis     Irritable bowel syndrome     Other acne     Migraine with aura and without status migrainosus, not intractable     Mild major depression (H)     Sedimentation rate elevation     Elevated vitamin B12 level     Anemia, unspecified type     Hypervitaminosis B6     Lymphocytic colitis     ASCUS of cervix with negative high risk HPV     Sjogren's syndrome with inflammatory arthritis (H24)   Significant for migraines, arthritis, depression, microcolitis    FAMILY HISTORY  Family History   Problem Relation Age of Onset     Neurologic Disorder Mother         MIGRIANES     Anxiety Disorder Mother      Osteoporosis Mother      C.A.D. Father         in his 60's     Depression Father      Heart Disease Father       Lipids Father      Hypertension Father      Arthritis Father      Sleep Disorder Father      Cerebrovascular Disease Father 78     Coronary Artery Disease Father      Hyperlipidemia Father      Cancer - colorectal Paternal Grandmother      Cancer Maternal Grandfather         stomach     Heart Disease Maternal Grandfather      Depression Brother      Depression Brother      Asthma Daughter      Heart Disease Paternal Grandfather      Breast Cancer Maternal Aunt      Breast Cancer Other         Maternal aunt     Diabetes No family hx of    Positive for migraines in her mother and aunt.    SOCIAL HISTORY  Social History     Tobacco Use     Smoking status: Never     Smokeless tobacco: Never   Vaping Use     Vaping status: Never Used   Substance Use Topics     Alcohol use: Yes     Comment: occasionally     Drug use: No       SYSTEMS REVIEW  Twelve-system ROS was done and other than the HPI this was negative except for neck pain, dizziness, hearing loss, sleepiness during the day, headaches, depression, bowel problems.  No new symptoms/issues.    MEDICATIONS  Current Outpatient Medications   Medication Sig Dispense Refill     acetaminophen (TYLENOL) 325 MG tablet Take 2 tablets by mouth every 4 hours as needed for pain. 100 tablet 0     Biotin 5000 MCG TABS Take 1,000 mcg by mouth daily       cholecalciferol 5000 UNITS CAPS Take 1 capsule (5,000 Units) by mouth daily FOR VITAMIN D DEFICIENCY (LOW VITAMIN D) 100 capsule 3     Ferrous Sulfate (IRON) 28 MG TABS        FLUoxetine (PROZAC) 40 MG capsule Take 1 capsule (40 mg) by mouth daily 90 capsule 3     IBUPROFEN PO Take 200 mg by mouth Takes 3 tablets PRN       loperamide (IMODIUM A-D) 2 MG tablet Take 2 mg by mouth daily as needed for diarrhea       nortriptyline (PAMELOR) 10 MG capsule Take 3 caps/night. 270 capsule 1     Riboflavin 100 MG TABS Take 400 mg by mouth       SUMAtriptan (IMITREX STATDOSE) 6 MG/0.5ML pen injector kit INJECT 0.5 ML UNDER THE SKIN AT ONSET OF  HEADACHE FOR MIGRAINE. MAY REPEAT IN ONE HOUR. MAX 12 MG IN 24 HOURS 12 mL 3     SUMAtriptan (IMITREX) 100 MG tablet TAKE 1 TABLET(100 MG) BY MOUTH AT ONSET OF HEADACHE FOR MIGRAINE. MAY REPEAT IN 2 HOURS. MAX 4 TABLETS/ 24 HOURS STRENGTH: 100 MG 18 tablet 11     tiZANidine (ZANAFLEX) 2 MG tablet Take 1 tablet (2 mg) by mouth 3 times daily as needed for muscle spasms 180 tablet 0     budesonide (ENTOCORT EC) 3 MG EC capsule Take 3 mg by mouth every other day (Patient not taking: Reported on 9/17/2024)       No current facility-administered medications for this visit.      PHYSICAL EXAMINATION  VITALS: /82 (BP Location: Left arm, Patient Position: Sitting)   Pulse 79   Ht 1.524 m (5')   Wt 62.1 kg (137 lb)   BMI 26.76 kg/m    GENERAL: Healthy appearing, alert, no acute distress, normal habitus.  CARDIOVASCULAR: Extremities warm and well perfused. Pulses present.   NEUROLOGICAL:  Patient is awake and oriented to self, place and time.  Attention span is normal.  Memory is grossly intact.  Language is fluent and follows commands appropriately.  Appropriate fund of knowledge. Cranial nerves 2-12 are intact. There is no pronator drift.  Motor exam shows 5/5 strength in all extremities.  Tone is symmetric bilaterally in upper and lower extremities.  (Previously reflexes are symmetric and 2+ in upper extremities and lower extremities. Sensory exam is grossly intact to light touch, pin prick and vibration.) Finger to nose and heel to shin is without dysmetria.  Romberg is negative.  Gait is normal and the patient is able to do tandem walk and walk on toes and heels.  Exam stable.    DIAGNOSTICS  CARDIOLOGY  EKG 2008  NSR    RELEVANT LABS  Component      Latest Ref Rng & Units 12/22/2020   WBC      4.0 - 11.0 10e9/L 6.7   RBC Count      3.8 - 5.2 10e12/L 3.93   Hemoglobin      11.7 - 15.7 g/dL 11.4 (L)   Hematocrit      35.0 - 47.0 % 35.2   MCV      78 - 100 fl 90   MCH      26.5 - 33.0 pg 29.0   MCHC      31.5 -  36.5 g/dL 32.4   RDW      10.0 - 15.0 % 13.2   Platelet Count      150 - 450 10e9/L 297   % Neutrophils      % 70.5   % Lymphocytes      % 20.4   % Monocytes      % 6.6   % Eosinophils      % 2.1   % Basophils      % 0.4   Absolute Neutrophil      1.6 - 8.3 10e9/L 4.7   Absolute Lymphocytes      0.8 - 5.3 10e9/L 1.4   Absolute Monocytes      0.0 - 1.3 10e9/L 0.4   Absolute Eosinophils      0.0 - 0.7 10e9/L 0.1   Absolute Basophils      0.0 - 0.2 10e9/L 0.0   Diff Method       Automated Method   Sodium      133 - 144 mmol/L 138   Potassium      3.4 - 5.3 mmol/L 4.5   Chloride      94 - 109 mmol/L 107   Carbon Dioxide      20 - 32 mmol/L 27   Anion Gap      3 - 14 mmol/L 4   Glucose      70 - 99 mg/dL 79   Urea Nitrogen      7 - 30 mg/dL 12   Creatinine      0.52 - 1.04 mg/dL 0.78   GFR Estimate      >60 mL/min/1.73:m2 87   GFR Estimate If Black      >60 mL/min/1.73:m2 >90   Calcium      8.5 - 10.1 mg/dL 9.2   Bilirubin Total      0.2 - 1.3 mg/dL 0.3   Albumin      3.4 - 5.0 g/dL 3.8   Protein Total      6.8 - 8.8 g/dL 7.8   Alkaline Phosphatase      40 - 150 U/L 73   ALT      0 - 50 U/L 22   AST      0 - 45 U/L 16       OUTSIDE RECORDS  Outside referral notes and chart notes were reviewed and pertinent information has been summarized (in addition to the HPI):-Patient was seen in primary care.  Has issues with depression.  Had a visit in September that was virtual.  Was diagnosed with migraine with aura.  Is on sumatriptan the day worsening.  History of migraines with sumatriptan injections and tablets.  Injections only for severe headaches.  Headaches happen once every 2 months.  Migraines get to cluster.  No significant changes in headaches.  Some concerns with hep C.    MRI brain-images reviewed with the patient again.  There are very minimal T2 hyperintensities noted.  Is appropriate for age.  IMPRESSION:  1.  No acute intracranial abnormality.  2.  Small nonspecific focus of subcortical T2 FLAIR hyperintensity in  the right frontal lobe is strictly nonspecific but can be seen in setting of migraines and prior insult or injury. Demyelination is not favored.      LABS  Component      Latest Ref Rng & Units 2/8/2022   WBC      4.0 - 11.0 10e3/uL 6.3   RBC Count      3.80 - 5.20 10e6/uL 4.36   Hemoglobin      11.7 - 15.7 g/dL 12.8   Hematocrit      35.0 - 47.0 % 39.1   MCV      78 - 100 fL 90   MCH      26.5 - 33.0 pg 29.4   MCHC      31.5 - 36.5 g/dL 32.7   RDW      10.0 - 15.0 % 13.2   Platelet Count      150 - 450 10e3/uL 332   % Neutrophils      % 51   % Lymphocytes      % 38   % Monocytes      % 7   % Eosinophils      % 3   % Basophils      % 1   % Immature Granulocytes      % 0   Absolute Neutrophils      1.6 - 8.3 10e3/uL 3.2   Absolute Lymphocytes      0.8 - 5.3 10e3/uL 2.4   Absolute Monocytes      0.0 - 1.3 10e3/uL 0.5   Absolute Eosinophils      0.0 - 0.7 10e3/uL 0.2   Absolute Basophils      0.0 - 0.2 10e3/uL 0.1   Absolute Immature Granulocytes      <=0.4 10e3/uL 0.0   Vitamin B12      193 - 986 pg/mL 428   TSH      0.40 - 4.00 mU/L 2.28   Ferritin      8 - 252 ng/mL 70   Sed Rate      0 - 30 mm/hr 17       IMPRESSION/REPORT/PLAN  Intractable chronic migraine without aura and without status migrainosus  History of depression  Medication side effect  Left eye twitching  Insomnia    This is a 54 year old female with headache suggestive of chronic migraines.  Exam/MRI have been negative.  Blood work has been noncontributory.  There might be some lifestyle issues like irregular sleep schedule that might be leading to her headaches.  It can also be a trigger for headaches.  She does do some yoga which helps with the headaches.  Does have neck issues related to working on the computer too long which could also be leading to headaches.  MRI showed some T2 hyperintensities.  No cause for headaches    For prevention of headaches she has tried Topamax and propranolol in the past.  Nortriptyline has provided minimal benefit and  possibly helping with sleep.  She does complain of daytime fatigue with the nortriptyline will reduce the dose.  Will add tizanidine for neck pain/neck tension provoked headaches to see if it helps.  No longer using the tizanidine.  Will continue the nortriptyline for right now.    Botox is helping.  Because of cosmetic reasons she does not want us injecting a lot in the frontal region.  Will inject more in the trapezius region to see if that helps with the headaches.  Headaches are more in the right temporal and occipital region.  Continue current paradigm which is helping.    For abortive therapy will continue oral and injectable Imitrex.    Previously she was having some left eyelid twitching which is now improved.  Will monitor.    She still continues to have 6-7 headaches a month.  Discussed about increasing the dose of Botox or increasing the dose of nortriptyline and she wants to try it in the fall to see how she does.  Headaches are generally worse in summer and might improve without increasing the medications.  Can revisit in 6 months.    Return in 6 months for medical visit and continue Botox every 3 months.  Medications refilled.    -     nortriptyline (PAMELOR) 10 MG capsule; Start with 3 caps/night.  -     tiZANidine (ZANAFLEX) 2 MG tablet; Take 1 tablet (2 mg) by mouth 3 times daily as needed for muscle spasms  -     SUMAtriptan (IMITREX STATDOSE) 6 MG/0.5ML pen injector kit; INJECT 0.5 ML UNDER THE SKIN AT ONSET OF HEADACHE FOR MIGRAINE. MAY REPEAT IN ONE HOUR. MAX 12 MG IN 24 HOURS  -     SUMAtriptan (IMITREX) 100 MG tablet; TAKE 1 TABLET(100 MG) BY MOUTH AT ONSET OF HEADACHE FOR MIGRAINE. MAY REPEAT IN 2 HOURS. MAX 4 TABLETS/ 24 HOURS STRENGTH: 100 MG  - Botox every 3 months    Return in about 3 months (around 12/17/2024) for In-Clinic Visit (must), Botox.    Over 31 minutes were spent coordinating the care for the patient on the day of the encounter.  This includes previsit, during visit and post  "visit activities as documented above.  Counseling patient.  Refractory problem.  Discussion of medications.  (Activities include but not inclusive of reviewing chart, reviewing outside records, reviewing labs and imaging study results as well as the images, patient visit time including getting history and exam,  use if applicable, review of test results with the patient and coming up with a plan in a shared model, counseling patient and family, education and answering patient questions, EMR , EMR diagnosis entry and problem list management, medication reconciliation and prescription management if applicable, paperwork if applicable, printing documents and documentation of the visit activities.)    Botox PA  \" The patient has a diagnosis of chronic migraines. She has more than 15 headache days a month with each headache lasting at least 4 hours despite use of triptans. Her headaches have been there for over 6 months. She has been screened for medication overuse headaches and she does not have that. She has tried nortriptyline, Topamax, Propranolol for 3 months without any significant benefit. I would request that the Botox be approved for her.  60-70% benefit after multiple sessions.\"      Chuy Dunn MD  Neurologist  [facility]  Tel:- [phone]    This note was dictated using voice recognition software.  Any grammatical or context distortions are unintentional and inherent to the software.      Again, thank you for allowing me to participate in the care of your patient.        Sincerely,        Chuy Dunn MD  "

## 2024-09-17 NOTE — PROGRESS NOTES
NEUROLOGY OUTPATIENT PROGRESS NOTE   Sep 17, 2024     CHIEF COMPLAINT/REASON FOR VISIT/REASON FOR CONSULT  Patient presents with:  Botox    REASON FOR CONSULTATION- Headaches    REFERRAL SOURCE  Dr. Srinivasa Sofia  CC Dr. Srinivasa Sofia    HISTORY OF PRESENT ILLNESS  Nikki Hardin is a 54 year old female seen today for evaluation of headaches. She reports that she has a diagnosis of migraines since age 14. Previously her headaches were sporadic but still frequent. She estimates that she was having at least ~15 headaches a month. Over the last 2 to 3 months headaches have become much more frequent. She is having a headache every day. Reports no real reasons why the headaches might have gotten worse.    Headaches are generally in the temple on either side and then radiate to the neck. Occasionally she can have more frontal pressure. Takes a throbbing. There is some photophobia and phonophobia. She has never had any auras with the headaches. Reports triggers including working and doing remote work from home, inconsistent sleep, lack of regular meals, going out in the sun sometimes storms. She does have some neck pain though neck pain is worse at the time of headaches.    She is tried Topamax in the past which caused severe side effects. She is also tried propanolol without any benefit. Over-the-counter medications do not help. 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 She has been using sumatriptan injections and tablets for several years which have been working for her. Has tried Maxalt with benefit though insurance would not cover it anymore and then she had to stop it. The sumatriptan works better than the Maxalt.    4/6/22  Patient returns today.  She did try the nortriptyline and that has helped with the headaches.  Currently is having 7 headaches a month.  Reports no changes in the nature of the headaches.  Imitrex still works with the abortive therapy.  She does complain of some somnolence in the  morning with the nortriptyline.  Is taking 30 mg.  Tries to take it earlier in the evening.  Things are may be slightly turning the corner at this point though she is not sure if this is a good long-term medication or not.  Is interested in Botox.    10/31/22  Patient returns today.  Brought in the headache log today which I reviewed.  She is still having 10-12 headaches every month.  Previously these were at 15 headaches a month.  Headaches have been minimally improved with the nortriptyline.  Reports side effects of somnolence with the nortriptyline.  Is taking 30 mg.  Wants to get off the medication.  Imitrex is working for abortive therapy.  The Imitrex injections were given to her by the pharmacy that she wants the pen which I prescribed today.  Denies any other new symptoms.  No other triggers for her headaches.  Wants to proceed with the Botox since most of her headaches are coming from the shoulder tension.    11/30/22  Patient returns today.  Headaches are about the same as before.  She had called the clinic for an Imitrex prescription which is working for her.  Reports of a new problem ongoing for the last 1 month.  She does have some twitching of her left eye.  This can happen anytime.  There is no clear triggers that she is identified.  Drinks about 2 cups of coffee.  Drinks plenty of water.  No new medications.  The twitching is not severe enough that it causes the eye to close.  Is limited to the left eye only.    2/7/23  Patient returns today.  Headaches have significantly improved with the Botox.  She reports 50% reduction in frequency.  She is having 9 headaches a month.  Also reports that the headaches are less severe and not lasting that long.  They are shorter in intensity.  They still would last for couple of days.  Sumatriptan is still effective.    Left eye twitching has not happened since the Botox.  She did go on a vacation and the stress is less at this point.    She further reports some  cosmetic changes in the frontal region with the Botox and wants to avoid those in the future.  She wants to do a lower dose of Botox in the frontal region.    8/15/23  Patient returns today.  1.  In the last 3 months since her Botox she has had 2 headaches in the month of June 9 in the month of July and 7 in the month of August.  Her headaches are about the same as before though might have been worse because of heat and irregular sleep schedule.  She starts work again tomorrow.  2.  No further eye twitching with the Botox  3.  We are injecting less Botox in the frontal region and that has not made the headaches any severe  4.  Imitrex still works as abortive therapy.  5.  Nortriptyline is making her more sleepy though wants to try a higher dose to see if the headaches can get better.  6.  Does complain of stiff neck when she works on the computer which could be leading to the headaches.    2/13/24  Patient returns today  1.  She is getting good benefit with the Botox though does have a few more headaches in the first month and the first half of the second month.  Second and third month do better.  We discussed that this is most likely related to other triggers and the Botox is working well.  2.  She is concerned about daytime fatigue with the nortriptyline and wants to cut down the dose.  It is providing some benefit with sleep  3.  Imitrex is working with abortive therapy.  Insurance is not approving both the oral and injections.  4.  Neck pain is better with the Botox.  Still has a lot of tension when she is working at the computer.  5.  No longer is having eye twitching.  No other new concerns.    9/17/24  Patient events.  1.  Continues to have 7-8 headaches a month.  There is no clear major triggers that she is identified.  Could be related to the summer season.  Headaches are generally worse in the summers.  2.  Remains on the nortriptyline which she feels is helping.  Has not been using the tizanidine.  Has  not needed it.  3.  Imitrex does work for abortive therapy.  Occasionally headaches are not severe enough that she would need abortive therapy  No other new concerns.  No side effects to the Botox.    Previous history is reviewed and this is unchanged.    PAST MEDICAL/SURGICAL HISTORY  Past Medical History:   Diagnosis Date    Abnormal Pap smear of cervix 11/22/2019    See problem list.     Anxiety     Arthritis Couple yrs ago?    Had surgery on one of fingers last may    Colitis     lymphocytic colitis ?    Depressive disorder Since young adult    On fluoxetine and attend therapy periodically    Depressive disorder, not elsewhere classified     sees psych- Dr Blue    Migraine, unspecified, without mention of intractable migraine without mention of status migrainosus age 14 yrs    Other acne     PAIN IN THORACIC SPINE [724.1] 04/13/2006    chiropractic care    Raynaud disease     ? connective tissue dz.  ? sogren's     Patient Active Problem List   Diagnosis    Irritable bowel syndrome    Other acne    Migraine with aura and without status migrainosus, not intractable    Mild major depression (H)    Sedimentation rate elevation    Elevated vitamin B12 level    Anemia, unspecified type    Hypervitaminosis B6    Lymphocytic colitis    ASCUS of cervix with negative high risk HPV    Sjogren's syndrome with inflammatory arthritis (H24)   Significant for migraines, arthritis, depression, microcolitis    FAMILY HISTORY  Family History   Problem Relation Age of Onset    Neurologic Disorder Mother         MIGRIANES    Anxiety Disorder Mother     Osteoporosis Mother     C.A.D. Father         in his 60's    Depression Father     Heart Disease Father     Lipids Father     Hypertension Father     Arthritis Father     Sleep Disorder Father     Cerebrovascular Disease Father 78    Coronary Artery Disease Father     Hyperlipidemia Father     Cancer - colorectal Paternal Grandmother     Cancer Maternal Grandfather         stomach     Heart Disease Maternal Grandfather     Depression Brother     Depression Brother     Asthma Daughter     Heart Disease Paternal Grandfather     Breast Cancer Maternal Aunt     Breast Cancer Other         Maternal aunt    Diabetes No family hx of    Positive for migraines in her mother and aunt.    SOCIAL HISTORY  Social History     Tobacco Use    Smoking status: Never    Smokeless tobacco: Never   Vaping Use    Vaping status: Never Used   Substance Use Topics    Alcohol use: Yes     Comment: occasionally    Drug use: No       SYSTEMS REVIEW  Twelve-system ROS was done and other than the HPI this was negative except for neck pain, dizziness, hearing loss, sleepiness during the day, headaches, depression, bowel problems.  No new symptoms/issues.    MEDICATIONS  Current Outpatient Medications   Medication Sig Dispense Refill    acetaminophen (TYLENOL) 325 MG tablet Take 2 tablets by mouth every 4 hours as needed for pain. 100 tablet 0    Biotin 5000 MCG TABS Take 1,000 mcg by mouth daily      cholecalciferol 5000 UNITS CAPS Take 1 capsule (5,000 Units) by mouth daily FOR VITAMIN D DEFICIENCY (LOW VITAMIN D) 100 capsule 3    Ferrous Sulfate (IRON) 28 MG TABS       FLUoxetine (PROZAC) 40 MG capsule Take 1 capsule (40 mg) by mouth daily 90 capsule 3    IBUPROFEN PO Take 200 mg by mouth Takes 3 tablets PRN      loperamide (IMODIUM A-D) 2 MG tablet Take 2 mg by mouth daily as needed for diarrhea      nortriptyline (PAMELOR) 10 MG capsule Take 3 caps/night. 270 capsule 1    Riboflavin 100 MG TABS Take 400 mg by mouth      SUMAtriptan (IMITREX STATDOSE) 6 MG/0.5ML pen injector kit INJECT 0.5 ML UNDER THE SKIN AT ONSET OF HEADACHE FOR MIGRAINE. MAY REPEAT IN ONE HOUR. MAX 12 MG IN 24 HOURS 12 mL 3    SUMAtriptan (IMITREX) 100 MG tablet TAKE 1 TABLET(100 MG) BY MOUTH AT ONSET OF HEADACHE FOR MIGRAINE. MAY REPEAT IN 2 HOURS. MAX 4 TABLETS/ 24 HOURS STRENGTH: 100 MG 18 tablet 11    tiZANidine (ZANAFLEX) 2 MG tablet Take 1  tablet (2 mg) by mouth 3 times daily as needed for muscle spasms 180 tablet 0    budesonide (ENTOCORT EC) 3 MG EC capsule Take 3 mg by mouth every other day (Patient not taking: Reported on 9/17/2024)       No current facility-administered medications for this visit.      PHYSICAL EXAMINATION  VITALS: /82 (BP Location: Left arm, Patient Position: Sitting)   Pulse 79   Ht 1.524 m (5')   Wt 62.1 kg (137 lb)   BMI 26.76 kg/m    GENERAL: Healthy appearing, alert, no acute distress, normal habitus.  CARDIOVASCULAR: Extremities warm and well perfused. Pulses present.   NEUROLOGICAL:  Patient is awake and oriented to self, place and time.  Attention span is normal.  Memory is grossly intact.  Language is fluent and follows commands appropriately.  Appropriate fund of knowledge. Cranial nerves 2-12 are intact. There is no pronator drift.  Motor exam shows 5/5 strength in all extremities.  Tone is symmetric bilaterally in upper and lower extremities.  (Previously reflexes are symmetric and 2+ in upper extremities and lower extremities. Sensory exam is grossly intact to light touch, pin prick and vibration.) Finger to nose and heel to shin is without dysmetria.  Romberg is negative.  Gait is normal and the patient is able to do tandem walk and walk on toes and heels.  Exam stable.    DIAGNOSTICS  CARDIOLOGY  EKG 2008  NSR    RELEVANT LABS  Component      Latest Ref Rng & Units 12/22/2020   WBC      4.0 - 11.0 10e9/L 6.7   RBC Count      3.8 - 5.2 10e12/L 3.93   Hemoglobin      11.7 - 15.7 g/dL 11.4 (L)   Hematocrit      35.0 - 47.0 % 35.2   MCV      78 - 100 fl 90   MCH      26.5 - 33.0 pg 29.0   MCHC      31.5 - 36.5 g/dL 32.4   RDW      10.0 - 15.0 % 13.2   Platelet Count      150 - 450 10e9/L 297   % Neutrophils      % 70.5   % Lymphocytes      % 20.4   % Monocytes      % 6.6   % Eosinophils      % 2.1   % Basophils      % 0.4   Absolute Neutrophil      1.6 - 8.3 10e9/L 4.7   Absolute Lymphocytes      0.8 - 5.3  10e9/L 1.4   Absolute Monocytes      0.0 - 1.3 10e9/L 0.4   Absolute Eosinophils      0.0 - 0.7 10e9/L 0.1   Absolute Basophils      0.0 - 0.2 10e9/L 0.0   Diff Method       Automated Method   Sodium      133 - 144 mmol/L 138   Potassium      3.4 - 5.3 mmol/L 4.5   Chloride      94 - 109 mmol/L 107   Carbon Dioxide      20 - 32 mmol/L 27   Anion Gap      3 - 14 mmol/L 4   Glucose      70 - 99 mg/dL 79   Urea Nitrogen      7 - 30 mg/dL 12   Creatinine      0.52 - 1.04 mg/dL 0.78   GFR Estimate      >60 mL/min/1.73:m2 87   GFR Estimate If Black      >60 mL/min/1.73:m2 >90   Calcium      8.5 - 10.1 mg/dL 9.2   Bilirubin Total      0.2 - 1.3 mg/dL 0.3   Albumin      3.4 - 5.0 g/dL 3.8   Protein Total      6.8 - 8.8 g/dL 7.8   Alkaline Phosphatase      40 - 150 U/L 73   ALT      0 - 50 U/L 22   AST      0 - 45 U/L 16       OUTSIDE RECORDS  Outside referral notes and chart notes were reviewed and pertinent information has been summarized (in addition to the HPI):-Patient was seen in primary care.  Has issues with depression.  Had a visit in September that was virtual.  Was diagnosed with migraine with aura.  Is on sumatriptan the day worsening.  History of migraines with sumatriptan injections and tablets.  Injections only for severe headaches.  Headaches happen once every 2 months.  Migraines get to cluster.  No significant changes in headaches.  Some concerns with hep C.    MRI brain-images reviewed with the patient again.  There are very minimal T2 hyperintensities noted.  Is appropriate for age.  IMPRESSION:  1.  No acute intracranial abnormality.  2.  Small nonspecific focus of subcortical T2 FLAIR hyperintensity in the right frontal lobe is strictly nonspecific but can be seen in setting of migraines and prior insult or injury. Demyelination is not favored.      LABS  Component      Latest Ref Rng & Units 2/8/2022   WBC      4.0 - 11.0 10e3/uL 6.3   RBC Count      3.80 - 5.20 10e6/uL 4.36   Hemoglobin      11.7 -  15.7 g/dL 12.8   Hematocrit      35.0 - 47.0 % 39.1   MCV      78 - 100 fL 90   MCH      26.5 - 33.0 pg 29.4   MCHC      31.5 - 36.5 g/dL 32.7   RDW      10.0 - 15.0 % 13.2   Platelet Count      150 - 450 10e3/uL 332   % Neutrophils      % 51   % Lymphocytes      % 38   % Monocytes      % 7   % Eosinophils      % 3   % Basophils      % 1   % Immature Granulocytes      % 0   Absolute Neutrophils      1.6 - 8.3 10e3/uL 3.2   Absolute Lymphocytes      0.8 - 5.3 10e3/uL 2.4   Absolute Monocytes      0.0 - 1.3 10e3/uL 0.5   Absolute Eosinophils      0.0 - 0.7 10e3/uL 0.2   Absolute Basophils      0.0 - 0.2 10e3/uL 0.1   Absolute Immature Granulocytes      <=0.4 10e3/uL 0.0   Vitamin B12      193 - 986 pg/mL 428   TSH      0.40 - 4.00 mU/L 2.28   Ferritin      8 - 252 ng/mL 70   Sed Rate      0 - 30 mm/hr 17       IMPRESSION/REPORT/PLAN  Intractable chronic migraine without aura and without status migrainosus  History of depression  Medication side effect  Left eye twitching  Insomnia    This is a 54 year old female with headache suggestive of chronic migraines.  Exam/MRI have been negative.  Blood work has been noncontributory.  There might be some lifestyle issues like irregular sleep schedule that might be leading to her headaches.  It can also be a trigger for headaches.  She does do some yoga which helps with the headaches.  Does have neck issues related to working on the computer too long which could also be leading to headaches.  MRI showed some T2 hyperintensities.  No cause for headaches    For prevention of headaches she has tried Topamax and propranolol in the past.  Nortriptyline has provided minimal benefit and possibly helping with sleep.  She does complain of daytime fatigue with the nortriptyline will reduce the dose.  Will add tizanidine for neck pain/neck tension provoked headaches to see if it helps.  No longer using the tizanidine.  Will continue the nortriptyline for right now.    Botox is helping.   Because of cosmetic reasons she does not want us injecting a lot in the frontal region.  Will inject more in the trapezius region to see if that helps with the headaches.  Headaches are more in the right temporal and occipital region.  Continue current paradigm which is helping.    For abortive therapy will continue oral and injectable Imitrex.    Previously she was having some left eyelid twitching which is now improved.  Will monitor.    She still continues to have 6-7 headaches a month.  Discussed about increasing the dose of Botox or increasing the dose of nortriptyline and she wants to try it in the fall to see how she does.  Headaches are generally worse in summer and might improve without increasing the medications.  Can revisit in 6 months.    Return in 6 months for medical visit and continue Botox every 3 months.  Medications refilled.    -     nortriptyline (PAMELOR) 10 MG capsule; Start with 3 caps/night.  -     tiZANidine (ZANAFLEX) 2 MG tablet; Take 1 tablet (2 mg) by mouth 3 times daily as needed for muscle spasms  -     SUMAtriptan (IMITREX STATDOSE) 6 MG/0.5ML pen injector kit; INJECT 0.5 ML UNDER THE SKIN AT ONSET OF HEADACHE FOR MIGRAINE. MAY REPEAT IN ONE HOUR. MAX 12 MG IN 24 HOURS  -     SUMAtriptan (IMITREX) 100 MG tablet; TAKE 1 TABLET(100 MG) BY MOUTH AT ONSET OF HEADACHE FOR MIGRAINE. MAY REPEAT IN 2 HOURS. MAX 4 TABLETS/ 24 HOURS STRENGTH: 100 MG  - Botox every 3 months    Return in about 3 months (around 12/17/2024) for In-Clinic Visit (must), Botox.    Over 31 minutes were spent coordinating the care for the patient on the day of the encounter.  This includes previsit, during visit and post visit activities as documented above.  Counseling patient.  Refractory problem.  Discussion of medications.  (Activities include but not inclusive of reviewing chart, reviewing outside records, reviewing labs and imaging study results as well as the images, patient visit time including getting history  "and exam,  use if applicable, review of test results with the patient and coming up with a plan in a shared model, counseling patient and family, education and answering patient questions, EMR , EMR diagnosis entry and problem list management, medication reconciliation and prescription management if applicable, paperwork if applicable, printing documents and documentation of the visit activities.)    Botox PA  \" The patient has a diagnosis of chronic migraines. She has more than 15 headache days a month with each headache lasting at least 4 hours despite use of triptans. Her headaches have been there for over 6 months. She has been screened for medication overuse headaches and she does not have that. She has tried nortriptyline, Topamax, Propranolol for 3 months without any significant benefit. I would request that the Botox be approved for her.  60-70% benefit after multiple sessions.\"      Chuy Dunn MD  Neurologist  [facility]rology [facility]  Tel:- [phone]    This note was dictated using voice recognition software.  Any grammatical or context distortions are unintentional and inherent to the software.  "

## 2024-09-17 NOTE — PROCEDURES
NEUROLOGY PROCEDURE NOTE  Sep 17, 2024      Chronic migraine Botox injection    CONSENT: The procedure was explained to the patient. The risks and benefits of the procedure and the alternatives were discussed with the patient. The patient was given an opportunity to ask any questions she had about the procedure. A verbal consent was obtained from the patient. A written consent is available in the chart.    PRE-PROCEDURE  Diagnosis: Chronic migraine  Headache frequency before the use of Botox:- 20 headache days per month  Headache frequency with Botox use:-7/month    EXAM  GENERAL: Healthy appearing, alert, no acute distress, normal habitus.  NEUROLOGICAL:  Patient is alert with fluent language.  Extraocular movements are intact.  Facial movements are present and symmetric.  No arm drift.    PROCEDURE SUMMARY:   The following muscles were identified after palpating for landmarks and the overlying skin was cleansed with alcohol. These muscles were then injected using a 30 guage- half  inch needle with the following doses of onabotulinumtoxin A. A 5 unit/ 0.1 ml dilution was used for the injections. A 2 x 100 unit vial was used.    Corrugators 2.5 units each side. (per patient request)  Procerus 2.5 units. (per patient request)  Frontalis 2.5x2 units each side. (per patient request)  Temporalis 20 units each side.  Occipitalis 15 units each side.  Paraspinals 13.75 units each side.  Trapezius 20 units each side.    Units Injected: 155 Units  Units Discarded: 45 Units  Lot Number and Expiration: [id number]; 11/26    The patient tolerated the procedure without any immediate complaints and will call the Neurology clinic if she has any problems or side effects from the medication. The patient will follow up in the Neurology clinic as previously decided.      Chuy Dunn MD  Neurologist  [facility]  [phone]

## 2024-09-17 NOTE — NURSING NOTE
Chief Complaint   Patient presents with    Botox     Verónica Gordon LPN on 9/17/2024 at 9:06 AM

## 2024-09-18 ENCOUNTER — TRANSFERRED RECORDS (OUTPATIENT)
Dept: HEALTH INFORMATION MANAGEMENT | Facility: CLINIC | Age: 54
End: 2024-09-18
Payer: COMMERCIAL

## 2024-12-10 ASSESSMENT — PATIENT HEALTH QUESTIONNAIRE - PHQ9
SUM OF ALL RESPONSES TO PHQ QUESTIONS 1-9: 4
10. IF YOU CHECKED OFF ANY PROBLEMS, HOW DIFFICULT HAVE THESE PROBLEMS MADE IT FOR YOU TO DO YOUR WORK, TAKE CARE OF THINGS AT HOME, OR GET ALONG WITH OTHER PEOPLE: SOMEWHAT DIFFICULT
SUM OF ALL RESPONSES TO PHQ QUESTIONS 1-9: 4

## 2024-12-11 ENCOUNTER — TELEPHONE (OUTPATIENT)
Dept: FAMILY MEDICINE | Facility: CLINIC | Age: 54
End: 2024-12-11

## 2024-12-11 ENCOUNTER — OFFICE VISIT (OUTPATIENT)
Dept: INTERNAL MEDICINE | Facility: CLINIC | Age: 54
End: 2024-12-11
Payer: COMMERCIAL

## 2024-12-11 VITALS
HEIGHT: 60 IN | WEIGHT: 143 LBS | RESPIRATION RATE: 14 BRPM | HEART RATE: 52 BPM | TEMPERATURE: 98.1 F | SYSTOLIC BLOOD PRESSURE: 94 MMHG | BODY MASS INDEX: 28.07 KG/M2 | OXYGEN SATURATION: 100 % | DIASTOLIC BLOOD PRESSURE: 66 MMHG

## 2024-12-11 DIAGNOSIS — Z01.818 PREOP GENERAL PHYSICAL EXAM: Primary | ICD-10-CM

## 2024-12-11 DIAGNOSIS — F32.0 CURRENT MILD EPISODE OF MAJOR DEPRESSIVE DISORDER, UNSPECIFIED WHETHER RECURRENT (H): ICD-10-CM

## 2024-12-11 DIAGNOSIS — G43.109 MIGRAINE WITH AURA AND WITHOUT STATUS MIGRAINOSUS, NOT INTRACTABLE: ICD-10-CM

## 2024-12-11 DIAGNOSIS — D64.9 ANEMIA, UNSPECIFIED TYPE: ICD-10-CM

## 2024-12-11 DIAGNOSIS — M79.641 PAIN OF RIGHT HAND: Primary | ICD-10-CM

## 2024-12-11 DIAGNOSIS — M15.0 PRIMARY OSTEOARTHRITIS INVOLVING MULTIPLE JOINTS: ICD-10-CM

## 2024-12-11 DIAGNOSIS — K58.9 IRRITABLE BOWEL SYNDROME, UNSPECIFIED TYPE: ICD-10-CM

## 2024-12-11 LAB — HGB BLD-MCNC: 12.2 G/DL (ref 11.7–15.7)

## 2024-12-11 PROCEDURE — 36415 COLL VENOUS BLD VENIPUNCTURE: CPT | Performed by: NURSE PRACTITIONER

## 2024-12-11 PROCEDURE — 93005 ELECTROCARDIOGRAM TRACING: CPT | Performed by: NURSE PRACTITIONER

## 2024-12-11 PROCEDURE — 99214 OFFICE O/P EST MOD 30 MIN: CPT | Performed by: NURSE PRACTITIONER

## 2024-12-11 PROCEDURE — 85018 HEMOGLOBIN: CPT | Performed by: NURSE PRACTITIONER

## 2024-12-11 NOTE — TELEPHONE ENCOUNTER
I called Sadie.  [facility] is asking for the ortho referral, so pt can have surgery at [facility]  NPI # [id number]. Then this would be covered as IN NETWORK.      fax it to Sadie at [phone]     Thanks!  YOLANDE Garcia

## 2024-12-11 NOTE — TELEPHONE ENCOUNTER
General Call      Reason for Call:  Referral    What are your questions or concerns:  [facility] is calling to request PCP to put in a referral for ortho as pt has surgery on December 18 with them. Insurance needs a referral from PCP. Please put in a referral and fax it to [facility]ie at [phone], thanks!    Date of last appointment with provider: 3/12/2024

## 2024-12-11 NOTE — PROGRESS NOTES
Preoperative Evaluation  [facility]  [address] [facility] [address]  Phone: [phone]  Fax: [phone]  Primary Provider: Srinivasa Sofia DO  Pre-op Performing Provider: Mare Wilson NP  Dec 11, 2024             12/7/2024   Surgical Information   What procedure is being done? surgery on left index and middle fingers (fusion)    Facility or Hospital where procedure/surgery will be performed: [facility]    Who is doing the procedure / surgery? Dr. Anika Mercado    Date of surgery / procedure: 12/18/2024    Time of surgery / procedure: TBD    Where do you plan to recover after surgery? at home with family        Patient-reported     Fax number for surgical facility: [phone]    Assessment & Plan     The proposed surgical procedure is considered INTERMEDIATE risk.    Preop general physical exam  Primary osteoarthritis involving multiple joints  Nikki is a very pleasant 54-year-old female here today for preoperative valuation prior to surgery on her left index and middle finger joints due to severe osteoarthrosis causing chronic pain.  No contraindication for planned procedure.  Reviewed preoperative guidelines.  - Hemoglobin; Future  - EKG 12-lead, tracing only      Migraine with aura and without status migrainosus, not intractable  Patient has a history of migraines.  Currently manages it with nortriptyline and sumatriptan as needed.  Can continue with nortriptyline prior to surgery but advised to hold sumatriptan the morning of surgery.    Current mild episode of major depressive disorder, unspecified whether recurrent (H)  Currently managed with fluoxetine.  Can continue this without modification prior to surgery.    Anemia, unspecified type  History of anemia.  Currently on iron supplementation daily.  Last hemoglobin A1c 9 months ago was stable at 12.5.  Will check hemoglobin today.     Irritable bowel syndrome, unspecified  type  Does have a history of irritable bowel syndrome.  Symptoms have been stable.  Uses Imodium as needed.  States she has not needed this in a while.            - No identified additional risk factors other than previously addressed    Preoperative Medication Instructions  Antiplatelet or Anticoagulation Medication Instructions   - Patient is on no antiplatelet or anticoagulation medications.    Additional Medication Instructions  Take all scheduled medications on the day of surgery EXCEPT for modifications listed below:   - Herbal medications and vitamins: DO NOT TAKE 14 days prior to surgery.   - triptans, migraine abortives: DO NOT TAKE on day of surgery   - ibuprofen (Advil, Motrin): DO NOT TAKE 1 day before surgery.     Recommendation  Approval given to proceed with proposed procedure, without further diagnostic evaluation.    Subjective   Nikki is a 54 year old, presenting for the following:  Pre-Op Exam          12/11/2024     4:48 PM   Additional Questions   Roomed by [facility] related to upcoming procedure: Nikki is a pleasant 54-year-old female here today for preoperative examination prior to having surgery on her left index and left middle finger.  She will be having a fusion due to severe osteoarthrosis causing chronic pain in these joints.  She has no acute concerns today.  Past medical history significant for migraines, anemia and depression.        12/7/2024   Pre-Op Questionnaire   Have you ever had a heart attack or stroke? No    Have you ever had surgery on your heart or blood vessels, such as a stent placement, a coronary artery bypass, or surgery on an artery in your head, neck, heart, or legs? No    Do you have chest pain with activity? No    Do you have a history of heart failure? No    Do you currently have a cold, bronchitis or symptoms of other infection? No    Do you have a cough, shortness of breath, or wheezing? No    Do you or anyone in your family have previous history of blood  clots? No    Do you or does anyone in your family have a serious bleeding problem such as prolonged bleeding following surgeries or cuts? No    Have you ever had problems with anemia or been told to take iron pills? (!) YES-currently on iron pills    Have you had any abnormal blood loss such as black, tarry or bloody stools, or abnormal vaginal bleeding? No    Have you ever had a blood transfusion? No    Are you willing to have a blood transfusion if it is medically needed before, during, or after your surgery? Yes    Have you or any of your relatives ever had problems with anesthesia? (!) YES  One time she has nausea and vomiting, has tolerated at other times.    Do you have sleep apnea, excessive snoring or daytime drowsiness? No    Do you have any artifical heart valves or other implanted medical devices like a pacemaker, defibrillator, or continuous glucose monitor? No    Do you have artificial joints? No    Are you allergic to latex? No        Patient-reported     Health Care Directive  Patient does not have a Health Care Directive: Discussed advance care planning with patient; however, patient declined at this time.    Preoperative Review of    reviewed - no record of controlled substances prescribed.          Patient Active Problem List    Diagnosis Date Noted    Sjogren's syndrome with inflammatory arthritis (H) 02/28/2023     Priority: Medium    ASCUS of cervix with negative high risk HPV 08/01/2018     Priority: Medium     03/08/04:  ASCUS Pap with Neg HR HPV result.   07/07/04:  ASCUS Pap with Neg HR HPV result.   01/09/06: NIL Pap  0402/07: NIL Pap  04/14/08:  ASCUS Pap with Neg HR HPV result.   04/22/09: NIL Pap  06/04/10: NIL Pap  01/30/12:  ASCUS Pap with Neg HR HPV result.   06/05/13:  ASCUS Pap with Neg HR HPV result.   07/11/16: ASCUS Pap with Neg HR HPV result. (care everywhere)  11/22/19: ASCUS Pap with Neg HR HPV result. Plan 3 yr co-test  2/28/23 ASCUS pap, Neg HPV. Plan 1 yr  co-test  3/12/24 NIL, Neg HPV. Plan 3 yr co-test      Lymphocytic colitis 2017     Priority: Medium    Sedimentation rate elevation 2017     Priority: Medium    Elevated vitamin B12 level 2017     Priority: Medium     NOT ON B-12 SUPPLEMENTS      Anemia, unspecified type 2017     Priority: Medium    Hypervitaminosis B6 2017     Priority: Medium    Migraine with aura and without status migrainosus, not intractable 2017     Priority: Medium    Mild major depression (H) 2017     Priority: Medium    Other acne 2006     Priority: Medium    Irritable bowel syndrome 2005     Priority: Medium     diarrhea        Past Medical History:   Diagnosis Date    Abnormal Pap smear of cervix 2019    See problem list.     Anxiety     Arthritis Couple yrs ago?    Had surgery on one of fingers last may    Colitis     lymphocytic colitis ?    Depressive disorder Since young adult    On fluoxetine and attend therapy periodically    Depressive disorder, not elsewhere classified     sees psych- Dr Blue    Migraine, unspecified, without mention of intractable migraine without mention of status migrainosus age 14 yrs    Other acne     PAIN IN THORACIC SPINE [724.1] 2006    chiropractic care    Raynaud disease     ? connective tissue dz.  ? sogren's     Past Surgical History:   Procedure Laterality Date    BREAST BIOPSY, RT/LT Left     C IUD,MIRENA  2005    ESOPHAGOSCOPY, GASTROSCOPY, DUODENOSCOPY (EGD), COMBINED  10/2010    HC ABLATION, ENDOMETRIAL, THERMAL, W/O HYSTEROSCOPIC GUIDANCE  2010    HYSTEROSCOPY  2009    D&C    ORTHOPEDIC SURGERY  2023    Rt index sudheer at 1st joint    [facility] NONSPECIFIC PROCEDURE       X 3    [facility] COLONOSCOPY THRU STOMA, DIAGNOSTIC  2004    [facility] COLONOSCOPY THRU STOMA, DIAGNOSTIC  2010     Current Outpatient Medications   Medication Sig Dispense Refill    acetaminophen (TYLENOL) 325 MG tablet Take 2 tablets by mouth every 4  hours as needed for pain. 100 tablet 0    Biotin 5000 MCG TABS Take 1,000 mcg by mouth daily      cholecalciferol 5000 UNITS CAPS Take 1 capsule (5,000 Units) by mouth daily FOR VITAMIN D DEFICIENCY (LOW VITAMIN D) 100 capsule 3    Ferrous Sulfate (IRON) 28 MG TABS Take by mouth daily.      FLUoxetine (PROZAC) 40 MG capsule Take 1 capsule (40 mg) by mouth daily 90 capsule 3    IBUPROFEN PO Take 200 mg by mouth Takes 3 tablets PRN      loperamide (IMODIUM A-D) 2 MG tablet Take 2 mg by mouth daily as needed for diarrhea      nortriptyline (PAMELOR) 10 MG capsule Take 3 caps/night. 270 capsule 1    Riboflavin 100 MG TABS Take 400 mg by mouth daily.      SUMAtriptan (IMITREX STATDOSE) 6 MG/0.5ML pen injector kit INJECT 0.5 ML UNDER THE SKIN AT ONSET OF HEADACHE FOR MIGRAINE. MAY REPEAT IN ONE HOUR. MAX 12 MG IN 24 HOURS 12 mL 3    SUMAtriptan (IMITREX) 100 MG tablet TAKE 1 TABLET(100 MG) BY MOUTH AT ONSET OF HEADACHE FOR MIGRAINE. MAY REPEAT IN 2 HOURS. MAX 4 TABLETS/ 24 HOURS STRENGTH: 100 MG 18 tablet 11    tiZANidine (ZANAFLEX) 2 MG tablet Take 1 tablet (2 mg) by mouth 3 times daily as needed for muscle spasms 180 tablet 0       Allergies   Allergen Reactions    Hydrocodone Nausea and Vomiting        Social History     Tobacco Use    Smoking status: Never     Passive exposure: Never    Smokeless tobacco: Never   Substance Use Topics    Alcohol use: Yes     Comment: occasionally       History   Drug Use No           Constitutional: No fever or unexplained weight loss.  No severe fatigue.    HEENT: Negative for ear pain, changes in hearing, frequent nosebleeds, nasal congestion, runny nose, sore throat, loose teeth, dentures or partials.    Eyes: Denies any changes in vision or eye pain.    Respiratory: Negative for cough, wheezing or shortness of breath.    Cardiovascular: No palpitations, tachycardia, chest pain or lower extremity edema.    Gastrointestinal: Negative for nausea, vomiting, abdominal pain, uncontrolled  heartburn or changes in bowel movements.    Genitourinary: Negative for any urinary symptoms or changes in urinary habits.    Integumentary: Negative for any rash or suspicious lesions.    Musculoskeletal: Negative except as noted in HPI.    Neurological: Negative for severe dizziness, headaches or numbness.    Psychiatric: No severe anxiety.  No issues with sleep or significant depression.  Denies recreational drug use or regular use of alcohol.    Allergic/immunologic: Negative for any history of complications with anesthesia.  No history of seasonal allergies.  No known exposure to HIV or hepatitis C.      Objective    BP 94/66 (BP Location: Right arm, Patient Position: Sitting, Cuff Size: Adult Regular)   Pulse 52   Temp 98.1  F (36.7  C) (Oral)   Resp 14   Ht 1.524 m (5')   Wt 64.9 kg (143 lb)   LMP 01/01/2022 (Approximate)   SpO2 100%   Breastfeeding No   BMI 27.93 kg/m     Estimated body mass index is 27.93 kg/m  as calculated from the following:    Height as of this encounter: 1.524 m (5').    Weight as of this encounter: 64.9 kg (143 lb).  Physical Exam  Constitutional: In no acute distress.  Clean appearance.  Eyes: PERRLA.  EOMI.  No conjunctival redness.  Ears: Bilateral TMs are intact without any erythema or effusion.  Grossly normal hearing.  Nose: Nares patent bilaterally.  Normal mucosa.  Oropharynx: Normal mucosa.  Dentition and gingiva is appropriate.  Posterior oropharynx without any abnormalities.  Neck: Supple.  Trachea is midline.  No thyromegaly.  Neck is without tenderness or masses.  Cardiovascular: Regular rate and rhythm.  Normal peripheral perfusion.  No edema.  No varicosities.  Respiratory: Lungs are clear bilaterally.  Normal respiratory effort.  Skin: Skin is without significant rashes or lesions.  No suspicious moles.  Gastrointestinal: Soft and flat.  Normal bowel sounds.  Nontender throughout upon palpation.  No organomegaly or masses.  Negative for CVA  tenderness.  Musculoskeletal:  Gait normal.  Able to mount exam table without difficulties.  Psychiatric: Appropriate affect and demeanor.  Memory intact.  Good insight and judgment.  Neurologic: Sensation and temperature of extremities appropriate.  No tremor or involuntary movement noted.      Recent Labs   Lab Test 03/12/24  0832   HGB 12.5         POTASSIUM 4.3   CR 0.88        Diagnostics  Labs pending at this time.  Results will be reviewed when available.   EKG: appears normal, NSR, normal axis, normal intervals, no acute ST/T changes c/w ischemia, no LVH by voltage criteria, there are no prior tracings available    Revised Cardiac Risk Index (RCRI)  The patient has the following serious cardiovascular risks for perioperative complications:   - No serious cardiac risks = 0 points     RCRI Interpretation: 0 points: Class I (very low risk - 0.4% complication rate)         Signed Electronically by: Mare Wilson NP  A copy of this evaluation report is provided to the requesting physician.        Answers submitted by the patient for this visit:  Patient Health Questionnaire (Submitted on 12/10/2024)  If you checked off any problems, how difficult have these problems made it for you to do your work, take care of things at home, or get along with other people?: Somewhat difficult  PHQ9 TOTAL SCORE: 4

## 2024-12-11 NOTE — PATIENT INSTRUCTIONS
How to Take Your Medication Before Surgery  Preoperative Medication Instructions   Antiplatelet or Anticoagulation Medication Instructions   - Patient is on no antiplatelet or anticoagulation medications.    Additional Medication Instructions  Take all scheduled medications on the day of surgery EXCEPT for modifications listed below:   - Herbal medications and vitamins: DO NOT TAKE 14 days prior to surgery.   - triptans, migraine abortives: DO NOT TAKE on day of surgery   - ibuprofen (Advil, Motrin): DO NOT TAKE 1 day before surgery.      Okay to take your nortriptyline, fluoxetine and tizanidine in the morning of surgery.  No ibuprofen 1 day prior to surgery but Tylenol is okay to use.    Patient Education   Preparing for Your Surgery  For Adults  Getting started  In most cases, a nurse will call to review your health history and instructions. They will give you an arrival time based on your scheduled surgery time. Please be ready to share:  Your doctor's clinic name and phone number  Your medical, surgical, and anesthesia history  A list of allergies and sensitivities  A list of medicines, including herbal treatments and over-the-counter drugs  Whether the patient has a legal guardian (ask how to send us the papers in advance)  Note: You may not receive a call if you were seen at our PAC (Preoperative Assessment Center).  Please tell us if you're pregnant--or if there's any chance you might be pregnant. Some surgeries may injure a fetus (unborn baby), so they require a pregnancy test. Surgeries that are safe for a fetus don't always need a test, and you can choose whether to have one.   Preparing for surgery  Within 10 to 30 days of surgery: Have a pre-op exam (sometimes called an H&P, or History and Physical). This can be done at a clinic or pre-operative center.  If you're having a , you may not need this exam. Talk to your care team.  At your pre-op exam, talk to your care team about all medicines you  take. (This includes CBD oil and any drugs, such as THC, marijuana, and other forms of cannabis.) If you need to stop any medicine before surgery, ask when to start taking it again.  This is for your safety. Many medicines and drugs can make you bleed too much during surgery. Some change how well surgery (anesthesia) drugs work.  Call your insurance company to let them know you're having surgery. (If you don't have insurance, call [phone].)  Call your clinic if there's any change in your health. This includes a scrape or scratch near the surgery site, or any signs of a cold (sore throat, runny nose, cough, rash, fever).  Eating and drinking guidelines  For your safety: Unless your surgeon tells you otherwise, follow the guidelines below.  Eat and drink as normal until 8 hours before you arrive for surgery. After that, no food or milk. You can spit out gum when you arrive.  Drink clear liquids until 2 hours before you arrive. These are liquids you can see through, like water, Gatorade, and Propel Water. They also include plain black coffee and tea (no cream or milk).  No alcohol for 24 hours before you arrive. The night before surgery, stop any drinks that contain THC.  If your care team tells you to take medicine on the morning of surgery, it's okay to take it with a sip of water. No other medicines or drugs are allowed (including CBD oil)--follow your care team's instructions.  If you have questions the day of surgery, call your hospital or surgery center.   Preventing infection  Shower or bathe the night before and the morning of surgery. Follow the instructions your clinic gave you. (If no instructions, use regular soap.)  Don't shave or clip hair near your surgery site. We'll remove the hair if needed.  Don't smoke or vape the morning of surgery. No chewing tobacco for 6 hours before you arrive. A nicotine patch is okay. You may spit out nicotine gum when you arrive.  For some surgeries, the surgeon will  tell you to fully quit smoking and nicotine.  We will make every effort to keep you safe from infection. We will:  Clean our hands often with soap and water (or an alcohol-based hand rub).  Clean the skin at your surgery site with a special soap that kills germs.  Give you a special gown to keep you warm. (Cold raises the risk of infection.)  Wear hair covers, masks, gowns, and gloves during surgery.  Give antibiotic medicine, if prescribed. Not all surgeries need this medicine.  What to bring on the day of surgery  Photo ID and insurance card  Copy of your health care directive, if you have one  Glasses and hearing aids (bring cases)  You can't wear contacts during surgery  Inhaler and eye drops, if you use them (tell us about these when you arrive)  CPAP machine or breathing device, if you use them  A few personal items, if spending the night  If you have . . .  A pacemaker, ICD (cardiac defibrillator), or other implant: Bring the ID card.  An implanted stimulator: Bring the remote control.  A legal guardian: Bring a copy of the certified (court-stamped) guardianship papers.  Please remove any jewelry, including body piercings. Leave jewelry and other valuables at home.  If you're going home the day of surgery  You must have a responsible adult drive you home. They should stay with you overnight as well.  If you don't have someone to stay with you, and you aren't safe to go home alone, we may keep you overnight. Insurance often won't pay for this.  After surgery  If it's hard to control your pain or you need more pain medicine, please call your surgeon's office.  Questions?   If you have any questions for your care team, list them here:   ____________________________________________________________________________________________________________________________________________________________________________________________________________________________________________________________  For informational purposes  only. Not to replace the advice of your health care provider. Copyright   2003, 2019 [facility]. All rights reserved. Clinically reviewed by Paxton Mora MD. [employer] [id number] - REV 08/24.

## 2024-12-12 LAB
ATRIAL RATE - MUSE: 65 BPM
DIASTOLIC BLOOD PRESSURE - MUSE: NORMAL MMHG
INTERPRETATION ECG - MUSE: NORMAL
P AXIS - MUSE: 34 DEGREES
PR INTERVAL - MUSE: 150 MS
QRS DURATION - MUSE: 90 MS
QT - MUSE: 422 MS
QTC - MUSE: 438 MS
R AXIS - MUSE: 27 DEGREES
SYSTOLIC BLOOD PRESSURE - MUSE: NORMAL MMHG
T AXIS - MUSE: 34 DEGREES
VENTRICULAR RATE- MUSE: 65 BPM

## 2024-12-12 PROCEDURE — 93010 ELECTROCARDIOGRAM REPORT: CPT | Performed by: STUDENT IN AN ORGANIZED HEALTH CARE EDUCATION/TRAINING PROGRAM

## 2024-12-12 NOTE — TELEPHONE ENCOUNTER
Faxed to [facility] as below.  Routing to referrals.  Radha CURIEL, NIRN, PHN, RN-[facility]  [phone]

## 2024-12-16 ENCOUNTER — PATIENT OUTREACH (OUTPATIENT)
Dept: CARE COORDINATION | Facility: CLINIC | Age: 54
End: 2024-12-16
Payer: COMMERCIAL

## 2024-12-17 ENCOUNTER — OFFICE VISIT (OUTPATIENT)
Dept: NEUROLOGY | Facility: CLINIC | Age: 54
End: 2024-12-17
Payer: COMMERCIAL

## 2024-12-17 VITALS
WEIGHT: 143 LBS | BODY MASS INDEX: 28.07 KG/M2 | DIASTOLIC BLOOD PRESSURE: 52 MMHG | SYSTOLIC BLOOD PRESSURE: 90 MMHG | HEIGHT: 60 IN | HEART RATE: 70 BPM

## 2024-12-17 DIAGNOSIS — G43.719 INTRACTABLE CHRONIC MIGRAINE WITHOUT AURA AND WITHOUT STATUS MIGRAINOSUS: ICD-10-CM

## 2024-12-17 PROCEDURE — 64615 CHEMODENERV MUSC MIGRAINE: CPT | Performed by: PSYCHIATRY & NEUROLOGY

## 2024-12-17 RX ORDER — NORTRIPTYLINE HYDROCHLORIDE 10 MG/1
CAPSULE ORAL
Qty: 270 CAPSULE | Refills: 1 | Status: SHIPPED | OUTPATIENT
Start: 2024-12-17

## 2024-12-17 NOTE — PROCEDURES
NEUROLOGY PROCEDURE NOTE  Dec 17, 2024      Chronic migraine Botox injection    CONSENT: The procedure was explained to the patient. The risks and benefits of the procedure and the alternatives were discussed with the patient. The patient was given an opportunity to ask any questions she had about the procedure. A verbal consent was obtained from the patient. A written consent is available in the chart.    PRE-PROCEDURE  Diagnosis: Chronic migraine  Headache frequency before the use of Botox:- 20 headache days per month  Headache frequency with Botox use:-7/month    EXAM  GENERAL: Healthy appearing, alert, no acute distress, normal habitus.  NEUROLOGICAL:  Patient is alert with fluent language.  Extraocular movements are intact.  Facial movements are present and symmetric.  No arm drift.    PROCEDURE SUMMARY:   The following muscles were identified after palpating for landmarks and the overlying skin was cleansed with alcohol. These muscles were then injected using a 30 guage- half  inch needle with the following doses of onabotulinumtoxin A. A 5 unit/ 0.1 ml dilution was used for the injections. A 2 x 100 unit vial was used.    Corrugators 2.5 units each side. (per patient request)  Procerus 2.5 units. (per patient request)  Frontalis 2.5x2 units each side. (per patient request)  Temporalis 20 units each side.  Occipitalis 15 units each side.  Paraspinals 13.75 units each side.  Trapezius 20 units each side.    Units Injected: 155 Units  Units Discarded: 45 Units  Lot Number and Expiration: [id number]; 4/27    The patient tolerated the procedure without any immediate complaints and will call the Neurology clinic if she has any problems or side effects from the medication. The patient will follow up in the Neurology clinic as previously decided.      Chuy Dunn MD  Neurologist  [facility]  [phone]

## 2024-12-17 NOTE — LETTER
12/17/2024      Nikki Hardin  [address] [address]      Dear Colleague,    Thank you for referring your patient, Nikki ATKINSON Wilfred, to the [facility]. Please see a copy of my visit note below.    See procedure note.       Again, thank you for allowing me to participate in the care of your patient.        Sincerely,        Chuy Dunn MD

## 2025-01-15 ENCOUNTER — OFFICE VISIT (OUTPATIENT)
Dept: URGENT CARE | Facility: URGENT CARE | Age: 55
End: 2025-01-15
Payer: COMMERCIAL

## 2025-01-15 VITALS
HEART RATE: 72 BPM | SYSTOLIC BLOOD PRESSURE: 120 MMHG | DIASTOLIC BLOOD PRESSURE: 78 MMHG | OXYGEN SATURATION: 98 % | TEMPERATURE: 98.3 F | RESPIRATION RATE: 14 BRPM

## 2025-01-15 DIAGNOSIS — G43.011 INTRACTABLE MIGRAINE WITHOUT AURA AND WITH STATUS MIGRAINOSUS: Primary | ICD-10-CM

## 2025-01-15 PROCEDURE — 99214 OFFICE O/P EST MOD 30 MIN: CPT | Mod: 25 | Performed by: INTERNAL MEDICINE

## 2025-01-15 PROCEDURE — 96372 THER/PROPH/DIAG INJ SC/IM: CPT | Performed by: INTERNAL MEDICINE

## 2025-01-15 RX ORDER — KETOROLAC TROMETHAMINE 30 MG/ML
30 INJECTION, SOLUTION INTRAMUSCULAR; INTRAVENOUS ONCE
Status: COMPLETED | OUTPATIENT
Start: 2025-01-15 | End: 2025-01-15

## 2025-01-15 RX ADMIN — KETOROLAC TROMETHAMINE 30 MG: 30 INJECTION, SOLUTION INTRAMUSCULAR; INTRAVENOUS at 12:27

## 2025-01-15 ASSESSMENT — PAIN SCALES - GENERAL: PAINLEVEL_OUTOF10: EXTREME PAIN (8)

## 2025-01-15 NOTE — PROGRESS NOTES
ASSESSMENT AND PLAN:      ICD-10-CM    1. Intractable migraine without aura and with status migrainosus  G43.011 ketorolac (TORADOL) injection 30 mg          Patient Instructions     Start yoga   Recheck with primary or Neurology     Daily supplement for migraines.  Riboflavin B2 400 mg day  Magnesium 360 mg day    Butterbur Root from (Petadolex)  50 mg twice daily  coEnzQ 150 mg daily    Migravent - combination of some of the supplements noted above    Return if symptoms worsen or fail to improve.        Imelda Delarosa MD  [facility] URG[facility]    Subjective     Nikki Hardin is a 54 year old who presents for Patient presents with:  Urgent Care  Headache: Patient presents with a migraine for 2x days. Patient is requesting toradol.    an established patient of [facility].    Headache  Patient here today with concerns for 2-day migraine.  She has history of intractable migraines and is under the care of a neurologist.    Requesting Toradol shot.    Onset of symptoms was 2day(s).    Character of pain:sharp and throbbing   Current and associated symptoms: light sensitivity  Location of pain: left-sided, temporal, and retro-orbital  Prodromal sx?:  No  History of Migranes: Yes   Is headache similar to previous: Yes  Predisposing factors: restarting desk job  Treatment and measures tried: Triptan and nortryptilline      Current Outpatient Medications   Medication Sig Dispense Refill    acetaminophen (TYLENOL) 325 MG tablet Take 2 tablets by mouth every 4 hours as needed for pain. 100 tablet 0    Biotin 5000 MCG TABS Take 1,000 mcg by mouth daily      cholecalciferol 5000 UNITS CAPS Take 1 capsule (5,000 Units) by mouth daily FOR VITAMIN D DEFICIENCY (LOW VITAMIN D) 100 capsule 3    Ferrous Sulfate (IRON) 28 MG TABS Take by mouth daily.      FLUoxetine (PROZAC) 40 MG capsule Take 1 capsule (40 mg) by mouth daily 90 capsule 3    IBUPROFEN PO Take 200 mg by mouth Takes 3 tablets PRN      loperamide (IMODIUM  A-D) 2 MG tablet Take 2 mg by mouth daily as needed for diarrhea      nortriptyline (PAMELOR) 10 MG capsule Take 3 caps/night. 270 capsule 1    Riboflavin 100 MG TABS Take 400 mg by mouth daily.      SUMAtriptan (IMITREX STATDOSE) 6 MG/0.5ML pen injector kit INJECT 0.5 ML UNDER THE SKIN AT ONSET OF HEADACHE FOR MIGRAINE. MAY REPEAT IN ONE HOUR. MAX 12 MG IN 24 HOURS 12 mL 3    SUMAtriptan (IMITREX) 100 MG tablet TAKE 1 TABLET(100 MG) BY MOUTH AT ONSET OF HEADACHE FOR MIGRAINE. MAY REPEAT IN 2 HOURS. MAX 4 TABLETS/ 24 HOURS STRENGTH: 100 MG 18 tablet 11    tiZANidine (ZANAFLEX) 2 MG tablet Take 1 tablet (2 mg) by mouth 3 times daily as needed for muscle spasms 180 tablet 0         Review of Systems        Objective    /78 (BP Location: Right arm)   Pulse 72   Temp 98.3  F (36.8  C) (Temporal)   Resp 14   LMP 01/01/2022 (Approximate)   SpO2 98%   Physical Exam  Vitals reviewed.   Constitutional:       Appearance: Normal appearance. She is ill-appearing.   Neurological:      General: No focal deficit present.      Mental Status: She is alert and oriented to person, place, and time.   Psychiatric:         Mood and Affect: Mood normal.

## 2025-01-15 NOTE — PATIENT INSTRUCTIONS
Start yoga   Recheck with primary or Neurology     Daily supplement for migraines.  Riboflavin B2 400 mg day  Magnesium 360 mg day    Butterbur Root from (Petadolex)  50 mg twice daily  coEnzQ 150 mg daily    Migravent - combination of some of the supplements noted above

## 2025-02-15 ENCOUNTER — MYC MEDICAL ADVICE (OUTPATIENT)
Dept: FAMILY MEDICINE | Facility: CLINIC | Age: 55
End: 2025-02-15
Payer: COMMERCIAL

## 2025-02-17 ENCOUNTER — E-VISIT (OUTPATIENT)
Dept: INTERNAL MEDICINE | Facility: CLINIC | Age: 55
End: 2025-02-17
Payer: COMMERCIAL

## 2025-02-17 DIAGNOSIS — R06.83 SNORING: Primary | ICD-10-CM

## 2025-02-17 DIAGNOSIS — G47.33 MILD OBSTRUCTIVE SLEEP APNEA: ICD-10-CM

## 2025-02-17 NOTE — TELEPHONE ENCOUNTER
Patient was noted to have seen sleep medicine back in 2017.  At that time she was noted to have mild sleep apnea that seem more positional when she was laying supine.  At that time it was not recommended she use a CPAP machine but more positional devices to help her avoid sleeping supine.  Patient now has concerns of increased snoring and stating her spouse notices that she may stop breathing at times during sleep.  Will place new referral for sleep medicine to follow-up on new concerns.  Provider E-Visit time total (minutes): 6 minutes

## 2025-03-17 ENCOUNTER — OFFICE VISIT (OUTPATIENT)
Dept: NEUROLOGY | Facility: CLINIC | Age: 55
End: 2025-03-17
Payer: COMMERCIAL

## 2025-03-17 VITALS
HEIGHT: 60 IN | HEART RATE: 72 BPM | SYSTOLIC BLOOD PRESSURE: 111 MMHG | BODY MASS INDEX: 27.09 KG/M2 | DIASTOLIC BLOOD PRESSURE: 75 MMHG | WEIGHT: 138 LBS

## 2025-03-17 DIAGNOSIS — G47.30 SLEEP APNEA, UNSPECIFIED TYPE: ICD-10-CM

## 2025-03-17 DIAGNOSIS — G43.109 MIGRAINE WITH AURA AND WITHOUT STATUS MIGRAINOSUS, NOT INTRACTABLE: ICD-10-CM

## 2025-03-17 DIAGNOSIS — G43.719 INTRACTABLE CHRONIC MIGRAINE WITHOUT AURA AND WITHOUT STATUS MIGRAINOSUS: Primary | ICD-10-CM

## 2025-03-17 PROCEDURE — 64615 CHEMODENERV MUSC MIGRAINE: CPT | Performed by: PSYCHIATRY & NEUROLOGY

## 2025-03-17 PROCEDURE — 99214 OFFICE O/P EST MOD 30 MIN: CPT | Mod: 25 | Performed by: PSYCHIATRY & NEUROLOGY

## 2025-03-17 RX ORDER — KETOROLAC TROMETHAMINE 10 MG/1
10 TABLET, FILM COATED ORAL EVERY 6 HOURS PRN
Qty: 20 TABLET | Refills: 1 | Status: SHIPPED | OUTPATIENT
Start: 2025-03-17

## 2025-03-17 RX ORDER — TIZANIDINE 2 MG/1
2 TABLET ORAL 3 TIMES DAILY PRN
Qty: 180 TABLET | Refills: 0 | Status: SHIPPED | OUTPATIENT
Start: 2025-03-17

## 2025-03-17 RX ORDER — SUMATRIPTAN SUCCINATE 100 MG/1
TABLET ORAL
Qty: 18 TABLET | Refills: 11 | Status: SHIPPED | OUTPATIENT
Start: 2025-03-17

## 2025-03-17 RX ORDER — NORTRIPTYLINE HYDROCHLORIDE 10 MG/1
CAPSULE ORAL
Qty: 270 CAPSULE | Refills: 1 | Status: SHIPPED | OUTPATIENT
Start: 2025-03-17

## 2025-03-17 NOTE — NURSING NOTE
Chief Complaint   Patient presents with    Botox     Botox     Headache     Patient states her migraines have improved. When she does have a migraine her meds are not working like they use to. Botox     Cristina Pratt on 3/17/2025 at 8:12 AM

## 2025-03-17 NOTE — PROCEDURES
NEUROLOGY PROCEDURE NOTE  Mar 17, 2025      Chronic migraine Botox injection    CONSENT: The procedure was explained to the patient. The risks and benefits of the procedure and the alternatives were discussed with the patient. The patient was given an opportunity to ask any questions she had about the procedure. A verbal consent was obtained from the patient. A written consent is available in the chart.    PRE-PROCEDURE  Diagnosis: Chronic migraine  Headache frequency before the use of Botox:- 20 headache days per month  Headache frequency with Botox use:-7/month    EXAM  GENERAL: Healthy appearing, alert, no acute distress, normal habitus.  NEUROLOGICAL:  Patient is alert with fluent language.  Extraocular movements are intact.  Facial movements are present and symmetric.  No arm drift.    PROCEDURE SUMMARY:   The following muscles were identified after palpating for landmarks and the overlying skin was cleansed with alcohol. These muscles were then injected using a 30 guage- half  inch needle with the following doses of onabotulinumtoxin A. A 5 unit/ 0.1 ml dilution was used for the injections. A 2 x 100 unit vial was used.    Corrugators 2.5 units each side. (per patient request)  Procerus 2.5 units. (per patient request)  Frontalis 2.5x2 units each side. (per patient request)  Temporalis 20 units each side.  Occipitalis 15 units each side.  Paraspinals 13.75 units each side.  Trapezius 20 units each side.    Units Injected: 155 Units  Units Discarded: 45 Units  Lot Number and Expiration: [id number]; 5/27    The patient tolerated the procedure without any immediate complaints and will call the Neurology clinic if she has any problems or side effects from the medication. The patient will follow up in the Neurology clinic as previously decided.      Chuy Dunn MD  Neurologist  [facility]  [phone]

## 2025-03-17 NOTE — PROGRESS NOTES
NEUROLOGY OUTPATIENT PROGRESS NOTE   Mar 17, 2025     CHIEF COMPLAINT/REASON FOR VISIT/REASON FOR CONSULT  Patient presents with:  Botox: Botox   Headache: Patient states her migraines have improved. When she does have a migraine her meds are not working like they use to. Botox    REASON FOR CONSULTATION- Headaches    REFERRAL SOURCE  Dr. Srinivasa Sofia   Dr. Srinivasa Sofia    HISTORY OF PRESENT ILLNESS  Nikki Hardin is a 54 year old female seen today for evaluation of headaches. She reports that she has a diagnosis of migraines since age 14. Previously her headaches were sporadic but still frequent. She estimates that she was having at least ~15 headaches a month. Over the last 2 to 3 months headaches have become much more frequent. She is having a headache every day. Reports no real reasons why the headaches might have gotten worse.    Headaches are generally in the temple on either side and then radiate to the neck. Occasionally she can have more frontal pressure. Takes a throbbing. There is some photophobia and phonophobia. She has never had any auras with the headaches. Reports triggers including working and doing remote work from home, inconsistent sleep, lack of regular meals, going out in the sun sometimes storms. She does have some neck pain though neck pain is worse at the time of headaches.    She is tried Topamax in the past which caused severe side effects. She is also tried propanolol without any benefit. Over-the-counter medications do not help. 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 She has been using sumatriptan injections and tablets for several years which have been working for her. Has tried Maxalt with benefit though insurance would not cover it anymore and then she had to stop it. The sumatriptan works better than the Maxalt.    4/6/22  Patient returns today.  She did try the nortriptyline and that has helped with the headaches.  Currently is having 7 headaches a month.  Reports  no changes in the nature of the headaches.  Imitrex still works with the abortive therapy.  She does complain of some somnolence in the morning with the nortriptyline.  Is taking 30 mg.  Tries to take it earlier in the evening.  Things are may be slightly turning the corner at this point though she is not sure if this is a good long-term medication or not.  Is interested in Botox.    10/31/22  Patient returns today.  Brought in the headache log today which I reviewed.  She is still having 10-12 headaches every month.  Previously these were at 15 headaches a month.  Headaches have been minimally improved with the nortriptyline.  Reports side effects of somnolence with the nortriptyline.  Is taking 30 mg.  Wants to get off the medication.  Imitrex is working for abortive therapy.  The Imitrex injections were given to her by the pharmacy that she wants the pen which I prescribed today.  Denies any other new symptoms.  No other triggers for her headaches.  Wants to proceed with the Botox since most of her headaches are coming from the shoulder tension.    11/30/22  Patient returns today.  Headaches are about the same as before.  She had called the clinic for an Imitrex prescription which is working for her.  Reports of a new problem ongoing for the last 1 month.  She does have some twitching of her left eye.  This can happen anytime.  There is no clear triggers that she is identified.  Drinks about 2 cups of coffee.  Drinks plenty of water.  No new medications.  The twitching is not severe enough that it causes the eye to close.  Is limited to the left eye only.    2/7/23  Patient returns today.  Headaches have significantly improved with the Botox.  She reports 50% reduction in frequency.  She is having 9 headaches a month.  Also reports that the headaches are less severe and not lasting that long.  They are shorter in intensity.  They still would last for couple of days.  Sumatriptan is still effective.    Left eye  twitching has not happened since the Botox.  She did go on a vacation and the stress is less at this point.    She further reports some cosmetic changes in the frontal region with the Botox and wants to avoid those in the future.  She wants to do a lower dose of Botox in the frontal region.    8/15/23  Patient returns today.  1.  In the last 3 months since her Botox she has had 2 headaches in the month of June 9 in the month of July and 7 in the month of August.  Her headaches are about the same as before though might have been worse because of heat and irregular sleep schedule.  She starts work again tomorrow.  2.  No further eye twitching with the Botox  3.  We are injecting less Botox in the frontal region and that has not made the headaches any severe  4.  Imitrex still works as abortive therapy.  5.  Nortriptyline is making her more sleepy though wants to try a higher dose to see if the headaches can get better.  6.  Does complain of stiff neck when she works on the computer which could be leading to the headaches.    2/13/24  Patient returns today  1.  She is getting good benefit with the Botox though does have a few more headaches in the first month and the first half of the second month.  Second and third month do better.  We discussed that this is most likely related to other triggers and the Botox is working well.  2.  She is concerned about daytime fatigue with the nortriptyline and wants to cut down the dose.  It is providing some benefit with sleep  3.  Imitrex is working with abortive therapy.  Insurance is not approving both the oral and injections.  4.  Neck pain is better with the Botox.  Still has a lot of tension when she is working at the computer.  5.  No longer is having eye twitching.  No other new concerns.    9/17/24  Patient events.  1.  Continues to have 7-8 headaches a month.  There is no clear major triggers that she is identified.  Could be related to the summer season.  Headaches are  generally worse in the summers.  2.  Remains on the nortriptyline which she feels is helping.  Has not been using the tizanidine.  Has not needed it.  3.  Imitrex does work for abortive therapy.  Occasionally headaches are not severe enough that she would need abortive therapy  No other new concerns.  No side effects to the Botox.      3/17/25  Patient returns today  1.  Continues to have 7-8 headaches a month.  Headaches have become slightly more severe compared to before.  Had to go to the urgent care for Toradol injection with one of the headaches.  Sumatriptan has also become less effective.  2.  She has noticed that she does not get good sleep and sometimes that can lead to headaches.  Does occasionally get up in the middle of the night gasping for air.  She is scheduled in May to be evaluated for sleep apnea.  This could be causing her headaches.  3.  Tizanidine does help.  Is not frequently using it.  4.  Wants to continue the same Botox paradigm  No other new concerns.    Previous history is reviewed and this is unchanged.    PAST MEDICAL/SURGICAL HISTORY  Past Medical History:   Diagnosis Date    Abnormal Pap smear of cervix 11/22/2019    See problem list.     Anxiety     Arthritis Couple yrs ago?    Had surgery on one of fingers last may    Colitis     lymphocytic colitis ?    Depressive disorder Since young adult    On fluoxetine and attend therapy periodically    Depressive disorder, not elsewhere classified     sees psych- Dr Blue    Migraine, unspecified, without mention of intractable migraine without mention of status migrainosus age 14 yrs    Other acne     PAIN IN THORACIC SPINE [724.1] 04/13/2006    chiropractic care    Raynaud disease     ? connective tissue dz.  ? sogren's     Patient Active Problem List   Diagnosis    Irritable bowel syndrome    Other acne    Migraine with aura and without status migrainosus, not intractable    Mild major depression (H)    Sedimentation rate elevation     Elevated vitamin B12 level    Anemia, unspecified type    Hypervitaminosis B6    Lymphocytic colitis    ASCUS of cervix with negative high risk HPV    Sjogren's syndrome with inflammatory arthritis   Significant for migraines, arthritis, depression, microcolitis    FAMILY HISTORY  Family History   Problem Relation Age of Onset    Neurologic Disorder Mother         MIGRIANES    Anxiety Disorder Mother     Osteoporosis Mother     C.A.D. Father         in his 60's    Depression Father     Heart Disease Father     Lipids Father     Hypertension Father     Arthritis Father     Sleep Disorder Father     Cerebrovascular Disease Father 78    Coronary Artery Disease Father     Hyperlipidemia Father     Cancer - colorectal Paternal Grandmother     Cancer Maternal Grandfather         stomach    Heart Disease Maternal Grandfather     Depression Brother     Depression Brother     Asthma Daughter     Heart Disease Paternal Grandfather     Breast Cancer Maternal Aunt     Breast Cancer Other         Maternal aunt    Diabetes No family hx of    Positive for migraines in her mother and aunt.    SOCIAL HISTORY  Social History     Tobacco Use    Smoking status: Never     Passive exposure: Never    Smokeless tobacco: Never   Vaping Use    Vaping status: Never Used   Substance Use Topics    Alcohol use: Yes     Comment: occasionally    Drug use: No       SYSTEMS REVIEW  Twelve-system ROS was done and other than the HPI this was negative except for neck pain, dizziness, hearing loss, sleepiness during the day, headaches, depression, bowel problems.  No new symptoms/issues.    MEDICATIONS  Current Outpatient Medications   Medication Sig Dispense Refill    acetaminophen (TYLENOL) 325 MG tablet Take 2 tablets by mouth every 4 hours as needed for pain. 100 tablet 0    Biotin 5000 MCG TABS Take 1,000 mcg by mouth daily      cholecalciferol 5000 UNITS CAPS Take 1 capsule (5,000 Units) by mouth daily FOR VITAMIN D DEFICIENCY (LOW VITAMIN D) 100  capsule 3    Ferrous Sulfate (IRON) 28 MG TABS Take by mouth daily.      FLUoxetine (PROZAC) 40 MG capsule Take 1 capsule (40 mg) by mouth daily 90 capsule 3    IBUPROFEN PO Take 200 mg by mouth Takes 3 tablets PRN      ketorolac (TORADOL) 10 MG tablet Take 1 tablet (10 mg) by mouth every 6 hours as needed for moderate pain. Max of 3 tabs/week. 20 tablet 1    loperamide (IMODIUM A-D) 2 MG tablet Take 2 mg by mouth daily as needed for diarrhea      nortriptyline (PAMELOR) 10 MG capsule Take 3 caps/night. 270 capsule 1    Riboflavin 100 MG TABS Take 400 mg by mouth daily.      SUMAtriptan (IMITREX STATDOSE) 6 MG/0.5ML pen injector kit INJECT 0.5 ML UNDER THE SKIN AT ONSET OF HEADACHE FOR MIGRAINE. MAY REPEAT IN ONE HOUR. MAX 12 MG IN 24 HOURS 12 mL 3    SUMAtriptan (IMITREX) 100 MG tablet TAKE 1 TABLET(100 MG) BY MOUTH AT ONSET OF HEADACHE FOR MIGRAINE. MAY REPEAT IN 2 HOURS. MAX 4 TABLETS/ 24 HOURS STRENGTH: 100 MG 18 tablet 11    tiZANidine (ZANAFLEX) 2 MG tablet Take 1 tablet (2 mg) by mouth 3 times daily as needed for muscle spasms. 180 tablet 0     No current facility-administered medications for this visit.      PHYSICAL EXAMINATION  VITALS: /75   Pulse 72   Ht 1.524 m (5')   Wt 62.6 kg (138 lb)   BMI 26.95 kg/m    GENERAL: Healthy appearing, alert, no acute distress, normal habitus.  CARDIOVASCULAR: Extremities warm and well perfused. Pulses present.   NEUROLOGICAL:  Patient is awake and oriented to self, place and time.  Attention span is normal.  Memory is grossly intact.  Language is fluent and follows commands appropriately.  Appropriate fund of knowledge. Cranial nerves 2-12 are intact. There is no pronator drift.  Motor exam shows 5/5 strength in all extremities.  Tone is symmetric bilaterally in upper and lower extremities.  (Previously reflexes are symmetric and 2+ in upper extremities and lower extremities. Sensory exam is grossly intact to light touch, pin prick and vibration.) Finger to  nose and heel to shin is without dysmetria.  Romberg is negative.  Gait is normal and the patient is able to do tandem walk and walk on toes and heels.  No change in exam    DIAGNOSTICS  CARDIOLOGY  EKG 2008  [facility]    RELEVANT LABS  Component      Latest Ref Rng & Units 12/22/2020   WBC      4.0 - 11.0 10e9/L 6.7   RBC Count      3.8 - 5.2 10e12/L 3.93   Hemoglobin      11.7 - 15.7 g/dL 11.4 (L)   Hematocrit      35.0 - 47.0 % 35.2   MCV      78 - 100 fl 90   MCH      26.5 - 33.0 pg 29.0   MCHC      31.5 - 36.5 g/dL 32.4   RDW      10.0 - 15.0 % 13.2   Platelet Count      150 - 450 10e9/L 297   % Neutrophils      % 70.5   % Lymphocytes      % 20.4   % Monocytes      % 6.6   % Eosinophils      % 2.1   % Basophils      % 0.4   Absolute Neutrophil      1.6 - 8.3 10e9/L 4.7   Absolute Lymphocytes      0.8 - 5.3 10e9/L 1.4   Absolute Monocytes      0.0 - 1.3 10e9/L 0.4   Absolute Eosinophils      0.0 - 0.7 10e9/L 0.1   Absolute Basophils      0.0 - 0.2 10e9/L 0.0   Diff Method       Automated Method   Sodium      133 - 144 mmol/L 138   Potassium      3.4 - 5.3 mmol/L 4.5   Chloride      94 - 109 mmol/L 107   Carbon Dioxide      20 - 32 mmol/L 27   Anion Gap      3 - 14 mmol/L 4   Glucose      70 - 99 mg/dL 79   Urea Nitrogen      7 - 30 mg/dL 12   Creatinine      0.52 - 1.04 mg/dL 0.78   GFR Estimate      >60 mL/min/1.73:m2 87   GFR Estimate If Black      >60 mL/min/1.73:m2 >90   Calcium      8.5 - 10.1 mg/dL 9.2   Bilirubin Total      0.2 - 1.3 mg/dL 0.3   Albumin      3.4 - 5.0 g/dL 3.8   Protein Total      6.8 - 8.8 g/dL 7.8   Alkaline Phosphatase      40 - 150 U/L 73   ALT      0 - 50 U/L 22   AST      0 - 45 U/L 16       OUTSIDE RECORDS  Outside referral notes and chart notes were reviewed and pertinent information has been summarized (in addition to the HPI):-Patient was seen in primary care.  Has issues with depression.  Had a visit in September that was virtual.  Was diagnosed with migraine with aura.  Is on  sumatriptan the day worsening.  History of migraines with sumatriptan injections and tablets.  Injections only for severe headaches.  Headaches happen once every 2 months.  Migraines get to cluster.  No significant changes in headaches.  Some concerns with hep C.    MRI brain-images reviewed with the patient again.  There are very minimal T2 hyperintensities noted.  Is appropriate for age.  IMPRESSION:  1.  No acute intracranial abnormality.  2.  Small nonspecific focus of subcortical T2 FLAIR hyperintensity in the right frontal lobe is strictly nonspecific but can be seen in setting of migraines and prior insult or injury. Demyelination is not favored.      LABS  Component      Latest Ref Rng & Units 2/8/2022   WBC      4.0 - 11.0 10e3/uL 6.3   RBC Count      3.80 - 5.20 10e6/uL 4.36   Hemoglobin      11.7 - 15.7 g/dL 12.8   Hematocrit      35.0 - 47.0 % 39.1   MCV      78 - 100 fL 90   MCH      26.5 - 33.0 pg 29.4   MCHC      31.5 - 36.5 g/dL 32.7   RDW      10.0 - 15.0 % 13.2   Platelet Count      150 - 450 10e3/uL 332   % Neutrophils      % 51   % Lymphocytes      % 38   % Monocytes      % 7   % Eosinophils      % 3   % Basophils      % 1   % Immature Granulocytes      % 0   Absolute Neutrophils      1.6 - 8.3 10e3/uL 3.2   Absolute Lymphocytes      0.8 - 5.3 10e3/uL 2.4   Absolute Monocytes      0.0 - 1.3 10e3/uL 0.5   Absolute Eosinophils      0.0 - 0.7 10e3/uL 0.2   Absolute Basophils      0.0 - 0.2 10e3/uL 0.1   Absolute Immature Granulocytes      <=0.4 10e3/uL 0.0   Vitamin B12      193 - 986 pg/mL 428   TSH      0.40 - 4.00 mU/L 2.28   Ferritin      8 - 252 ng/mL 70   Sed Rate      0 - 30 mm/hr 17       IMPRESSION/REPORT/PLAN  Intractable chronic migraine without aura and without status migrainosus  History of depression  Medication side effect  Left eye twitching  Insomnia  Suspected sleep apnea    This is a 54 year old female with headache suggestive of chronic migraines.  Exam/MRI have been negative.   Blood work has been noncontributory.  There might be some lifestyle issues like irregular sleep schedule that might be leading to her headaches.  It can also be a trigger for headaches.  She does do some yoga which helps with the headaches.  Does have neck issues related to working on the computer too long which could also be leading to headaches.  MRI showed some T2 hyperintensities.  No cause for headaches    For prevention of headaches she has tried Topamax and propranolol in the past.  Nortriptyline has provided minimal benefit and possibly helping with sleep.  She does complain of daytime fatigue with the nortriptyline will reduce the dose.  Will add tizanidine for neck pain/neck tension provoked headaches to see if it helps.  She rarely uses a tizanidine.  Will continue the nortriptyline for right now.    Botox is helping.  Because of cosmetic reasons she does not want us injecting a lot in the frontal region.  Will inject more in the trapezius region to see if that helps with the headaches.  Headaches are more in the right temporal and occipital region.  Continue current paradigm which is helping.    For abortive therapy will continue oral and injectable Imitrex.    Previously she was having some left eyelid twitching which is now improved.  Will monitor.    Will hold off on increasing the nortriptyline as she plans to get evaluated for sleep apnea.  Will add Toradol for abortive therapy with the sumatriptan.  Could consider increasing the Botox or CPAP does not really help with the headaches.  Return in 6 months.    Return in 6 months for medical visit and continue Botox every 3 months.  Medications refilled.    -     SUMAtriptan (IMITREX STATDOSE) 6 MG/0.5ML pen injector kit; INJECT 0.5 ML UNDER THE SKIN AT ONSET OF HEADACHE FOR MIGRAINE. MAY REPEAT IN ONE HOUR. MAX 12 MG IN 24 HOURS  - Botox every 3 months  -     ketorolac (TORADOL) 10 MG tablet; Take 1 tablet (10 mg) by mouth every 6 hours as needed for  "moderate pain. Max of 3 tabs/week.  -     nortriptyline (PAMELOR) 10 MG capsule; Take 3 caps/night.  -     tiZANidine (ZANAFLEX) 2 MG tablet; Take 1 tablet (2 mg) by mouth 3 times daily as needed for muscle spasms.  -     SUMAtriptan (IMITREX) 100 MG tablet; TAKE 1 TABLET(100 MG) BY MOUTH AT ONSET OF HEADACHE FOR MIGRAINE. MAY REPEAT IN 2 HOURS. MAX 4 TABLETS/ 24 HOURS STRENGTH: 100 MG      Return in about 3 months (around 6/17/2025) for In-Clinic Visit (must), Botox.    Over 30 minutes were spent coordinating the care for the patient on the day of the encounter.  This includes previsit, during visit and post visit activities as documented above.  Counseling patient.  Refractory problem.  Prescription management.  New medication prescribed.  Multiple problems reviewed.  (Activities include but not inclusive of reviewing chart, reviewing outside records, reviewing labs and imaging study results as well as the images, patient visit time including getting history and exam,  use if applicable, review of test results with the patient and coming up with a plan in a shared model, counseling patient and family, education and answering patient questions, EMR , EMR diagnosis entry and problem list management, medication reconciliation and prescription management if applicable, paperwork if applicable, printing documents and documentation of the visit activities.)    Botox PA  \" The patient has a diagnosis of chronic migraines. She has more than 15 headache days a month with each headache lasting at least 4 hours despite use of triptans. Her headaches have been there for over 6 months. She has been screened for medication overuse headaches and she does not have that. She has tried nortriptyline, Topamax, Propranolol for 3 months without any significant benefit. I would request that the Botox be approved for her.  60-70% benefit after multiple sessions.\"      Chuy Dunn MD  Neurologist  [facility] " [facility]  Tel:- [phone]    This note was dictated using voice recognition software.  Any grammatical or context distortions are unintentional and inherent to the software.

## 2025-03-17 NOTE — LETTER
3/17/2025      Nikki Hardin  [address] [facility] [id number]      Dear Colleague,    Thank you for referring your patient, Nikki Hardin, to the [facility] [place]. Please see a copy of my visit note below.    NEUROLOGY OUTPATIENT PROGRESS NOTE   Mar 17, 2025     CHIEF COMPLAINT/REASON FOR VISIT/REASON FOR CONSULT  Patient presents with:  Botox: Botox   Headache: Patient states her migraines have improved. When she does have a migraine her meds are not working like they use to. Botox    REASON FOR CONSULTATION- Headaches    REFERRAL SOURCE  Dr. Srinivasa Sofia  CC Dr. Srinivasa Sofia    HISTORY OF PRESENT ILLNESS  Nikki Hardin is a 54 year old female seen today for evaluation of headaches. She reports that she has a diagnosis of migraines since age 14. Previously her headaches were sporadic but still frequent. She estimates that she was having at least ~15 headaches a month. Over the last 2 to 3 months headaches have become much more frequent. She is having a headache every day. Reports no real reasons why the headaches might have gotten worse.    Headaches are generally in the temple on either side and then radiate to the neck. Occasionally she can have more frontal pressure. Takes a throbbing. There is some photophobia and phonophobia. She has never had any auras with the headaches. Reports triggers including working and doing remote work from home, inconsistent sleep, lack of regular meals, going out in the sun sometimes storms. She does have some neck pain though neck pain is worse at the time of headaches.    She is tried Topamax in the past which caused severe side effects. She is also tried propanolol without any benefit. Over-the-counter medications do not help. 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 She has been using sumatriptan injections and tablets for several years which have been working for her. Has tried Maxalt with benefit though insurance  would not cover it anymore and then she had to stop it. The sumatriptan works better than the Maxalt.    4/6/22  Patient returns today.  She did try the nortriptyline and that has helped with the headaches.  Currently is having 7 headaches a month.  Reports no changes in the nature of the headaches.  Imitrex still works with the abortive therapy.  She does complain of some somnolence in the morning with the nortriptyline.  Is taking 30 mg.  Tries to take it earlier in the evening.  Things are may be slightly turning the corner at this point though she is not sure if this is a good long-term medication or not.  Is interested in Botox.    10/31/22  Patient returns today.  Brought in the headache log today which I reviewed.  She is still having 10-12 headaches every month.  Previously these were at 15 headaches a month.  Headaches have been minimally improved with the nortriptyline.  Reports side effects of somnolence with the nortriptyline.  Is taking 30 mg.  Wants to get off the medication.  Imitrex is working for abortive therapy.  The Imitrex injections were given to her by the pharmacy that she wants the pen which I prescribed today.  Denies any other new symptoms.  No other triggers for her headaches.  Wants to proceed with the Botox since most of her headaches are coming from the shoulder tension.    11/30/22  Patient returns today.  Headaches are about the same as before.  She had called the clinic for an Imitrex prescription which is working for her.  Reports of a new problem ongoing for the last 1 month.  She does have some twitching of her left eye.  This can happen anytime.  There is no clear triggers that she is identified.  Drinks about 2 cups of coffee.  Drinks plenty of water.  No new medications.  The twitching is not severe enough that it causes the eye to close.  Is limited to the left eye only.    2/7/23  Patient returns today.  Headaches have significantly improved with the Botox.  She reports 50%  reduction in frequency.  She is having 9 headaches a month.  Also reports that the headaches are less severe and not lasting that long.  They are shorter in intensity.  They still would last for couple of days.  Sumatriptan is still effective.    Left eye twitching has not happened since the Botox.  She did go on a vacation and the stress is less at this point.    She further reports some cosmetic changes in the frontal region with the Botox and wants to avoid those in the future.  She wants to do a lower dose of Botox in the frontal region.    8/15/23  Patient returns today.  1.  In the last 3 months since her Botox she has had 2 headaches in the month of June 9 in the month of July and 7 in the month of August.  Her headaches are about the same as before though might have been worse because of heat and irregular sleep schedule.  She starts work again tomorrow.  2.  No further eye twitching with the Botox  3.  We are injecting less Botox in the frontal region and that has not made the headaches any severe  4.  Imitrex still works as abortive therapy.  5.  Nortriptyline is making her more sleepy though wants to try a higher dose to see if the headaches can get better.  6.  Does complain of stiff neck when she works on the computer which could be leading to the headaches.    2/13/24  Patient returns today  1.  She is getting good benefit with the Botox though does have a few more headaches in the first month and the first half of the second month.  Second and third month do better.  We discussed that this is most likely related to other triggers and the Botox is working well.  2.  She is concerned about daytime fatigue with the nortriptyline and wants to cut down the dose.  It is providing some benefit with sleep  3.  Imitrex is working with abortive therapy.  Insurance is not approving both the oral and injections.  4.  Neck pain is better with the Botox.  Still has a lot of tension when she is working at the  computer.  5.  No longer is having eye twitching.  No other new concerns.    9/17/24  Patient events.  1.  Continues to have 7-8 headaches a month.  There is no clear major triggers that she is identified.  Could be related to the summer season.  Headaches are generally worse in the summers.  2.  Remains on the nortriptyline which she feels is helping.  Has not been using the tizanidine.  Has not needed it.  3.  Imitrex does work for abortive therapy.  Occasionally headaches are not severe enough that she would need abortive therapy  No other new concerns.  No side effects to the Botox.      3/17/25  Patient returns today  1.  Continues to have 7-8 headaches a month.  Headaches have become slightly more severe compared to before.  Had to go to the urgent care for Toradol injection with one of the headaches.  Sumatriptan has also become less effective.  2.  She has noticed that she does not get good sleep and sometimes that can lead to headaches.  Does occasionally get up in the middle of the night gasping for air.  She is scheduled in May to be evaluated for sleep apnea.  This could be causing her headaches.  3.  Tizanidine does help.  Is not frequently using it.  4.  Wants to continue the same Botox paradigm  No other new concerns.    Previous history is reviewed and this is unchanged.    PAST MEDICAL/SURGICAL HISTORY  Past Medical History:   Diagnosis Date     Abnormal Pap smear of cervix 11/22/2019    See problem list.      Anxiety      Arthritis Couple yrs ago?    Had surgery on one of fingers last may     Colitis     lymphocytic colitis ?     Depressive disorder Since young adult    On fluoxetine and attend therapy periodically     Depressive disorder, not elsewhere classified     sees psych- Dr Blue     Migraine, unspecified, without mention of intractable migraine without mention of status migrainosus age 14 yrs     Other acne      PAIN IN THORACIC SPINE [724.1] 04/13/2006    chiropractic care     Raynaud  disease     ? connective tissue dz.  ? sogren's     Patient Active Problem List   Diagnosis     Irritable bowel syndrome     Other acne     Migraine with aura and without status migrainosus, not intractable     Mild major depression (H)     Sedimentation rate elevation     Elevated vitamin B12 level     Anemia, unspecified type     Hypervitaminosis B6     Lymphocytic colitis     ASCUS of cervix with negative high risk HPV     Sjogren's syndrome with inflammatory arthritis   Significant for migraines, arthritis, depression, microcolitis    FAMILY HISTORY  Family History   Problem Relation Age of Onset     Neurologic Disorder Mother         MIGRIANES     Anxiety Disorder Mother      Osteoporosis Mother      C.A.D. Father         in his 60's     Depression Father      Heart Disease Father      Lipids Father      Hypertension Father      Arthritis Father      Sleep Disorder Father      Cerebrovascular Disease Father 78     Coronary Artery Disease Father      Hyperlipidemia Father      Cancer - colorectal Paternal Grandmother      Cancer Maternal Grandfather         stomach     Heart Disease Maternal Grandfather      Depression Brother      Depression Brother      Asthma Daughter      Heart Disease Paternal Grandfather      Breast Cancer Maternal Aunt      Breast Cancer Other         Maternal aunt     Diabetes No family hx of    Positive for migraines in her mother and aunt.    SOCIAL HISTORY  Social History     Tobacco Use     Smoking status: Never     Passive exposure: Never     Smokeless tobacco: Never   Vaping Use     Vaping status: Never Used   Substance Use Topics     Alcohol use: Yes     Comment: occasionally     Drug use: No       SYSTEMS REVIEW  Twelve-system ROS was done and other than the HPI this was negative except for neck pain, dizziness, hearing loss, sleepiness during the day, headaches, depression, bowel problems.  No new symptoms/issues.    MEDICATIONS  Current Outpatient Medications   Medication Sig  Dispense Refill     acetaminophen (TYLENOL) 325 MG tablet Take 2 tablets by mouth every 4 hours as needed for pain. 100 tablet 0     Biotin 5000 MCG TABS Take 1,000 mcg by mouth daily       cholecalciferol 5000 UNITS CAPS Take 1 capsule (5,000 Units) by mouth daily FOR VITAMIN D DEFICIENCY (LOW VITAMIN D) 100 capsule 3     Ferrous Sulfate (IRON) 28 MG TABS Take by mouth daily.       FLUoxetine (PROZAC) 40 MG capsule Take 1 capsule (40 mg) by mouth daily 90 capsule 3     IBUPROFEN PO Take 200 mg by mouth Takes 3 tablets PRN       ketorolac (TORADOL) 10 MG tablet Take 1 tablet (10 mg) by mouth every 6 hours as needed for moderate pain. Max of 3 tabs/week. 20 tablet 1     loperamide (IMODIUM A-D) 2 MG tablet Take 2 mg by mouth daily as needed for diarrhea       nortriptyline (PAMELOR) 10 MG capsule Take 3 caps/night. 270 capsule 1     Riboflavin 100 MG TABS Take 400 mg by mouth daily.       SUMAtriptan (IMITREX STATDOSE) 6 MG/0.5ML pen injector kit INJECT 0.5 ML UNDER THE SKIN AT ONSET OF HEADACHE FOR MIGRAINE. MAY REPEAT IN ONE HOUR. MAX 12 MG IN 24 HOURS 12 mL 3     SUMAtriptan (IMITREX) 100 MG tablet TAKE 1 TABLET(100 MG) BY MOUTH AT ONSET OF HEADACHE FOR MIGRAINE. MAY REPEAT IN 2 HOURS. MAX 4 TABLETS/ 24 HOURS STRENGTH: 100 MG 18 tablet 11     tiZANidine (ZANAFLEX) 2 MG tablet Take 1 tablet (2 mg) by mouth 3 times daily as needed for muscle spasms. 180 tablet 0     No current facility-administered medications for this visit.      PHYSICAL EXAMINATION  VITALS: /75   Pulse 72   Ht 1.524 m (5')   Wt 62.6 kg (138 lb)   BMI 26.95 kg/m    GENERAL: Healthy appearing, alert, no acute distress, normal habitus.  CARDIOVASCULAR: Extremities warm and well perfused. Pulses present.   NEUROLOGICAL:  Patient is awake and oriented to self, place and time.  Attention span is normal.  Memory is grossly intact.  Language is fluent and follows commands appropriately.  Appropriate fund of knowledge. Cranial nerves 2-12 are  today intact. There is no pronator drift.  Motor exam shows 5/5 strength in all extremities.  Tone is symmetric bilaterally in upper and lower extremities.  (Previously reflexes are symmetric and 2+ in upper extremities and lower extremities. Sensory exam is grossly intact to light touch, pin prick and vibration.) Finger to nose and heel to shin is without dysmetria.  Romberg is negative.  Gait is normal and the patient is able to do tandem walk and walk on toes and heels.  No change in exam    DIAGNOSTICS  CARDIOLOGY  EKG 2008  NS[facility]    RELEVANT LABS  Component      Latest Ref Rng & Units 12/22/2020   WBC      4.0 - 11.0 10e9/L 6.7   RBC Count      3.8 - 5.2 10e12/L 3.93   Hemoglobin      11.7 - 15.7 g/dL 11.4 (L)   Hematocrit      35.0 - 47.0 % 35.2   MCV      78 - 100 fl 90   MCH      26.5 - 33.0 pg 29.0   MCHC      31.5 - 36.5 g/dL 32.4   RDW      10.0 - 15.0 % 13.2   Platelet Count      150 - 450 10e9/L 297   % Neutrophils      % 70.5   % Lymphocytes      % 20.4   % Monocytes      % 6.6   % Eosinophils      % 2.1   % Basophils      % 0.4   Absolute Neutrophil      1.6 - 8.3 10e9/L 4.7   Absolute Lymphocytes      0.8 - 5.3 10e9/L 1.4   Absolute Monocytes      0.0 - 1.3 10e9/L 0.4   Absolute Eosinophils      0.0 - 0.7 10e9/L 0.1   Absolute Basophils      0.0 - 0.2 10e9/L 0.0   Diff Method       Automated Method   Sodium      133 - 144 mmol/L 138   Potassium      3.4 - 5.3 mmol/L 4.5   Chloride      94 - 109 mmol/L 107   Carbon Dioxide      20 - 32 mmol/L 27   Anion Gap      3 - 14 mmol/L 4   Glucose      70 - 99 mg/dL 79   Urea Nitrogen      7 - 30 mg/dL 12   Creatinine      0.52 - 1.04 mg/dL 0.78   GFR Estimate      >60 mL/min/1.73:m2 87   GFR Estimate If Black      >60 mL/min/1.73:m2 >90   Calcium      8.5 - 10.1 mg/dL 9.2   Bilirubin Total      0.2 - 1.3 mg/dL 0.3   Albumin      3.4 - 5.0 g/dL 3.8   Protein Total      6.8 - 8.8 g/dL 7.8   Alkaline Phosphatase      40 - 150 U/L 73   ALT      0 - 50 U/L 22   AST      0  - 45 U/L 16       OUTSIDE RECORDS  Outside referral notes and chart notes were reviewed and pertinent information has been summarized (in addition to the HPI):-Patient was seen in primary care.  Has issues with depression.  Had a visit in September that was virtual.  Was diagnosed with migraine with aura.  Is on sumatriptan the day worsening.  History of migraines with sumatriptan injections and tablets.  Injections only for severe headaches.  Headaches happen once every 2 months.  Migraines get to cluster.  No significant changes in headaches.  Some concerns with hep C.    MRI brain-images reviewed with the patient again.  There are very minimal T2 hyperintensities noted.  Is appropriate for age.  IMPRESSION:  1.  No acute intracranial abnormality.  2.  Small nonspecific focus of subcortical T2 FLAIR hyperintensity in the right frontal lobe is strictly nonspecific but can be seen in setting of migraines and prior insult or injury. Demyelination is not favored.      LABS  Component      Latest Ref Rng & Units 2/8/2022   WBC      4.0 - 11.0 10e3/uL 6.3   RBC Count      3.80 - 5.20 10e6/uL 4.36   Hemoglobin      11.7 - 15.7 g/dL 12.8   Hematocrit      35.0 - 47.0 % 39.1   MCV      78 - 100 fL 90   MCH      26.5 - 33.0 pg 29.4   MCHC      31.5 - 36.5 g/dL 32.7   RDW      10.0 - 15.0 % 13.2   Platelet Count      150 - 450 10e3/uL 332   % Neutrophils      % 51   % Lymphocytes      % 38   % Monocytes      % 7   % Eosinophils      % 3   % Basophils      % 1   % Immature Granulocytes      % 0   Absolute Neutrophils      1.6 - 8.3 10e3/uL 3.2   Absolute Lymphocytes      0.8 - 5.3 10e3/uL 2.4   Absolute Monocytes      0.0 - 1.3 10e3/uL 0.5   Absolute Eosinophils      0.0 - 0.7 10e3/uL 0.2   Absolute Basophils      0.0 - 0.2 10e3/uL 0.1   Absolute Immature Granulocytes      <=0.4 10e3/uL 0.0   Vitamin B12      193 - 986 pg/mL 428   TSH      0.40 - 4.00 mU/L 2.28   Ferritin      8 - 252 ng/mL 70   Sed Rate      0 - 30 mm/hr 17        IMPRESSION/REPORT/PLAN  Intractable chronic migraine without aura and without status migrainosus  History of depression  Medication side effect  Left eye twitching  Insomnia  Suspected sleep apnea    This is a 54 year old female with headache suggestive of chronic migraines.  Exam/MRI have been negative.  Blood work has been noncontributory.  There might be some lifestyle issues like irregular sleep schedule that might be leading to her headaches.  It can also be a trigger for headaches.  She does do some yoga which helps with the headaches.  Does have neck issues related to working on the computer too long which could also be leading to headaches.  MRI showed some T2 hyperintensities.  No cause for headaches    For prevention of headaches she has tried Topamax and propranolol in the past.  Nortriptyline has provided minimal benefit and possibly helping with sleep.  She does complain of daytime fatigue with the nortriptyline will reduce the dose.  Will add tizanidine for neck pain/neck tension provoked headaches to see if it helps.  She rarely uses a tizanidine.  Will continue the nortriptyline for right now.    Botox is helping.  Because of cosmetic reasons she does not want us injecting a lot in the frontal region.  Will inject more in the trapezius region to see if that helps with the headaches.  Headaches are more in the right temporal and occipital region.  Continue current paradigm which is helping.    For abortive therapy will continue oral and injectable Imitrex.    Previously she was having some left eyelid twitching which is now improved.  Will monitor.    Will hold off on increasing the nortriptyline as she plans to get evaluated for sleep apnea.  Will add Toradol for abortive therapy with the sumatriptan.  Could consider increasing the Botox or CPAP does not really help with the headaches.  Return in 6 months.    Return in 6 months for medical visit and continue Botox every 3 months.  Medications  "refilled.    -     SUMAtriptan (IMITREX STATDOSE) 6 MG/0.5ML pen injector kit; INJECT 0.5 ML UNDER THE SKIN AT ONSET OF HEADACHE FOR MIGRAINE. MAY REPEAT IN ONE HOUR. MAX 12 MG IN 24 HOURS  - Botox every 3 months  -     ketorolac (TORADOL) 10 MG tablet; Take 1 tablet (10 mg) by mouth every 6 hours as needed for moderate pain. Max of 3 tabs/week.  -     nortriptyline (PAMELOR) 10 MG capsule; Take 3 caps/night.  -     tiZANidine (ZANAFLEX) 2 MG tablet; Take 1 tablet (2 mg) by mouth 3 times daily as needed for muscle spasms.  -     SUMAtriptan (IMITREX) 100 MG tablet; TAKE 1 TABLET(100 MG) BY MOUTH AT ONSET OF HEADACHE FOR MIGRAINE. MAY REPEAT IN 2 HOURS. MAX 4 TABLETS/ 24 HOURS STRENGTH: 100 MG      Return in about 3 months (around 6/17/2025) for In-Clinic Visit (must), Botox.    Over 30 minutes were spent coordinating the care for the patient on the day of the encounter.  This includes previsit, during visit and post visit activities as documented above.  Counseling patient.  Refractory problem.  Prescription management.  New medication prescribed.  Multiple problems reviewed.  (Activities include but not inclusive of reviewing chart, reviewing outside records, reviewing labs and imaging study results as well as the images, patient visit time including getting history and exam,  use if applicable, review of test results with the patient and coming up with a plan in a shared model, counseling patient and family, education and answering patient questions, EMR , EMR diagnosis entry and problem list management, medication reconciliation and prescription management if applicable, paperwork if applicable, printing documents and documentation of the visit activities.)    Botox PA  \" The patient has a diagnosis of chronic migraines. She has more than 15 headache days a month with each headache lasting at least 4 hours despite use of triptans. Her headaches have been there for over 6 months. She has been " "screened for medication overuse headaches and she does not have that. She has tried nortriptyline, Topamax, Propranolol for 3 months without any significant benefit. I would request that the Botox be approved for her.  60-70% benefit after multiple sessions.\"      Chuy Dunn MD  Neurologist  [facility]  Tel:- [phone]    This note was dictated using voice recognition software.  Any grammatical or context distortions are unintentional and inherent to the software.        Again, thank you for allowing me to participate in the care of your patient.        Sincerely,        Chuy Dunn MD    Electronically signed"

## 2025-04-01 DIAGNOSIS — F32.0 MILD MAJOR DEPRESSION: ICD-10-CM

## 2025-04-01 RX ORDER — FLUOXETINE HYDROCHLORIDE 40 MG/1
40 CAPSULE ORAL DAILY
Qty: 90 CAPSULE | Refills: 3 | Status: SHIPPED | OUTPATIENT
Start: 2025-04-01

## 2025-05-04 DIAGNOSIS — G43.109 MIGRAINE WITH AURA AND WITHOUT STATUS MIGRAINOSUS, NOT INTRACTABLE: ICD-10-CM

## 2025-05-05 RX ORDER — CEFUROXIME AXETIL 250 MG/1
TABLET ORAL
Qty: 12 ML | Refills: 3 | Status: SHIPPED | OUTPATIENT
Start: 2025-05-05

## 2025-05-05 NOTE — TELEPHONE ENCOUNTER
Refill request for the following medication (s) listed below.    Pending Prescriptions:                       Disp   Refills    SUMAtriptan (IMITREX STATDOSE) 6 MG/0.5ML*12 mL  3            Sig: INJECT 0.5 ML(6MG) UNDER THE SKIN AT ONSET OF           HEADACHE FOR MIGRAINE. MAY REPEAT IN 1 HOUR. MAX           12 MG IN 24 HOURS      Last office visit provider:  03/17/2025  Next appointment scheduled: 06/10/2025      Medication T'd for review and signature    Alexandra LUZ

## 2025-05-10 ASSESSMENT — SLEEP AND FATIGUE QUESTIONNAIRES
HOW LIKELY ARE YOU TO NOD OFF OR FALL ASLEEP WHILE SITTING AND TALKING TO SOMEONE: WOULD NEVER DOZE
HOW LIKELY ARE YOU TO NOD OFF OR FALL ASLEEP WHILE WATCHING TV: SLIGHT CHANCE OF DOZING
HOW LIKELY ARE YOU TO NOD OFF OR FALL ASLEEP WHEN YOU ARE A PASSENGER IN A CAR FOR AN HOUR WITHOUT A BREAK: SLIGHT CHANCE OF DOZING
HOW LIKELY ARE YOU TO NOD OFF OR FALL ASLEEP WHILE LYING DOWN TO REST IN THE AFTERNOON WHEN CIRCUMSTANCES PERMIT: SLIGHT CHANCE OF DOZING
HOW LIKELY ARE YOU TO NOD OFF OR FALL ASLEEP IN A CAR, WHILE STOPPED FOR A FEW MINUTES IN TRAFFIC: WOULD NEVER DOZE
HOW LIKELY ARE YOU TO NOD OFF OR FALL ASLEEP WHILE SITTING QUIETLY AFTER LUNCH WITHOUT ALCOHOL: SLIGHT CHANCE OF DOZING
HOW LIKELY ARE YOU TO NOD OFF OR FALL ASLEEP WHILE SITTING AND READING: MODERATE CHANCE OF DOZING
HOW LIKELY ARE YOU TO NOD OFF OR FALL ASLEEP WHILE SITTING INACTIVE IN A PUBLIC PLACE: SLIGHT CHANCE OF DOZING

## 2025-05-13 ENCOUNTER — OFFICE VISIT (OUTPATIENT)
Dept: SLEEP MEDICINE | Facility: CLINIC | Age: 55
End: 2025-05-13
Payer: COMMERCIAL

## 2025-05-13 VITALS
DIASTOLIC BLOOD PRESSURE: 77 MMHG | WEIGHT: 141.2 LBS | BODY MASS INDEX: 27.72 KG/M2 | SYSTOLIC BLOOD PRESSURE: 116 MMHG | OXYGEN SATURATION: 100 % | HEIGHT: 60 IN | HEART RATE: 73 BPM

## 2025-05-13 DIAGNOSIS — G47.33 MILD OBSTRUCTIVE SLEEP APNEA: ICD-10-CM

## 2025-05-13 DIAGNOSIS — R06.83 SNORING: Primary | ICD-10-CM

## 2025-05-13 DIAGNOSIS — Z78.0 POST-MENOPAUSAL: ICD-10-CM

## 2025-05-13 PROCEDURE — 99204 OFFICE O/P NEW MOD 45 MIN: CPT | Performed by: INTERNAL MEDICINE

## 2025-05-13 NOTE — PROGRESS NOTES
Name: Nikki Hardin MRN# [id number]   Age: 55 year old YOB: 1970     Date of Consultation: May 13, 2025  Consultation is requested by: Mare Wilson, NP  [id number] [facility] [place] [address] Mare Wilson  Primary care provider: Srinivasa Sofia       Reason for Sleep Consult:     Patient s Reason for visit  Nikki Hardin main reason for visit: (Patient-Rptd) Rarely feel rested after a night's sleep. Sometimes wake up gasping (for air?)  Patient states problem(s) started: (Patient-Rptd) Yrs but within last yr much worse  Nikki Hardin's goals for this visit: (Patient-Rptd) See if i can be assessed for sleep apnea           Assessment and Plan:     Summary Sleep Diagnoses:  Mild obstructive sleep apnea with snoring  Non-restorative sleep      Comorbid Diagnoses:  Irritable bowel syndrome  Depression  Post Menopausal     Established with our team in 2017 and PSG suggested Mild AHI, with strong positional component (AHI 5-->30). Unsure if this was helpful. Since then, underwent menopause.   Before snoring + non-restorative sleep but now with choking/gasping and am headaches.     Plan:  HST to reassess apnea burden; understands that she may need a PSG after depending on results  She would not be comfortable with PAP due to claustrophobia however would understand that this may be needed  Encourage healthy lifestyle    Orders Placed This Encounter   Procedures    HST-Home Sleep Apnea Test - Noxturnal Returnable       Theresa Salcido MD  Sleep Med fellow    This patient was examined and evaluated jointly with Dr. Salcido     Total time reviewing previous testing, medications, record review and preparation, 45 minutes  Assessment and plan were developed jointly and discussed with the patient during this visit.   EMILY HAMMER MD  [facility] Sleep Medicine  [facility]  Department of Medicine              HISTORY PRESENT ILLNESS:     Nikki Hardin is a 55 year old year old  BMI near-normal (27) with habitual snoring observed apneas and nonrestorative sleep with insomnia complaint (TRUE 15)    Dx with mild, positional JOSH in 2017 with AHI 5.5 and supine AHI 30. Tried positional therapy, belt, but felt this was cumbersome and didn't use for too long. Can't remember if there was any benefit with use.     Most concerned about Choking/gasping for air, non-restorative sleep.  Associated with am headaches, snoring, snort arousals, daytime sleepiness.   No witnessed apneas    She's claustrophobic and worries that she will have to wear CPAP mask.   Her weight is exact same as PSG in 2017, 141lb.  Mom snores; daughter intermittently snores.   Not a mouth breather.     Missing wisdom teeth, no planned dental work.   No TMJ conerns    Had uterine ablation 5 years ago.     No parasomnias, dream enactment, RLS, cataplexy, sleep paralysis, hallucinations.   Nightmares - once/week; not interrupting sleep    Previous PS2017  Sleep latency 7.5 minutes with Ambien.  REM achieved.   REM latency 128 minutes.  Sleep efficiency 90%. Total sleep time 416 minutes.  AHI 5.5/hr; supine AHI 29.8.      Sleep architecture:  Stage 1, 3.7% (5%), stage 2, 61.4% (45-55%), stage 3, 17.2% (15-20%), stage REM, 17.7% (20-25%).  AHI was 5.5/hr, with desaturations down to 89%. She spent 0 minutes below 90% SpO2. RDI 7.5/hr.  REM RDI 17.1/hr, consistent with moderate REM JOSH.  Supine RDI 40.8/hr, consistent with severe SUPINE JOSH (76.5 minutes supine).  Her non-supine RDI was 0. Periodic Limb Movement Index 2.2/hour, none with arousals.              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     Work/School Days: Sleep opportunity 8 hours   Patient goes to school/work: (Patient-Rptd) Yes   Usually gets into bed at (Patient-Rptd) 11 pm  Takes patient about (Patient-Rptd) Less than 5 minutes    Wakes up in the middle of the night (Patient-Rptd) ~3 times.  Wakes up due to (Patient-Rptd) Snorting self awake, Use the bathroom  It usually takes (Patient-Rptd) Few minutes to get back to sleep  She has trouble falling back asleep (Patient-Rptd) 1-2 times a month, ruminates and takes 1 hr    Patient is usually up at (Patient-Rptd) 7 am, with alarm  Not refreshed    This has been her schedule x 9 years  Sleep a decade before was good.         Weekends/Non-work Days/All Other Days: Sleep opportunity 8 hours  Usually gets into bed at (Patient-Rptd) 10:30 - 11:30pm   Takes patient about (Patient-Rptd) 1 to 5 min. to fall asleep  Patient is usually up at (Patient-Rptd) 6:30-7:30 am, with alarm, to let dog out.   Before would sleep in but now to avoid migraines she tries to not sleep in.   Naps - 2x/month, 30 min, wakes up feeling more refreshed      Sleep Need  Patient gets  (Patient-Rptd) 6 sleep on average   Patient thinks she needs about (Patient-Rptd) 7.5/8 sleep    Nikki Hardin prefers to sleep in this position(s): (Patient-Rptd) Side   Patient states they do the following activities in bed: (Patient-Rptd) Read, Eat, Watch TV, Use phone, computer, or tablet, Work      Patient has had a driving accident or near-miss due to sleepiness/drowsiness: (Patient-Rptd) No        CAFFEINE AND OTHER SUBSTANCES:    Patient consumes caffeinated beverages per day:  (Patient-Rptd) 1 cup of coffee, 1 fountain soda; last intake is 4-5pm     Etoh - 1x/2month  Declines Nicotine, vaping, marijuana, illicit substances      Family History:  Patient has a family member been diagnosed with a sleep disorder: (Patient-Rptd) Yes  (Patient-Rptd) Spouse; i  know daughter has but won't address it              SCALES:       EPWORTH SLEEPINESS SCALE         5/10/2025     5:21 AM    [place] Sleepiness Scale ( JARAD Persaud  [id number]<br>ESS - [place]/English - Final version - 21 Nov 07 - [facility] [place].)   Sitting and reading Moderate chance of dozing   Watching TV Slight chance of dozing   Sitting, inactive in a public place (e.g. a theatre or a meeting) Slight chance of dozing   As a passenger in a car for an hour without a break Slight chance of dozing   Lying down to rest in the afternoon when circumstances permit Slight chance of dozing   Sitting and talking to someone Would never doze   Sitting quietly after a lunch without alcohol Slight chance of dozing   In a car, while stopped for a few minutes in traffic Would never doze   [place] Score (MC) 7   [place] Score (Sleep) 7        Patient-reported         INSOMNIA SEVERITY INDEX (TRUE)          5/10/2025     4:52 AM   Insomnia Severity Index (TRUE)   Difficulty falling asleep 0   Difficulty staying asleep 2   Problems waking up too early 3   How SATISFIED/DISSATISFIED are you with your CURRENT sleep pattern? 3   How NOTICEABLE to others do you think your sleep problem is in terms of impairing the quality of your life? 1   How WORRIED/DISTRESSED are you about your current sleep problem? 3   To what extent do you consider your sleep problem to INTERFERE with your daily functioning (e.g. daytime fatigue, mood, ability to function at work/daily chores, concentration, memory, mood, etc.) CURRENTLY? 3   TRUE Total Score 15        Patient-reported       Guidelines for Scoring/Interpretation:  Total score categories:  0-7 = No clinically significant insomnia   8-14 = Subthreshold insomnia   15-21 = Clinical insomnia (moderate severity)  22-28 = Clinical insomnia (severe)  Used via courtesy of www.GLOBALDRUMealth.va.gov with permission from Danny Black PhD., [facility]      STOP BANG         5/13/2025     2:00 PM   STOP  BANG Questionnaire (  2008, the American Society of Anesthesiologists, Inc. [employer]   Neck Cir (cm) Clinic: 34 cm   B/P Clinic: 116/77   BMI Clinic: 27.58         GAD7        9/28/2021     5:21 PM   BYRON-7    1. Feeling nervous, anxious, or on edge 1   2. Not being able to stop or control worrying 0   3. Worrying too much about different things 1   4. Trouble relaxing 0   5. Being so restless that it is hard to sit still 0   6. Becoming easily annoyed or irritable 1   7. Feeling afraid, as if something awful might happen 0   BYRON-7 Total Score 3         PATIENT HEALTH QUESTIONNAIRE-9 (PHQ - 9)        12/10/2024     9:43 PM   PHQ-9 ([employer])   1.  Little interest or pleasure in doing things 0   2.  Feeling down, depressed, or hopeless 1   3.  Trouble falling or staying asleep, or sleeping too much 0   4.  Feeling tired or having little energy 1   5.  Poor appetite or overeating 1   6.  Feeling bad about yourself - or that you are a failure or have let yourself or your family down 1   7.  Trouble concentrating on things, such as reading the newspaper or watching television 0   8.  Moving or speaking so slowly that other people could have noticed. Or the opposite - being so fidgety or restless that you have been moving around a lot more than usual 0   9.  Thoughts that you would be better off dead, or of hurting yourself in some way 0   PHQ-9 Total Score 4    6.  Feeling bad about yourself 1   7.  Trouble concentrating 0   8.  Moving slowly or restless 0   9.  Suicidal or self-harm thoughts 0   1.  Little interest or pleasure in doing things Not at all   2.  Feeling down, depressed, or hopeless Several days   3.  Trouble falling or staying asleep, or sleeping too much Not at all   4.  Feeling tired or having little energy Several days   5.  Poor appetite or overeating Several days   6.  Feeling bad about yourself Several days   7.  Trouble concentrating Not at all   8.  Moving slowly or  restless Not at all   9.  Suicidal or self-harm thoughts Not at all   PHQ-9 via [facility] TOTAL SCORE-----> 4 (Minimal depression)   Difficulty at work, home, or with people Somewhat difficult       Patient-reported       Developed by Didier Parikh, Beckie Ha, Jaron Encinas and colleagues, with an educational indira from [employer] No permission required to reproduce, translate, display or distribute.        Allergies:    Allergies   Allergen Reactions    Hydrocodone Nausea and Vomiting            Problem List:     Patient Active Problem List   Diagnosis    Irritable bowel syndrome    Other acne    Migraine with aura and without status migrainosus, not intractable    Mild major depression (H)    Sedimentation rate elevation    Elevated vitamin B12 level    Anemia, unspecified type    Hypervitaminosis B6    Lymphocytic colitis    ASCUS of cervix with negative high risk HPV    Sjogren's syndrome with inflammatory arthritis    Post-menopausal            MEDICATIONS:     Current Outpatient Medications   Medication Sig Dispense Refill    acetaminophen (TYLENOL) 325 MG tablet Take 2 tablets by mouth every 4 hours as needed for pain. 100 tablet 0    Biotin 5000 MCG TABS Take 1,000 mcg by mouth daily      cholecalciferol 5000 UNITS CAPS Take 1 capsule (5,000 Units) by mouth daily FOR VITAMIN D DEFICIENCY (LOW VITAMIN D) 100 capsule 3    Ferrous Sulfate (IRON) 28 MG TABS Take by mouth daily.      FLUoxetine (PROZAC) 40 MG capsule Take 1 capsule (40 mg) by mouth daily. 90 capsule 3    IBUPROFEN PO Take 200 mg by mouth Takes 3 tablets PRN      ketorolac (TORADOL) 10 MG tablet Take 1 tablet (10 mg) by mouth every 6 hours as needed for moderate pain. Max of 3 tabs/week. 20 tablet 1    loperamide (IMODIUM A-D) 2 MG tablet Take 2 mg by mouth daily as needed for diarrhea      nortriptyline (PAMELOR) 10 MG capsule Take 3 caps/night. 270 capsule 1    Riboflavin 100 MG TABS Take 400 mg by mouth daily.      SUMAtriptan  (IMITREX STATDOSE) 6 MG/0.5ML pen injector kit INJECT 0.5 ML(6MG) UNDER THE SKIN AT ONSET OF HEADACHE FOR MIGRAINE. MAY REPEAT IN 1 HOUR. MAX 12 MG IN 24 HOURS 12 mL 3    SUMAtriptan (IMITREX) 100 MG tablet TAKE 1 TABLET(100 MG) BY MOUTH AT ONSET OF HEADACHE FOR MIGRAINE. MAY REPEAT IN 2 HOURS. MAX 4 TABLETS/ 24 HOURS STRENGTH: 100 MG 18 tablet 11    tiZANidine (ZANAFLEX) 2 MG tablet Take 1 tablet (2 mg) by mouth 3 times daily as needed for muscle spasms. 180 tablet 0       Problem List:  Patient Active Problem List    Diagnosis Date Noted    Post-menopausal 05/13/2025     Priority: Medium    Sjogren's syndrome with inflammatory arthritis 02/28/2023     Priority: Medium    ASCUS of cervix with negative high risk HPV 08/01/2018     Priority: Medium     03/08/04:  ASCUS Pap with Neg HR HPV result.   07/07/04:  ASCUS Pap with Neg HR HPV result.   01/09/06: NIL Pap  0402/07: NIL Pap  04/14/08:  ASCUS Pap with Neg HR HPV result.   04/22/09: NIL Pap  06/04/10: NIL Pap  01/30/12:  ASCUS Pap with Neg HR HPV result.   06/05/13:  ASCUS Pap with Neg HR HPV result.   07/11/16: ASCUS Pap with Neg HR HPV result. (care everywhere)  11/22/19: ASCUS Pap with Neg HR HPV result. Plan 3 yr co-test  2/28/23 ASCUS pap, Neg HPV. Plan 1 yr co-test  3/12/24 NIL, Neg HPV. Plan 3 yr co-test      Lymphocytic colitis 12/21/2017     Priority: Medium    Sedimentation rate elevation 04/13/2017     Priority: Medium    Elevated vitamin B12 level 04/13/2017     Priority: Medium     NOT ON B-12 SUPPLEMENTS      Anemia, unspecified type 04/13/2017     Priority: Medium    Hypervitaminosis B6 04/13/2017     Priority: Medium    Migraine with aura and without status migrainosus, not intractable 02/07/2017     Priority: Medium    Mild major depression (H) 02/07/2017     Priority: Medium    Other acne 02/28/2006     Priority: Medium    Irritable bowel syndrome 03/23/2005     Priority: Medium     diarrhea          Past Medical/Surgical History:  Past Medical  History:   Diagnosis Date    Abnormal Pap smear of cervix 2019    See problem list.     Anxiety     Arthritis Couple yrs ago?    Had surgery on one of fingers last may    Colitis     lymphocytic colitis ?    Depressive disorder Since young adult    On fluoxetine and attend therapy periodically    Depressive disorder, not elsewhere classified     sees psych- Dr Blue    Migraine, unspecified, without mention of intractable migraine without mention of status migrainosus age 14 yrs    Other acne     PAIN IN THORACIC SPINE [724.1] 2006    chiropractic care    Raynaud disease     ? connective tissue dz.  ? sogren's     Past Surgical History:   Procedure Laterality Date    BREAST BIOPSY, RT/LT Left     C IUD,MIRENA  2005    ESOPHAGOSCOPY, GASTROSCOPY, DUODENOSCOPY (EGD), COMBINED  10/2010    HC ABLATION, ENDOMETRIAL, THERMAL, W/O HYSTEROSCOPIC GUIDANCE  2010    HYSTEROSCOPY  2009    D&C    ORTHOPEDIC SURGERY  2023    Rt index sudheer at 1st joint    Z NONSPECIFIC PROCEDURE       X 3    ZZHC COLONOSCOPY THRU STOMA, DIAGNOSTIC  2004    ZZHC COLONOSCOPY THRU STOMA, DIAGNOSTIC  2010       Social History:  Social History     Socioeconomic History    Marital status:      Spouse name: Not on file    Number of children: Not on file    Years of education: Not on file    Highest education level: Not on file   Occupational History    Not on file   Tobacco Use    Smoking status: Never     Passive exposure: Never    Smokeless tobacco: Never   Vaping Use    Vaping status: Never Used   Substance and Sexual Activity    Alcohol use: Yes     Comment: occasionally    Drug use: No    Sexual activity: Yes     Partners: Male     Birth control/protection: Male Surgical, Other     Comment: Uterine ablation   Other Topics Concern    Parent/sibling w/ CABG, MI or angioplasty before 65F 55M? No   Social History Narrative    Caffeine intake/servings daily - 1    Calcium intake/servings daily - 2     Exercise 1 times weekly - describe walk    Sunscreen used - Yes    Seatbelts used - Yes    Guns stored in the home - No    Self Breast Exam - No    Pap test up to date -  No    Eye exam up to date -  Yes    Dental exam up to date -  Yes    DEXA scan up to date -  No    Flex Sig/Colonoscopy up to date -  Yes    Mammography up to date -  Yes    Immunizations reviewed and up to date - Yes    Abuse: Current or Past (Physical, Sexual or Emotional) - No    Do you feel safe in your environment - Yes    Do you cope well with stress - No    Do you suffer from insomnia - Yes    Last updated by: Issac George  1/30/2012                 Social Drivers of Health     Financial Resource Strain: Low Risk  (3/5/2024)    Financial Resource Strain     Within the past 12 months, have you or your family members you live with been unable to get utilities (heat, electricity) when it was really needed?: No   Food Insecurity: Low Risk  (3/5/2024)    Food Insecurity     Within the past 12 months, did you worry that your food would run out before you got money to buy more?: No     Within the past 12 months, did the food you bought just not last and you didn t have money to get more?: No   Transportation Needs: Low Risk  (3/5/2024)    Transportation Needs     Within the past 12 months, has lack of transportation kept you from medical appointments, getting your medicines, non-medical meetings or appointments, work, or from getting things that you need?: No   Physical Activity: Insufficiently Active (3/5/2024)    Exercise Vital Sign     Days of Exercise per Week: 1 day     Minutes of Exercise per Session: 50 min   Stress: Stress Concern Present (3/5/2024)    [employer] [place] [employer] - Occupational Stress Questionnaire     Feeling of Stress : To some extent   Social Connections: Unknown (3/5/2024)    Social Connection and Isolation Panel [NHANES]     Frequency of Communication with Friends and Family: Not on file     Frequency  of Social Gatherings with Friends and Family: Twice a week     Attends Tenriism Services: Not on file     Active Member of Clubs or Organizations: Not on file     Attends Club or Organization Meetings: Not on file     Marital Status: Not on file   Interpersonal Safety: Low Risk  (12/11/2024)    Interpersonal Safety     Do you feel physically and emotionally safe where you currently live?: Yes     Within the past 12 months, have you been hit, slapped, kicked or otherwise physically hurt by someone?: No     Within the past 12 months, have you been humiliated or emotionally abused in other ways by your partner or ex-partner?: No   Housing Stability: Low Risk  (3/5/2024)    Housing Stability     Do you have housing? : Yes     Are you worried about losing your housing?: No       Family History:  Family History   Problem Relation Age of Onset    Neurologic Disorder Mother         MIGRIANES    Anxiety Disorder Mother     Osteoporosis Mother     C.A.D. Father         in his 60's    Depression Father     Heart Disease Father     Lipids Father     Hypertension Father     Arthritis Father     Sleep Disorder Father     Cerebrovascular Disease Father 78    Coronary Artery Disease Father     Hyperlipidemia Father     Cancer - colorectal Paternal Grandmother     Cancer Maternal Grandfather         stomach    Heart Disease Maternal Grandfather     Depression Brother     Depression Brother     Asthma Daughter     Heart Disease Paternal Grandfather     Breast Cancer Maternal Aunt     Breast Cancer Other         Maternal aunt    Diabetes No family hx of        Review of Systems:  A complete review of systems reviewed by me is negative with the exeption of what has been mentioned in the history of present illness.  In the last TWO WEEKS have you experienced any of the following symptoms?  Fevers: (Patient-Rptd) No  Night Sweats: (Patient-Rptd) No  Weight Gain: (Patient-Rptd) No  Pain at Night: (Patient-Rptd) No  Double Vision:  (Patient-Rptd) No  Changes in Vision: (Patient-Rptd) No  Difficulty Breathing through Nose: (Patient-Rptd) No  Sore Throat in Morning: (Patient-Rptd) No  Dry Mouth in the Morning: (Patient-Rptd) Yes  Shortness of Breath Lying Flat: (Patient-Rptd) No  Shortness of Breath With Activity: (Patient-Rptd) No  Awakening with Shortness of Breath: (Patient-Rptd) No  Increased Cough: (Patient-Rptd) No  Heart Racing at Night: (Patient-Rptd) No  Swelling in Feet or Legs: (Patient-Rptd) Yes  Diarrhea at Night: (Patient-Rptd) Yes  Heartburn at Night: (Patient-Rptd) No  Urinating More than Once at Night: (Patient-Rptd) No  Losing Control of Urine at Night: (Patient-Rptd) No  Joint Pains at Night: (Patient-Rptd) No  Headaches in Morning: (Patient-Rptd) Yes  Weakness in Arms or Legs: (Patient-Rptd) No  Depressed Mood: (Patient-Rptd) Yes  Anxiety: (Patient-Rptd) Yes          Physical Examination:     Vitals: /77   Pulse 73   Ht 1.524 m (5')   Wt 64 kg (141 lb 3.2 oz)   SpO2 100%   BMI 27.58 kg/m    BMI= Body mass index is 27.58 kg/m .  Mandibular profile  Mallampati Class: III.  Tonsillar Stage: 1  hidden by pillars.  GENERAL: alert and no distress  EYES: Eyes grossly normal to inspection.  No discharge or erythema, or obvious scleral/conjunctival abnormalities.  RESP: No audible wheeze, cough, or visible cyanosis.    SKIN: Visible skin clear. No significant rash, abnormal pigmentation or lesions.  NEURO: Cranial nerves grossly intact.  Mentation and speech appropriate for age.  PSYCH: Appropriate affect, tone, and pace of words    Neck Cir (cm): 34 cm             Data: All pertinent previous laboratory data reviewed     Recent Labs   Lab Test 03/12/24  0832 01/03/22  0947 12/22/20  1156     --  138   POTASSIUM 4.3  --  4.5   CHLORIDE 102  --  107   CO2 25  --  27   ANIONGAP 11  --  4   GLC 75 78 79   BUN 21.2*  --  12   CR 0.88  --  0.78   SWETA 9.7  --  9.2       Recent Labs   Lab Test 12/11/24  1722 03/12/24  0803    WBC  --  7.2   RBC  --  4.38   HGB 12.2 12.5   HCT  --  38.3   MCV  --  87   MCH  --  28.5   MCHC  --  32.6   RDW  --  13.2   PLT  --  324       Recent Labs   Lab Test 12/22/20  1156   PROTTOTAL 7.8   ALBUMIN 3.8   BILITOTAL 0.3   ALKPHOS 73   AST 16   ALT 22       TSH (mU/L)   Date Value   02/08/2022 2.28   11/12/2018 1.34   03/23/2017 2.88       Iron Saturation Index   Date/Time Value Ref Range Status   12/21/2017 12:27 PM 18 15 - 46 % Final     Ferritin   Date/Time Value Ref Range Status   03/12/2024 08:32 AM 71 11 - 328 ng/mL Final   11/12/2018 11:26 AM 35 8 - 252 ng/mL Final             Theresa Salcido MD 5/13/2025     Total time spent reviewing medical records including previous testing and interpretation as well as direct patient contact and documentation on this date:

## 2025-05-13 NOTE — PATIENT INSTRUCTIONS
"          MY TREATMENT INFORMATION FOR SLEEP APNEA-  Nikki ATKINSON Wilfred    DOCTOR : Theresa Salcido MD    Am I having a sleep study at a sleep center?  --->Due to normal delays, you will be contacted within 2-4 weeks to schedule    Am I having a home sleep study?  --->Watch the video for the device you are using:    -/drop off device-   https://www.VMO Systemsube.com/watch?v=yGGFBdELGhk    -Disposable device sent out require phone/computer application-   https://www.CrowdHall.com/watch?v=BCce_vbiwxE      Frequently asked questions:  1. What is Obstructive Sleep Apnea (JOSH)? JOSH is the most common type of sleep apnea. Apnea means, \"without breath.\"  Apnea is most often caused by narrowing or collapse of the upper airway as muscles relax during sleep.   Almost everyone has occasional apneas. Most people with sleep apnea have had brief interruptions at night frequently for many years.  The severity of sleep apnea is related to how frequent and severe the events are.   2. What are the consequences of JOSH? Symptoms include: feeling sleepy during the day, snoring loudly, gasping or stopping of breathing, trouble sleeping, and occasionally morning headaches or heartburn at night.  Sleepiness can be serious and even increase the risk of falling asleep while driving. Other health consequences may include development of high blood pressure and other cardiovascular disease in persons who are susceptible. Untreated JOSH  can contribute to heart disease, stroke and diabetes.   3. What are the treatment options? In most situations, sleep apnea is a lifelong disease that must be managed with daily therapy. Medications are not effective for sleep apnea and surgery is generally not considered until other therapies have been tried. Your treatment is your choice . Continuous Positive Airway (CPAP) works right away and is the therapy that is effective in nearly everyone. An oral device to hold your jaw forward is usually the next most " reliable option. Other options include postioning devices (to keep you off your back), weight loss, and surgery including a tongue pacing device. There is more detail about some of these options below.  4. Are my sleep studies covered by insurance? Although we will request verification of coverage, we advise you also check in advance of the study to ensure there is coverage.    Important tips for those choosing CPAP and similar devices  REMEMBER-IF YOU RECEIVE A CALL FROM  [phone]-->IT IS TO SETUP A DEVICE  For new devices, sign up for device TRIP to monitor your device for your followup visits  We encourage you to utilize the We[employer]ann trip or website ( https://LendingStar.Zebtab/ ) to monitor your therapy progress and share the data with your healthcare team when you discuss your sleep apnea.                                                    Know your equipment:  CPAP is continuous positive airway pressure that prevents obstructive sleep apnea by keeping the throat from collapsing while you are sleeping. In most cases, the device is  smart  and can slowly self-adjusts if your throat collapses and keeps a record every day of how well you are treated-this information is available to you and your care team.  BPAP is bilevel positive airway pressure that keeps your throat open and also assists each breath with a pressure boost to maintain adequate breathing.  Special kinds of BPAP are used in patients who have inadequate breathing from lung or heart disease. In most cases, the device is  smart  and can slowly self-adjusts to assist breathing. Like CPAP, the device keeps a record of how well you are treated.  Your mask is your connection to the device. You get to choose what feels most comfortable and the staff will help to make sure if fits. Here: are some examples of the different masks that are available: Magnetic mask aids may assist with use but there are safety issues that should be addressed when  considering with magnets* ( see end of discussion).       Key points to remember on your journey with sleep apnea:  Sleep study.  PAP devices often need to be adjusted during a sleep study to show that they are effective and adjusted right.  Good tips to remember: Try wearing just the mask during a quiet time during the day so your body adapts to wearing it. A humidifier is recommended for comfort in most cases to prevent drying of your nose and throat. Allergy medication from your provider may help you if you are having nasal congestion.  Getting settled-in. It takes more than one night for most of us to get used to wearing a mask. Try wearing just the mask during a quiet time during the day so your body adapts to wearing it. A humidifier is recommended for comfort in most cases. Our team will work with you carefully on the first day and will be in contact within 4 days and again at 2 and 4 weeks for advice and remote device adjustments. Your therapy is evaluated by the device each day.   Use it every night. The more you are able to sleep naturally for 7-8 hours, the more likely you will have good sleep and to prevent health risks or symptoms from sleep apnea. Even if you use it 4 hours it helps. Occasionally all of us are unable to use a medical therapy, in sleep apnea, it is not dangerous to miss one night.   Communicate. Call our skilled team on the number provided on the first day if your visit for problems that make it difficult to wear the device. Over 2 out of 3 patients can learn to wear the device long-term with help from our team. Remember to call our team or your sleep providers if you are unable to wear the device as we may have other solutions for those who cannot adapt to mask CPAP therapy. It is recommended that you sleep your sleep provider within the first 3 months and yearly after that if you are not having problems.   Use it for your health. We encourage use of CPAP masks during daytime quiet  periods to allow your face and brain to adapt to the sensation of CPAP so that it will be a more natural sensation to awaken to at night or during naps. This can be very useful during the first few weeks or months of adapting to CPAP though it does not help medically to wear CPAP during wakefulness and  should not be used as a strategy just to meet guidelines.  Take care of your equipment. Make sure you clean your mask and tubing using directions every day and that your filter and mask are replaced as recommended or if they are not working.     *Masks with magnets:  Updated Contraindications  Masks with magnetic components are contraindicated for use by patients where they, or anyone in close physical contact while using the mask, have the following:   Active medical implants that interact with magnets (i.e., pacemakers, implantable cardioverter defibrillators (ICD), neurostimulators, cerebrospinal fluid (CSF) shunts, insulin/infusion pumps)   Metallic implants/objects containing ferromagnetic material (i.e., aneurysm clips/flow disruption devices, embolic coils, stents, valves, electrodes, implants to restore hearing or balance with implanted magnets, ocular implants, metallic splinters in the eye)  Updated Warning  Keep the mask magnets at a safe distance of at least 6 inches (150 mm) away from implants or medical devices that may be adversely affected by magnetic interference. This warning applies to you or anyone in close physical contact with your mask. The magnets are in the frame and lower headgear clips, with a magnetic field strength of up to 400mT. When worn, they connect to secure the mask but may inadvertently detach while asleep.  Implants/medical devices, including those listed within contraindications, may be adversely affected if they change function under external magnetic fields or contain ferromagnetic materials that attract/repel to magnetic fields (some metallic implants, e.g., contact lenses  with metal, dental implants, metallic cranial plates, screws, shari hole covers, and bone substitute devices). Consult your physician and  of your implant / other medical device for information on the potential adverse effects of magnetic fields.    BESIDES CPAP, WHAT OTHER THERAPIES ARE THERE?    Positioning Device  Positioning devices are generally used when sleep apnea is mild and only occurs on your back.This example shows a pillow that straps around the waist. It may be appropriate for those whose sleep study shows milder sleep apnea that occurs primarily when lying flat on one's back. Preliminary studies have shown benefit but effectiveness at home may need to be verified by a home sleep test. These devices are generally not covered by medical insurance.  Examples of devices that maintain sleeping on the back to prevent snoring and mild sleep apnea.    Belt type body positioner  http://Guardly/    Electronic reminder  http://nightshifttherapy.Dial a Dealer/            Oral Appliance  What is oral appliance therapy?  An oral appliance device fits on your teeth at night like a retainer used after having braces. The device is made by a specialized dentist and requires several visits over 1-2 months before a manufactured device is made to fit your teeth and is adjusted to prevent your sleep apnea. Once an oral device is working properly, snoring should be improved. A home sleep test may be recommended at that time if to determine whether the sleep apnea is adequately treated.       Some things to remember:  -Oral devices are often, but not always, covered by your medical insurance. Be sure to check with your insurance provider.   -If you are referred for oral therapy, you will be given a list of specialized dentists to consider or you may choose to visit the Web site of the American Academy of Dental Sleep Medicine  -Oral devices are less likely to work if you have severe sleep apnea or are extremely  overweight.     More detailed information  An oral appliance is a small acrylic device that fits over the upper and lower teeth  (similar to a retainer or a mouth guard). This device slightly moves jaw forward, which moves the base of the tongue forward, opens the airway, improves breathing for effective treat snoring and obstructive sleep apnea in perhaps 7 out of 10 people .  The best working devices are custom-made by a dental device  after a mold is made of the teeth 1, 2, 3.  When is an oral appliance indicated?  Oral appliance therapy is recommended as a first-line treatment for patients with primary snoring, mild sleep apnea, and for patients with moderate sleep apnea who prefer appliance therapy to use of CPAP4, 5. Severity of sleep apnea is determined by sleep testing and is based on the number of respiratory events per hour of sleep.   How successful is oral appliance therapy?  The success rate of oral appliance therapy in patients with mild sleep apnea is 75-80% while in patients with moderate sleep apnea it is 50-70%. The chance of success in patients with severe sleep apnea is 40-50%. The research also shows that oral appliances have a beneficial effect on the cardiovascular health of JOSH patients at the same magnitude as CPAP therapy7.  Oral appliances should be a second-line treatment in cases of severe sleep apnea, but if not completely successful then a combination therapy utilizing CPAP plus oral appliance therapy may be effective. Oral appliances tend to be effective in a broad range of patients although studies show that the patients who have the highest success are females, younger patients, those with milder disease, and less severe obesity. 3, 6.   Finding a dentist that practices dental sleep medicine  Specific training is available through the American Academy of Dental Sleep Medicine for dentists interested in working in the field of sleep. To find a dentist who is educated in  the field of sleep and the use of oral appliances, near you, visit the Web site of the American Academy of Dental Sleep Medicine.    References  1. Divine, et al. Objectively measured vs self-reported compliance during oral appliance therapy for sleep-disordered breathing. Chest 2013; 144(5): 3949-4887.  2. Cassidy et al. Objective measurement of compliance during oral appliance therapy for sleep-disordered breathing. Thorax 2013; 68(1): 91-96.  3. Jean et al. Mandibular advancement devices in 620 men and women with JOSH and snoring: tolerability and predictors of treatment success. Chest 2004; 125: 3234-9243.  4. Juan José, et al. Oral appliances for snoring and JOSH: a review. Sleep 2006; 29: 244-262.  5. Enmanuel et al. Oral appliance treatment for JOSH: an update. J Clin Sleep Med 2014; 10(2): 215-227.  6. Su et al. Predictors of OSAH treatment outcome. J Dent Res 2007; 86: 0461-2555.      Weight Loss:   Your Body mass index is 27.58 kg/m .    Being overweight does not necessarily mean you will have health consequences.  Those who have BMI over 30 or over 27 with existing medical conditions carries greater risk.   Weight loss decreases severity of sleep apnea in most people with obesity. For those with mild obesity who have developed snoring with weight gain, even 15-30 pound weight loss can improve and occasionally milder eliminate sleep apnea.  Structured and life-long dietary and health habits are necessary to lose weight and keep healthier weight levels.     The Comprehensive Weight loss program offers all aspects of weight loss strategies including two Non-Surgical Weight Loss Programs: Medical Weight Management and our 24 Week Healthy Lifestyle Program:  Medical Weight Management: You will meet with a Medical Weight Management Provider, as well as a Registered Dietician. The program may include medication therapy, dietary education, recommended exercise and physical therapy programs,  monthly support group meetings, and possible psychological counseling. Follow up visits with the provider or dietician are scheduled based on your progress and needs.  24 Week Healthy Lifestyle Program: This unique program is designed to give you the support of weekly appointments and activities thru a 24-week period. It may include all of the components of the basic program (above), with the addition of 11 individual Health  Visits, 24-week access to the [employer] website for over 700 online classes, and monthly support group meetings. This program has an out-of-pocket expense of $499 to cover the items that can not be billed to insurance (health coaches and [employer] access), and is non-refundable/non-transferable (you may be able to use a Health Savings Account; ask your HSA provider). There may be an optional meal replacement plan prescribed as well.   Medication therapy has been approved for the treatment of sleep apnea: The FDA approved tirzepatide (ZEPBOUND) for moderate to severe sleep apnea (apnea-hypopnea index greater than or equal to 15) in patients with BMI of greater than or equal to 30, or BMI greater than or equal to 27 with at least 1 weight-related condition such as hypertension or dyslipidemia.  Surgical management achieves meaningful long-term weight loss and improvement in health risks in most patients with more severe obesity.      Sleep Apnea Surgery:    Surgery for obstructive sleep apnea is considered generally only when other therapies fail to work. Surgery may be discussed with you if you are having a difficult time tolerating CPAP and or when there is an abnormal structure that requires surgical correction.  Nose and throat surgeries often enlarge the airway to prevent collapse.  Most of these surgeries create pain for 1-2 weeks and up to half of the most common surgeries are not effective throughout life.  You should carefully discuss the benefits and drawbacks to surgery with your  sleep provider and surgeon to determine if it is the best solution for you.   More information  Surgery for JOSH is directed at areas that are responsible for narrowing or complete obstruction of the airway during sleep.  There are a wide range of procedures available to enlarge and/or stabilize the airway to prevent blockage of breathing in the three major areas where it can occur: the palate, tongue, and nasal regions.  Successful surgical treatment depends on the accurate identification of the factors responsible for obstructive sleep apnea in each person.  A personalized approach is required because there is no single treatment that works well for everyone.  Because of anatomic variation, consultation with an examination by a sleep surgeon is a critical first step in determining what surgical options are best for each patient.  In some cases, examination during sedation may be recommended in order to guide the selection of procedures.  Patients will be counseled about risks and benefits as well as the typical recovery course after surgery. Surgery is typically not a cure for a person s JOSH.  However, surgery will often significantly improve one s JOSH severity (termed  success rate ).  Even in the absence of a cure, surgery will decrease the cardiovascular risk associated with OSA7; improve overall quality of life8 (sleepiness, functionality, sleep quality, etc).      Palate Procedures:  Patients with JOSH often have narrowing of their airway in the region of their tonsils and uvula.  The goals of palate procedures are to widen the airway in this region as well as to help the tissues resist collapse.  Modern palate procedure techniques focus on tissue conservation and soft tissue rearrangement, rather than tissue removal.  Often the uvula is preserved in this procedure. Residual sleep apnea is common in patient after pharyngoplasty with an average reduction in sleep apnea events of 33%2.      Tongue  Procedures:  ExamWhile patients are awake, the muscles that surround the throat are active and keep this region open for breathing. These muscles relax during sleep, allowing the tongue and other structures to collapse and block breathing.  There are several different tongue procedures available.  Selection of a tongue base procedure depends on characteristics seen on physical exam.  Generally, procedures are aimed at removing bulky tissues in this area or preventing the back of the tongue from falling back during sleep.  Success rates for tongue surgery range from 50-62%3.    Hypoglossal Nerve Stimulation:  Hypoglossal nerve stimulation has recently received approval from the [place] Food and Drug Administration for the treatment of obstructive sleep apnea.  This is based on research showing that the system was safe and effective in treating sleep apnea6.  Results showed that the median AHI score decreased 68%, from 29.3 to 9.0. This therapy uses an implant system that senses breathing patterns and delivers mild stimulation to airway muscles, which keeps the airway open during sleep.  The system consists of three fully implanted components: a small generator (similar in size to a pacemaker), a breathing sensor, and a stimulation lead.  Using a small handheld remote, a patient turns the therapy on before bed and off upon awakening.    Candidates for this device must be greater than 18 years of age, have moderate to severe obstructive sleep apnea with less than 25% central events  (AHI between 15-65), BMI less than 35, have tried CPAP/oral appliance for at least 8 weeks without success, and have appropriate upper airway anatomy (determined by a sleep endoscopy performed by Dr. Mitchell Paul or Dr. Russel Headley).     Nasal Procedures:  Nasal obstruction can interfere with nasal breathing during the day and night.  Studies have shown that relief of nasal obstruction can improve the ability of some patients to  tolerate positive airway pressure therapy for obstructive sleep apnea1.  Treatment options include medications such as nasal saline, topical corticosteroid and antihistamine sprays, and oral medications such as antihistamines or decongestants. Non-surgical treatments can include external nasal dilators for selected patients. If these are not successful by themselves, surgery can improve the nasal airway either alone or in combination with these other options.        Combination Procedures:  Combination of surgical procedures and other treatments may be recommended, particularly if patients have more than one area of narrowing or persistent positional disease.  The success rate of combination surgery ranges from 66-80%2,3.    References  Mee LYON. The Role of the Nose in Snoring and Obstructive Sleep Apnoea: An Update.  Eur Arch Otorhinolaryngol. 2011; 268: 1365-73.   Yeyo SM; Anastasia JA; Valerio JR; Pallanch JF; Lorne MB; Yeny SG; Ravi STAFFORD. Surgical modifications of the upper airway for obstructive sleep apnea in adults: a systematic review and meta-analysis. SLEEP 2010;33(10):3354-9926. Roverto BRYAN. Hypopharyngeal surgery in obstructive sleep apnea: an evidence-based medicine review.  Arch Otolaryngol Head Neck Surg. 2006 Feb;132(2):206-13.  Faivan YH1, Ashley Y, Saqib LEONARDO. The efficacy of anatomically based multilevel surgery for obstructive sleep apnea. Otolaryngol Head Neck Surg. 2003 Oct;129(4):327-35.  Roverto BRYAN, Goldberg A. Hypopharyngeal Surgery in Obstructive Sleep Apnea: An Evidence-Based Medicine Review. Arch Otolaryngol Head Neck Surg. 2006 Feb;132(2):206-13.  Sekou AMATO et al. Upper-Airway Stimulation for Obstructive Sleep Apnea.  N Engl J Med. 2014 Jan 9;370(2):139-49.  Conchita Y et al. Increased Incidence of Cardiovascular Disease in Middle-aged Men with Obstructive Sleep Apnea. Am J Respir Crit Care Med; 2002 166: 159-165  Anneliese FIELDS et al. Studying Life Effects and Effectiveness of  Palatopharyngoplasty (SLEEP) study: Subjective Outcomes of Isolated Uvulopalatopharyngoplasty. Otolaryngol Head Neck Surg. 2011; 144: 623-631.        WHAT IF I ONLY HAVE SNORING?    Mandibular advancement devices, lateral sleep positioning, long-term weight loss and treatment of nasal allergies have been shown to improve snoring.  Exercising tongue muscles with a game (https://apps."Tunespotter[employer] Inc."/us/trip/MacuLogix-reduce-snoring/os9649574588) or stimulating the tongue during the day with a device (https://doi.org/10.3390/ivk64680338) have improved snoring in some individuals.  https://www.Shelfari.Stylenda/  https://www.sleepfoundation.org/best-anti-snoring-mouthpieces-and-mouthguards    Remember to Drive Safe... Drive Alive     Sleep health profoundly affects your health, mood, and your safety.  Thirty three percent of the population (one in three of us) is not getting enough sleep and many have a sleep disorder. Not getting enough sleep or having an untreated / undertreated sleep condition may make us sleepy without even knowing it. In fact, our driving could be dramatically impaired due to our sleep health. As your provider, here are some things I would like you to know about driving:     Here are some warning signs for impairment and dangerous drowsy driving:              -Having been awake more than 16 hours               -Looking tired               -Eyelid drooping              -Head nodding (it could be too late at this point)              -Driving for more than 30 minutes     Some things you could do to make the driving safer if you are experiencing some drowsiness:              -Stop driving and rest              -Call for transportation              -Make sure your sleep disorder is adequately treated     Some things that have been shown NOT to work when experiencing drowsiness while driving:              -Turning on the radio              -Opening windows              -Eating any  distracting  /  entertaining   foods (e.g., sunflower seeds, candy, or any other)              -Talking on the phone      Your decision may not only impact your life, but also the life of others. Please, remember to drive safe for yourself and all of us.  Below is a list of dental appliance resources recommended by [facility]   If you wish to choose your own dental sleep dentist, you may identify a provider close to you: http://[email or web address]/[email or web address]  [facility]  Abdirizak Mckeon DDS, MS   [facility]  [address] [place] [place] [address] [address]  [place] [address]   Appointments: [phone]  Fax: [phone]   Email: [email or web address]   [facility]   Niraj Williamson, VALARIE Torrez DDS   [address]   [place] [address]   Appointments: [phone]   Website: [email or web address]    [facility]   [facility]   JOI Queen DDS  [address]   [place] [address]   Appointments: [phone]   Fax: [phone]   Email: [email or web address]  Website: [email or web address]     [facility]   Office 1   Mars Vaughan DDS - [DME Medicare]  [address], [address]   [place] [address]  Appointments: [phone]  Fax: [phone]  Website: [email or web address]    [facility]   Office 2  Mars Vaughan DDS - [DME Medicare]  [address]  [place] [address]  Appointments: [phone]  Fax: [phone]  Website: [email or web address]    [facility]  Aly Hay DDS  [address] [place] [address]  Appointments: [phone]    [facility]   [place] Office   Ashok Torrez DDS   [employer]   [address], [address]   [place] [address]   Appointments:  [phone]   Fax: [phone]   Website: [employer]     [facility]   [place] Office   Archie Victor DDS, MS - [DME Medicare]  [facility]   [address], [address]   [place] [address]   Appointments: [phone]   Fax: [phone]   Website: [employer]     [facility]  Jonathan Kelton, DMD, MSD - [DME Medicare]  [address], [address]  [place] [address]  Appointments [phone]  Fax: [phone]  Website: [email or web address]    [facility]   [place] Office   Amee Arredondo DDS, PhD, [address]   [address], [address]   [place] [address]   Appointments: [phone]   Fax: [phone]   Website: [email or web address]     [facility]   [place] Office   Amee Arredondo DDS, PhD, MS   [facility]   [address]   [place] [address]   Appointments: [phone]   Fax: [phone]   Website: [email or web address]     [facility]  Lois Lopez DDS  [facility]  [address] [place] [address]  Appointments: [phone]   Fax: [phone]

## 2025-05-28 SDOH — HEALTH STABILITY: PHYSICAL HEALTH: ON AVERAGE, HOW MANY DAYS PER WEEK DO YOU ENGAGE IN MODERATE TO STRENUOUS EXERCISE (LIKE A BRISK WALK)?: 2 DAYS

## 2025-05-28 SDOH — HEALTH STABILITY: PHYSICAL HEALTH: ON AVERAGE, HOW MANY MINUTES DO YOU ENGAGE IN EXERCISE AT THIS LEVEL?: 10 MIN

## 2025-05-28 ASSESSMENT — SOCIAL DETERMINANTS OF HEALTH (SDOH): HOW OFTEN DO YOU GET TOGETHER WITH FRIENDS OR RELATIVES?: ONCE A WEEK

## 2025-06-01 ASSESSMENT — PATIENT HEALTH QUESTIONNAIRE - PHQ9
SUM OF ALL RESPONSES TO PHQ QUESTIONS 1-9: 6
SUM OF ALL RESPONSES TO PHQ QUESTIONS 1-9: 6
10. IF YOU CHECKED OFF ANY PROBLEMS, HOW DIFFICULT HAVE THESE PROBLEMS MADE IT FOR YOU TO DO YOUR WORK, TAKE CARE OF THINGS AT HOME, OR GET ALONG WITH OTHER PEOPLE: SOMEWHAT DIFFICULT

## 2025-06-02 ENCOUNTER — OFFICE VISIT (OUTPATIENT)
Dept: FAMILY MEDICINE | Facility: CLINIC | Age: 55
End: 2025-06-02
Attending: FAMILY MEDICINE
Payer: COMMERCIAL

## 2025-06-02 VITALS
BODY MASS INDEX: 27.43 KG/M2 | WEIGHT: 139.7 LBS | HEART RATE: 70 BPM | SYSTOLIC BLOOD PRESSURE: 112 MMHG | TEMPERATURE: 97 F | DIASTOLIC BLOOD PRESSURE: 74 MMHG | OXYGEN SATURATION: 100 % | HEIGHT: 60 IN | RESPIRATION RATE: 16 BRPM

## 2025-06-02 DIAGNOSIS — Z12.31 ENCOUNTER FOR SCREENING MAMMOGRAM FOR BREAST CANCER: ICD-10-CM

## 2025-06-02 DIAGNOSIS — E78.5 HYPERLIPIDEMIA LDL GOAL <160: ICD-10-CM

## 2025-06-02 DIAGNOSIS — Z23 ENCOUNTER FOR VACCINATION: ICD-10-CM

## 2025-06-02 DIAGNOSIS — E67.3 HYPERVITAMINOSIS D: ICD-10-CM

## 2025-06-02 DIAGNOSIS — Z00.00 ROUTINE GENERAL MEDICAL EXAMINATION AT A HEALTH CARE FACILITY: Primary | ICD-10-CM

## 2025-06-02 LAB
CHOLEST SERPL-MCNC: 250 MG/DL
FASTING STATUS PATIENT QL REPORTED: NO
HDLC SERPL-MCNC: 84 MG/DL
LDLC SERPL CALC-MCNC: 150 MG/DL
NONHDLC SERPL-MCNC: 166 MG/DL
TRIGL SERPL-MCNC: 79 MG/DL
VIT D+METAB SERPL-MCNC: 60 NG/ML (ref 20–50)

## 2025-06-02 PROCEDURE — 99396 PREV VISIT EST AGE 40-64: CPT | Mod: 25 | Performed by: FAMILY MEDICINE

## 2025-06-02 PROCEDURE — 90677 PCV20 VACCINE IM: CPT | Performed by: FAMILY MEDICINE

## 2025-06-02 PROCEDURE — 90471 IMMUNIZATION ADMIN: CPT | Performed by: FAMILY MEDICINE

## 2025-06-02 PROCEDURE — 82306 VITAMIN D 25 HYDROXY: CPT | Performed by: FAMILY MEDICINE

## 2025-06-02 PROCEDURE — 36415 COLL VENOUS BLD VENIPUNCTURE: CPT | Performed by: FAMILY MEDICINE

## 2025-06-02 PROCEDURE — 80061 LIPID PANEL: CPT | Performed by: FAMILY MEDICINE

## 2025-06-02 ASSESSMENT — PAIN SCALES - GENERAL: PAINLEVEL_OUTOF10: NO PAIN (0)

## 2025-06-02 NOTE — PATIENT INSTRUCTIONS
Patient Education   Preventive Care Advice   This is general advice given by our system to help you stay healthy. However, your care team may have specific advice just for you. Please talk to your care team about your preventive care needs.  Nutrition  Eat 5 or more servings of fruits and vegetables each day.  Try wheat bread, brown rice and whole grain pasta (instead of white bread, rice, and pasta).  Get enough calcium and vitamin D. Check the label on foods and aim for 100% of the RDA (recommended daily allowance).  Lifestyle  Exercise at least 150 minutes each week  (30 minutes a day, 5 days a week).  Do muscle strengthening activities 2 days a week. These help control your weight and prevent disease.  No smoking.  Wear sunscreen to prevent skin cancer.  Have a dental exam and cleaning every 6 months.  Yearly exams  See your health care team every year to talk about:  Any changes in your health.  Any medicines your care team has prescribed.  Preventive care, family planning, and ways to prevent chronic diseases.  Shots (vaccines)   HPV shots (up to age 26), if you've never had them before.  Hepatitis B shots (up to age 59), if you've never had them before.  COVID-19 shot: Get this shot when it's due.  Flu shot: Get a flu shot every year.  Tetanus shot: Get a tetanus shot every 10 years.  Pneumococcal, hepatitis A, and RSV shots: Ask your care team if you need these based on your risk.  Shingles shot (for age 50 and up)  General health tests  Diabetes screening:  Starting at age 35, Get screened for diabetes at least every 3 years.  If you are younger than age 35, ask your care team if you should be screened for diabetes.  Cholesterol test: At age 39, start having a cholesterol test every 5 years, or more often if advised.  Bone density scan (DEXA): At age 50, ask your care team if you should have this scan for osteoporosis (brittle bones).  Hepatitis C: Get tested at least once in your life.  STIs (sexually  transmitted infections)  Before age 24: Ask your care team if you should be screened for STIs.  After age 24: Get screened for STIs if you're at risk. You are at risk for STIs (including HIV) if:  You are sexually active with more than one person.  You don't use condoms every time.  You or a partner was diagnosed with a sexually transmitted infection.  If you are at risk for HIV, ask about PrEP medicine to prevent HIV.  Get tested for HIV at least once in your life, whether you are at risk for HIV or not.  Cancer screening tests  Cervical cancer screening: If you have a cervix, begin getting regular cervical cancer screening tests starting at age 21.  Breast cancer scan (mammogram): If you've ever had breasts, begin having regular mammograms starting at age 40. This is a scan to check for breast cancer.  Colon cancer screening: It is important to start screening for colon cancer at age 45.  Have a colonoscopy test every 10 years (or more often if you're at risk) Or, ask your provider about stool tests like a FIT test every year or Cologuard test every 3 years.  To learn more about your testing options, visit:   .  For help making a decision, visit:   https://bit.ly/bs31904.  Prostate cancer screening test: If you have a prostate, ask your care team if a prostate cancer screening test (PSA) at age 55 is right for you.  Lung cancer screening: If you are a current or former smoker ages 50 to 80, ask your care team if ongoing lung cancer screenings are right for you.  For informational purposes only. Not to replace the advice of your health care provider. Copyright   2023 [facility]. All rights reserved. Clinically reviewed by the [facility]. [employer] [id number] - REV 01/24.  Learning About Stress  What is stress?     Stress is your body's response to a hard situation. Your body can have a physical, emotional, or mental response. Stress is a fact of life for most people, and it  affects everyone differently. What causes stress for you may not be stressful for someone else.  A lot of things can cause stress. You may feel stress when you go on a job interview, take a test, or run a race. This kind of short-term stress is normal and even useful. It can help you if you need to work hard or react quickly. For example, stress can help you finish an important job on time.  Long-term stress is caused by ongoing stressful situations or events. Examples of long-term stress include long-term health problems, ongoing problems at work, or conflicts in your family. Long-term stress can harm your health.  How does stress affect your health?  When you are stressed, your body responds as though you are in danger. It makes hormones that speed up your heart, make you breathe faster, and give you a burst of energy. This is called the fight-or-flight stress response. If the stress is over quickly, your body goes back to normal and no harm is done.  But if stress happens too often or lasts too long, it can have bad effects. Long-term stress can make you more likely to get sick, and it can make symptoms of some diseases worse. If you tense up when you are stressed, you may develop neck, shoulder, or low back pain. Stress is linked to high blood pressure and heart disease.  Stress also harms your emotional health. It can make you patterson, tense, or depressed. Your relationships may suffer, and you may not do well at work or school.  What can you do to manage stress?  You can try these things to help manage stress:   Do something active. Exercise or activity can help reduce stress. Walking is a great way to get started. Even everyday activities such as housecleaning or yard work can help.  Try yoga or ana paula chi. These techniques combine exercise and meditation. You may need some training at first to learn them.  Do something you enjoy. For example, listen to music or go to a movie. Practice your hobby or do volunteer  "work.  Meditate. This can help you relax, because you are not worrying about what happened before or what may happen in the future.  Do guided imagery. Imagine yourself in any setting that helps you feel calm. You can use online videos, books, or a teacher to guide you.  Do breathing exercises. For example:  From a standing position, bend forward from the waist with your knees slightly bent. Let your arms dangle close to the floor.  Breathe in slowly and deeply as you return to a standing position. Roll up slowly and lift your head last.  Hold your breath for just a few seconds in the standing position.  Breathe out slowly and bend forward from the waist.  Let your feelings out. Talk, laugh, cry, and express anger when you need to. Talking with supportive friends or family, a counselor, or a hu leader about your feelings is a healthy way to relieve stress. Avoid discussing your feelings with people who make you feel worse.  Write. It may help to write about things that are bothering you. This helps you find out how much stress you feel and what is causing it. When you know this, you can find better ways to cope.  What can you do to prevent stress?  You might try some of these things to help prevent stress:  Manage your time. This helps you find time to do the things you want and need to do.  Get enough sleep. Your body recovers from the stresses of the day while you are sleeping.  Get support. Your family, friends, and community can make a difference in how you experience stress.  Limit your news feed. Avoid or limit time on social media or news that may make you feel stressed.  Do something active. Exercise or activity can help reduce stress. Walking is a great way to get started.  Where can you learn more?  Go to https://www.[employer].net/patiented  Enter N032 in the search box to learn more about \"Learning About Stress.\"  Current as of: October 24, 2024  Content Version: 14.4 2024-2025 [employer] " [employer].   Care instructions adapted under license by your healthcare professional. If you have questions about a medical condition or this instruction, always ask your healthcare professional. [employer] disclaims any warranty or liability for your use of this information.    Learning About Depression Screening  What is depression screening?  Depression screening is a way to see if you have depression symptoms. It may be done by a doctor or counselor. It's often part of a routine checkup. That's because your mental health is just as important as your physical health.  Depression is a mental health condition that affects how you feel, think, and act. You may:  Have less energy.  Lose interest in your daily activities.  Feel sad and grouchy for a long time.  Depression is very common. It affects people of all ages.  Many things can lead to depression. Some people become depressed after they have a stroke or find out they have a major illness like cancer or heart disease. The death of a loved one or a breakup may lead to depression. It can run in families. Most experts believe that a combination of inherited genes and stressful life events can cause it.  What happens during screening?  You may be asked to fill out a form about your depression symptoms. You and the doctor will discuss your answers. The doctor may ask you more questions to learn more about how you think, act, and feel.  What happens after screening?  If you have symptoms of depression, your doctor will talk to you about your options.  Doctors usually treat depression with medicines or counseling. Often, combining the two works best. Many people don't get help because they think that they'll get over the depression on their own. But people with depression may not get better unless they get treatment.  The cause of depression is not well understood. There may be many factors involved. But if you have depression, it's not your fault.  A serious  "symptom of depression is thinking about death or suicide. If you or someone you care about talks about this or about feeling hopeless, get help right away.  It's important to know that depression can be treated. Medicine, counseling, and self-care may help.  Where can you learn more?  Go to https://www.[employer]juk.net/patiented  Enter T185 in the search box to learn more about \"Learning About Depression Screening.\"  Current as of: July 31, 2024  Content Version: 14.4    7093-6555 [employer].   Care instructions adapted under license by your healthcare professional. If you have questions about a medical condition or this instruction, always ask your healthcare professional. [employer] disclaims any warranty or liability for your use of this information.       "

## 2025-06-02 NOTE — PROGRESS NOTES
"Preventive Care Visit  [facility]  Srinivasa Sofia DO, Family Medicine  Jun 2, 2025      Assessment & Plan     Routine general medical examination at a health care facility    Encounter for vaccination  - Pneumococcal 20 Valent Conjugate (PCV20)    Encounter for screening mammogram for breast cancer  - MA Screening Bilateral w/ Daniel    Hypervitaminosis D  -Decreased vitamin D intake last year, due for recheck  - Vitamin D Deficiency    Hyperlipidemia LDL goal <160  -Due for lab monitoring  - Lipid panel reflex to direct LDL Non-fasting              BMI  Estimated body mass index is 27.74 kg/m  as calculated from the following:    Height as of this encounter: 1.511 m (4' 11.5\").    Weight as of this encounter: 63.4 kg (139 lb 11.2 oz).   Weight management plan: Discussed healthy diet and exercise guidelines    Counseling  Appropriate preventive services were addressed with this patient via screening, questionnaire, or discussion as appropriate for fall prevention, nutrition, physical activity, Tobacco-use cessation, social engagement, weight loss and cognition.  Checklist reviewing preventive services available has been given to the patient.  Reviewed patient's diet, addressing concerns and/or questions.   She is at risk for lack of exercise and has been provided with information to increase physical activity for the benefit of her well-being.   She is at risk for psychosocial distress and has been provided with information to reduce risk.   The patient's PHQ-9 score is consistent with mild depression. She was provided with information regarding depression.           Johnie Jordan is a 55 year old, presenting for the following:  Physical        6/2/2025     1:19 PM   Additional Questions   Roomed by Carisa VIZCAINO    Yoga once a week, a few walks a week.     Mood is decent, sees a therapist. Fluoxetine working.          Advance Care Planning    Patient states has Health Care " Directive and will send to [employer].        5/28/2025   General Health   How would you rate your overall physical health? Good   Feel stress (tense, anxious, or unable to sleep) Rather much   (!) STRESS CONCERN      5/28/2025   Nutrition   Three or more servings of calcium each day? Yes   Diet: Regular (no restrictions)   How many servings of fruit and vegetables per day? (!) 2-3   How many sweetened beverages each day? 0-1         5/28/2025   Exercise   Days per week of moderate/strenous exercise 2 days   Average minutes spent exercising at this level 10 min   (!) EXERCISE CONCERN      5/28/2025   Social Factors   Frequency of gathering with friends or relatives Once a week   Worry food won't last until get money to buy more No   Food not last or not have enough money for food? No   Do you have housing? (Housing is defined as stable permanent housing and does not include staying outside in a car, in a tent, in an abandoned building, in an overnight shelter, or couch-surfing.) Yes   Are you worried about losing your housing? No   Lack of transportation? No   Unable to get utilities (heat,electricity)? No         5/28/2025   Fall Risk   Fallen 2 or more times in the past year? No   Trouble with walking or balance? No          5/28/2025   Dental   Dentist two times every year? Yes       Today's PHQ-9 Score:       6/1/2025     7:47 PM   PHQ-9 SCORE   PHQ-9 Total Score MyChart 6 (Mild depression)   PHQ-9 Total Score 6        Patient-reported         5/28/2025   Substance Use   Alcohol more than 3/day or more than 7/wk No   Do you use any other substances recreationally? No     Social History     Tobacco Use    Smoking status: Never     Passive exposure: Never    Smokeless tobacco: Never   Vaping Use    Vaping status: Never Used   Substance Use Topics    Alcohol use: Yes     Comment: occasionally    Drug use: No           8/14/2023   LAST FHS-7 RESULTS   1st degree relative breast or ovarian cancer No   Any  "relative bilateral breast cancer No   Any male have breast cancer No   Any ONE woman have BOTH breast AND ovarian cancer No   Any woman with breast cancer before 50yrs Yes   2 or more relatives with breast AND/OR ovarian cancer No   2 or more relatives with breast AND/OR bowel cancer Yes                5/28/2025   STI Screening   New sexual partner(s) since last STI/HIV test? No     History of abnormal Pap smear: YES - reflected in Problem List and Health Maintenance accordingly        Latest Ref Rng & Units 3/12/2024     8:12 AM 2/28/2023    11:22 AM 11/22/2019     1:19 PM   PAP / HPV   PAP  Negative for Intraepithelial Lesion or Malignancy (NILM)  Atypical squamous cells of undetermined significance (ASC-US)     PAP (Historical)    ASC-US    HPV 16 DNA Negative Negative  Negative     HPV 18 DNA Negative Negative  Negative     Other HR HPV Negative Negative  Negative       ASCVD Risk   The 10-year ASCVD risk score (Padmini BRIDGES, et al., 2019) is: 1.6%    Values used to calculate the score:      Age: 55 years      Sex: Female      Is Non- : No      Diabetic: No      Tobacco smoker: No      Systolic Blood Pressure: 112 mmHg      Is BP treated: No      HDL Cholesterol: 85 mg/dL      Total Cholesterol: 257 mg/dL           Reviewed and updated as needed this visit by Provider   Tobacco       Fam Hx  Soc Hx Sexual Activity                   Objective    Exam  /74 (BP Location: Right arm, Patient Position: Sitting, Cuff Size: Adult Regular)   Pulse 70   Temp 97  F (36.1  C) (Temporal)   Resp 16   Ht 1.511 m (4' 11.5\")   Wt 63.4 kg (139 lb 11.2 oz)   SpO2 100%   BMI 27.74 kg/m     Estimated body mass index is 27.74 kg/m  as calculated from the following:    Height as of this encounter: 1.511 m (4' 11.5\").    Weight as of this encounter: 63.4 kg (139 lb 11.2 oz).    Physical Exam  GENERAL: alert and no distress  EYES: Eyes grossly normal to inspection, PERRL and conjunctivae and " sclerae normal  HENT: nose and mouth without ulcers or lesions  NECK: no adenopathy, no asymmetry, masses, or scars  RESP: lungs clear to auscultation - no rales, rhonchi or wheezes  CV: regular rate and rhythm, normal S1 S2, no S3 or S4, no murmur, click or rub, no peripheral edema  ABDOMEN: soft, nontender, no hepatosplenomegaly, no masses and bowel sounds normal  MS: no gross musculoskeletal defects noted, no edema  SKIN: no suspicious lesions or rashes  NEURO: Normal strength and tone, mentation intact and speech normal  PSYCH: mentation appears normal, affect normal/bright        Signed Electronically by: Srinivasa Sofia, DO

## 2025-06-04 ENCOUNTER — RESULTS FOLLOW-UP (OUTPATIENT)
Dept: FAMILY MEDICINE | Facility: CLINIC | Age: 55
End: 2025-06-04

## 2025-06-10 ENCOUNTER — OFFICE VISIT (OUTPATIENT)
Dept: NEUROLOGY | Facility: CLINIC | Age: 55
End: 2025-06-10
Payer: COMMERCIAL

## 2025-06-10 VITALS
HEART RATE: 72 BPM | HEIGHT: 60 IN | WEIGHT: 142.6 LBS | BODY MASS INDEX: 28 KG/M2 | DIASTOLIC BLOOD PRESSURE: 73 MMHG | SYSTOLIC BLOOD PRESSURE: 108 MMHG

## 2025-06-10 DIAGNOSIS — R25.3 TWITCHING: ICD-10-CM

## 2025-06-10 DIAGNOSIS — G47.30 SLEEP APNEA, UNSPECIFIED TYPE: ICD-10-CM

## 2025-06-10 DIAGNOSIS — R20.0 NUMBNESS: ICD-10-CM

## 2025-06-10 DIAGNOSIS — G43.719 INTRACTABLE CHRONIC MIGRAINE WITHOUT AURA AND WITHOUT STATUS MIGRAINOSUS: Primary | ICD-10-CM

## 2025-06-10 PROCEDURE — 64615 CHEMODENERV MUSC MIGRAINE: CPT | Performed by: PSYCHIATRY & NEUROLOGY

## 2025-06-10 PROCEDURE — 99214 OFFICE O/P EST MOD 30 MIN: CPT | Mod: 25 | Performed by: PSYCHIATRY & NEUROLOGY

## 2025-06-10 NOTE — LETTER
6/10/2025      Nikki Hardin  [address] [facility] [place] [address]      Dear Colleague,    Thank you for referring your patient, Nikki Hardin, to the [facility] [place]. Please see a copy of my visit note below.    NEUROLOGY OUTPATIENT PROGRESS NOTE   Alfredito 10, 2025     CHIEF COMPLAINT/REASON FOR VISIT/REASON FOR CONSULT  Patient presents with:  Als  Botox Migraine: Pt states she's getting less migraines. However, when she does get migraines it last longer. Pt will like to discus new symptoms-upper left cheek and left bicep is twitching. There's also numbness and tingling on the left hand.     REASON FOR CONSULTATION- Headaches    REFERRAL SOURCE  Dr. Srinivasa Sofia  CC Dr. Srinivasa Sofia    HISTORY OF PRESENT ILLNESS  Nikki Hardin is a 55 year old female seen today for evaluation of headaches. She reports that she has a diagnosis of migraines since age 14. Previously her headaches were sporadic but still frequent. She estimates that she was having at least ~15 headaches a month. Over the last 2 to 3 months headaches have become much more frequent. She is having a headache every day. Reports no real reasons why the headaches might have gotten worse.    Headaches are generally in the temple on either side and then radiate to the neck. Occasionally she can have more frontal pressure. Takes a throbbing. There is some photophobia and phonophobia. She has never had any auras with the headaches. Reports triggers including working and doing remote work from home, inconsistent sleep, lack of regular meals, going out in the sun sometimes storms. She does have some neck pain though neck pain is worse at the time of headaches.    She is tried Topamax in the past which caused severe side effects. She is also tried propanolol without any benefit. Over-the-counter medications do not help. 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 She has been using sumatriptan injections and tablets  for several years which have been working for her. Has tried Maxalt with benefit though insurance would not cover it anymore and then she had to stop it. The sumatriptan works better than the Maxalt.    4/6/22  Patient returns today.  She did try the nortriptyline and that has helped with the headaches.  Currently is having 7 headaches a month.  Reports no changes in the nature of the headaches.  Imitrex still works with the abortive therapy.  She does complain of some somnolence in the morning with the nortriptyline.  Is taking 30 mg.  Tries to take it earlier in the evening.  Things are may be slightly turning the corner at this point though she is not sure if this is a good long-term medication or not.  Is interested in Botox.    10/31/22  Patient returns today.  Brought in the headache log today which I reviewed.  She is still having 10-12 headaches every month.  Previously these were at 15 headaches a month.  Headaches have been minimally improved with the nortriptyline.  Reports side effects of somnolence with the nortriptyline.  Is taking 30 mg.  Wants to get off the medication.  Imitrex is working for abortive therapy.  The Imitrex injections were given to her by the pharmacy that she wants the pen which I prescribed today.  Denies any other new symptoms.  No other triggers for her headaches.  Wants to proceed with the Botox since most of her headaches are coming from the shoulder tension.    11/30/22  Patient returns today.  Headaches are about the same as before.  She had called the clinic for an Imitrex prescription which is working for her.  Reports of a new problem ongoing for the last 1 month.  She does have some twitching of her left eye.  This can happen anytime.  There is no clear triggers that she is identified.  Drinks about 2 cups of coffee.  Drinks plenty of water.  No new medications.  The twitching is not severe enough that it causes the eye to close.  Is limited to the left eye  only.    2/7/23  Patient returns today.  Headaches have significantly improved with the Botox.  She reports 50% reduction in frequency.  She is having 9 headaches a month.  Also reports that the headaches are less severe and not lasting that long.  They are shorter in intensity.  They still would last for couple of days.  Sumatriptan is still effective.    Left eye twitching has not happened since the Botox.  She did go on a vacation and the stress is less at this point.    She further reports some cosmetic changes in the frontal region with the Botox and wants to avoid those in the future.  She wants to do a lower dose of Botox in the frontal region.    8/15/23  Patient returns today.  1.  In the last 3 months since her Botox she has had 2 headaches in the month of June 9 in the month of July and 7 in the month of August.  Her headaches are about the same as before though might have been worse because of heat and irregular sleep schedule.  She starts work again tomorrow.  2.  No further eye twitching with the Botox  3.  We are injecting less Botox in the frontal region and that has not made the headaches any severe  4.  Imitrex still works as abortive therapy.  5.  Nortriptyline is making her more sleepy though wants to try a higher dose to see if the headaches can get better.  6.  Does complain of stiff neck when she works on the computer which could be leading to the headaches.    2/13/24  Patient returns today  1.  She is getting good benefit with the Botox though does have a few more headaches in the first month and the first half of the second month.  Second and third month do better.  We discussed that this is most likely related to other triggers and the Botox is working well.  2.  She is concerned about daytime fatigue with the nortriptyline and wants to cut down the dose.  It is providing some benefit with sleep  3.  Imitrex is working with abortive therapy.  Insurance is not approving both the oral and  injections.  4.  Neck pain is better with the Botox.  Still has a lot of tension when she is working at the computer.  5.  No longer is having eye twitching.  No other new concerns.    9/17/24  Patient events.  1.  Continues to have 7-8 headaches a month.  There is no clear major triggers that she is identified.  Could be related to the summer season.  Headaches are generally worse in the summers.  2.  Remains on the nortriptyline which she feels is helping.  Has not been using the tizanidine.  Has not needed it.  3.  Imitrex does work for abortive therapy.  Occasionally headaches are not severe enough that she would need abortive therapy  No other new concerns.  No side effects to the Botox.      3/17/25  Patient returns today  1.  Continues to have 7-8 headaches a month.  Headaches have become slightly more severe compared to before.  Had to go to the urgent care for Toradol injection with one of the headaches.  Sumatriptan has also become less effective.  2.  She has noticed that she does not get good sleep and sometimes that can lead to headaches.  Does occasionally get up in the middle of the night gasping for air.  She is scheduled in May to be evaluated for sleep apnea.  This could be causing her headaches.  3.  Tizanidine does help.  Is not frequently using it.  4.  Wants to continue the same Botox paradigm  No other new concerns.    6/10/25  Patient returns today  1.  Slightly better compared to before though she continues to have more severe headaches that have been more refractory to the sumatriptan.  She has tried to use the Toradol which has not been effective.  She does have some stress going on with her son's mental illness.  He is having a manic phase to his illness  2.  She further reports that she did see the sleep provider though schedule in 3 months to get the CPAP.  She has not been able to sleep well which could be making the headaches worse.  Does not want to try more medications right now  till she can further evaluate her sleep  3.  She does complain of new onset of twitching in her face as well as her left biceps.  She also has numbness in the left hand and all 5 digits mainly on the palmar side.  She is concerned about ALS and we discussed that numbness is not a sign of ALS.  It was decided to monitor for right now.  She will try doing drink more water.  No new concerns.    Previous history is reviewed and this is unchanged.    PAST MEDICAL/SURGICAL HISTORY  Past Medical History:   Diagnosis Date     Abnormal Pap smear of cervix 11/22/2019    See problem list.      Anxiety      Arthritis Couple yrs ago?    Had surgery on one of fingers last may     Colitis     lymphocytic colitis ?     Depressive disorder Since young adult    On fluoxetine and attend therapy periodically     Depressive disorder, not elsewhere classified     sees psych- Dr Blue     Migraine, unspecified, without mention of intractable migraine without mention of status migrainosus age 14 yrs     Other acne      PAIN IN THORACIC SPINE [724.1] 04/13/2006    chiropractic care     Raynaud disease     ? connective tissue dz.  ? sogren's     Patient Active Problem List   Diagnosis     Irritable bowel syndrome     Other acne     Migraine with aura and without status migrainosus, not intractable     Mild major depression (H)     Sedimentation rate elevation     Elevated vitamin B12 level     Anemia, unspecified type     Hypervitaminosis B6     Lymphocytic colitis     ASCUS of cervix with negative high risk HPV     Sjogren's syndrome with inflammatory arthritis     Post-menopausal   Significant for migraines, arthritis, depression, microcolitis    FAMILY HISTORY  Family History   Problem Relation Age of Onset     Neurologic Disorder Mother         MIGRIANES     Osteoporosis Mother      C.A.D. Father         in his 60's     Depression Father      Heart Disease Father      Lipids Father      Hypertension Father      Arthritis Father       Sleep Disorder Father      Cerebrovascular Disease Father 78     Coronary Artery Disease Father      Hyperlipidemia Father      Cancer - colorectal Paternal Grandmother      Colon Cancer Paternal Grandmother      Cancer Maternal Grandfather         stomach     Heart Disease Maternal Grandfather      Other Cancer Maternal Grandfather      Depression Brother      Substance Abuse Brother      Depression Brother      Asthma Daughter      Anxiety Disorder Daughter      Substance Abuse Daughter      Heart Disease Paternal Grandfather      Breast Cancer Maternal Aunt      Breast Cancer Other         Maternal aunt     Breast Cancer Other      Depression Brother      Substance Abuse Brother      Asthma Daughter      Breast Cancer Other      Depression Brother      Depression Son      Mental Illness Daughter         Bipolar disorder--is managed     Diabetes No family hx of    Positive for migraines in her mother and aunt.    SOCIAL HISTORY  Social History     Tobacco Use     Smoking status: Never     Passive exposure: Never     Smokeless tobacco: Never   Vaping Use     Vaping status: Never Used   Substance Use Topics     Alcohol use: Yes     Comment: occasionally     Drug use: No       SYSTEMS REVIEW  Twelve-system ROS was done and other than the HPI this was negative except for neck pain, dizziness, hearing loss, sleepiness during the day, headaches, depression, bowel problems.  No new symptoms/issues.    MEDICATIONS  Current Outpatient Medications   Medication Sig Dispense Refill     acetaminophen (TYLENOL) 325 MG tablet Take 2 tablets by mouth every 4 hours as needed for pain. 100 tablet 0     Biotin 5000 MCG TABS Take 1,000 mcg by mouth daily       cholecalciferol 5000 UNITS CAPS Take 1 capsule (5,000 Units) by mouth daily FOR VITAMIN D DEFICIENCY (LOW VITAMIN D) 100 capsule 3     Ferrous Sulfate (IRON) 28 MG TABS Take by mouth daily.       FLUoxetine (PROZAC) 40 MG capsule Take 1 capsule (40 mg) by mouth daily. 90  "capsule 3     IBUPROFEN PO Take 200 mg by mouth Takes 3 tablets PRN       ketorolac (TORADOL) 10 MG tablet Take 1 tablet (10 mg) by mouth every 6 hours as needed for moderate pain. Max of 3 tabs/week. 20 tablet 1     loperamide (IMODIUM A-D) 2 MG tablet Take 2 mg by mouth daily as needed for diarrhea       nortriptyline (PAMELOR) 10 MG capsule Take 3 caps/night. 270 capsule 1     Riboflavin 100 MG TABS Take 400 mg by mouth daily.       SUMAtriptan (IMITREX STATDOSE) 6 MG/0.5ML pen injector kit INJECT 0.5 ML(6MG) UNDER THE SKIN AT ONSET OF HEADACHE FOR MIGRAINE. MAY REPEAT IN 1 HOUR. MAX 12 MG IN 24 HOURS 12 mL 3     SUMAtriptan (IMITREX) 100 MG tablet TAKE 1 TABLET(100 MG) BY MOUTH AT ONSET OF HEADACHE FOR MIGRAINE. MAY REPEAT IN 2 HOURS. MAX 4 TABLETS/ 24 HOURS STRENGTH: 100 MG 18 tablet 11     tiZANidine (ZANAFLEX) 2 MG tablet Take 1 tablet (2 mg) by mouth 3 times daily as needed for muscle spasms. 180 tablet 0     Current Facility-Administered Medications   Medication Dose Route Frequency Provider Last Rate Last Admin     Botulinum Toxin Type A (BOTOX) 200 units injection 155 Units  155 Units Intramuscular Once           PHYSICAL EXAMINATION  VITALS: /73 (BP Location: Left arm, Patient Position: Sitting)   Pulse 72   Ht 1.511 m (4' 11.5\")   Wt 64.7 kg (142 lb 9.6 oz)   BMI 28.32 kg/m    GENERAL: Healthy appearing, alert, no acute distress, normal habitus.  CARDIOVASCULAR: Extremities warm and well perfused. Pulses present.   NEUROLOGICAL:  Patient is awake and oriented to self, place and time.  Attention span is normal.  Memory is grossly intact.  Language is fluent and follows commands appropriately.  Appropriate fund of knowledge. Cranial nerves 2-12 are intact. There is no pronator drift.  Motor exam shows 5/5 strength in all extremities.  Tone is symmetric bilaterally in upper and lower extremities.  (Previously reflexes are symmetric and 2+ in upper extremities and lower extremities. Sensory exam " is grossly intact to light touch, pin prick and vibration.) Finger to nose and heel to shin is without dysmetria.  Romberg is negative.  Gait is normal and the patient is able to do tandem walk and walk on toes and heels.  Exam stable.    DIAGNOSTICS  CARDIOLOGY  EKG 2008  NS[facility]    RELEVANT LABS  Component      Latest Ref Rng & Units 12/22/2020   WBC      4.0 - 11.0 10e9/L 6.7   RBC Count      3.8 - 5.2 10e12/L 3.93   Hemoglobin      11.7 - 15.7 g/dL 11.4 (L)   Hematocrit      35.0 - 47.0 % 35.2   MCV      78 - 100 fl 90   MCH      26.5 - 33.0 pg 29.0   MCHC      31.5 - 36.5 g/dL 32.4   RDW      10.0 - 15.0 % 13.2   Platelet Count      150 - 450 10e9/L 297   % Neutrophils      % 70.5   % Lymphocytes      % 20.4   % Monocytes      % 6.6   % Eosinophils      % 2.1   % Basophils      % 0.4   Absolute Neutrophil      1.6 - 8.3 10e9/L 4.7   Absolute Lymphocytes      0.8 - 5.3 10e9/L 1.4   Absolute Monocytes      0.0 - 1.3 10e9/L 0.4   Absolute Eosinophils      0.0 - 0.7 10e9/L 0.1   Absolute Basophils      0.0 - 0.2 10e9/L 0.0   Diff Method       Automated Method   Sodium      133 - 144 mmol/L 138   Potassium      3.4 - 5.3 mmol/L 4.5   Chloride      94 - 109 mmol/L 107   Carbon Dioxide      20 - 32 mmol/L 27   Anion Gap      3 - 14 mmol/L 4   Glucose      70 - 99 mg/dL 79   Urea Nitrogen      7 - 30 mg/dL 12   Creatinine      0.52 - 1.04 mg/dL 0.78   GFR Estimate      >60 mL/min/1.73:m2 87   GFR Estimate If Black      >60 mL/min/1.73:m2 >90   Calcium      8.5 - 10.1 mg/dL 9.2   Bilirubin Total      0.2 - 1.3 mg/dL 0.3   Albumin      3.4 - 5.0 g/dL 3.8   Protein Total      6.8 - 8.8 g/dL 7.8   Alkaline Phosphatase      40 - 150 U/L 73   ALT      0 - 50 U/L 22   AST      0 - 45 U/L 16       OUTSIDE RECORDS  Outside referral notes and chart notes were reviewed and pertinent information has been summarized (in addition to the HPI):-Patient was seen in primary care.  Has issues with depression.  Had a visit in September that  was virtual.  Was diagnosed with migraine with aura.  Is on sumatriptan the day worsening.  History of migraines with sumatriptan injections and tablets.  Injections only for severe headaches.  Headaches happen once every 2 months.  Migraines get to cluster.  No significant changes in headaches.  Some concerns with hep C.    MRI brain-images reviewed with the patient again.  There are very minimal T2 hyperintensities noted.  Is appropriate for age.  IMPRESSION:  1.  No acute intracranial abnormality.  2.  Small nonspecific focus of subcortical T2 FLAIR hyperintensity in the right frontal lobe is strictly nonspecific but can be seen in setting of migraines and prior insult or injury. Demyelination is not favored.      LABS  Component      Latest Ref Rng & Units 2/8/2022   WBC      4.0 - 11.0 10e3/uL 6.3   RBC Count      3.80 - 5.20 10e6/uL 4.36   Hemoglobin      11.7 - 15.7 g/dL 12.8   Hematocrit      35.0 - 47.0 % 39.1   MCV      78 - 100 fL 90   MCH      26.5 - 33.0 pg 29.4   MCHC      31.5 - 36.5 g/dL 32.7   RDW      10.0 - 15.0 % 13.2   Platelet Count      150 - 450 10e3/uL 332   % Neutrophils      % 51   % Lymphocytes      % 38   % Monocytes      % 7   % Eosinophils      % 3   % Basophils      % 1   % Immature Granulocytes      % 0   Absolute Neutrophils      1.6 - 8.3 10e3/uL 3.2   Absolute Lymphocytes      0.8 - 5.3 10e3/uL 2.4   Absolute Monocytes      0.0 - 1.3 10e3/uL 0.5   Absolute Eosinophils      0.0 - 0.7 10e3/uL 0.2   Absolute Basophils      0.0 - 0.2 10e3/uL 0.1   Absolute Immature Granulocytes      <=0.4 10e3/uL 0.0   Vitamin B12      193 - 986 pg/mL 428   TSH      0.40 - 4.00 mU/L 2.28   Ferritin      8 - 252 ng/mL 70   Sed Rate      0 - 30 mm/hr 17   Sleep clinic notes reviewed    IMPRESSION/REPORT/PLAN  Intractable chronic migraine without aura and without status migrainosus  History of depression  Medication side effect  Left eye twitching  Insomnia  Suspected sleep apnea-awaiting CPAP  Left  facial twitching/left hand numbness    This is a 55 year old female with headache suggestive of chronic migraines.  Exam/MRI have been negative.  Blood work has been noncontributory.  There might be some lifestyle issues like irregular sleep schedule that might be leading to her headaches.  It can also be a trigger for headaches.  She does do some yoga which helps with the headaches.  Does have neck issues related to working on the computer too long which could also be leading to headaches.  MRI showed some T2 hyperintensities.  No cause for headaches.    For prevention of headaches she has tried Topamax and propranolol in the past.  Nortriptyline has provided minimal benefit and possibly helping with sleep.  She does complain of daytime fatigue with the nortriptyline will reduce the dose.  Will add tizanidine for neck pain/neck tension provoked headaches to see if it helps.  She rarely uses a tizanidine.  Will continue the nortriptyline for right now.    Botox is helping.  Because of cosmetic reasons she does not want us injecting a lot in the frontal region.  Will inject more in the trapezius region to see if that helps with the headaches.  Headaches are more in the right temporal and occipital region.  Continue current paradigm which is helping.    For abortive therapy will continue oral and injectable Imitrex.  She was put on Toradol previously which has not been effective.  We could add more medication or provide her IM Toradol to use at home though for right now it was decided to hold off and see how she does with the CPAP.    Previously she was having some left eyelid twitching which is now improved.  She is now having some left cheek, left biceps twitching and left hand numbness.  Reassured her that this is not symptoms of ALS.  Will monitor for right now.  Encouraged reducing stress and increased hydration.    Could consider increasing the Botox or CPAP does not really help with the headaches.      Return  "in 3 months for medical visit and continue Botox every 3 months.  Medications refilled.    -     SUMAtriptan (IMITREX STATDOSE) 6 MG/0.5ML pen injector kit; INJECT 0.5 ML UNDER THE SKIN AT ONSET OF HEADACHE FOR MIGRAINE. MAY REPEAT IN ONE HOUR. MAX 12 MG IN 24 HOURS  - Botox every 3 months  -     ketorolac (TORADOL) 10 MG tablet; Take 1 tablet (10 mg) by mouth every 6 hours as needed for moderate pain. Max of 3 tabs/week.  -     nortriptyline (PAMELOR) 10 MG capsule; Take 3 caps/night.  -     tiZANidine (ZANAFLEX) 2 MG tablet; Take 1 tablet (2 mg) by mouth 3 times daily as needed for muscle spasms.  -     SUMAtriptan (IMITREX) 100 MG tablet; TAKE 1 TABLET(100 MG) BY MOUTH AT ONSET OF HEADACHE FOR MIGRAINE. MAY REPEAT IN 2 HOURS. MAX 4 TABLETS/ 24 HOURS STRENGTH: 100 MG      Return in about 3 months (around 9/10/2025) for In-Clinic Visit (must), Botox.    Over 30 minutes were spent coordinating the care for the patient on the day of the encounter.  This includes previsit, during visit and post visit activities as documented above.  Counseling patient.  Refractory problem.  Reviewing sleep medicine notes.  New problem.  (Activities include but not inclusive of reviewing chart, reviewing outside records, reviewing labs and imaging study results as well as the images, patient visit time including getting history and exam,  use if applicable, review of test results with the patient and coming up with a plan in a shared model, counseling patient and family, education and answering patient questions, EMR , EMR diagnosis entry and problem list management, medication reconciliation and prescription management if applicable, paperwork if applicable, printing documents and documentation of the visit activities.)    Botox PA  \" The patient has a diagnosis of chronic migraines. She has more than 15 headache days a month with each headache lasting at least 4 hours despite use of triptans. Her headaches have " "been there for over 6 months. She has been screened for medication overuse headaches and she does not have that. She has tried nortriptyline, Topamax, Propranolol for 3 months without any significant benefit. I would request that the Botox be approved for her.  60-70% benefit after multiple sessions.\"      Chuy Dunn MD  Neurologist  [facility]  Tel:- [phone]    This note was dictated using voice recognition software.  Any grammatical or context distortions are unintentional and inherent to the software.      Again, thank you for allowing me to participate in the care of your patient.        Sincerely,        Chuy Dunn MD    Electronically signed"

## 2025-06-10 NOTE — NURSING NOTE
Chief Complaint   Patient presents with    Als    Botox Migraine     Pt states she's getting less migraines. However, when she does get migraines it last longer. Pt will like to discus new symptoms-upper left cheek and left bicep is twitching. There's also numbness and tingling on the left hand.      Hawa Yoon, CMA on 6/10/2025 at 9:34 AM

## 2025-06-10 NOTE — PROGRESS NOTES
NEUROLOGY OUTPATIENT PROGRESS NOTE   Alfredito 10, 2025     CHIEF COMPLAINT/REASON FOR VISIT/REASON FOR CONSULT  Patient presents with:  Als  Botox Migraine: Pt states she's getting less migraines. However, when she does get migraines it last longer. Pt will like to discus new symptoms-upper left cheek and left bicep is twitching. There's also numbness and tingling on the left hand.     REASON FOR CONSULTATION- Headaches    REFERRAL SOURCE  Dr. Srinivasa Sofia  CC Dr. Srinivasa Sofia    HISTORY OF PRESENT ILLNESS  Nikki Hardin is a 55 year old female seen today for evaluation of headaches. She reports that she has a diagnosis of migraines since age 14. Previously her headaches were sporadic but still frequent. She estimates that she was having at least ~15 headaches a month. Over the last 2 to 3 months headaches have become much more frequent. She is having a headache every day. Reports no real reasons why the headaches might have gotten worse.    Headaches are generally in the temple on either side and then radiate to the neck. Occasionally she can have more frontal pressure. Takes a throbbing. There is some photophobia and phonophobia. She has never had any auras with the headaches. Reports triggers including working and doing remote work from home, inconsistent sleep, lack of regular meals, going out in the sun sometimes storms. She does have some neck pain though neck pain is worse at the time of headaches.    She is tried Topamax in the past which caused severe side effects. She is also tried propanolol without any benefit. Over-the-counter medications do not help. 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 She has been using sumatriptan injections and tablets for several years which have been working for her. Has tried Maxalt with benefit though insurance would not cover it anymore and then she had to stop it. The sumatriptan works better than the Maxalt.    4/6/22  Patient returns today.  She did try the  nortriptyline and that has helped with the headaches.  Currently is having 7 headaches a month.  Reports no changes in the nature of the headaches.  Imitrex still works with the abortive therapy.  She does complain of some somnolence in the morning with the nortriptyline.  Is taking 30 mg.  Tries to take it earlier in the evening.  Things are may be slightly turning the corner at this point though she is not sure if this is a good long-term medication or not.  Is interested in Botox.    10/31/22  Patient returns today.  Brought in the headache log today which I reviewed.  She is still having 10-12 headaches every month.  Previously these were at 15 headaches a month.  Headaches have been minimally improved with the nortriptyline.  Reports side effects of somnolence with the nortriptyline.  Is taking 30 mg.  Wants to get off the medication.  Imitrex is working for abortive therapy.  The Imitrex injections were given to her by the pharmacy that she wants the pen which I prescribed today.  Denies any other new symptoms.  No other triggers for her headaches.  Wants to proceed with the Botox since most of her headaches are coming from the shoulder tension.    11/30/22  Patient returns today.  Headaches are about the same as before.  She had called the clinic for an Imitrex prescription which is working for her.  Reports of a new problem ongoing for the last 1 month.  She does have some twitching of her left eye.  This can happen anytime.  There is no clear triggers that she is identified.  Drinks about 2 cups of coffee.  Drinks plenty of water.  No new medications.  The twitching is not severe enough that it causes the eye to close.  Is limited to the left eye only.    2/7/23  Patient returns today.  Headaches have significantly improved with the Botox.  She reports 50% reduction in frequency.  She is having 9 headaches a month.  Also reports that the headaches are less severe and not lasting that long.  They are shorter  in intensity.  They still would last for couple of days.  Sumatriptan is still effective.    Left eye twitching has not happened since the Botox.  She did go on a vacation and the stress is less at this point.    She further reports some cosmetic changes in the frontal region with the Botox and wants to avoid those in the future.  She wants to do a lower dose of Botox in the frontal region.    8/15/23  Patient returns today.  1.  In the last 3 months since her Botox she has had 2 headaches in the month of June 9 in the month of July and 7 in the month of August.  Her headaches are about the same as before though might have been worse because of heat and irregular sleep schedule.  She starts work again tomorrow.  2.  No further eye twitching with the Botox  3.  We are injecting less Botox in the frontal region and that has not made the headaches any severe  4.  Imitrex still works as abortive therapy.  5.  Nortriptyline is making her more sleepy though wants to try a higher dose to see if the headaches can get better.  6.  Does complain of stiff neck when she works on the computer which could be leading to the headaches.    2/13/24  Patient returns today  1.  She is getting good benefit with the Botox though does have a few more headaches in the first month and the first half of the second month.  Second and third month do better.  We discussed that this is most likely related to other triggers and the Botox is working well.  2.  She is concerned about daytime fatigue with the nortriptyline and wants to cut down the dose.  It is providing some benefit with sleep  3.  Imitrex is working with abortive therapy.  Insurance is not approving both the oral and injections.  4.  Neck pain is better with the Botox.  Still has a lot of tension when she is working at the computer.  5.  No longer is having eye twitching.  No other new concerns.    9/17/24  Patient events.  1.  Continues to have 7-8 headaches a month.  There is no  clear major triggers that she is identified.  Could be related to the summer season.  Headaches are generally worse in the summers.  2.  Remains on the nortriptyline which she feels is helping.  Has not been using the tizanidine.  Has not needed it.  3.  Imitrex does work for abortive therapy.  Occasionally headaches are not severe enough that she would need abortive therapy  No other new concerns.  No side effects to the Botox.      3/17/25  Patient returns today  1.  Continues to have 7-8 headaches a month.  Headaches have become slightly more severe compared to before.  Had to go to the urgent care for Toradol injection with one of the headaches.  Sumatriptan has also become less effective.  2.  She has noticed that she does not get good sleep and sometimes that can lead to headaches.  Does occasionally get up in the middle of the night gasping for air.  She is scheduled in May to be evaluated for sleep apnea.  This could be causing her headaches.  3.  Tizanidine does help.  Is not frequently using it.  4.  Wants to continue the same Botox paradigm  No other new concerns.    6/10/25  Patient returns today  1.  Slightly better compared to before though she continues to have more severe headaches that have been more refractory to the sumatriptan.  She has tried to use the Toradol which has not been effective.  She does have some stress going on with her son's mental illness.  He is having a manic phase to his illness  2.  She further reports that she did see the sleep provider though schedule in 3 months to get the CPAP.  She has not been able to sleep well which could be making the headaches worse.  Does not want to try more medications right now till she can further evaluate her sleep  3.  She does complain of new onset of twitching in her face as well as her left biceps.  She also has numbness in the left hand and all 5 digits mainly on the palmar side.  She is concerned about ALS and we discussed that numbness  is not a sign of ALS.  It was decided to monitor for right now.  She will try doing drink more water.  No new concerns.    Previous history is reviewed and this is unchanged.    PAST MEDICAL/SURGICAL HISTORY  Past Medical History:   Diagnosis Date    Abnormal Pap smear of cervix 11/22/2019    See problem list.     Anxiety     Arthritis Couple yrs ago?    Had surgery on one of fingers last may    Colitis     lymphocytic colitis ?    Depressive disorder Since young adult    On fluoxetine and attend therapy periodically    Depressive disorder, not elsewhere classified     sees psych- Dr Blue    Migraine, unspecified, without mention of intractable migraine without mention of status migrainosus age 14 yrs    Other acne     PAIN IN THORACIC SPINE [724.1] 04/13/2006    chiropractic care    Raynaud disease     ? connective tissue dz.  ? sogren's     Patient Active Problem List   Diagnosis    Irritable bowel syndrome    Other acne    Migraine with aura and without status migrainosus, not intractable    Mild major depression (H)    Sedimentation rate elevation    Elevated vitamin B12 level    Anemia, unspecified type    Hypervitaminosis B6    Lymphocytic colitis    ASCUS of cervix with negative high risk HPV    Sjogren's syndrome with inflammatory arthritis    Post-menopausal   Significant for migraines, arthritis, depression, microcolitis    FAMILY HISTORY  Family History   Problem Relation Age of Onset    Neurologic Disorder Mother         MIGRIANES    Osteoporosis Mother     C.A.D. Father         in his 60's    Depression Father     Heart Disease Father     Lipids Father     Hypertension Father     Arthritis Father     Sleep Disorder Father     Cerebrovascular Disease Father 78    Coronary Artery Disease Father     Hyperlipidemia Father     Cancer - colorectal Paternal Grandmother     Colon Cancer Paternal Grandmother     Cancer Maternal Grandfather         stomach    Heart Disease Maternal Grandfather     Other Cancer  Maternal Grandfather     Depression Brother     Substance Abuse Brother     Depression Brother     Asthma Daughter     Anxiety Disorder Daughter     Substance Abuse Daughter     Heart Disease Paternal Grandfather     Breast Cancer Maternal Aunt     Breast Cancer Other         Maternal aunt    Breast Cancer Other     Depression Brother     Substance Abuse Brother     Asthma Daughter     Breast Cancer Other     Depression Brother     Depression Son     Mental Illness Daughter         Bipolar disorder--is managed    Diabetes No family hx of    Positive for migraines in her mother and aunt.    SOCIAL HISTORY  Social History     Tobacco Use    Smoking status: Never     Passive exposure: Never    Smokeless tobacco: Never   Vaping Use    Vaping status: Never Used   Substance Use Topics    Alcohol use: Yes     Comment: occasionally    Drug use: No       SYSTEMS REVIEW  Twelve-system ROS was done and other than the HPI this was negative except for neck pain, dizziness, hearing loss, sleepiness during the day, headaches, depression, bowel problems.  No new symptoms/issues.    MEDICATIONS  Current Outpatient Medications   Medication Sig Dispense Refill    acetaminophen (TYLENOL) 325 MG tablet Take 2 tablets by mouth every 4 hours as needed for pain. 100 tablet 0    Biotin 5000 MCG TABS Take 1,000 mcg by mouth daily      cholecalciferol 5000 UNITS CAPS Take 1 capsule (5,000 Units) by mouth daily FOR VITAMIN D DEFICIENCY (LOW VITAMIN D) 100 capsule 3    Ferrous Sulfate (IRON) 28 MG TABS Take by mouth daily.      FLUoxetine (PROZAC) 40 MG capsule Take 1 capsule (40 mg) by mouth daily. 90 capsule 3    IBUPROFEN PO Take 200 mg by mouth Takes 3 tablets PRN      ketorolac (TORADOL) 10 MG tablet Take 1 tablet (10 mg) by mouth every 6 hours as needed for moderate pain. Max of 3 tabs/week. 20 tablet 1    loperamide (IMODIUM A-D) 2 MG tablet Take 2 mg by mouth daily as needed for diarrhea      nortriptyline (PAMELOR) 10 MG capsule Take 3  "caps/night. 270 capsule 1    Riboflavin 100 MG TABS Take 400 mg by mouth daily.      SUMAtriptan (IMITREX STATDOSE) 6 MG/0.5ML pen injector kit INJECT 0.5 ML(6MG) UNDER THE SKIN AT ONSET OF HEADACHE FOR MIGRAINE. MAY REPEAT IN 1 HOUR. MAX 12 MG IN 24 HOURS 12 mL 3    SUMAtriptan (IMITREX) 100 MG tablet TAKE 1 TABLET(100 MG) BY MOUTH AT ONSET OF HEADACHE FOR MIGRAINE. MAY REPEAT IN 2 HOURS. MAX 4 TABLETS/ 24 HOURS STRENGTH: 100 MG 18 tablet 11    tiZANidine (ZANAFLEX) 2 MG tablet Take 1 tablet (2 mg) by mouth 3 times daily as needed for muscle spasms. 180 tablet 0     Current Facility-Administered Medications   Medication Dose Route Frequency Provider Last Rate Last Admin    Botulinum Toxin Type A (BOTOX) 200 units injection 155 Units  155 Units Intramuscular Once           PHYSICAL EXAMINATION  VITALS: /73 (BP Location: Left arm, Patient Position: Sitting)   Pulse 72   Ht 1.511 m (4' 11.5\")   Wt 64.7 kg (142 lb 9.6 oz)   BMI 28.32 kg/m    GENERAL: Healthy appearing, alert, no acute distress, normal habitus.  CARDIOVASCULAR: Extremities warm and well perfused. Pulses present.   NEUROLOGICAL:  Patient is awake and oriented to self, place and time.  Attention span is normal.  Memory is grossly intact.  Language is fluent and follows commands appropriately.  Appropriate fund of knowledge. Cranial nerves 2-12 are intact. There is no pronator drift.  Motor exam shows 5/5 strength in all extremities.  Tone is symmetric bilaterally in upper and lower extremities.  (Previously reflexes are symmetric and 2+ in upper extremities and lower extremities. Sensory exam is grossly intact to light touch, pin prick and vibration.) Finger to nose and heel to shin is without dysmetria.  Romberg is negative.  Gait is normal and the patient is able to do tandem walk and walk on toes and heels.  Exam stable.    DIAGNOSTICS  CARDIOLOGY  EKG 2008  NS[facility]    RELEVANT LABS  Component      Latest Ref Rng & Units 12/22/2020   WBC      4.0 " - 11.0 10e9/L 6.7   RBC Count      3.8 - 5.2 10e12/L 3.93   Hemoglobin      11.7 - 15.7 g/dL 11.4 (L)   Hematocrit      35.0 - 47.0 % 35.2   MCV      78 - 100 fl 90   MCH      26.5 - 33.0 pg 29.0   MCHC      31.5 - 36.5 g/dL 32.4   RDW      10.0 - 15.0 % 13.2   Platelet Count      150 - 450 10e9/L 297   % Neutrophils      % 70.5   % Lymphocytes      % 20.4   % Monocytes      % 6.6   % Eosinophils      % 2.1   % Basophils      % 0.4   Absolute Neutrophil      1.6 - 8.3 10e9/L 4.7   Absolute Lymphocytes      0.8 - 5.3 10e9/L 1.4   Absolute Monocytes      0.0 - 1.3 10e9/L 0.4   Absolute Eosinophils      0.0 - 0.7 10e9/L 0.1   Absolute Basophils      0.0 - 0.2 10e9/L 0.0   Diff Method       Automated Method   Sodium      133 - 144 mmol/L 138   Potassium      3.4 - 5.3 mmol/L 4.5   Chloride      94 - 109 mmol/L 107   Carbon Dioxide      20 - 32 mmol/L 27   Anion Gap      3 - 14 mmol/L 4   Glucose      70 - 99 mg/dL 79   Urea Nitrogen      7 - 30 mg/dL 12   Creatinine      0.52 - 1.04 mg/dL 0.78   GFR Estimate      >60 mL/min/1.73:m2 87   GFR Estimate If Black      >60 mL/min/1.73:m2 >90   Calcium      8.5 - 10.1 mg/dL 9.2   Bilirubin Total      0.2 - 1.3 mg/dL 0.3   Albumin      3.4 - 5.0 g/dL 3.8   Protein Total      6.8 - 8.8 g/dL 7.8   Alkaline Phosphatase      40 - 150 U/L 73   ALT      0 - 50 U/L 22   AST      0 - 45 U/L 16       OUTSIDE RECORDS  Outside referral notes and chart notes were reviewed and pertinent information has been summarized (in addition to the HPI):-Patient was seen in primary care.  Has issues with depression.  Had a visit in September that was virtual.  Was diagnosed with migraine with aura.  Is on sumatriptan the day worsening.  History of migraines with sumatriptan injections and tablets.  Injections only for severe headaches.  Headaches happen once every 2 months.  Migraines get to cluster.  No significant changes in headaches.  Some concerns with hep C.    MRI brain-images reviewed with the  patient again.  There are very minimal T2 hyperintensities noted.  Is appropriate for age.  IMPRESSION:  1.  No acute intracranial abnormality.  2.  Small nonspecific focus of subcortical T2 FLAIR hyperintensity in the right frontal lobe is strictly nonspecific but can be seen in setting of migraines and prior insult or injury. Demyelination is not favored.      LABS  Component      Latest Ref Rng & Units 2/8/2022   WBC      4.0 - 11.0 10e3/uL 6.3   RBC Count      3.80 - 5.20 10e6/uL 4.36   Hemoglobin      11.7 - 15.7 g/dL 12.8   Hematocrit      35.0 - 47.0 % 39.1   MCV      78 - 100 fL 90   MCH      26.5 - 33.0 pg 29.4   MCHC      31.5 - 36.5 g/dL 32.7   RDW      10.0 - 15.0 % 13.2   Platelet Count      150 - 450 10e3/uL 332   % Neutrophils      % 51   % Lymphocytes      % 38   % Monocytes      % 7   % Eosinophils      % 3   % Basophils      % 1   % Immature Granulocytes      % 0   Absolute Neutrophils      1.6 - 8.3 10e3/uL 3.2   Absolute Lymphocytes      0.8 - 5.3 10e3/uL 2.4   Absolute Monocytes      0.0 - 1.3 10e3/uL 0.5   Absolute Eosinophils      0.0 - 0.7 10e3/uL 0.2   Absolute Basophils      0.0 - 0.2 10e3/uL 0.1   Absolute Immature Granulocytes      <=0.4 10e3/uL 0.0   Vitamin B12      193 - 986 pg/mL 428   TSH      0.40 - 4.00 mU/L 2.28   Ferritin      8 - 252 ng/mL 70   Sed Rate      0 - 30 mm/hr 17   Sleep clinic notes reviewed    IMPRESSION/REPORT/PLAN  Intractable chronic migraine without aura and without status migrainosus  History of depression  Medication side effect  Left eye twitching  Insomnia  Suspected sleep apnea-awaiting CPAP  Left facial twitching/left hand numbness    This is a 55 year old female with headache suggestive of chronic migraines.  Exam/MRI have been negative.  Blood work has been noncontributory.  There might be some lifestyle issues like irregular sleep schedule that might be leading to her headaches.  It can also be a trigger for headaches.  She does do some yoga which helps  with the headaches.  Does have neck issues related to working on the computer too long which could also be leading to headaches.  MRI showed some T2 hyperintensities.  No cause for headaches.    For prevention of headaches she has tried Topamax and propranolol in the past.  Nortriptyline has provided minimal benefit and possibly helping with sleep.  She does complain of daytime fatigue with the nortriptyline will reduce the dose.  Will add tizanidine for neck pain/neck tension provoked headaches to see if it helps.  She rarely uses a tizanidine.  Will continue the nortriptyline for right now.    Botox is helping.  Because of cosmetic reasons she does not want us injecting a lot in the frontal region.  Will inject more in the trapezius region to see if that helps with the headaches.  Headaches are more in the right temporal and occipital region.  Continue current paradigm which is helping.    For abortive therapy will continue oral and injectable Imitrex.  She was put on Toradol previously which has not been effective.  We could add more medication or provide her IM Toradol to use at home though for right now it was decided to hold off and see how she does with the CPAP.    Previously she was having some left eyelid twitching which is now improved.  She is now having some left cheek, left biceps twitching and left hand numbness.  Reassured her that this is not symptoms of ALS.  Will monitor for right now.  Encouraged reducing stress and increased hydration.    Could consider increasing the Botox or CPAP does not really help with the headaches.      Return in 3 months for medical visit and continue Botox every 3 months.  Medications refilled.    -     SUMAtriptan (IMITREX STATDOSE) 6 MG/0.5ML pen injector kit; INJECT 0.5 ML UNDER THE SKIN AT ONSET OF HEADACHE FOR MIGRAINE. MAY REPEAT IN ONE HOUR. MAX 12 MG IN 24 HOURS  - Botox every 3 months  -     ketorolac (TORADOL) 10 MG tablet; Take 1 tablet (10 mg) by mouth every  "6 hours as needed for moderate pain. Max of 3 tabs/week.  -     nortriptyline (PAMELOR) 10 MG capsule; Take 3 caps/night.  -     tiZANidine (ZANAFLEX) 2 MG tablet; Take 1 tablet (2 mg) by mouth 3 times daily as needed for muscle spasms.  -     SUMAtriptan (IMITREX) 100 MG tablet; TAKE 1 TABLET(100 MG) BY MOUTH AT ONSET OF HEADACHE FOR MIGRAINE. MAY REPEAT IN 2 HOURS. MAX 4 TABLETS/ 24 HOURS STRENGTH: 100 MG      Return in about 3 months (around 9/10/2025) for In-Clinic Visit (must), Botox.    Over 30 minutes were spent coordinating the care for the patient on the day of the encounter.  This includes previsit, during visit and post visit activities as documented above.  Counseling patient.  Refractory problem.  Reviewing sleep medicine notes.  New problem.  (Activities include but not inclusive of reviewing chart, reviewing outside records, reviewing labs and imaging study results as well as the images, patient visit time including getting history and exam,  use if applicable, review of test results with the patient and coming up with a plan in a shared model, counseling patient and family, education and answering patient questions, EMR , EMR diagnosis entry and problem list management, medication reconciliation and prescription management if applicable, paperwork if applicable, printing documents and documentation of the visit activities.)    Botox PA  \" The patient has a diagnosis of chronic migraines. She has more than 15 headache days a month with each headache lasting at least 4 hours despite use of triptans. Her headaches have been there for over 6 months. She has been screened for medication overuse headaches and she does not have that. She has tried nortriptyline, Topamax, Propranolol for 3 months without any significant benefit. I would request that the Botox be approved for her.  60-70% benefit after multiple sessions.\"      Chuy Dunn MD  Neurologist  [facility] " [facility]  Tel:- [phone]    This note was dictated using voice recognition software.  Any grammatical or context distortions are unintentional and inherent to the software.

## 2025-06-18 ENCOUNTER — MYC MEDICAL ADVICE (OUTPATIENT)
Dept: FAMILY MEDICINE | Facility: CLINIC | Age: 55
End: 2025-06-18
Payer: COMMERCIAL

## 2025-06-18 NOTE — TELEPHONE ENCOUNTER
Keep appt in current slot but we can start sooner 12:30 or 1 pm, whatever pt's preference is.    Srinivasa Sofia, DO

## 2025-06-24 ENCOUNTER — VIRTUAL VISIT (OUTPATIENT)
Dept: FAMILY MEDICINE | Facility: CLINIC | Age: 55
End: 2025-06-24
Payer: COMMERCIAL

## 2025-06-24 DIAGNOSIS — E67.3 HYPERVITAMINOSIS D: Primary | ICD-10-CM

## 2025-06-24 PROCEDURE — 98005 SYNCH AUDIO-VIDEO EST LOW 20: CPT | Performed by: FAMILY MEDICINE

## 2025-06-24 NOTE — PROGRESS NOTES
Nikki is a 55 year old who is being evaluated via a billable video visit.          Assessment & Plan     Hypervitaminosis D  -Elevated levels due to excessive supplementation and excess stored in fat. Improved over the last year but still elevated. Discussed holding calcium + d supplement for now. No further vitamin D supplementation at this time. Will check calcium levels and PTH.  -If labs below normal, recheck vitamin D in 6 months and at annual visit.   - Basic metabolic panel  (Ca, Cl, CO2, Creat, Gluc, K, Na, BUN)  - Parathyroid Hormone Intact              Subjective   Nikki is a 55 year old, presenting for the following health issues:  No chief complaint on file.    History of Present Illness       Reason for visit:  Dr Sofia asked me to meet following results of vit d test   She is taking medications regularly.      Here to discuss high vitamin D levels.  Had levels checked last year, vitamin D was 80 at that time. Was taking 5000 international unit(s) daily, went down to taking 1600IU.    Rechecked vitamin D  this month was 60, improved but still high.  Takes calcium with 50 mcg of vitamin d, currently.  Does not take any other supplements with vitamin d.   Does not consume any fortified dairy products.  Hx of osteopenia due to hx of chronic steroid use.                 Objective           Vitals:  No vitals were obtained today due to virtual visit.    Physical Exam   GENERAL: alert and no distress  EYES: Eyes grossly normal to inspection.  No discharge or erythema, or obvious scleral/conjunctival abnormalities.  RESP: No audible wheeze, cough, or visible cyanosis.    SKIN: Visible skin clear. No significant rash, abnormal pigmentation or lesions.  NEURO: Cranial nerves grossly intact.  Mentation and speech appropriate for age.  PSYCH: Appropriate affect, tone, and pace of words          Video-Visit Details    Type of service:  Video Visit   Originating Location (pt. Location): Home    Distant Location  (provider location):  On-site  Platform used for Video Visit: Cris  Signed Electronically by: Srinivasa Sofia DO

## 2025-06-28 ENCOUNTER — LAB (OUTPATIENT)
Dept: LAB | Facility: CLINIC | Age: 55
End: 2025-06-28
Payer: COMMERCIAL

## 2025-06-28 DIAGNOSIS — E67.3 HYPERVITAMINOSIS D: ICD-10-CM

## 2025-06-28 LAB
ANION GAP SERPL CALCULATED.3IONS-SCNC: 9 MMOL/L (ref 7–15)
BUN SERPL-MCNC: 15.8 MG/DL (ref 6–20)
CALCIUM SERPL-MCNC: 9.6 MG/DL (ref 8.8–10.4)
CHLORIDE SERPL-SCNC: 103 MMOL/L (ref 98–107)
CREAT SERPL-MCNC: 0.87 MG/DL (ref 0.51–0.95)
EGFRCR SERPLBLD CKD-EPI 2021: 77 ML/MIN/1.73M2
GLUCOSE SERPL-MCNC: 78 MG/DL (ref 70–99)
HCO3 SERPL-SCNC: 25 MMOL/L (ref 22–29)
POTASSIUM SERPL-SCNC: 4.8 MMOL/L (ref 3.4–5.3)
PTH-INTACT SERPL-MCNC: 30 PG/ML (ref 15–65)
SODIUM SERPL-SCNC: 137 MMOL/L (ref 135–145)

## 2025-06-28 PROCEDURE — 83970 ASSAY OF PARATHORMONE: CPT

## 2025-06-28 PROCEDURE — 36415 COLL VENOUS BLD VENIPUNCTURE: CPT

## 2025-06-28 PROCEDURE — 80048 BASIC METABOLIC PNL TOTAL CA: CPT

## 2025-07-01 ENCOUNTER — RESULTS FOLLOW-UP (OUTPATIENT)
Dept: FAMILY MEDICINE | Facility: CLINIC | Age: 55
End: 2025-07-01

## 2025-07-10 ENCOUNTER — MYC MEDICAL ADVICE (OUTPATIENT)
Dept: FAMILY MEDICINE | Facility: CLINIC | Age: 55
End: 2025-07-10
Payer: COMMERCIAL

## 2025-07-11 NOTE — TELEPHONE ENCOUNTER
Dr. Sofia --    Please review and advise: calcium/vitamin D supplement.     See MyChart message from patient.     PETER Sears RN  [facility]

## 2025-07-14 NOTE — TELEPHONE ENCOUNTER
Take just a solo calcium supplement 1000 to 1200 mg daily divided into two doses.    Srinivasa Sofia, DO

## 2025-07-15 NOTE — TELEPHONE ENCOUNTER
Relayed plan of care to patient via [email or web address].    NIR HawleyN, RN (she/her)  [facility] RN

## 2025-07-16 NOTE — TELEPHONE ENCOUNTER
Writer responded via [employer].  Radha CURIEL, BSN, PHN, RN-[facility]  [phone]

## 2025-08-11 ENCOUNTER — MYC MEDICAL ADVICE (OUTPATIENT)
Dept: SLEEP MEDICINE | Facility: CLINIC | Age: 55
End: 2025-08-11
Payer: COMMERCIAL

## 2025-08-15 ASSESSMENT — SLEEP AND FATIGUE QUESTIONNAIRES
HOW LIKELY ARE YOU TO NOD OFF OR FALL ASLEEP IN A CAR, WHILE STOPPED FOR A FEW MINUTES IN TRAFFIC: WOULD NEVER DOZE
HOW LIKELY ARE YOU TO NOD OFF OR FALL ASLEEP WHILE SITTING AND TALKING TO SOMEONE: WOULD NEVER DOZE
HOW LIKELY ARE YOU TO NOD OFF OR FALL ASLEEP WHILE WATCHING TV: SLIGHT CHANCE OF DOZING
HOW LIKELY ARE YOU TO NOD OFF OR FALL ASLEEP WHILE LYING DOWN TO REST IN THE AFTERNOON WHEN CIRCUMSTANCES PERMIT: HIGH CHANCE OF DOZING
HOW LIKELY ARE YOU TO NOD OFF OR FALL ASLEEP WHILE SITTING QUIETLY AFTER LUNCH WITHOUT ALCOHOL: SLIGHT CHANCE OF DOZING
HOW LIKELY ARE YOU TO NOD OFF OR FALL ASLEEP WHILE SITTING AND READING: MODERATE CHANCE OF DOZING
HOW LIKELY ARE YOU TO NOD OFF OR FALL ASLEEP WHILE SITTING INACTIVE IN A PUBLIC PLACE: WOULD NEVER DOZE
HOW LIKELY ARE YOU TO NOD OFF OR FALL ASLEEP WHEN YOU ARE A PASSENGER IN A CAR FOR AN HOUR WITHOUT A BREAK: SLIGHT CHANCE OF DOZING

## 2025-08-18 ENCOUNTER — ANCILLARY PROCEDURE (OUTPATIENT)
Dept: MAMMOGRAPHY | Facility: CLINIC | Age: 55
End: 2025-08-18
Attending: FAMILY MEDICINE
Payer: COMMERCIAL

## 2025-08-18 DIAGNOSIS — Z12.31 ENCOUNTER FOR SCREENING MAMMOGRAM FOR BREAST CANCER: ICD-10-CM

## 2025-08-18 PROCEDURE — 77067 SCR MAMMO BI INCL CAD: CPT | Mod: TC | Performed by: RADIOLOGY

## 2025-08-18 PROCEDURE — 77063 BREAST TOMOSYNTHESIS BI: CPT | Mod: TC | Performed by: RADIOLOGY

## 2025-08-19 ENCOUNTER — OFFICE VISIT (OUTPATIENT)
Dept: SLEEP MEDICINE | Facility: CLINIC | Age: 55
End: 2025-08-19
Attending: INTERNAL MEDICINE
Payer: COMMERCIAL

## 2025-08-19 DIAGNOSIS — G47.33 MILD OBSTRUCTIVE SLEEP APNEA: ICD-10-CM

## 2025-08-19 DIAGNOSIS — Z78.0 POST-MENOPAUSAL: ICD-10-CM

## 2025-08-19 DIAGNOSIS — R06.83 SNORING: ICD-10-CM

## 2025-08-20 ENCOUNTER — DOCUMENTATION ONLY (OUTPATIENT)
Dept: SLEEP MEDICINE | Facility: CLINIC | Age: 55
End: 2025-08-20
Payer: COMMERCIAL